# Patient Record
Sex: FEMALE | Race: WHITE | NOT HISPANIC OR LATINO | Employment: FULL TIME | URBAN - METROPOLITAN AREA
[De-identification: names, ages, dates, MRNs, and addresses within clinical notes are randomized per-mention and may not be internally consistent; named-entity substitution may affect disease eponyms.]

---

## 2017-02-02 ENCOUNTER — OFFICE VISIT (OUTPATIENT)
Dept: OPTOMETRY | Facility: CLINIC | Age: 62
End: 2017-02-02
Payer: COMMERCIAL

## 2017-02-02 DIAGNOSIS — E11.9 TYPE 2 DIABETES MELLITUS WITHOUT RETINOPATHY (H): Primary | ICD-10-CM

## 2017-02-02 DIAGNOSIS — H52.201 MYOPIA OF RIGHT EYE WITH ASTIGMATISM AND PRESBYOPIA: ICD-10-CM

## 2017-02-02 DIAGNOSIS — H52.4 HYPEROPIA WITH PRESBYOPIA OF LEFT EYE: ICD-10-CM

## 2017-02-02 DIAGNOSIS — H52.11 MYOPIA OF RIGHT EYE WITH ASTIGMATISM AND PRESBYOPIA: ICD-10-CM

## 2017-02-02 DIAGNOSIS — H26.9 CATARACT OF BOTH EYES: ICD-10-CM

## 2017-02-02 DIAGNOSIS — H02.839 DERMATOCHALASIS OF EYELID: ICD-10-CM

## 2017-02-02 DIAGNOSIS — H52.02 HYPEROPIA WITH PRESBYOPIA OF LEFT EYE: ICD-10-CM

## 2017-02-02 DIAGNOSIS — H52.4 MYOPIA OF RIGHT EYE WITH ASTIGMATISM AND PRESBYOPIA: ICD-10-CM

## 2017-02-02 PROCEDURE — 92015 DETERMINE REFRACTIVE STATE: CPT | Performed by: OPTOMETRIST

## 2017-02-02 PROCEDURE — 92014 COMPRE OPH EXAM EST PT 1/>: CPT | Performed by: OPTOMETRIST

## 2017-02-02 ASSESSMENT — EXTERNAL EXAM - RIGHT EYE: OD_EXAM: NORMAL

## 2017-02-02 ASSESSMENT — TONOMETRY
OD_IOP_MMHG: 19
OS_IOP_MMHG: 19
IOP_METHOD: APPLANATION

## 2017-02-02 ASSESSMENT — VISUAL ACUITY
METHOD: SNELLEN - LINEAR
OD_SC: 20/20
OS_CC: 20/40
OS_SC: 20/120
OD_CC: 20/30
OD_SC: 20/60
OD_CC+: -1
OS_SC: 20/40
OD_SC+: -1
OS_CC: 20/40
OD_CC: 20/20
CORRECTION_TYPE: GLASSES

## 2017-02-02 ASSESSMENT — REFRACTION_MANIFEST
OD_AXIS: 010
OS_ADD: +2.25
OD_SPHERE: -0.75
OD_ADD: +2.25
OS_SPHERE: +1.00
OD_CYLINDER: +0.50
OS_CYLINDER: SPHERE

## 2017-02-02 ASSESSMENT — REFRACTION_WEARINGRX
OD_CYLINDER: +0.50
SPECS_TYPE: BIFOCAL
OS_AXIS: 039
OS_SPHERE: +0.75
OS_CYLINDER: +0.25
OS_ADD: +2.50
OD_SPHERE: -1.25
OD_AXIS: 174
OD_ADD: +2.50

## 2017-02-02 ASSESSMENT — SLIT LAMP EXAM - LIDS
COMMENTS: 1+ DERMATOCHALASIS - UPPER LID
COMMENTS: 1+ DERMATOCHALASIS - UPPER LID

## 2017-02-02 ASSESSMENT — CUP TO DISC RATIO
OS_RATIO: 0.4
OD_RATIO: 0.3

## 2017-02-02 ASSESSMENT — EXTERNAL EXAM - LEFT EYE: OS_EXAM: NORMAL

## 2017-02-02 ASSESSMENT — CONF VISUAL FIELD
OS_NORMAL: 1
OD_NORMAL: 1

## 2017-02-02 NOTE — Clinical Note
[facility]  Optometry Department      2/2/2017    Re:  Kenna MANE Zownirowycz  1955   [id number]    Dear Dr. Layne,    Your patient was seen in our office on 2/2/2017 for a dilated diabetic eye exam.      Findings:    No Retinopathy  OU - Both  Mild Cataracts both eyes  Dermatochalasis of eyelid    Comments:  Kenna should return for a comprehensive eye exam in 1 year.    Sincerely,        Emi Savage O.D  [address]. [place]  [address]    [phone]

## 2017-02-02 NOTE — PATIENT INSTRUCTIONS
Monitor, Keep blood sugar under control  Sent letter to primary care provider regarding diabetes  Monitor cataracts and dermatochalasis by having yearly exams.  Wear sunglasses when outside.  A final glasses prescription was given.  Allow time for adaptation.  The glasses may cause dizziness and affect depth perception for awhile.  Return to clinic 1 year for Comprehensive Vision Exam      Emi Savage O.D  [address]. [facility] [place]  [address]    [phone]

## 2017-02-02 NOTE — PROGRESS NOTES
Chief Complaint   Patient presents with     Diabetic Eye Exam     Accompanied by self  A1C      7.0   8/4/2016  A1C      6.1   10/26/2015  A1C      5.9   6/25/2015  A1C      6.0   12/31/2014  A1C      6.2   9/4/2014    Last Eye Exam: 2 years ago  Dilated Previously: Yes    What are you currently using to see?  glasses    Distance Vision Acuity: Satisfied with vision    Near Vision Acuity: Satisfied with vision while reading  with glasses    Eye Comfort: good  Do you use eye drops? : No  Occupation or Hobbies:     Sarai Almazan, Optometric Tech     Medical, surgical and family histories reviewed and updated 2/2/2017.       OBJECTIVE: See Ophthalmology exam    ASSESSMENT:    ICD-10-CM    1. Type 2 diabetes mellitus without retinopathy (H) E11.9 EYE EXAM (SIMPLE-NONBILLABLE)   2. Cataract of both eyes H26.9 EYE EXAM (SIMPLE-NONBILLABLE)   3. Dermatochalasis of eyelid H02.839 EYE EXAM (SIMPLE-NONBILLABLE)   4. Myopia of right eye with astigmatism and presbyopia H52.11 EYE EXAM (SIMPLE-NONBILLABLE)    H52.201 REFRACTION    H52.4    5. Hyperopia with presbyopia of left eye H52.02 EYE EXAM (SIMPLE-NONBILLABLE)    H52.4 REFRACTION      PLAN:  Monitor, Keep blood sugar under control  Sent letter to primary care provider regarding diabetes.  Kenna Miranda aware  eye exam results will be sent to Carolyne Layne.  Monitor cataracts and dermatochalasis by having yearly exams.  Wear sunglasses when outside.  A final glasses prescription was given.  Allow time for adaptation.  The glasses may cause dizziness and affect depth perception for awhile.  Return to clinic 1 year for Comprehensive Vision Exam      Emi Savage O.D  [address]. [facility] [place]  [address]    [phone]

## 2017-02-02 NOTE — MR AVS SNAPSHOT
After Visit Summary   2/2/2017    Kenna Houghwnirowycz    MRN: [id number]           Patient Information     Date Of Birth          1955        Visit Information        Provider Department      2/2/2017 3:30 PM Emi Savage OD [facility]        Today's Diagnoses     Type 2 diabetes mellitus without retinopathy (H)    -  1     Cataract of both eyes         Dermatochalasis of eyelid         Myopia of right eye with astigmatism and presbyopia         Hyperopia with presbyopia of left eye           Care Instructions        Monitor, Keep blood sugar under control  Sent letter to primary care provider regarding diabetes  Monitor cataracts and dermatochalasis by having yearly exams.  Wear sunglasses when outside.  A final glasses prescription was given.  Allow time for adaptation.  The glasses may cause dizziness and affect depth perception for awhile.  Return to clinic 1 year for Comprehensive Vision Exam      Emi Savage O.D  [facility]  [address] [place] [address]. [place]  [address] [phone]              Follow-ups after your visit        Follow-up notes from your care team     Return in about 1 year (around 2/2/2018) for Eye Exam.      Your next 10 appointments already scheduled     Jun 16, 2017  3:00 PM   (Arrive by 2:45 PM)   Return Visit with Kaycee Meng MD   [facility] ([facility])    [address] [place] [id number] [phone]              Who to contact     If you have questions or need follow up information about today's clinic visit or your schedule please contact [facility] directly at [phone].  Normal or non-critical lab and imaging results will be communicated to you by MyChart, letter or phone within 4 business days after the clinic has received the results. If you do not hear from us within 7 days, please contact the clinic through MyChart or  phone. If you have a critical or abnormal lab result, we will notify you by phone as soon as possible.  Submit refill requests through [employer] or call your pharmacy and they will forward the refill request to us. Please allow 3 business days for your refill to be completed.          Additional Information About Your Visit        [employer] Information     [employer] gives you secure access to your electronic health record. If you see a primary care provider, you can also send messages to your care team and make appointments. If you have questions, please call your primary care clinic.  If you do not have a primary care provider, please call [phone] and they will assist you.        Care EveryWhere ID     This is your Care EveryWhere ID. This could be used by other organizations to access your [facility] medical records  [id number]         Blood Pressure from Last 3 Encounters:   12/16/16 147/81   09/01/16 137/84   06/13/16 156/85    Weight from Last 3 Encounters:   12/16/16 99.111 kg (218 lb 8 oz)   12/15/16 97.977 kg (216 lb)   09/01/16 99.338 kg (219 lb)              We Performed the Following     EYE EXAM (SIMPLE-NONBILLABLE)     REFRACTION        Primary Care Provider Office Phone # Fax #    Carolyne Layne [phone]       [address] [address]        Thank you!     Thank you for choosing [facility]  for your care. Our goal is always to provide you with excellent care. Hearing back from our patients is one way we can continue to improve our services. Please take a few minutes to complete the written survey that you may receive in the mail after your visit with us. Thank you!             Your Updated Medication List - Protect others around you: Learn how to safely use, store and throw away your medicines at www.disposemymeds.org.          This list is accurate as of: 2/2/17  4:41 PM.  Always use your most recent med list.                    Brand Name Dispense Instructions for use    anastrozole 1 MG tablet    ARIMIDEX    90 tablet    Take 1 tablet (1 mg) by mouth daily       aspirin 81 MG tablet      1 TABLET DAILY       atorvastatin 20 MG tablet    LIPITOR    26 tablet    Twice a week       levothyroxine 125 MCG tablet    LEVOXYL    90 tablet    Take 1 tablet (125 mcg) by mouth every morning       losartan-hydrochlorothiazide 50-12.5 MG per tablet    HYZAAR    90 tablet    Take 1 tablet by mouth daily       metoprolol 25 MG tablet    LOPRESSOR    180 tablet    Take 1 tablet (25 mg) by mouth 2 times daily       MULTIPLE VITAMINS/WOMENS PO      1 tablet daily       vitamin D 1000 UNITS capsule      Take 1 capsule by mouth At Bedtime.

## 2017-02-07 DIAGNOSIS — E03.8 OTHER SPECIFIED HYPOTHYROIDISM: Primary | ICD-10-CM

## 2017-02-07 NOTE — TELEPHONE ENCOUNTER
+++Med Sync patient requesting 90 day supply+++    This patient is enrolled in our Medication Synchronization Program.  Med Sync coordinates a patient's prescriptions to be filled on the same day.  Our goal is to help our patients better understand their medication therapy and achieve optimum health outcomes.    By authorizing, we will be able to synchronize these medications to be due for refill authorization from your office at the same time.    Thank you for your cooperation.  We are pleased to partner with you in the care of our patient.          Levothyroxine Sodium 125 mcg tablet  Last Written Prescription Date: 07/11/16  Last Quantity: 90, # refills: 1  Last Office Visit with [facility], [facility] or [facility] prescribing provider: 12/15/16       TSH   Date Value Ref Range Status   08/04/2016 4.68* 0.40 - 4.00 mU/L Final     Alena Mcduffie CPhT  On Behalf of [facility]

## 2017-02-09 RX ORDER — LEVOTHYROXINE SODIUM 125 UG/1
125 TABLET ORAL EVERY MORNING
Qty: 90 TABLET | Refills: 0 | Status: SHIPPED | OUTPATIENT
Start: 2017-02-09 | End: 2017-05-08

## 2017-02-09 NOTE — TELEPHONE ENCOUNTER
Routing refill request to provider for review/approval because:  Labs out of range:  Last TSH was abnormal.    Ruth Luz RN [facility] Triage.

## 2017-05-08 DIAGNOSIS — E03.8 OTHER SPECIFIED HYPOTHYROIDISM: ICD-10-CM

## 2017-05-08 DIAGNOSIS — I10 ESSENTIAL HYPERTENSION WITH GOAL BLOOD PRESSURE LESS THAN 140/90: ICD-10-CM

## 2017-05-08 NOTE — TELEPHONE ENCOUNTER
levothyroxine (LEVOXYL) 125 MCG tablet     Last Written Prescription Date: 2-9-17  Last Quantity: 90, # refills: 0  Last Office Visit with [facility], [facility] or [facility] prescribing provider: 12-15-16        TSH   Date Value Ref Range Status   08/04/2016 4.68 (H) 0.40 - 4.00 mU/L Final     metoprolol (LOPRESSOR) 25 MG tablet      Last Written Prescription Date: 7-11-16  Last Fill Quantity: 180, # refills: 2    Last Office Visit with [facility], [facility] or [facility] prescribing provider:  12-15-16   Future Office Visit:        BP Readings from Last 3 Encounters:   12/16/16 147/81   09/01/16 137/84   06/13/16 156/85

## 2017-05-10 RX ORDER — METOPROLOL TARTRATE 25 MG/1
TABLET, FILM COATED ORAL
Qty: 180 TABLET | Refills: 1 | Status: SHIPPED | OUTPATIENT
Start: 2017-05-10 | End: 2017-09-07

## 2017-05-10 RX ORDER — LEVOTHYROXINE SODIUM 125 UG/1
TABLET ORAL
Qty: 90 TABLET | Refills: 0 | Status: SHIPPED | OUTPATIENT
Start: 2017-05-10 | End: 2017-08-07

## 2017-05-10 NOTE — TELEPHONE ENCOUNTER
Routing refill request to provider for review/approval because:  Labs/ blood pressure out of range.     Peggy Marsh RN  [facility]

## 2017-06-01 ENCOUNTER — OFFICE VISIT (OUTPATIENT)
Dept: NUTRITION | Facility: CLINIC | Age: 62
End: 2017-06-01
Payer: COMMERCIAL

## 2017-06-01 VITALS — BODY MASS INDEX: 39.3 KG/M2 | WEIGHT: 208 LBS

## 2017-06-01 DIAGNOSIS — R80.9 TYPE 2 DIABETES MELLITUS WITH MICROALBUMINURIA, WITHOUT LONG-TERM CURRENT USE OF INSULIN (H): Primary | ICD-10-CM

## 2017-06-01 DIAGNOSIS — E11.29 TYPE 2 DIABETES MELLITUS WITH MICROALBUMINURIA, WITHOUT LONG-TERM CURRENT USE OF INSULIN (H): Primary | ICD-10-CM

## 2017-06-01 DIAGNOSIS — I10 ESSENTIAL HYPERTENSION WITH GOAL BLOOD PRESSURE LESS THAN 140/90: ICD-10-CM

## 2017-06-01 PROCEDURE — 97802 MEDICAL NUTRITION INDIV IN: CPT

## 2017-06-01 NOTE — PATIENT INSTRUCTIONS
Have balanced meals 3 times a day - include protein (1/4 plate), whole grain or starchy vegetable (1/4 plate), fruit (1/2 cup), non-starchy vegetable (1/2 plate).     Include dairy 1-2 times a day (milk with breakfast and yogurt with lunch)    Best snack options - fruit, yogurt, raw vegetables or small amount of nuts (handful)    Keep sweetened beverage servings to 4-6 oz once a day at most    Get back into drinking water more often    Keep a food journal    For physical activity  -Start slowly with walking the dogs or on the treadmill (10-15 minutes most days) and work up to 150 minutes a week (30 minutes, 5 days a week)  - Include 2-3 days of strength training (take the stairs a few extra times a day, use light weights, cans of food or water bottles for upper arm strength exercise)    Follow-up on Thursday, August 10 at 1 pm at the [facility] with Chantal

## 2017-06-01 NOTE — MR AVS SNAPSHOT
After Visit Summary   6/1/2017    Kenna Bravonimadhuriycz    MRN: [id number]           Patient Information     Date Of Birth          1955        Visit Information        Provider Department      6/1/2017 8:30 AM CP NUTRITION RESOURCE [facility]        Care Instructions    Have balanced meals 3 times a day - include protein (1/4 plate), whole grain or starchy vegetable (1/4 plate), fruit (1/2 cup), non-starchy vegetable (1/2 plate).     Include dairy 1-2 times a day (milk with breakfast and yogurt with lunch)    Best snack options - fruit, yogurt, raw vegetables or small amount of nuts (handful)    Keep sweetened beverage servings to 4-6 oz once a day at most    Get back into drinking water more often    Keep a food journal    For physical activity  -Start slowly with walking the dogs or on the treadmill (10-15 minutes most days) and work up to 150 minutes a week (30 minutes, 5 days a week)  - Include 2-3 days of strength training (take the stairs a few extra times a day, use light weights, cans of food or water bottles for upper arm strength exercise)    Follow-up on Thursday, August 10 at 1 pm at the [facility] with Chantal          Follow-ups after your visit        Your next 10 appointments already scheduled     Jun 19, 2017  3:30 PM CDT   (Arrive by 3:15 PM)   Return Visit with Kaycee Meng MD   [facility] ([facility])    [address] [place] [id number] [phone]            Aug 10, 2017  1:00 PM CDT   Office Visit with CP DIABETIC ED RESOURCE   [facility]    [address] [facility] [id number] [phone]           Bring a current list of meds and any records pertaining to this visit.  For Physicals, please bring immunization records and any forms needing to be filled out.  Please arrive 10  minutes early to complete paperwork.              Who to contact     If you have questions or need follow up information about today's clinic visit or your schedule please contact [facility] directly at [phone].  Normal or non-critical lab and imaging results will be communicated to you by MyChart, letter or phone within 4 business days after the clinic has received the results. If you do not hear from us within 7 days, please contact the clinic through MyChart or phone. If you have a critical or abnormal lab result, we will notify you by phone as soon as possible.  Submit refill requests through [employer] or call your pharmacy and they will forward the refill request to us. Please allow 3 business days for your refill to be completed.          Additional Information About Your Visit        [employer] Information     [employer] gives you secure access to your electronic health record. If you see a primary care provider, you can also send messages to your care team and make appointments. If you have questions, please call your primary care clinic.  If you do not have a primary care provider, please call [phone] and they will assist you.        Care EveryWhere ID     This is your Care EveryWhere ID. This could be used by other organizations to access your [facility] medical records  [id number]         Blood Pressure from Last 3 Encounters:   12/16/16 147/81   09/01/16 137/84   06/13/16 156/85    Weight from Last 3 Encounters:   12/16/16 99.1 kg (218 lb 8 oz)   12/15/16 98 kg (216 lb)   09/01/16 99.3 kg (219 lb)              Today, you had the following     No orders found for display       Primary Care Provider Office Phone # Fax #    Carolyne Layne [phone]       [facility] [address]  [facility] [place] [address]        Thank you!     Thank you for choosing [facility]  for your care. Our goal is always to provide you with  excellent care. Hearing back from our patients is one way we can continue to improve our services. Please take a few minutes to complete the written survey that you may receive in the mail after your visit with us. Thank you!             Your Updated Medication List - Protect others around you: Learn how to safely use, store and throw away your medicines at www.disposemymeds.org.          This list is accurate as of: 6/1/17  9:15 AM.  Always use your most recent med list.                   Brand Name Dispense Instructions for use    anastrozole 1 MG tablet    ARIMIDEX    90 tablet    Take 1 tablet (1 mg) by mouth daily       aspirin 81 MG tablet      1 TABLET DAILY       atorvastatin 20 MG tablet    LIPITOR    26 tablet    Twice a week       levothyroxine 125 MCG tablet    SYNTHROID/LEVOTHROID    90 tablet    TAKE ONE TABLET BY MOUTH EVERY MORNING       losartan-hydrochlorothiazide 50-12.5 MG per tablet    HYZAAR    90 tablet    Take 1 tablet by mouth daily       metoprolol 25 MG tablet    LOPRESSOR    180 tablet    TAKE ONE TABLET BY MOUTH TWICE A DAY       MULTIPLE VITAMINS/WOMENS PO      1 tablet daily       vitamin D 1000 UNITS capsule      Take 1 capsule by mouth At Bedtime.

## 2017-06-01 NOTE — PROGRESS NOTES
Medical Nutrition Therapy  Visit Type:Initial assessment and intervention    Kenna Miranda presents today for MNT and education related to type 2 diabetes and weight management.   She is accompanied by self.     ASSESSMENT:   Patient comments/concerns relating to nutrition: reports that she has tried multiple weight loss plans in the past - Radha Pravin, Weight Watchers and [employer]. Had the most success with [employer], however felt this was not the healthiest way to lose weight as the supplements they required made her ill. She has been working on weight loss independently and would like further guidance for continued weight loss. She is going to stop working in a couple of weeks, as the stress of her job is not allowing her to take very good care of herself. She feels that she will be better able to focus on her health once she is no longer working in her stressful position.     When she is no longer working, she plans to resume [employer] breakfast - scrambled eggs, 1/2 cup brown rice, fruit, milk. Thinking that she may do a salad with protein for lunch when at home. She is looking forward to being able to cook dinner meals at home more often, as well.    NUTRITION HISTORY:    Breakfast: [employer] granola bars, coffee with creamer  Lunch: fresh fruit - cantaloupe, watermelon or applesauce  Dinner: often out for dinner or take out - steak and potatoes OR pizza  Snacks: not often now, but anticipates she may snack when she is no longer working  Beverages: Juice 1/day, Pop occasionally/day, Tea (hot) 1 large/day, Coffee 1/day and Water     Misses meals? no  Eats out:  A few meals/per week     Previous diet education:  No     Food allergies/intolerances: none reported    Diet is high in: carbohydrates at some sittings  Diet is low in: vegetables    EXERCISE: no regular exercise program, plans to begin walking her dogs or on her treadmill    SOCIO/ECONOMIC:   Lives with: spouse    MEDICATIONS:  Current  Outpatient Prescriptions   Medication     levothyroxine (SYNTHROID/LEVOTHROID) 125 MCG tablet     metoprolol (LOPRESSOR) 25 MG tablet     anastrozole (ARIMIDEX) 1 MG tablet     atorvastatin (LIPITOR) 20 MG tablet     losartan-hydrochlorothiazide (HYZAAR) 50-12.5 MG per tablet     Cholecalciferol (VITAMIN D) 1000 UNITS capsule     MULTIPLE VITAMINS/WOMENS OR     ASPIRIN 81 MG OR TABS     No current facility-administered medications for this visit.        LABS:  Last Basic Metabolic Panel:  Lab Results   Component Value Date     12/15/2016      Lab Results   Component Value Date    POTASSIUM 3.8 12/15/2016     Lab Results   Component Value Date    CHLORIDE 103 12/15/2016     Lab Results   Component Value Date    CARLYN 9.3 12/15/2016     Lab Results   Component Value Date    CO2 27 12/15/2016     Lab Results   Component Value Date    BUN 17 12/15/2016     Lab Results   Component Value Date    CR 0.91 12/15/2016     Lab Results   Component Value Date     12/15/2016       ANTHROPOMETRICS:  Vitals: Wt 94.3 kg (208 lb)  BMI 39.3 kg/m2  Body mass index is 39.3 kg/(m^2).      Wt Readings from Last 5 Encounters:   06/01/17 94.3 kg (208 lb)   12/16/16 99.1 kg (218 lb 8 oz)   12/15/16 98 kg (216 lb)   09/01/16 99.3 kg (219 lb)   06/13/16 97.3 kg (214 lb 9.6 oz)       Weight Change: weight is down ~10 lbs in 6 months    ESTIMATED KCAL REQUIREMENTS:  1730 kcal per day    NUTRITION DIAGNOSIS: Overweight/Obesity related to historical eating pattern and stress as evidenced by Body mass index is 39.3 kg/(m^2).     NUTRITION INTERVENTION:  Nutrition Prescription: Energy Intake: 2736-9968 kcal/day  Education given to support: general nutrition guidelines, weight reduction, consistent meals, artificial sweeteners, exercise, fiber, behavior modification and portion control  Education Materials Provided: My Plate Planner/Choose My Plate and Carbohydrate Counting  Motivational Interviewing - patient is motivated and in action  stage of change - she reports high level of confidence that she will be able to make further changes to diet and exercise.     PATIENT'S BEHAVIOR CHANGE GOALS:   See Patient Instructions for patient stated behavior change goals. AVS was printed and given to patient at today's appointment.    MONITOR / EVALUATE:  RD will monitor/evaluate:  Blood Glucose / A1c  Food and nutrition knowledge / skills  Food / Beverage / Nutrient intake   Progress toward meeting stated nutrition-related goals  Readiness to change nutrition-related behaviors  Weight change    FOLLOW-UP:  Follow-up appointment scheduled on 8/10/17.     Chantal Hoover, MPH RD LD CDE   Time spent in minutes: 50  Encounter: Individual

## 2017-06-14 ASSESSMENT — ENCOUNTER SYMPTOMS
MUSCLE WEAKNESS: 0
JOINT SWELLING: 0
HYPOTENSION: 0
TACHYCARDIA: 0
BACK PAIN: 0
LIGHT-HEADEDNESS: 0
EXERCISE INTOLERANCE: 0
ORTHOPNEA: 0
LEG PAIN: 1
STIFFNESS: 0
MUSCLE CRAMPS: 0
MYALGIAS: 0
PALPITATIONS: 0
HYPERTENSION: 0
CLAUDICATION: 0
SLEEP DISTURBANCES DUE TO BREATHING: 0
ARTHRALGIAS: 0
NECK PAIN: 0
LEG SWELLING: 1

## 2017-06-19 ENCOUNTER — ONCOLOGY VISIT (OUTPATIENT)
Dept: ONCOLOGY | Facility: CLINIC | Age: 62
End: 2017-06-19
Attending: INTERNAL MEDICINE
Payer: COMMERCIAL

## 2017-06-19 VITALS
BODY MASS INDEX: 38.37 KG/M2 | HEART RATE: 82 BPM | SYSTOLIC BLOOD PRESSURE: 199 MMHG | TEMPERATURE: 98.5 F | HEIGHT: 62 IN | RESPIRATION RATE: 12 BRPM | DIASTOLIC BLOOD PRESSURE: 93 MMHG | OXYGEN SATURATION: 94 % | WEIGHT: 208.5 LBS

## 2017-06-19 DIAGNOSIS — C50.919 MALIGNANT NEOPLASM OF FEMALE BREAST, UNSPECIFIED LATERALITY, UNSPECIFIED SITE OF BREAST: Primary | ICD-10-CM

## 2017-06-19 PROCEDURE — 99212 OFFICE O/P EST SF 10 MIN: CPT

## 2017-06-19 PROCEDURE — 99214 OFFICE O/P EST MOD 30 MIN: CPT | Mod: GC | Performed by: INTERNAL MEDICINE

## 2017-06-19 ASSESSMENT — PAIN SCALES - GENERAL: PAINLEVEL: NO PAIN (0)

## 2017-06-19 NOTE — PROGRESS NOTES
[facility] CANCER CLINIC  ONCOLOGY FOLLOW-UP VISIT NOTE    PATIENT NAME: Kenna Miranda    YOB: 1955  MRN :[id number]  DATE OF VISIT: Jun 19, 2017    Diagnosis: Locally advanced breast cancer .     History of present Illness :  Ms. Miranda is a 61-year-old female with a history of hypertension, hyperlipidemia and diabetes and a stage I, ER positive , Her2 negative LEFT breast cancer. Briefly, Kenna has been getting regular screening mammographies. She reports she had a breast biopsy performed approximately 5 years ago for what turned out to be a cyst. In mammogram, which was performed in 10/2012, a small, spiculated asymmetry was appreciated in the posteromedial left breast. There was no evidence of malignancy in the right breast. She went on to have a diagnostic mammogram of the left breast on 10/18. This revealed an 8 x 11 x 7-mm mass in the left breast. This was approximately 9 cm from the nipple at 10 o'clock. She went on for an ultrasound that was performed that same day and subsequently ultrasound core biopsy performed. The actual pathology from the biopsy, which was performed on 10/24, revealed an invasive adenocarcinoma, solid and cribriform types. This was well-differentiated and Rajni grade 1. There were no calcifications present. Necrosis was absent. There was associated grade 1 intraductal carcinoma. This was 100% positive for estrogen and 80% positive for progesterone. HER2 testing was performed by FISH was negative with a ratio of HER2/CEP17 signals of 1.3. She went on to have a left lumpectomy performed by Jesus Vicente on 11/05/2012, as well as a left axillary sentinel lymph node biopsy. The left breast lumpectomy specimen revealed infiltrating ductal carcinoma, histologic [place] grade 1. The ultimate size was 1.1 x 0.7 cm. There was associated ductal carcinoma in situ with cribriform and micropapillary type, nuclear grade 2, representing  "approximately 13% of the tumor and extending beyond the main tumor. The margins were negative. The closest margin was 0.2 cm away, giving her a final pathologic staging of pT1c N0 MX. One sentinel lymph node was removed; this was negative. There was hemangioma present in the perinodal soft tissue. Oncotype DX returned at 12. This gives Kenna a chance of distant recurrence of 8% in 10 years, after 5 years of tamoxifen. With these results, she was recommended proceeding with radiation and adjuvant AI. No chemotherapy was indicated.  She completed radiation in January 2013.  She began arimidex shortly thereafter.    INTERVAL HISTORY : Kenna returns for a routine followup visit.  She has been doing relatively well.  She is tolerating Arimidex with minimal side effects.  She reports chronic cough which she has been experiencing for the past 6 months.  She describes the cough as dry.  It usually gets worse at night.  She denies having fever or chills.  She has had intermittent heartburn which usually improves with Tums.  Otherwise, she has no significant GERD symptoms.  She denies taking any new medications recently.  She denies having a runny nose or postnasal drip.  She denies a history of allergic rhinitis or bronchitis.  Her blood pressure also has been running high recently.  She thinks this is related to stress at her workplaces.  She is very happy that she retired just last week.      She gets intermittent hot flashes and mild arthralgias related to Arimidex.  Otherwise, she denies vaginal dryness.  Her mood has been good.  She reports stable energy.  However, she has not been exercising regularly.  She has no new lumps or bumps.       ROS: 10 point ROS neg other than the symptoms noted above in the HPI.    PHYSICAL EXAM :  BP (!) 199/93  Pulse 82  Temp 98.5  F (36.9  C) (Oral)  Resp 12  Ht 1.57 m (5' 1.81\")  Wt 94.6 kg (208 lb 8 oz)  SpO2 94%  BMI 38.37 kg/m2   GENERAL : Alert and oriented , not in distress " .   HEENT: Normocephalic head.  Anicteric sclerae. Moist buccal mucosa. No oral ulceration. Pupils are equal and reactive bilaterally. Normal extraocular eye movements. No conjunctival injection.    NECK: Supple, no thyromegaly.   LYMPH:  No cervical, supraclavicular, axillary, epitrochlear, or inguinal lymphadenopathy.  BREAST: Without palpable abnormalities, no overlying skin abnormalities.  No palpable lesions.   LUNGS: Clear breath sounds bilaterally, no wheezing, no crackles.    CARDIOVASCULAR: S1 and S2 well heard, regular rate and rhythm, no murmur or gallop. JVP absent.    ABDOMEN: Soft, nontender, positive bowel sounds. No organomegaly was appreciated.  EXTREMITIES: No cyanosis, no clubbing, no edema.    SKIN: No skin rash, no purpura or petechiae.    NEUROLOGIC: Normal mental status. Alert and oriented .Cranial nerves II through XII grossly intact.  No focal weakness. Sensory examination grossly intact.  Normal coordination. Romberg`s sign is negative.    ASSESSMENT AND PLAN:  61-year-old female with a T1c N0 M0, infiltrating ductal carcinoma of the left breast, ER/CT-positive, HER2/kenia-negative.     1.  Left breast cancer.  She is status post left lumpectomy and sentinel lymph node biopsy.  Her Oncotype DX score was 12 and she did not require adjuvant chemotherapy.  She had adjuvant radiation.  She has been on Arimidex since 01/2013.  Now she is approaching 4-1/2 years.  She is doing well without clinical evidence of disease recurrence on today's examination.  We plan to continue Arimidex for at least 5 years.  We discussed the pros and cons of continuing Arimidex for total of 10 years.  We discussed about the overall benefit based on MA-17R trial.  The patient expressed her interest to continue Arimidex for a total of 10 years.  She will return back to clinic with a mammogram in 6 months.      2.  Bone health.  She had a DEXA scan in 11/2015 which showed a T-score of -1.4 at the left femoral neck.  She  has been on calcium and vitamin D supplementation.  In the past we discussed about the possibility of Reclast.  She needs to see a dentist sometimes in the future to get clearance for it and see if she needs any dental work prior to starting Reclast.  This will be readdressed at the next visit.     3.  Hypertension.  Her blood pressure is significantly elevated today.  The patient also reports that her blood pressure has been running on the higher side in the past few weeks.  She thinks it is related with stress related to her job.  She has been retired for the past 1 week.  However, her blood pressure remains high.  She states that she has been compliant with her blood pressure medications.  We repeated her blood pressure and it remained high.  She has not been symptomatic.  We advised her to increase her dose of metoprolol tonight to 50 mg and then recheck her blood pressure in the morning and to contact her primary care physician for further guidance and adjustment of her blood pressure medications.  We also advised her to contact her primary care physician or to go to the emergency room if she gets any headache, neck stiffness, chest pain or any worrisome symptoms.      4.  Obesity.  We encouraged her to lose weight and exercise regularly.     5.  Health care maintenance.  The patient was encouraged to continue taking calcium and vitamin D.  We also discussed the importance of regular exercise with at least 150 minutes per week.         Patient seen and plan discussed with Dr Karyn Boss MD  Hematology and Oncology Fellow  [phone] (Pager)       Pt was seen and evaluated by me with Dr Boss.  Reviewed labs and mammogram.  Breast examination reveals no nodules or masses.  I did recheck BP manually and it was 182/92.  I asked pt to increase her metoprolol to 50 mg this evening, and to check back with her PCP tomorrow.    Mammogram in 6 months.    Kaycee Meng

## 2017-06-19 NOTE — LETTER
6/19/2017       RE: Kenna Miranda  [address] [facility] [address]     Dear Colleague,    Thank you for referring your patient, Kenna Miranda, to the [facility] CANCER CLINIC. Please see a copy of my visit note below.    [facility] CANCER CLINIC  ONCOLOGY FOLLOW-UP VISIT NOTE    PATIENT NAME: Kenna Miranda    YOB: 1955  MRN :[id number]  DATE OF VISIT: Jun 19, 2017    Diagnosis: Locally advanced breast cancer .     History of present Illness :  Ms. Miranda is a 61-year-old female with a history of hypertension, hyperlipidemia and diabetes and a stage I, ER positive , Her2 negative LEFT breast cancer. Briefly, Kenna has been getting regular screening mammographies. She reports she had a breast biopsy performed approximately 5 years ago for what turned out to be a cyst. In mammogram, which was performed in 10/2012, a small, spiculated asymmetry was appreciated in the posteromedial left breast. There was no evidence of malignancy in the right breast. She went on to have a diagnostic mammogram of the left breast on 10/18. This revealed an 8 x 11 x 7-mm mass in the left breast. This was approximately 9 cm from the nipple at 10 o'clock. She went on for an ultrasound that was performed that same day and subsequently ultrasound core biopsy performed. The actual pathology from the biopsy, which was performed on 10/24, revealed an invasive adenocarcinoma, solid and cribriform types. This was well-differentiated and [place] grade 1. There were no calcifications present. Necrosis was absent. There was associated grade 1 intraductal carcinoma. This was 100% positive for estrogen and 80% positive for progesterone. HER2 testing was performed by FISH was negative with a ratio of HER2/CEP17 signals of 1.3. She went on to have a left lumpectomy performed by Jesus Vicente on 11/05/2012, as well as a left axillary sentinel lymph node biopsy. The left breast  lumpectomy specimen revealed infiltrating ductal carcinoma, histologic [place] grade 1. The ultimate size was 1.1 x 0.7 cm. There was associated ductal carcinoma in situ with cribriform and micropapillary type, nuclear grade 2, representing approximately 13% of the tumor and extending beyond the main tumor. The margins were negative. The closest margin was 0.2 cm away, giving her a final pathologic staging of pT1c N0 MX. One sentinel lymph node was removed; this was negative. There was hemangioma present in the perinodal soft tissue. Oncotype DX returned at 12. This gives Kenna a chance of distant recurrence of 8% in 10 years, after 5 years of tamoxifen. With these results, she was recommended proceeding with radiation and adjuvant AI. No chemotherapy was indicated.  She completed radiation in January 2013.  She began arimidex shortly thereafter.    INTERVAL HISTORY : Kenna returns for a routine followup visit.  She has been doing relatively well.  She is tolerating Arimidex with minimal side effects.  She reports chronic cough which she has been experiencing for the past 6 months.  She describes the cough as dry.  It usually gets worse at night.  She denies having fever or chills.  She has had intermittent heartburn which usually improves with Tums.  Otherwise, she has no significant GERD symptoms.  She denies taking any new medications recently.  She denies having a runny nose or postnasal drip.  She denies a history of allergic rhinitis or bronchitis.  Her blood pressure also has been running high recently.  She thinks this is related to stress at her workplaces.  She is very happy that she retired just last week.      She gets intermittent hot flashes and mild arthralgias related to Arimidex.  Otherwise, she denies vaginal dryness.  Her mood has been good.  She reports stable energy.  However, she has not been exercising regularly.  She has no new lumps or bumps.       ROS: 10 point ROS neg other than the  "symptoms noted above in the HPI.    PHYSICAL EXAM :  BP (!) 199/93  Pulse 82  Temp 98.5  F (36.9  C) (Oral)  Resp 12  Ht 1.57 m (5' 1.81\")  Wt 94.6 kg (208 lb 8 oz)  SpO2 94%  BMI 38.37 kg/m2   GENERAL : Alert and oriented , not in distress .   HEENT: Normocephalic head.  Anicteric sclerae. Moist buccal mucosa. No oral ulceration. Pupils are equal and reactive bilaterally. Normal extraocular eye movements. No conjunctival injection.    NECK: Supple, no thyromegaly.   LYMPH:  No cervical, supraclavicular, axillary, epitrochlear, or inguinal lymphadenopathy.  BREAST: Without palpable abnormalities, no overlying skin abnormalities.  No palpable lesions.   LUNGS: Clear breath sounds bilaterally, no wheezing, no crackles.    CARDIOVASCULAR: S1 and S2 well heard, regular rate and rhythm, no murmur or gallop. JVP absent.    ABDOMEN: Soft, nontender, positive bowel sounds. No organomegaly was appreciated.  EXTREMITIES: No cyanosis, no clubbing, no edema.    SKIN: No skin rash, no purpura or petechiae.    NEUROLOGIC: Normal mental status. Alert and oriented .Cranial nerves II through XII grossly intact.  No focal weakness. Sensory examination grossly intact.  Normal coordination. Romberg`s sign is negative.    ASSESSMENT AND PLAN:  61-year-old female with a T1c N0 M0, infiltrating ductal carcinoma of the left breast, ER/SD-positive, HER2/kenia-negative.     1.  Left breast cancer.  She is status post left lumpectomy and sentinel lymph node biopsy.  Her Oncotype DX score was 12 and she did not require adjuvant chemotherapy.  She had adjuvant radiation.  She has been on Arimidex since 01/2013.  Now she is approaching 4-1/2 years.  She is doing well without clinical evidence of disease recurrence on today's examination.  We plan to continue Arimidex for at least 5 years.  We discussed the pros and cons of continuing Arimidex for total of 10 years.  We discussed about the overall benefit based on MA-17R trial.  The patient " expressed her interest to continue Arimidex for a total of 10 years.  She will return back to clinic with a mammogram in 6 months.      2.  Bone health.  She had a DEXA scan in 11/2015 which showed a T-score of -1.4 at the left femoral neck.  She has been on calcium and vitamin D supplementation.  In the past we discussed about the possibility of Reclast.  She needs to see a dentist sometimes in the future to get clearance for it and see if she needs any dental work prior to starting Reclast.  This will be readdressed at the next visit.     3.  Hypertension.  Her blood pressure is significantly elevated today.  The patient also reports that her blood pressure has been running on the higher side in the past few weeks.  She thinks it is related with stress related to her job.  She has been retired for the past 1 week.  However, her blood pressure remains high.  She states that she has been compliant with her blood pressure medications.  We repeated her blood pressure and it remained high.  She has not been symptomatic.  We advised her to increase her dose of metoprolol tonight to 50 mg and then recheck her blood pressure in the morning and to contact her primary care physician for further guidance and adjustment of her blood pressure medications.  We also advised her to contact her primary care physician or to go to the emergency room if she gets any headache, neck stiffness, chest pain or any worrisome symptoms.      4.  Obesity.  We encouraged her to lose weight and exercise regularly.     5.  Health care maintenance.  The patient was encouraged to continue taking calcium and vitamin D.  We also discussed the importance of regular exercise with at least 150 minutes per week.         Patient seen and plan discussed with Dr Karyn Boss MD  Hematology and Oncology Fellow  [phone] (Pager)       Pt was seen and evaluated by me with Dr Boss.  Reviewed labs and mammogram.  Breast examination  reveals no nodules or masses.  I did recheck BP manually and it was 182/92.  I asked pt to increase her metoprolol to 50 mg this evening, and to check back with her PCP tomorrow.    Mammogram in 6 months.    Kaycee Meng

## 2017-06-19 NOTE — NURSING NOTE
"Oncology Rooming Note    June 19, 2017 3:14 PM   Kenna Miranda is a 61 year old female who presents for:    Chief Complaint   Patient presents with     Oncology Clinic Visit     breast ca 6 mo f/u      Initial Vitals: Pulse 82  Temp 98.5  F (36.9  C) (Oral)  Resp 12  Ht 1.57 m (5' 1.81\")  Wt 94.6 kg (208 lb 8 oz)  SpO2 94%  BMI 38.37 kg/m2 Estimated body mass index is 38.37 kg/(m^2) as calculated from the following:    Height as of this encounter: 1.57 m (5' 1.81\").    Weight as of this encounter: 94.6 kg (208 lb 8 oz). Body surface area is 2.03 meters squared.  No Pain (0) Comment: Data Unavailable   No LMP recorded. Patient is postmenopausal.  Allergies reviewed: Yes  Medications reviewed: Yes    Medications: Medication refills not needed today.  Pharmacy name entered into EPIC:    [facility] - [place], [place] - [address]  [facility] [address] [facility] - [place], [place] - [address]  [facility] - [place], [place] - [address] [facility]    Clinical concerns: no doc was NOT notified.    5 minutes for nursing intake (face to face time)     Elba Luevano CMA              "

## 2017-06-19 NOTE — MR AVS SNAPSHOT
After Visit Summary   6/19/2017    Kenna Bravonirowycz    MRN: [id number]           Patient Information     Date Of Birth          1955        Visit Information        Provider Department      6/19/2017 3:30 PM Kaycee Meng MD [facility]        Today's Diagnoses     Malignant neoplasm of female breast, unspecified laterality, unspecified site of breast (H)    -  1       Follow-ups after your visit        Your next 10 appointments already scheduled     Aug 10, 2017  1:00 PM CDT   Office Visit with CP DIABETIC ED RESOURCE   [facility]    [address] [facility] [address] [phone]           Bring a current list of meds and any records pertaining to this visit.  For Physicals, please bring immunization records and any forms needing to be filled out.  Please arrive 10 minutes early to complete paperwork.              Who to contact     If you have questions or need follow up information about today's clinic visit or your schedule please contact [facility] directly at [phone].  Normal or non-critical lab and imaging results will be communicated to you by Baroc Pubhart, letter or phone within 4 business days after the clinic has received the results. If you do not hear from us within 7 days, please contact the clinic through [employer] or phone. If you have a critical or abnormal lab result, we will notify you by phone as soon as possible.  Submit refill requests through [employer] or call your pharmacy and they will forward the refill request to us. Please allow 3 business days for your refill to be completed.          Additional Information About Your Visit        MyChart Information     [employer] gives you secure access to your electronic health record. If you see a primary care provider, you can also send messages to your care team and make appointments. If you have  "questions, please call your primary care clinic.  If you do not have a primary care provider, please call [phone] and they will assist you.        Care EveryWhere ID     This is your Care EveryWhere ID. This could be used by other organizations to access your [place] medical records  [id number]        Your Vitals Were     Pulse Temperature Respirations Height Pulse Oximetry BMI (Body Mass Index)    82 98.5  F (36.9  C) (Oral) 12 1.57 m (5' 1.81\") 94% 38.37 kg/m2       Blood Pressure from Last 3 Encounters:   06/21/17 136/82   06/19/17 (!) 199/93   12/16/16 147/81    Weight from Last 3 Encounters:   06/21/17 93.4 kg (206 lb)   06/19/17 94.6 kg (208 lb 8 oz)   06/01/17 94.3 kg (208 lb)              Today, you had the following     No orders found for display       Primary Care Provider Office Phone # Fax #    Carolyne Layne [phone]       [facility] [address] [facility] [address]        Equal Access to Services     [facility]: Hadii aad ku hadasho Soomaali, waaxda luqadaha, qaybta kaalmada adeegyada, kesha vann . So [facility] [phone].    ATENCIÓN: Si habla español, tiene a tam disposición servicios gratuitos de asistencia lingüística. Llame al [phone].    We comply with applicable federal civil rights laws and [place] laws. We do not discriminate on the basis of race, color, national origin, age, disability sex, sexual orientation or gender identity.            Thank you!     Thank you for choosing [facility] CANCER [facility]  for your care. Our goal is always to provide you with excellent care. Hearing back from our patients is one way we can continue to improve our services. Please take a few minutes to complete the written survey that you may receive in the mail after your visit with us. Thank you!             Your Updated Medication List - Protect others around you: Learn how to safely use, store and throw " away your medicines at www.disposemymeds.org.          This list is accurate as of: 6/19/17 11:59 PM.  Always use your most recent med list.                   Brand Name Dispense Instructions for use Diagnosis    anastrozole 1 MG tablet    ARIMIDEX    90 tablet    Take 1 tablet (1 mg) by mouth daily    Malignant neoplasm of female breast, unspecified laterality, unspecified site of breast (H)       aspirin 81 MG tablet      1 TABLET DAILY        atorvastatin 20 MG tablet    LIPITOR    26 tablet    Twice a week    Hyperlipidemia LDL goal <100       levothyroxine 125 MCG tablet    SYNTHROID/LEVOTHROID    90 tablet    TAKE ONE TABLET BY MOUTH EVERY MORNING    Other specified hypothyroidism       losartan-hydrochlorothiazide 50-12.5 MG per tablet    HYZAAR    90 tablet    Take 1 tablet by mouth daily    Essential hypertension with goal blood pressure less than 140/90       metoprolol 25 MG tablet    LOPRESSOR    180 tablet    TAKE ONE TABLET BY MOUTH TWICE A DAY    Essential hypertension with goal blood pressure less than 140/90       MULTIPLE VITAMINS/WOMENS PO      1 tablet daily        vitamin D 1000 UNITS capsule      Take 1 capsule by mouth At Bedtime.

## 2017-06-21 ENCOUNTER — OFFICE VISIT (OUTPATIENT)
Dept: FAMILY MEDICINE | Facility: CLINIC | Age: 62
End: 2017-06-21
Payer: COMMERCIAL

## 2017-06-21 VITALS
DIASTOLIC BLOOD PRESSURE: 82 MMHG | TEMPERATURE: 98 F | OXYGEN SATURATION: 92 % | SYSTOLIC BLOOD PRESSURE: 136 MMHG | WEIGHT: 206 LBS | HEART RATE: 79 BPM | BODY MASS INDEX: 37.91 KG/M2

## 2017-06-21 DIAGNOSIS — I10 ESSENTIAL HYPERTENSION WITH GOAL BLOOD PRESSURE LESS THAN 140/90: Primary | ICD-10-CM

## 2017-06-21 PROCEDURE — 99213 OFFICE O/P EST LOW 20 MIN: CPT | Performed by: PHYSICIAN ASSISTANT

## 2017-06-21 ASSESSMENT — PAIN SCALES - GENERAL: PAINLEVEL: NO PAIN (0)

## 2017-06-21 NOTE — PROGRESS NOTES
SUBJECTIVE:                                                    Kenna Miranda is a 61 year old female who presents to clinic today for the following health issues:    Hypertension Follow-up      Outpatient blood pressures are being checked at home.  Results are 160/120 yesterday 180/120 the day before.    Low Salt Diet: not monitoring salt, but is now starting to.       Amount of exercise or physical activity: 7days/week for an average of less than 15 minutes    Problems taking medications regularly: No    Medication side effects: none    Diet: regular (no restrictions)- starting to monitor salt.      Florencia Troy MA    Takes her meds at about 7:30am and 7:30pm  Did not take the metoprolol this morning, because yesterday it did not seem to affect the BP.  Headaches with the high BP.   No dizziness. No chest pain or shortness of breath. No new swelling in the legs.   No palpitations.   Patient notes she has been using the Hyzaar just 2 times per week.       Problem list and histories reviewed & adjusted, as indicated.  Additional history: as documented    Patient Active Problem List   Diagnosis     Borderline osteopenia     HYPERLIPIDEMIA LDL GOAL <100     Family history of colon cancer     S/P colonoscopic polypectomy     Advanced directives, counseling/discussion     Breast cancer (H)     Cataract of both eyes     Elevated LFTs     Dermatochalasis of eyelid     No diabetic retinopathy in both eyes     overweight     Tibialis posterior tendinitis, left     Essential hypertension with goal blood pressure less than 140/90     Type 2 diabetes mellitus with microalbuminuria (H)     Hypothyroidism, unspecified type     Past Surgical History:   Procedure Laterality Date     BIOPSY  Nov 2015     BIOPSY BREAST NEEDLE LOCALIZATION, BIOPSY NODE SENTINEL, COMBINED  11/5/2012    Procedure: COMBINED BIOPSY BREAST NEEDLE LOCALIZATION, BIOPSY NODE SENTINEL;  Left Breast Wire Locilized Lumpectomy and Sentinal Lymph Node  Biopsy;  Surgeon: Jesus Vicente MD;  Location: [facility] OR     C  DELIVERY ONLY      x 2     COLONOSCOPY  10-26-11    Return in 1 yr for Polyp Surveillance     COLONOSCOPY  12    Return for Colonoscopy in 3 yrs.Polyps     LITHOTRIPSY       SURGICAL HISTORY OF -       exploratory laparotomy     SURGICAL HISTORY OF -       Right ovary removed     SURGICAL HISTORY OF -       breast biopsy       Social History   Substance Use Topics     Smoking status: Never Smoker     Smokeless tobacco: Never Used      Comment: no second hand exposure.  when young Dad smoked.     Alcohol use Yes      Comment: very, very little     Family History   Problem Relation Age of Onset     Cancer - colorectal Mother      Thyroid Disease Mother      Colon Cancer Mother      CANCER Father      Lung     Lipids Father      Glaucoma Father      Macular Degeneration Father      Depression Brother      Respiratory Brother      losing hearing in 50's           Reviewed and updated as needed this visit by clinical staff       Reviewed and updated as needed this visit by Provider         ROS:  Constitutional, HEENT, cardiovascular, pulmonary, gi and gu systems are negative, except as otherwise noted.    OBJECTIVE:                                                    /82 (BP Location: Left arm, Patient Position: Chair, Cuff Size: Adult Large)  Pulse 79  Temp 98  F (36.7  C) (Oral)  Wt 206 lb (93.4 kg)  SpO2 92%  BMI 37.91 kg/m2  Body mass index is 37.91 kg/(m^2).  GENERAL: healthy, alert and no distress  CV: regular rate and rhythm, normal S1 S2, no S3 or S4, no murmur, click or rub, no peripheral edema     Diagnostic Test Results:  none      ASSESSMENT/PLAN:                                                        ICD-10-CM    1. Essential hypertension with goal blood pressure less than 140/90 I10      Blood pressure at goal today. It was found that patient had confused the directions for the atorvastatin and her Hyzaar. We  discussed taking the Hyzaar and Metoprolol daily. Patient is normal in clinic today. She will continue to monitor it at home and follow up with her PCP if regularly higher than 140/90.    FUTURE APPOINTMENTS:       - Follow-up for annual visit or as needed    Danielle Lake PA-C  [facility]

## 2017-06-21 NOTE — NURSING NOTE
"Chief Complaint   Patient presents with     Hypertension     Health Maintenance     Foot Exam, Microalbumin, A1C, and ADP       Initial /82 (BP Location: Left arm, Patient Position: Chair, Cuff Size: Adult Large)  Pulse 79  Temp 98  F (36.7  C) (Oral)  Wt 206 lb (93.4 kg)  SpO2 92%  BMI 37.91 kg/m2 Estimated body mass index is 37.91 kg/(m^2) as calculated from the following:    Height as of 6/19/17: 5' 1.81\" (1.57 m).    Weight as of this encounter: 206 lb (93.4 kg).  Medication Reconciliation: complete   Florencia Troy MA      "

## 2017-06-21 NOTE — MR AVS SNAPSHOT
After Visit Summary   6/21/2017    Kenna Andersonyclobito    MRN: [id number]           Patient Information     Date Of Birth          1955        Visit Information        Provider Department      6/21/2017 8:20 AM Danielle Lake PA-C [facility]        Care Instructions    Goal for blood pressure less than 140/90              Follow-ups after your visit        Your next 10 appointments already scheduled     Aug 10, 2017  1:00 PM CDT   Office Visit with CP DIABETIC ED RESOURCE   [facility] ([facility])    [address] [id number] [phone]           Bring a current list of meds and any records pertaining to this visit.  For Physicals, please bring immunization records and any forms needing to be filled out.  Please arrive 10 minutes early to complete paperwork.              Who to contact     If you have questions or need follow up information about today's clinic visit or your schedule please contact [facility] directly at [phone].  Normal or non-critical lab and imaging results will be communicated to you by [email or web address], letter or phone within 4 business days after the clinic has received the results. If you do not hear from us within 7 days, please contact the clinic through BioBeats or phone. If you have a critical or abnormal lab result, we will notify you by phone as soon as possible.  Submit refill requests through [employer]Beats or call your pharmacy and they will forward the refill request to us. Please allow 3 business days for your refill to be completed.          Additional Information About Your Visit        [email or web address] Information     [employer] gives you secure access to your electronic health record. If you see a primary care provider, you can also send messages to your care team and make appointments. If you have questions, please call your primary care clinic.  If  you do not have a primary care provider, please call [phone] and they will assist you.        Care EveryWhere ID     This is your Care EveryWhere ID. This could be used by other organizations to access your [facility] medical records  [id number]        Your Vitals Were     Pulse Temperature Pulse Oximetry BMI (Body Mass Index)          79 98  F (36.7  C) (Oral) 92% 37.91 kg/m2         Blood Pressure from Last 3 Encounters:   06/21/17 136/82   06/19/17 (!) 199/93   12/16/16 147/81    Weight from Last 3 Encounters:   06/21/17 206 lb (93.4 kg)   06/19/17 208 lb 8 oz (94.6 kg)   06/01/17 208 lb (94.3 kg)              Today, you had the following     No orders found for display       Primary Care Provider Office Phone # Fax #    Carolyne Layne [phone]       [facility] [address]  [facility] [address]        Equal Access to Services     EBEN ROBINS : Hadii aad ku hadasho Soomaali, waaxda luqadaha, qaybta kaalmada adeegyada, waxay idiin hayroxyn china vann . So [facility] [phone].    ATENCIÓN: Si habla español, tiene a tam disposición servicios gratuitos de asistencia lingüística. Llame al [phone].    We comply with applicable federal civil rights laws and [place] laws. We do not discriminate on the basis of race, color, national origin, age, disability sex, sexual orientation or gender identity.            Thank you!     Thank you for choosing [facility]  for your care. Our goal is always to provide you with excellent care. Hearing back from our patients is one way we can continue to improve our services. Please take a few minutes to complete the written survey that you may receive in the mail after your visit with us. Thank you!             Your Updated Medication List - Protect others around you: Learn how to safely use, store and throw away your medicines at www.disposemymeds.org.          This list is accurate as of:  6/21/17  8:40 AM.  Always use your most recent med list.                   Brand Name Dispense Instructions for use Diagnosis    anastrozole 1 MG tablet    ARIMIDEX    90 tablet    Take 1 tablet (1 mg) by mouth daily    Malignant neoplasm of female breast, unspecified laterality, unspecified site of breast (H)       aspirin 81 MG tablet      1 TABLET DAILY        atorvastatin 20 MG tablet    LIPITOR    26 tablet    Twice a week    Hyperlipidemia LDL goal <100       levothyroxine 125 MCG tablet    SYNTHROID/LEVOTHROID    90 tablet    TAKE ONE TABLET BY MOUTH EVERY MORNING    Other specified hypothyroidism       losartan-hydrochlorothiazide 50-12.5 MG per tablet    HYZAAR    90 tablet    Take 1 tablet by mouth daily    Essential hypertension with goal blood pressure less than 140/90       metoprolol 25 MG tablet    LOPRESSOR    180 tablet    TAKE ONE TABLET BY MOUTH TWICE A DAY    Essential hypertension with goal blood pressure less than 140/90       MULTIPLE VITAMINS/WOMENS PO      1 tablet daily        vitamin D 1000 UNITS capsule      Take 1 capsule by mouth At Bedtime.

## 2017-06-23 ENCOUNTER — TELEPHONE (OUTPATIENT)
Dept: FAMILY MEDICINE | Facility: CLINIC | Age: 62
End: 2017-06-23

## 2017-06-23 DIAGNOSIS — E11.29 TYPE 2 DIABETES MELLITUS WITH MICROALBUMINURIA, WITHOUT LONG-TERM CURRENT USE OF INSULIN (H): Primary | ICD-10-CM

## 2017-06-23 DIAGNOSIS — I10 ESSENTIAL HYPERTENSION WITH GOAL BLOOD PRESSURE LESS THAN 140/90: ICD-10-CM

## 2017-06-23 DIAGNOSIS — R80.9 TYPE 2 DIABETES MELLITUS WITH MICROALBUMINURIA, WITHOUT LONG-TERM CURRENT USE OF INSULIN (H): Primary | ICD-10-CM

## 2017-06-23 RX ORDER — LOSARTAN POTASSIUM AND HYDROCHLOROTHIAZIDE 12.5; 5 MG/1; MG/1
2 TABLET ORAL DAILY
Qty: 180 TABLET | Refills: 3 | Status: ON HOLD
Start: 2017-06-23 | End: 2017-07-02

## 2017-06-23 NOTE — TELEPHONE ENCOUNTER
Increase losartan HCTZ to two daily    See HTN RN consult next week AND the week after.  Referral is in.   See me next available (  for DM follow up/ HTN follow up. )    She should have labs done at the RN visit, too.  I have ordered them

## 2017-06-23 NOTE — TELEPHONE ENCOUNTER
Called and spoke with patient, advised of provider message below.  Scheduled as requested.    Kathy Mak RN  [facility]

## 2017-06-23 NOTE — TELEPHONE ENCOUNTER
Called and spoke with patient.  BP was 171/84 with dizziness last night.  BP is 165/88 today just an hour ago, with a headache.  She reports her headache is just kind of there.  She will experience it in the morning for a while, then it will go away, then it come back in the evening.  Her BP was WNL with she saw Danielle, did make some medication adjustments.    Routed to PCP to advise.    Kathy Mak RN  [facility]

## 2017-06-23 NOTE — TELEPHONE ENCOUNTER
Reason for call:  Patient reporting a symptom    Symptom or request: B/P high, last night B/P 171/84 with dizziness, 165/88, patient has headache today but no dizziness    Duration (how long have symptoms been present): x 2 days    Have you been treated for this before? Yes    Additional comments: patient saw Danielle Lake on 6/21, patient states she was told to come in and have home B/P cuff checked    Phone Number patient can be reached at:  Cell number on file [phone]    Best Time:  any    Can we leave a detailed message on this number:  YES    Call taken on 6/23/2017 at 10:18 AM by Brie Eason

## 2017-06-29 ENCOUNTER — APPOINTMENT (OUTPATIENT)
Dept: GENERAL RADIOLOGY | Facility: CLINIC | Age: 62
DRG: 644 | End: 2017-06-29
Attending: EMERGENCY MEDICINE
Payer: COMMERCIAL

## 2017-06-29 ENCOUNTER — HOSPITAL ENCOUNTER (INPATIENT)
Facility: CLINIC | Age: 62
LOS: 2 days | Discharge: HOME | DRG: 644 | End: 2017-07-02
Attending: EMERGENCY MEDICINE | Admitting: INTERNAL MEDICINE
Payer: COMMERCIAL

## 2017-06-29 ENCOUNTER — OFFICE VISIT (OUTPATIENT)
Dept: FAMILY MEDICINE | Facility: CLINIC | Age: 62
End: 2017-06-29
Payer: COMMERCIAL

## 2017-06-29 ENCOUNTER — TELEPHONE (OUTPATIENT)
Dept: FAMILY MEDICINE | Facility: CLINIC | Age: 62
End: 2017-06-29

## 2017-06-29 ENCOUNTER — HOSPITAL ENCOUNTER (OUTPATIENT)
Dept: MRI IMAGING | Facility: CLINIC | Age: 62
Discharge: HOME | End: 2017-06-29
Attending: FAMILY MEDICINE | Admitting: FAMILY MEDICINE
Payer: COMMERCIAL

## 2017-06-29 ENCOUNTER — ALLIED HEALTH/NURSE VISIT (OUTPATIENT)
Dept: NURSING | Facility: CLINIC | Age: 62
End: 2017-06-29
Payer: COMMERCIAL

## 2017-06-29 VITALS
WEIGHT: 200.4 LBS | SYSTOLIC BLOOD PRESSURE: 152 MMHG | HEART RATE: 76 BPM | BODY MASS INDEX: 36.88 KG/M2 | DIASTOLIC BLOOD PRESSURE: 81 MMHG

## 2017-06-29 VITALS
BODY MASS INDEX: 36.88 KG/M2 | SYSTOLIC BLOOD PRESSURE: 152 MMHG | WEIGHT: 200.4 LBS | TEMPERATURE: 97.4 F | DIASTOLIC BLOOD PRESSURE: 81 MMHG | HEART RATE: 68 BPM | OXYGEN SATURATION: 98 %

## 2017-06-29 DIAGNOSIS — E83.39 HYPOPHOSPHATASIA: ICD-10-CM

## 2017-06-29 DIAGNOSIS — E03.9 HYPOTHYROIDISM, UNSPECIFIED TYPE: ICD-10-CM

## 2017-06-29 DIAGNOSIS — J96.01 ACUTE RESPIRATORY FAILURE WITH HYPOXIA (H): ICD-10-CM

## 2017-06-29 DIAGNOSIS — I10 BENIGN ESSENTIAL HYPERTENSION: Primary | ICD-10-CM

## 2017-06-29 DIAGNOSIS — N17.9 ACUTE KIDNEY INJURY (H): ICD-10-CM

## 2017-06-29 DIAGNOSIS — R80.9 TYPE 2 DIABETES MELLITUS WITH MICROALBUMINURIA, WITHOUT LONG-TERM CURRENT USE OF INSULIN (H): ICD-10-CM

## 2017-06-29 DIAGNOSIS — E83.52 HYPERCALCEMIA: ICD-10-CM

## 2017-06-29 DIAGNOSIS — R41.82 ALTERED MENTAL STATUS, UNSPECIFIED ALTERED MENTAL STATUS TYPE: ICD-10-CM

## 2017-06-29 DIAGNOSIS — I10 ESSENTIAL HYPERTENSION WITH GOAL BLOOD PRESSURE LESS THAN 140/90: ICD-10-CM

## 2017-06-29 DIAGNOSIS — R79.89 ELEVATED LFTS: ICD-10-CM

## 2017-06-29 DIAGNOSIS — R41.0 CONFUSION: ICD-10-CM

## 2017-06-29 DIAGNOSIS — E87.5 HYPERKALEMIA: ICD-10-CM

## 2017-06-29 DIAGNOSIS — R41.82 ALTERED MENTAL STATUS, UNSPECIFIED ALTERED MENTAL STATUS TYPE: Primary | ICD-10-CM

## 2017-06-29 DIAGNOSIS — E11.29 TYPE 2 DIABETES MELLITUS WITH MICROALBUMINURIA, WITHOUT LONG-TERM CURRENT USE OF INSULIN (H): ICD-10-CM

## 2017-06-29 DIAGNOSIS — C50.919 MALIGNANT NEOPLASM OF FEMALE BREAST, UNSPECIFIED ESTROGEN RECEPTOR STATUS, UNSPECIFIED LATERALITY, UNSPECIFIED SITE OF BREAST (H): ICD-10-CM

## 2017-06-29 LAB
ALBUMIN SERPL-MCNC: 3.9 G/DL (ref 3.4–5)
ALP SERPL-CCNC: 59 U/L (ref 40–150)
ALT SERPL W P-5'-P-CCNC: 93 U/L (ref 0–50)
ANION GAP SERPL CALCULATED.3IONS-SCNC: 13 MMOL/L (ref 3–14)
AST SERPL W P-5'-P-CCNC: 61 U/L (ref 0–45)
BILIRUB DIRECT SERPL-MCNC: 0.2 MG/DL (ref 0–0.2)
BILIRUB SERPL-MCNC: 1 MG/DL (ref 0.2–1.3)
BUN SERPL-MCNC: 37 MG/DL (ref 7–30)
CA-I BLD-MCNC: 8.3 MG/DL (ref 4.4–5.2)
CALCIUM SERPL-MCNC: 18.1 MG/DL (ref 8.5–10.1)
CHLORIDE SERPL-SCNC: 94 MMOL/L (ref 94–109)
CO2 SERPL-SCNC: 30 MMOL/L (ref 20–32)
CREAT SERPL-MCNC: 2.91 MG/DL (ref 0.52–1.04)
CREAT UR-MCNC: 154 MG/DL
D DIMER PPP FEU-MCNC: 0.9 UG/ML FEU (ref 0–0.5)
GFR SERPL CREATININE-BSD FRML MDRD: 16 ML/MIN/1.7M2
GLUCOSE SERPL-MCNC: 170 MG/DL (ref 70–99)
HBA1C MFR BLD: 7.1 % (ref 4.3–6)
MICROALBUMIN UR-MCNC: 259 MG/L
MICROALBUMIN/CREAT UR: 168.18 MG/G CR (ref 0–25)
POTASSIUM SERPL-SCNC: 3.4 MMOL/L (ref 3.4–5.3)
PROT SERPL-MCNC: 9 G/DL (ref 6.8–8.8)
SODIUM SERPL-SCNC: 137 MMOL/L (ref 133–144)
T4 FREE SERPL-MCNC: 1.23 NG/DL (ref 0.76–1.46)
TSH SERPL DL<=0.005 MIU/L-ACNC: 4.5 MU/L (ref 0.4–4)

## 2017-06-29 PROCEDURE — 99207 ZZC NO CHARGE NURSE ONLY: CPT

## 2017-06-29 PROCEDURE — 70551 MRI BRAIN STEM W/O DYE: CPT

## 2017-06-29 PROCEDURE — 83970 ASSAY OF PARATHORMONE: CPT | Performed by: EMERGENCY MEDICINE

## 2017-06-29 PROCEDURE — 96361 HYDRATE IV INFUSION ADD-ON: CPT | Performed by: EMERGENCY MEDICINE

## 2017-06-29 PROCEDURE — 83036 HEMOGLOBIN GLYCOSYLATED A1C: CPT | Performed by: INTERNAL MEDICINE

## 2017-06-29 PROCEDURE — 84100 ASSAY OF PHOSPHORUS: CPT | Performed by: EMERGENCY MEDICINE

## 2017-06-29 PROCEDURE — 25000128 H RX IP 250 OP 636: Performed by: EMERGENCY MEDICINE

## 2017-06-29 PROCEDURE — 82306 VITAMIN D 25 HYDROXY: CPT | Performed by: EMERGENCY MEDICINE

## 2017-06-29 PROCEDURE — 84443 ASSAY THYROID STIM HORMONE: CPT | Performed by: INTERNAL MEDICINE

## 2017-06-29 PROCEDURE — 84439 ASSAY OF FREE THYROXINE: CPT | Performed by: INTERNAL MEDICINE

## 2017-06-29 PROCEDURE — 85379 FIBRIN DEGRADATION QUANT: CPT | Performed by: EMERGENCY MEDICINE

## 2017-06-29 PROCEDURE — 80076 HEPATIC FUNCTION PANEL: CPT | Performed by: INTERNAL MEDICINE

## 2017-06-29 PROCEDURE — 83735 ASSAY OF MAGNESIUM: CPT | Performed by: EMERGENCY MEDICINE

## 2017-06-29 PROCEDURE — 82043 UR ALBUMIN QUANTITATIVE: CPT | Performed by: INTERNAL MEDICINE

## 2017-06-29 PROCEDURE — 96360 HYDRATION IV INFUSION INIT: CPT | Performed by: EMERGENCY MEDICINE

## 2017-06-29 PROCEDURE — 93010 ELECTROCARDIOGRAM REPORT: CPT | Mod: Z6 | Performed by: EMERGENCY MEDICINE

## 2017-06-29 PROCEDURE — 71020 XR CHEST 2 VW: CPT

## 2017-06-29 PROCEDURE — 99285 EMERGENCY DEPT VISIT HI MDM: CPT | Mod: 25 | Performed by: EMERGENCY MEDICINE

## 2017-06-29 PROCEDURE — 99214 OFFICE O/P EST MOD 30 MIN: CPT | Performed by: FAMILY MEDICINE

## 2017-06-29 PROCEDURE — 82652 VIT D 1 25-DIHYDROXY: CPT | Performed by: EMERGENCY MEDICINE

## 2017-06-29 PROCEDURE — 80048 BASIC METABOLIC PNL TOTAL CA: CPT | Performed by: EMERGENCY MEDICINE

## 2017-06-29 PROCEDURE — 84484 ASSAY OF TROPONIN QUANT: CPT | Performed by: EMERGENCY MEDICINE

## 2017-06-29 PROCEDURE — 36415 COLL VENOUS BLD VENIPUNCTURE: CPT | Performed by: INTERNAL MEDICINE

## 2017-06-29 PROCEDURE — 80048 BASIC METABOLIC PNL TOTAL CA: CPT | Performed by: INTERNAL MEDICINE

## 2017-06-29 PROCEDURE — 82330 ASSAY OF CALCIUM: CPT | Performed by: EMERGENCY MEDICINE

## 2017-06-29 PROCEDURE — 93005 ELECTROCARDIOGRAM TRACING: CPT | Performed by: EMERGENCY MEDICINE

## 2017-06-29 RX ORDER — SODIUM CHLORIDE 9 MG/ML
1000 INJECTION, SOLUTION INTRAVENOUS CONTINUOUS
Status: DISCONTINUED | OUTPATIENT
Start: 2017-06-29 | End: 2017-06-30

## 2017-06-29 RX ADMIN — SODIUM CHLORIDE 1000 ML: 9 INJECTION, SOLUTION INTRAVENOUS at 23:32

## 2017-06-29 ASSESSMENT — ENCOUNTER SYMPTOMS
ANAL BLEEDING: 0
NAUSEA: 1
NUMBNESS: 0
SHORTNESS OF BREATH: 0
ABDOMINAL PAIN: 0
ARTHRALGIAS: 0
CONFUSION: 1
APPETITE CHANGE: 1
WEAKNESS: 0
CONSTIPATION: 0
SPEECH DIFFICULTY: 1
SLEEP DISTURBANCE: 1
BLOOD IN STOOL: 0
BACK PAIN: 0
HEMATURIA: 0
HEADACHES: 1
VOMITING: 0
FATIGUE: 1
DIARRHEA: 0

## 2017-06-29 ASSESSMENT — PAIN SCALES - GENERAL
PAINLEVEL: NO PAIN (0)
PAINLEVEL: NO PAIN (0)

## 2017-06-29 NOTE — IP AVS SNAPSHOT
MRN:[id number]                      After Visit Summary   6/29/2017    Kenna Miranda    MRN: [id number]           Thank you!     Thank you for choosing [facility] for your care. Our goal is always to provide you with excellent care. Hearing back from our patients is one way we can continue to improve our services. Please take a few minutes to complete the written survey that you may receive in the mail after you visit with us. Thank you!        Patient Information     Date Of Birth          1955        About your hospital stay     You were admitted on:  June 30, 2017 You last received care in the:  [address] [facility]    You were discharged on:  July 2, 2017       Who to Call     For medical emergencies, please call 911.  For non-urgent questions about your medical care, please call your primary care provider or clinic, [phone]  For questions related to your surgery, please call your surgery clinic        Attending Provider     Provider Specialty    Danielle Meier MD Emergency Medicine    Flor Patton MD Hematology & Oncology    L[place]ely Park MD Hematology & Oncology       Primary Care Provider Office Phone # Fax #    Carolyne Layne [phone]      Your next 10 appointments already scheduled     Jul 11, 2017  9:00 AM CDT   Nurse Only with CP RN   [facility]    [address] [id number] [phone]           Honoring Choices need to be scheduled for 60 mins            Jul 13, 2017  9:00 AM CDT   SHORT with Carolyne Layne MD   [facility] ([facility])    [address] [place] [address]   [phone]            Aug 10, 2017  1:00 PM CDT   Office Visit with CP DIABETIC ED RESOURCE   [facility] ([facility])    [address] [address] [phone]           Bring a current list of meds and any records pertaining to this visit.  For Physicals, please bring immunization records and any forms needing to be filled out.  Please arrive 10 minutes early to complete paperwork.              Future tests that were ordered for you     Basic metabolic panel           Phosphorus                 Pending Results     Date and Time Order Name Status Description    7/1/2017 2330 Bone specific alk phosphatase In process     7/1/2017 1137 Urine Culture Aerobic Bacterial Preliminary     7/1/2017 0600 EKG 12-lead, complete Preliminary     6/30/2017 1314 EKG 12-lead, complete Preliminary     6/29/2017 2322 1,25 Dihydroxyvitamin D In process     6/29/2017 2247 PTH Related Peptide Test In process             Statement of Approval     Ordered          07/02/17 0830  I have reviewed and agree with all the recommendations and orders detailed in this document.  EFFECTIVE NOW     Approved and electronically signed by:  Flor Patton MD             Admission Information     Date & Time Department Dept. Phone    6/29/2017 [address] [facility] [place] [phone]      Your Vitals Were     Blood Pressure Pulse Temperature Respirations Height Weight    140/62 (BP Location: Left arm) 64 98  F (36.7  C) (Oral) 18 1.575 m (5' 2\") 95.5 kg (210 lb 8 oz)    Pulse Oximetry BMI (Body Mass Index)                96% 38.5 kg/m2          MyChart Information     Sincerely gives you secure access to your electronic health record. If you see a primary care provider, you can also send messages to your care team and make appointments. If you have questions, please call your primary care clinic.  If you do not have a primary care provider, please call [phone] and they will assist you.        Care EveryWhere ID     This is your Care EveryWhere ID. This could be used by other organizations to access your [facility] medical " records  [id number]        Equal Access to Services     CARMEN ROBINS : Hadii aad ku hadannemariejerry [place], waaxda luqadaha, qaybta andresmacandelario cabrales, kesha afir. So [facility] [phone].    ATENCIÓN: Si habla español, tiene a tam disposición servicios gratuitos de asistencia lingüística. Llame al [phone].    We comply with applicable federal civil rights laws and [place] laws. We do not discriminate on the basis of race, color, national origin, age, disability sex, sexual orientation or gender identity.               Review of your medicines      START taking        Dose / Directions    amLODIPine 10 MG tablet   Commonly known as:  NORVASC   Used for:  Benign essential hypertension        Dose:  10 mg   Take 1 tablet (10 mg) by mouth daily   Quantity:  30 tablet   Refills:  1       potassium & sodium phosphates 280-160-250 MG Packet   Commonly known as:  NEUTRA-PHOS   Used for:  Hypophosphatasia        Dose:  1 packet   Take 1 packet by mouth daily   Quantity:  3 each   Refills:  0       potassium chloride 20 MEQ Packet   Commonly known as:  KLOR-CON   Used for:  Hyperkalemia        Dose:  40 mEq   Take 40 mEq by mouth daily   Quantity:  3 tablet   Refills:  0         CONTINUE these medicines which have NOT CHANGED        Dose / Directions    anastrozole 1 MG tablet   Commonly known as:  ARIMIDEX   Used for:  Malignant neoplasm of female breast, unspecified laterality, unspecified site of breast        Dose:  1 mg   Take 1 tablet (1 mg) by mouth daily   Quantity:  90 tablet   Refills:  3       aspirin 81 MG tablet        1 TABLET DAILY   Refills:  0       levothyroxine 125 MCG tablet   Commonly known as:  SYNTHROID/LEVOTHROID   Used for:  Other specified hypothyroidism        TAKE ONE TABLET BY MOUTH EVERY MORNING   Quantity:  90 tablet   Refills:  0       metoprolol 25 MG tablet   Commonly known as:  LOPRESSOR   Used for:  Essential hypertension with goal blood pressure less than  140/90        TAKE ONE TABLET BY MOUTH TWICE A DAY   Quantity:  180 tablet   Refills:  1         STOP taking     losartan-hydrochlorothiazide 50-12.5 MG per tablet   Commonly known as:  HYZAAR           MULTIPLE VITAMINS/WOMENS PO           vitamin D 1000 UNITS capsule                Where to get your medicines      These medications were sent to [facility] - [place], [place] - [address]  [address] [address]     Phone:  [phone]     amLODIPine 10 MG tablet    potassium & sodium phosphates 280-160-250 MG Packet    potassium chloride 20 MEQ Packet                Protect others around you: Learn how to safely use, store and throw away your medicines at www.disposemymeds.org.             Medication List: This is a list of all your medications and when to take them. Check marks below indicate your daily home schedule. Keep this list as a reference.      Medications           Morning Afternoon Evening Bedtime As Needed    amLODIPine 10 MG tablet   Commonly known as:  NORVASC   Take 1 tablet (10 mg) by mouth daily   Last time this was given:  10 mg on 7/2/2017  9:25 AM                                anastrozole 1 MG tablet   Commonly known as:  ARIMIDEX   Take 1 tablet (1 mg) by mouth daily                                aspirin 81 MG tablet   1 TABLET DAILY                                levothyroxine 125 MCG tablet   Commonly known as:  SYNTHROID/LEVOTHROID   TAKE ONE TABLET BY MOUTH EVERY MORNING   Last time this was given:  125 mcg on 7/2/2017  9:25 AM                                metoprolol 25 MG tablet   Commonly known as:  LOPRESSOR   TAKE ONE TABLET BY MOUTH TWICE A DAY   Last time this was given:  25 mg on 7/2/2017  9:25 AM                                potassium & sodium phosphates 280-160-250 MG Packet   Commonly known as:  NEUTRA-PHOS   Take 1 packet by mouth daily                                potassium chloride 20 MEQ Packet   Commonly known as:   KLOR-CON   Take 40 mEq by mouth daily   Last time this was given:  40 mEq on 7/2/2017  9:29 AM

## 2017-06-29 NOTE — PROGRESS NOTES
I spoke with the radiologist and he reviewed these negative/unremarkable findings with me by phone. I informed the patient's  of this and he will discuss findings with the patient. She still has lab work pending and there may be findings there that could account for her symptoms (such as hyponatremia, etc). We will inform the patient and/or her  the lab results when available. Continue same meds for now.

## 2017-06-29 NOTE — MR AVS SNAPSHOT
After Visit Summary   6/29/2017    Kenna Andersonyclobito    MRN: [id number]           Patient Information     Date Of Birth          1955        Visit Information        Provider Department      6/29/2017 11:00 AM CP RN [facility]        Today's Diagnoses     Type 2 diabetes mellitus with microalbuminuria, without long-term current use of insulin (H)        Elevated LFTs        Hypothyroidism, unspecified type        Essential hypertension with goal blood pressure less than 140/90           Follow-ups after your visit        Your next 10 appointments already scheduled     Jun 29, 2017  1:00 PM CDT   SHORT with Curtis Lozoya MD   [facility]    [address] [place] [address]   [phone]            Jul 11, 2017  9:00 AM CDT   Nurse Only with CP RN   [facility]    [address] [place] [address]   [phone]           Honoring Choices need to be scheduled for 60 mins            Jul 13, 2017  9:00 AM CDT   SHORT with Carolyne Layne MD   [facility]    [address] [place] [address] [place] [address]   [phone]            Aug 10, 2017  1:00 PM CDT   Office Visit with CP DIABETIC ED RESOURCE   [facility]    [address] [place] [address]   [phone]           Bring a current list of meds and any records pertaining to this visit.  For Physicals, please bring immunization records and any forms needing to be filled out.  Please arrive 10 minutes early to complete paperwork.              Who to contact     If you have questions or need follow up information about today's clinic visit or your schedule please contact [facility]  [facility] directly at [phone].  Normal or non-critical lab and imaging results will be communicated to you by MyChart, letter or phone within 4 business days after the clinic has received the results. If you do not hear from us within 7 days, please contact the clinic through [facility] or phone. If you have a critical or abnormal lab result, we will notify you by phone as soon as possible.  Submit refill requests through Trunkbow or call your pharmacy and they will forward the refill request to us. Please allow 3 business days for your refill to be completed.          Additional Information About Your Visit        [facility] Information     Trunkbow gives you secure access to your electronic health record. If you see a primary care provider, you can also send messages to your care team and make appointments. If you have questions, please call your primary care clinic.  If you do not have a primary care provider, please call [phone] and they will assist you.        Care EveryWhere ID     This is your Care EveryWhere ID. This could be used by other organizations to access your [place] medical records  [id number]        Your Vitals Were     Pulse BMI (Body Mass Index)                76 36.88 kg/m2           Blood Pressure from Last 3 Encounters:   06/29/17 152/81   06/21/17 136/82   06/19/17 (!) 199/93    Weight from Last 3 Encounters:   06/29/17 200 lb 6.4 oz (90.9 kg)   06/21/17 206 lb (93.4 kg)   06/19/17 208 lb 8 oz (94.6 kg)              We Performed the Following     Albumin Random Urine Quantitative     Basic metabolic panel     Hemoglobin A1c     Hepatic panel (Albumin, ALT, AST, Bili, Alk Phos, TP)     TSH with free T4 reflex        Primary Care Provider Office Phone # Fax #    Carolyne Layne [phone]       [facility] [address]  [facility] [address]        Equal Access to Services     EBEN ROBINS : santy Vidal  ericka hernandezshruthi parsonsherry robekesha wadsworth. So [facility] [phone].    ATENCIÓN: Si willie smith, tiene a tam disposición servicios gratuitos de asistencia lingüística. Melo al [phone].    We comply with applicable federal civil rights laws and [place] laws. We do not discriminate on the basis of race, color, national origin, age, disability sex, sexual orientation or gender identity.            Thank you!     Thank you for choosing [facility]  for your care. Our goal is always to provide you with excellent care. Hearing back from our patients is one way we can continue to improve our services. Please take a few minutes to complete the written survey that you may receive in the mail after your visit with us. Thank you!             Your Updated Medication List - Protect others around you: Learn how to safely use, store and throw away your medicines at www.disposemymeds.org.          This list is accurate as of: 6/29/17 12:13 PM.  Always use your most recent med list.                   Brand Name Dispense Instructions for use Diagnosis    anastrozole 1 MG tablet    ARIMIDEX    90 tablet    Take 1 tablet (1 mg) by mouth daily    Malignant neoplasm of female breast, unspecified laterality, unspecified site of breast       aspirin 81 MG tablet      1 TABLET DAILY        levothyroxine 125 MCG tablet    SYNTHROID/LEVOTHROID    90 tablet    TAKE ONE TABLET BY MOUTH EVERY MORNING    Other specified hypothyroidism       losartan-hydrochlorothiazide 50-12.5 MG per tablet    HYZAAR    180 tablet    Take 2 tablets by mouth daily    Essential hypertension with goal blood pressure less than 140/90       metoprolol 25 MG tablet    LOPRESSOR    180 tablet    TAKE ONE TABLET BY MOUTH TWICE A DAY    Essential hypertension with goal blood pressure less than 140/90       MULTIPLE VITAMINS/WOMENS PO      1 tablet daily        vitamin D 1000 UNITS capsule      Take 1 capsule  by mouth At Bedtime.

## 2017-06-29 NOTE — IP AVS SNAPSHOT
[address] [address]    Phone:  [phone]                                       After Visit Summary   6/29/2017    Kenna Miranda    MRN: [id number]           After Visit Summary Signature Page     I have received my discharge instructions, and my questions have been answered. I have discussed any challenges I see with this plan with the nurse or doctor.    ..........................................................................................................................................  Patient/Patient Representative Signature      ..........................................................................................................................................  Patient Representative Print Name and Relationship to Patient    ..................................................               ................................................  Date                                            Time    ..........................................................................................................................................  Reviewed by Signature/Title    ...................................................              ..............................................  Date                                                            Time

## 2017-06-29 NOTE — PROGRESS NOTES
Indications for Blood Pressure monitoring: elevated, med on 6/23 (doubled the hyzaar)    Recent home BP checks (states she checks twice a day):    143/82, pulse 69  150/94, pulse 65  158/80, pulse 72  133/91, pulse 62  157/91, pulse 79  164/96, pulse 78  150/87, pulse 63  165/98, pulse 70    Questioned patient about current smoking habits.  Pt. has never smoked.     Signs and Symptoms:   Headaches: Yes , mild for 30 minutes every AM for past few days  Chest pain: No  Shortness of breath: No  Edema: No  Visual problems: No  Parathesia: No  Epitaxis: No  Dizziness: Yes, for the last 2 days, denies fainting or falling    Also reports trouble thinking/making words.   Son reports she is slurring her speech a bit for about a week.   No facial droop noted.  She also reports very poor sleep for about a week (2-3 hours per night, states having vivid dreams, denies any over the counter sleep meds taken).          BP:   BP Readings from Last 1 Encounters:   06/29/17 152/81     76     Diabetic: Yes   Heart Disease:  No    RN will huddle with team 1 provider to discuss, patient has future labs recommended by [facility].  Uncomfortable making med changes due to symptoms so only doing BP check today (has RN HTN orders).    Dr. Lozoya has a 1 pm opening, scheduled, huddled with Dr. Lozoya who agrees to see patient, could see her early as has a patient who has not come in yet (late).    Patient and son advised to wait in lab area for further instructions; did not intend to release labs until seen by Dr. Lozoya, but lab released orders and сергей labs while she was waiting.    Ani Garcia, OLGA  [facility]

## 2017-06-29 NOTE — PROGRESS NOTES
"  SUBJECTIVE:                                                    Kenna Miranda is a 61 year old female who presents to clinic today for the following health issues:      Hypertension Follow-up      Outpatient blood pressures are being checked at home.  Results are 150's/83.    Low Salt Diet: no added salt      Amount of exercise or physical activity: 2-3 days/week for an average of less than 15 minutes    Problems taking medications regularly: No    Medication side effects: none    Diet: regular (no restrictions) and no salt      Patient was seeing the RN for her HTN and explained that she was having other symptoms today of weakness, slurred speech and trouble cognitively-having trouble getting the right words out , balance is off and not sleeping at night for 2 weeks.    I was asked to see the patient on a work in basis for the above concerns. She is here with her son today. I also got some history from her , who is a physician in our clinic and a colleague of mine.  For the last couple of weeks she has seemed \"off\". She's had some confusion at times and some somewhat slurred speech. She's been tired and sleeping a lot. Some trouble with word finding and balance.  She was seen over a week ago and had not been taking her Hyzaar consistently. Her Hyzaar dose was doubled 6 days ago. She is on metoprolol as well. Blood pressures are starting to improve.  She has not had any obvious focal neurologic deficits such as numbness or taking a weakness of the extremities. She does have a history of breast cancer.    Problem list and histories reviewed & adjusted, as indicated.  Additional history: as documented    Patient Active Problem List   Diagnosis     Borderline osteopenia     HYPERLIPIDEMIA LDL GOAL <100     Family history of colon cancer     S/P colonoscopic polypectomy     Advanced directives, counseling/discussion     Breast cancer (H)     Cataract of both eyes     Elevated LFTs     Dermatochalasis of " eyelid     No diabetic retinopathy in both eyes     overweight     Tibialis posterior tendinitis, left     Essential hypertension with goal blood pressure less than 140/90     Type 2 diabetes mellitus with microalbuminuria (H)     Hypothyroidism, unspecified type     Past Surgical History:   Procedure Laterality Date     BIOPSY  2015     BIOPSY BREAST NEEDLE LOCALIZATION, BIOPSY NODE SENTINEL, COMBINED  2012    Procedure: COMBINED BIOPSY BREAST NEEDLE LOCALIZATION, BIOPSY NODE SENTINEL;  Left Breast Wire Locilized Lumpectomy and Sentinal Lymph Node Biopsy;  Surgeon: Jesus Vicente MD;  Location: [facility] OR     C  DELIVERY ONLY      x 2     COLONOSCOPY  10-26-11    Return in 1 yr for Polyp Surveillance     COLONOSCOPY  12    Return for Colonoscopy in 3 yrs.Polyps     LITHOTRIPSY       SURGICAL HISTORY OF -       exploratory laparotomy     SURGICAL HISTORY OF -       Right ovary removed     SURGICAL HISTORY OF -       breast biopsy       Social History   Substance Use Topics     Smoking status: Never Smoker     Smokeless tobacco: Never Used      Comment: no second hand exposure.  when young Dad smoked.     Alcohol use Yes      Comment: very, very little     Family History   Problem Relation Age of Onset     Cancer - colorectal Mother      Thyroid Disease Mother      Colon Cancer Mother      CANCER Father      Lung     Lipids Father      Glaucoma Father      Macular Degeneration Father      Depression Brother      Respiratory Brother      losing hearing in 50's         Current Outpatient Prescriptions   Medication Sig Dispense Refill     losartan-hydrochlorothiazide (HYZAAR) 50-12.5 MG per tablet Take 2 tablets by mouth daily 180 tablet 3     levothyroxine (SYNTHROID/LEVOTHROID) 125 MCG tablet TAKE ONE TABLET BY MOUTH EVERY MORNING 90 tablet 0     metoprolol (LOPRESSOR) 25 MG tablet TAKE ONE TABLET BY MOUTH TWICE A  tablet 1     anastrozole (ARIMIDEX) 1 MG tablet Take 1 tablet (1 mg)  by mouth daily 90 tablet 3     Cholecalciferol (VITAMIN D) 1000 UNITS capsule Take 1 capsule by mouth At Bedtime.       MULTIPLE VITAMINS/WOMENS OR 1 tablet daily       ASPIRIN 81 MG OR TABS 1 TABLET DAILY       Allergies   Allergen Reactions     Nkda [No Known Drug Allergies]        Reviewed and updated as needed this visit by clinical staff  Tobacco  Allergies  Meds       Reviewed and updated as needed this visit by Provider         ROS:  Mainly significant for the above    OBJECTIVE:     /81 (BP Location: Right arm, Patient Position: Chair, Cuff Size: Adult Large)  Pulse 68  Temp 97.4  F (36.3  C) (Oral)  Wt 200 lb 6.4 oz (90.9 kg)  SpO2 98%  Breastfeeding? No  BMI 36.88 kg/m2  Body mass index is 36.88 kg/(m^2).  GENERAL: healthy, alert and no distress  HENT: Grossly normal  NECK: Supple  MS: no gross musculoskeletal defects noted, no edema  NEURO: No obvious focal neurologic deficit. Her speech is somewhat slow to respond. She was able to tell me the current president and the last president, but she thought the date was July 26, when it is actually June 29. She knew the year. She struggled with serial sevens and was not able to do them accurately.  She works as an , and her son says this is unusual for her not to be able to do these types of calculations in her head.    Diagnostic Test Results:  She has had labs drawn today already which were ordered by her PCP    ASSESSMENT/PLAN:       ICD-10-CM    1. Altered mental status, unspecified altered mental status type R41.82 MR Brain w/o Contrast   2. Essential hypertension with goal blood pressure less than 140/90 I10    3. Type 2 diabetes mellitus with microalbuminuria, without long-term current use of insulin (H) E11.29     R80.9    4. Malignant neoplasm of female breast, unspecified estrogen receptor status, unspecified laterality, unspecified site of breast (H) C50.919      I am concerned about some altered mental status here and the  possibility of a recent stroke, bleed, or even metastatic disease from her previous breast cancer  I discussed these concerns with the patient and her son and her  and we will obtain an MRI of her brain today  We will check the lab results that will be coming back from the blood drawn just a short time ago  Continue with same meds for now  Further treatment then as indicated by the above results    Curtis Lozoya MD  [facility]

## 2017-06-29 NOTE — NURSING NOTE
"Chief Complaint   Patient presents with     Hypertension       Initial /81 (BP Location: Right arm, Patient Position: Chair, Cuff Size: Adult Large)  Pulse 68  Temp 97.4  F (36.3  C) (Oral)  Wt 200 lb 6.4 oz (90.9 kg)  SpO2 98%  Breastfeeding? No  BMI 36.88 kg/m2 Estimated body mass index is 36.88 kg/(m^2) as calculated from the following:    Height as of 6/19/17: 5' 1.81\" (1.57 m).    Weight as of this encounter: 200 lb 6.4 oz (90.9 kg).  Medication Reconciliation: complete.  JEANETH Perez MA      "

## 2017-06-29 NOTE — MR AVS SNAPSHOT
After Visit Summary   6/29/2017    Kenna Andersonyclobito    MRN: [id number]           Patient Information     Date Of Birth          1955        Visit Information        Provider Department      6/29/2017 1:00 PM Curtis Lozoya MD [facility]        Today's Diagnoses     Altered mental status, unspecified altered mental status type    -  1    Essential hypertension with goal blood pressure less than 140/90        Type 2 diabetes mellitus with microalbuminuria, without long-term current use of insulin (H)        Malignant neoplasm of female breast, unspecified estrogen receptor status, unspecified laterality, unspecified site of breast (H)           Follow-ups after your visit        Follow-up notes from your care team     Return if symptoms worsen or fail to improve.      Your next 10 appointments already scheduled     Jul 11, 2017  9:00 AM CDT   Nurse Only with CP RN   [facility]    [address] [place] [address]   [phone]           Honoring Choices need to be scheduled for 60 mins            Jul 13, 2017  9:00 AM CDT   SHORT with Carolyne Layne MD   [facility]    [address] [place] [address]   [phone]            Aug 10, 2017  1:00 PM CDT   Office Visit with CP DIABETIC ED RESOURCE   [facility]    [address] [place] [address]   [phone]           Bring a current list of meds and any records pertaining to this visit.  For Physicals, please bring immunization records and any forms needing to be filled out.  Please arrive 10 minutes early to complete paperwork.              Future tests that were ordered for you today     Open Future Orders        Priority Expected Expires Ordered    MR  Brain w/o Contrast Routine  6/29/2018 6/29/2017            Who to contact     If you have questions or need follow up information about today's clinic visit or your schedule please contact [facility] directly at [phone].  Normal or non-critical lab and imaging results will be communicated to you by MyChart, letter or phone within 4 business days after the clinic has received the results. If you do not hear from us within 7 days, please contact the clinic through [facility] or phone. If you have a critical or abnormal lab result, we will notify you by phone as soon as possible.  Submit refill requests through Sazze or call your pharmacy and they will forward the refill request to us. Please allow 3 business days for your refill to be completed.          Additional Information About Your Visit        [email or web address] Information     S[employer]ze gives you secure access to your electronic health record. If you see a primary care provider, you can also send messages to your care team and make appointments. If you have questions, please call your primary care clinic.  If you do not have a primary care provider, please call [phone] and they will assist you.        Care EveryWhere ID     This is your Care EveryWhere ID. This could be used by other organizations to access your [place] medical records  [id number]        Your Vitals Were     Pulse Temperature Pulse Oximetry Breastfeeding? BMI (Body Mass Index)       68 97.4  F (36.3  C) (Oral) 98% No 36.88 kg/m2        Blood Pressure from Last 3 Encounters:   06/29/17 152/81   06/29/17 152/81   06/21/17 136/82    Weight from Last 3 Encounters:   06/29/17 200 lb 6.4 oz (90.9 kg)   06/29/17 200 lb 6.4 oz (90.9 kg)   06/21/17 206 lb (93.4 kg)               Primary Care Provider Office Phone # Fax #    Carolyne Layne [phone]       [facility] [address]  [place]. \Hospital [facility] and Outlook.\"" [id number]        Equal Access to Services      EBEN ROBINS : Hadii aad ku ant Hernandez, waaxda luqadaha, qaybta kaalmada robemaycandelario, kesha sandhya haynohemy wooaltagraciajanel vann . So [facility] [phone].    ATENCIÓN: Si habla español, tiene a tam disposición servicios gratuitos de asistencia lingüística. Llame al [phone].    We comply with applicable federal civil rights laws and [place] laws. We do not discriminate on the basis of race, color, national origin, age, disability sex, sexual orientation or gender identity.            Thank you!     Thank you for choosing [facility]  for your care. Our goal is always to provide you with excellent care. Hearing back from our patients is one way we can continue to improve our services. Please take a few minutes to complete the written survey that you may receive in the mail after your visit with us. Thank you!             Your Updated Medication List - Protect others around you: Learn how to safely use, store and throw away your medicines at www.disposemymeds.org.          This list is accurate as of: 6/29/17  1:12 PM.  Always use your most recent med list.                   Brand Name Dispense Instructions for use Diagnosis    anastrozole 1 MG tablet    ARIMIDEX    90 tablet    Take 1 tablet (1 mg) by mouth daily    Malignant neoplasm of female breast, unspecified laterality, unspecified site of breast       aspirin 81 MG tablet      1 TABLET DAILY        levothyroxine 125 MCG tablet    SYNTHROID/LEVOTHROID    90 tablet    TAKE ONE TABLET BY MOUTH EVERY MORNING    Other specified hypothyroidism       losartan-hydrochlorothiazide 50-12.5 MG per tablet    HYZAAR    180 tablet    Take 2 tablets by mouth daily    Essential hypertension with goal blood pressure less than 140/90       metoprolol 25 MG tablet    LOPRESSOR    180 tablet    TAKE ONE TABLET BY MOUTH TWICE A DAY    Essential hypertension with goal blood pressure less than 140/90       MULTIPLE VITAMINS/WOMENS PO      1 tablet  daily        vitamin D 1000 UNITS capsule      Take 1 capsule by mouth At Bedtime.

## 2017-06-30 ENCOUNTER — APPOINTMENT (OUTPATIENT)
Dept: CT IMAGING | Facility: CLINIC | Age: 62
DRG: 644 | End: 2017-06-30
Attending: UROLOGY
Payer: COMMERCIAL

## 2017-06-30 ENCOUNTER — ANESTHESIA EVENT (OUTPATIENT)
Dept: SURGERY | Facility: CLINIC | Age: 62
DRG: 644 | End: 2017-06-30
Payer: COMMERCIAL

## 2017-06-30 ENCOUNTER — APPOINTMENT (OUTPATIENT)
Dept: ULTRASOUND IMAGING | Facility: CLINIC | Age: 62
DRG: 644 | End: 2017-06-30
Payer: COMMERCIAL

## 2017-06-30 PROBLEM — E83.52 HYPERCALCEMIA: Status: ACTIVE | Noted: 2017-06-30

## 2017-06-30 LAB
ALBUMIN SERPL-MCNC: 3.5 G/DL (ref 3.4–5)
ALBUMIN UR-MCNC: 10 MG/DL
ALP SERPL-CCNC: 51 U/L (ref 40–150)
ALT SERPL W P-5'-P-CCNC: 74 U/L (ref 0–50)
ANION GAP SERPL CALCULATED.3IONS-SCNC: 7 MMOL/L (ref 3–14)
ANION GAP SERPL CALCULATED.3IONS-SCNC: 9 MMOL/L (ref 3–14)
ANION GAP SERPL CALCULATED.3IONS-SCNC: 9 MMOL/L (ref 3–14)
APPEARANCE UR: ABNORMAL
AST SERPL W P-5'-P-CCNC: 54 U/L (ref 0–45)
BILIRUB SERPL-MCNC: 2.1 MG/DL (ref 0.2–1.3)
BILIRUB UR QL STRIP: NEGATIVE
BUN SERPL-MCNC: 34 MG/DL (ref 7–30)
BUN SERPL-MCNC: 37 MG/DL (ref 7–30)
BUN SERPL-MCNC: 43 MG/DL (ref 7–30)
CA-I BLD-MCNC: 7.9 MG/DL (ref 4.4–5.2)
CALCIUM SERPL-MCNC: 13.8 MG/DL (ref 8.5–10.1)
CALCIUM SERPL-MCNC: 15.4 MG/DL (ref 8.5–10.1)
CALCIUM SERPL-MCNC: 15.8 MG/DL (ref 8.5–10.1)
CHLORIDE SERPL-SCNC: 101 MMOL/L (ref 94–109)
CHLORIDE SERPL-SCNC: 94 MMOL/L (ref 94–109)
CHLORIDE SERPL-SCNC: 98 MMOL/L (ref 94–109)
CK SERPL-CCNC: 117 U/L (ref 30–225)
CO2 SERPL-SCNC: 25 MMOL/L (ref 20–32)
CO2 SERPL-SCNC: 31 MMOL/L (ref 20–32)
CO2 SERPL-SCNC: 32 MMOL/L (ref 20–32)
COLOR UR AUTO: ABNORMAL
CREAT SERPL-MCNC: 2.7 MG/DL (ref 0.52–1.04)
CREAT SERPL-MCNC: 2.86 MG/DL (ref 0.52–1.04)
CREAT SERPL-MCNC: 3.12 MG/DL (ref 0.52–1.04)
DEPRECATED CALCIDIOL+CALCIFEROL SERPL-MC: NORMAL UG/L (ref 20–75)
ERYTHROCYTE [DISTWIDTH] IN BLOOD BY AUTOMATED COUNT: 13.7 % (ref 10–15)
GFR SERPL CREATININE-BSD FRML MDRD: 15 ML/MIN/1.7M2
GFR SERPL CREATININE-BSD FRML MDRD: 17 ML/MIN/1.7M2
GFR SERPL CREATININE-BSD FRML MDRD: 18 ML/MIN/1.7M2
GLUCOSE SERPL-MCNC: 158 MG/DL (ref 70–99)
GLUCOSE SERPL-MCNC: 163 MG/DL (ref 70–99)
GLUCOSE SERPL-MCNC: 163 MG/DL (ref 70–99)
GLUCOSE UR STRIP-MCNC: NEGATIVE MG/DL
HCT VFR BLD AUTO: 36.5 % (ref 35–47)
HGB BLD-MCNC: 12 G/DL (ref 11.7–15.7)
HGB UR QL STRIP: ABNORMAL
HYALINE CASTS #/AREA URNS LPF: 1 /LPF (ref 0–2)
INTERPRETATION ECG - MUSE: NORMAL
KETONES UR STRIP-MCNC: NEGATIVE MG/DL
LEUKOCYTE ESTERASE UR QL STRIP: ABNORMAL
MAGNESIUM SERPL-MCNC: 1.3 MG/DL (ref 1.6–2.3)
MAGNESIUM SERPL-MCNC: 2.4 MG/DL (ref 1.6–2.3)
MCH RBC QN AUTO: 27.6 PG (ref 26.5–33)
MCHC RBC AUTO-ENTMCNC: 32.9 G/DL (ref 31.5–36.5)
MCV RBC AUTO: 84 FL (ref 78–100)
NITRATE UR QL: NEGATIVE
PH UR STRIP: 7 PH (ref 5–7)
PHOSPHATE SERPL-MCNC: 2.1 MG/DL (ref 2.5–4.5)
PHOSPHATE SERPL-MCNC: 2.1 MG/DL (ref 2.5–4.5)
PLATELET # BLD AUTO: 189 10E9/L (ref 150–450)
POTASSIUM SERPL-SCNC: 3 MMOL/L (ref 3.4–5.3)
POTASSIUM SERPL-SCNC: 3 MMOL/L (ref 3.4–5.3)
POTASSIUM SERPL-SCNC: 4 MMOL/L (ref 3.4–5.3)
PROT SERPL-MCNC: 7.8 G/DL (ref 6.8–8.8)
PTH-INTACT SERPL-MCNC: 25 PG/ML (ref 12–72)
RBC # BLD AUTO: 4.34 10E12/L (ref 3.8–5.2)
RBC #/AREA URNS AUTO: 6 /HPF (ref 0–2)
SODIUM SERPL-SCNC: 135 MMOL/L (ref 133–144)
SODIUM SERPL-SCNC: 135 MMOL/L (ref 133–144)
SODIUM SERPL-SCNC: 136 MMOL/L (ref 133–144)
SP GR UR STRIP: 1.01 (ref 1–1.03)
TROPONIN I SERPL-MCNC: 0.03 UG/L (ref 0–0.04)
TROPONIN I SERPL-MCNC: 0.04 UG/L (ref 0–0.04)
URATE SERPL-MCNC: 12 MG/DL (ref 2.6–6)
URN SPEC COLLECT METH UR: ABNORMAL
UROBILINOGEN UR STRIP-MCNC: NORMAL MG/DL (ref 0–2)
VITAMIN D2 SERPL-MCNC: <5 UG/L
VITAMIN D3 SERPL-MCNC: 57 UG/L
WBC # BLD AUTO: 8.3 10E9/L (ref 4–11)
WBC #/AREA URNS AUTO: 94 /HPF (ref 0–2)

## 2017-06-30 PROCEDURE — 99233 SBSQ HOSP IP/OBS HIGH 50: CPT | Mod: GC | Performed by: INTERNAL MEDICINE

## 2017-06-30 PROCEDURE — 81001 URINALYSIS AUTO W/SCOPE: CPT | Performed by: INTERNAL MEDICINE

## 2017-06-30 PROCEDURE — 74176 CT ABD & PELVIS W/O CONTRAST: CPT

## 2017-06-30 PROCEDURE — 93005 ELECTROCARDIOGRAM TRACING: CPT

## 2017-06-30 PROCEDURE — 12000008 ZZH R&B INTERMEDIATE UMMC

## 2017-06-30 PROCEDURE — 80053 COMPREHEN METABOLIC PANEL: CPT | Performed by: INTERNAL MEDICINE

## 2017-06-30 PROCEDURE — 80048 BASIC METABOLIC PNL TOTAL CA: CPT | Performed by: INTERNAL MEDICINE

## 2017-06-30 PROCEDURE — 36415 COLL VENOUS BLD VENIPUNCTURE: CPT | Performed by: INTERNAL MEDICINE

## 2017-06-30 PROCEDURE — 76770 US EXAM ABDO BACK WALL COMP: CPT

## 2017-06-30 PROCEDURE — 25000128 H RX IP 250 OP 636: Performed by: INTERNAL MEDICINE

## 2017-06-30 PROCEDURE — 82330 ASSAY OF CALCIUM: CPT | Performed by: INTERNAL MEDICINE

## 2017-06-30 PROCEDURE — 25000132 ZZH RX MED GY IP 250 OP 250 PS 637: Performed by: EMERGENCY MEDICINE

## 2017-06-30 PROCEDURE — 25000132 ZZH RX MED GY IP 250 OP 250 PS 637: Performed by: INTERNAL MEDICINE

## 2017-06-30 PROCEDURE — 25000125 ZZHC RX 250: Performed by: INTERNAL MEDICINE

## 2017-06-30 PROCEDURE — 82542 COL CHROMOTOGRAPHY QUAL/QUAN: CPT | Performed by: EMERGENCY MEDICINE

## 2017-06-30 PROCEDURE — 82550 ASSAY OF CK (CPK): CPT | Performed by: INTERNAL MEDICINE

## 2017-06-30 PROCEDURE — 40000141 ZZH STATISTIC PERIPHERAL IV START W/O US GUIDANCE

## 2017-06-30 PROCEDURE — 25000131 ZZH RX MED GY IP 250 OP 636 PS 637: Performed by: INTERNAL MEDICINE

## 2017-06-30 PROCEDURE — 83735 ASSAY OF MAGNESIUM: CPT | Performed by: INTERNAL MEDICINE

## 2017-06-30 PROCEDURE — 84550 ASSAY OF BLOOD/URIC ACID: CPT | Performed by: INTERNAL MEDICINE

## 2017-06-30 PROCEDURE — 25000128 H RX IP 250 OP 636: Performed by: EMERGENCY MEDICINE

## 2017-06-30 PROCEDURE — 85027 COMPLETE CBC AUTOMATED: CPT | Performed by: INTERNAL MEDICINE

## 2017-06-30 PROCEDURE — 84100 ASSAY OF PHOSPHORUS: CPT | Performed by: INTERNAL MEDICINE

## 2017-06-30 PROCEDURE — 84484 ASSAY OF TROPONIN QUANT: CPT | Performed by: INTERNAL MEDICINE

## 2017-06-30 RX ORDER — CALCITONIN SALMON 200 [USP'U]/ML
4 INJECTION, SOLUTION INTRAMUSCULAR; SUBCUTANEOUS ONCE
Status: COMPLETED | OUTPATIENT
Start: 2017-06-30 | End: 2017-06-30

## 2017-06-30 RX ORDER — HYDRALAZINE HYDROCHLORIDE 10 MG/1
10 TABLET, FILM COATED ORAL 4 TIMES DAILY PRN
Status: DISCONTINUED | OUTPATIENT
Start: 2017-06-30 | End: 2017-07-02 | Stop reason: HOSPADM

## 2017-06-30 RX ORDER — CEFAZOLIN SODIUM 2 G/100ML
2 INJECTION, SOLUTION INTRAVENOUS
Status: COMPLETED | OUTPATIENT
Start: 2017-07-01 | End: 2017-07-01

## 2017-06-30 RX ORDER — POTASSIUM CHLORIDE 20MEQ/15ML
20 LIQUID (ML) ORAL ONCE
Status: COMPLETED | OUTPATIENT
Start: 2017-06-30 | End: 2017-06-30

## 2017-06-30 RX ORDER — LIDOCAINE 40 MG/G
CREAM TOPICAL
Status: DISCONTINUED | OUTPATIENT
Start: 2017-06-30 | End: 2017-07-02 | Stop reason: HOSPADM

## 2017-06-30 RX ORDER — ONDANSETRON 4 MG/1
4 TABLET, ORALLY DISINTEGRATING ORAL EVERY 6 HOURS PRN
Status: DISCONTINUED | OUTPATIENT
Start: 2017-06-30 | End: 2017-07-02 | Stop reason: HOSPADM

## 2017-06-30 RX ORDER — HEPARIN SODIUM 5000 [USP'U]/.5ML
5000 INJECTION, SOLUTION INTRAVENOUS; SUBCUTANEOUS EVERY 8 HOURS
Status: DISCONTINUED | OUTPATIENT
Start: 2017-06-30 | End: 2017-07-02 | Stop reason: HOSPADM

## 2017-06-30 RX ORDER — POLYETHYLENE GLYCOL 3350 17 G/17G
17 POWDER, FOR SOLUTION ORAL DAILY
Status: DISCONTINUED | OUTPATIENT
Start: 2017-06-30 | End: 2017-07-02 | Stop reason: HOSPADM

## 2017-06-30 RX ORDER — METOPROLOL TARTRATE 25 MG/1
25 TABLET, FILM COATED ORAL 2 TIMES DAILY
Status: DISCONTINUED | OUTPATIENT
Start: 2017-06-30 | End: 2017-07-02 | Stop reason: HOSPADM

## 2017-06-30 RX ORDER — AMLODIPINE BESYLATE 10 MG/1
10 TABLET ORAL DAILY
Status: DISCONTINUED | OUTPATIENT
Start: 2017-07-01 | End: 2017-07-02 | Stop reason: HOSPADM

## 2017-06-30 RX ORDER — CEFAZOLIN SODIUM 1 G/3ML
1 INJECTION, POWDER, FOR SOLUTION INTRAMUSCULAR; INTRAVENOUS SEE ADMIN INSTRUCTIONS
Status: DISCONTINUED | OUTPATIENT
Start: 2017-06-30 | End: 2017-07-01 | Stop reason: HOSPADM

## 2017-06-30 RX ORDER — POTASSIUM CHLORIDE 750 MG/1
20 TABLET, EXTENDED RELEASE ORAL ONCE
Status: COMPLETED | OUTPATIENT
Start: 2017-06-30 | End: 2017-06-30

## 2017-06-30 RX ORDER — AMLODIPINE BESYLATE 5 MG/1
5 TABLET ORAL DAILY
Status: DISCONTINUED | OUTPATIENT
Start: 2017-06-30 | End: 2017-06-30

## 2017-06-30 RX ORDER — ONDANSETRON 2 MG/ML
4 INJECTION INTRAMUSCULAR; INTRAVENOUS EVERY 6 HOURS PRN
Status: DISCONTINUED | OUTPATIENT
Start: 2017-06-30 | End: 2017-07-02 | Stop reason: HOSPADM

## 2017-06-30 RX ORDER — HYDRALAZINE HYDROCHLORIDE 20 MG/ML
10 INJECTION INTRAMUSCULAR; INTRAVENOUS EVERY 6 HOURS PRN
Status: DISCONTINUED | OUTPATIENT
Start: 2017-06-30 | End: 2017-06-30

## 2017-06-30 RX ORDER — ACETAMINOPHEN 325 MG/1
650 TABLET ORAL EVERY 4 HOURS PRN
Status: DISCONTINUED | OUTPATIENT
Start: 2017-06-30 | End: 2017-07-02 | Stop reason: HOSPADM

## 2017-06-30 RX ORDER — SODIUM CHLORIDE 9 MG/ML
INJECTION, SOLUTION INTRAVENOUS CONTINUOUS
Status: DISCONTINUED | OUTPATIENT
Start: 2017-06-30 | End: 2017-07-02 | Stop reason: HOSPADM

## 2017-06-30 RX ORDER — POTASSIUM CHLORIDE 1.5 G/1.58G
60 POWDER, FOR SOLUTION ORAL ONCE
Status: COMPLETED | OUTPATIENT
Start: 2017-06-30 | End: 2017-06-30

## 2017-06-30 RX ORDER — MAGNESIUM SULFATE HEPTAHYDRATE 40 MG/ML
4 INJECTION, SOLUTION INTRAVENOUS EVERY 4 HOURS PRN
Status: DISCONTINUED | OUTPATIENT
Start: 2017-06-30 | End: 2017-07-02 | Stop reason: HOSPADM

## 2017-06-30 RX ORDER — POTASSIUM CHLORIDE 1.5 G/1.58G
20 POWDER, FOR SOLUTION ORAL 2 TIMES DAILY
Status: DISCONTINUED | OUTPATIENT
Start: 2017-06-30 | End: 2017-06-30

## 2017-06-30 RX ORDER — AMLODIPINE BESYLATE 5 MG/1
5 TABLET ORAL ONCE
Status: COMPLETED | OUTPATIENT
Start: 2017-06-30 | End: 2017-06-30

## 2017-06-30 RX ORDER — SODIUM CHLORIDE 9 MG/ML
1000 INJECTION, SOLUTION INTRAVENOUS CONTINUOUS
Status: ACTIVE | OUTPATIENT
Start: 2017-06-30 | End: 2017-06-30

## 2017-06-30 RX ORDER — DOCUSATE SODIUM 100 MG/1
100 CAPSULE, LIQUID FILLED ORAL 2 TIMES DAILY
Status: DISCONTINUED | OUTPATIENT
Start: 2017-06-30 | End: 2017-07-02 | Stop reason: HOSPADM

## 2017-06-30 RX ADMIN — AMLODIPINE BESYLATE 5 MG: 5 TABLET ORAL at 20:02

## 2017-06-30 RX ADMIN — AMLODIPINE BESYLATE 5 MG: 5 TABLET ORAL at 10:18

## 2017-06-30 RX ADMIN — SODIUM CHLORIDE 1000 ML: 9 INJECTION, SOLUTION INTRAVENOUS at 02:05

## 2017-06-30 RX ADMIN — METOPROLOL TARTRATE 25 MG: 25 TABLET ORAL at 20:05

## 2017-06-30 RX ADMIN — HYDRALAZINE HYDROCHLORIDE 10 MG: 10 TABLET, FILM COATED ORAL at 11:37

## 2017-06-30 RX ADMIN — Medication 2 G: at 02:06

## 2017-06-30 RX ADMIN — SODIUM CHLORIDE 1000 ML: 9 INJECTION, SOLUTION INTRAVENOUS at 09:38

## 2017-06-30 RX ADMIN — METOPROLOL TARTRATE 25 MG: 25 TABLET ORAL at 08:08

## 2017-06-30 RX ADMIN — HEPARIN SODIUM 5000 UNITS: 5000 INJECTION, SOLUTION INTRAVENOUS; SUBCUTANEOUS at 20:01

## 2017-06-30 RX ADMIN — SODIUM CHLORIDE 1000 ML: 9 INJECTION, SOLUTION INTRAVENOUS at 21:10

## 2017-06-30 RX ADMIN — SODIUM CHLORIDE: 9 INJECTION, SOLUTION INTRAVENOUS at 22:28

## 2017-06-30 RX ADMIN — HEPARIN SODIUM 5000 UNITS: 5000 INJECTION, SOLUTION INTRAVENOUS; SUBCUTANEOUS at 10:18

## 2017-06-30 RX ADMIN — LEVOTHYROXINE SODIUM 125 MCG: 25 TABLET ORAL at 08:08

## 2017-06-30 RX ADMIN — POTASSIUM CHLORIDE 20 MEQ: 20 SOLUTION ORAL at 00:54

## 2017-06-30 RX ADMIN — POTASSIUM CHLORIDE 60 MEQ: 1.5 POWDER, FOR SOLUTION ORAL at 10:18

## 2017-06-30 RX ADMIN — SODIUM CHLORIDE 1000 ML: 9 INJECTION, SOLUTION INTRAVENOUS at 15:49

## 2017-06-30 RX ADMIN — HEPARIN SODIUM 5000 UNITS: 5000 INJECTION, SOLUTION INTRAVENOUS; SUBCUTANEOUS at 02:05

## 2017-06-30 RX ADMIN — CALCITONIN SALMON 364 UNITS: 200 INJECTION, SOLUTION INTRAMUSCULAR; SUBCUTANEOUS at 02:36

## 2017-06-30 RX ADMIN — SODIUM CHLORIDE 1000 ML: 9 INJECTION, SOLUTION INTRAVENOUS at 05:30

## 2017-06-30 RX ADMIN — POLYETHYLENE GLYCOL 3350 17 G: 17 POWDER, FOR SOLUTION ORAL at 08:09

## 2017-06-30 RX ADMIN — HYDRALAZINE HYDROCHLORIDE 10 MG: 10 TABLET, FILM COATED ORAL at 22:28

## 2017-06-30 RX ADMIN — POTASSIUM CHLORIDE 20 MEQ: 750 TABLET, EXTENDED RELEASE ORAL at 08:14

## 2017-06-30 NOTE — PLAN OF CARE
Problem: Goal Outcome Summary  Goal: Goal Outcome Summary  BP elevated in the 160s. Norvasc started this am and pt continued to take home med lopressor. Hydralazine given PRN x1. NS @200cc/hr. Will continue to monitor for fluid overload. Pt sating 92% on 2LNC, fine crackles to lungs noted on assessment. MD aware. Will notify MD if pt is requiring more O2. Incentive Spirometry teaching done. Pt up to chair and encouraged to walk and stay out of bed. Pt reported stool was hard. Miralax given this am, Colace PRN if needed. K replaced this AM redraw labs at 1330 show Ca and Crt trending down and K 4.0. Pt placed on tele running NSR. Pt had one epidose of aflutter, EKG obtained and MD aware. No acute findings will continue to monitor. Pt had renal US today. Endocrine consulted. PT consulted. Pt up with assistance stand by assist.

## 2017-06-30 NOTE — ED PROVIDER NOTES
History     Chief Complaint   Patient presents with     Altered Mental Status     The history is provided by the patient and the spouse (). The history is limited by the condition of the patient (altered mental status).     Kenna Miranda is a 61 year old female with a history of diabetes, hypothyroid, hypertension, and breast cancer s/p radiation therapy (~2012) who presents for evaluation of altered mental status. Patient reports approximately two weeks ago she noticed she was having increased difficulty sleeping followed by increased fatigue resulting in decreased activity levels and decreased PO and fluid intake. These symptoms were subsequently followed by the onset of word finding difficulty which she reports other people pointed out to her about one week ago as well as confusion and disorientation. She also complains of increased difficulty ambulating which started 2-3 days ago due to bilateral lower extremity weakness. She denies bilateral lower extremity numbness or tingling. Additionally, she complains of intermittent headaches with associated nausea which she notes only occur in the morning. She denies chest pain, shortness of breath, or abdominal pain. She has had no urinary incontinence, dysuria, or hematuria, but does report decreased urine output and frequency which she attributes to her decreased fluid intake. No back pain, arthralgias, or saddle anesthesia. No vision changes.     Her medications dosages of lisinopril and hydrochlorothiazide were doubled after a recent episode of hypertension.  Patient is confused about whether or not she has been taking her metoprolol, and  reports he is unsure if the patient has been taking this medication.  Patient also takes 2 tablets of TUMS on average twice per week. No recent falls or trauma.    Patient was seen and evaluated in her primary clinic, [facility], earlier today at which time she had laboratory studies performed  which revealed an elevated calcium of 18.1, decreased GFR of 16. Patient also had a non-contrast MRI of the brain at [facility] which showed minimal atrophy and chronic white matter disease, though nothing clearly acute without the benefit of contrast material.     I have reviewed the Medications, Allergies, Past Medical and Surgical History, and Social History in the [facility] system.  Past Medical History:   Diagnosis Date     Benign breast lumps     biopsied.     Borderline osteopenia 2010     Cancer (H)      Cholesterol serum increased      DM (diabetes mellitus), type 2 (H)      Elevated liver function tests     h/o mild increase of LFT's     HTN (hypertension)      Hypothyroid     as teenager.     Menopause      Personal history of kidney stones     + stones on CT scan , treated with lithotripsy     Rectal fissure     resolved       Past Surgical History:   Procedure Laterality Date     BIOPSY  2015     BIOPSY BREAST NEEDLE LOCALIZATION, BIOPSY NODE SENTINEL, COMBINED  2012    Procedure: COMBINED BIOPSY BREAST NEEDLE LOCALIZATION, BIOPSY NODE SENTINEL;  Left Breast Wire Locilized Lumpectomy and Sentinal Lymph Node Biopsy;  Surgeon: Jesus Vicente MD;  Location: [facility] OR     C  DELIVERY ONLY      x 2     COLONOSCOPY  10-26-11    Return in 1 yr for Polyp Surveillance     COLONOSCOPY  12    Return for Colonoscopy in 3 yrs.Polyps     LITHOTRIPSY       SURGICAL HISTORY OF -       exploratory laparotomy     SURGICAL HISTORY OF -       Right ovary removed     SURGICAL HISTORY OF -   2008    breast biopsy       Family History   Problem Relation Age of Onset     Cancer - colorectal Mother      Thyroid Disease Mother      Colon Cancer Mother      CANCER Father      Lung     Lipids Father      Glaucoma Father      Macular Degeneration Father      Depression Brother      Respiratory Brother      losing hearing in 50's       Social History   Substance Use Topics     Smoking  "status: Never Smoker     Smokeless tobacco: Never Used      Comment: no second hand exposure.  when young Dad smoked.     Alcohol use Yes      Comment: very, very little     No current facility-administered medications for this encounter.      Current Outpatient Prescriptions   Medication     losartan-hydrochlorothiazide (HYZAAR) 50-12.5 MG per tablet     levothyroxine (SYNTHROID/LEVOTHROID) 125 MCG tablet     metoprolol (LOPRESSOR) 25 MG tablet     anastrozole (ARIMIDEX) 1 MG tablet     Cholecalciferol (VITAMIN D) 1000 UNITS capsule     MULTIPLE VITAMINS/WOMENS OR     ASPIRIN 81 MG OR TABS        Allergies   Allergen Reactions     Nkda [No Known Drug Allergies]        Review of Systems   Constitutional: Positive for appetite change (decreased PO/fluid intake) and fatigue.   Respiratory: Negative for shortness of breath.    Cardiovascular: Negative for chest pain.   Gastrointestinal: Positive for nausea (intermittent in mornings). Negative for abdominal pain, anal bleeding, blood in stool, constipation, diarrhea and vomiting.   Genitourinary: Positive for decreased urine volume. Negative for hematuria.   Musculoskeletal: Positive for gait problem (See HPI). Negative for arthralgias and back pain.   Neurological: Positive for speech difficulty and headaches (intermittent in mornings). Negative for weakness and numbness.   Psychiatric/Behavioral: Positive for confusion and sleep disturbance (decreased).   All other systems reviewed and are negative.      Physical Exam   BP: 188/88  Pulse: 71  Temp: 98  F (36.7  C)  Resp: 18  Height: 157.5 cm (5' 2\")  Weight: 91.2 kg (201 lb)  SpO2: 90 %  Physical Exam  General: patient is alert and can provide some history but does easily become confused and has difficulty recalling recent events  Head: atraumatic and normocephalic   EENT: dry mucus membranes without tonsillar erythema or exudates, pupils 2 mm equal round and reactive, EOMI, no nystagmus  Neck: supple with full " ROM  Cardiovascular: regular rate and rhythm, extremities warm and well perfused, 1+ bilateral lower extremity edema  Pulmonary: lungs clear to auscultation bilaterally   Abdomen: soft, non-tender   Musculoskeletal: normal range of motion   Neurological: alert and oriented x 4 but difficulty recalling recent details and events, unable to perform serial 7s, moving all extremities symmetrically, no facial droop, no pronator drift or drift in lower extremities, sensation to light touch in distal upper and lower extremities intact, normal finger to nose bilaterally  Skin: warm, dry     ED Course     ED Course     Procedures       10:11 PM  The patient was seen and examined by Dr. Meier in Room 10.          EKG Interpretation:      Interpreted by Danielle Meier  Time reviewed: 2205  Symptoms at time of EKG: altered mental status   Rhythm: normal sinus   Rate: Normal  Axis: Left Axis Deviation  Ectopy: none  Conduction: normal  ST Segments/ T Waves: No acute ischemic changes  Q Waves: nonspecific  Comparison to prior: No old EKG available    Clinical Impression: non-specific EKG                Critical Care time:  none               Labs Ordered and Resulted from Time of ED Arrival Up to the Time of Departure from the ED - No data to display         Assessments & Plan (with Medical Decision Making)   Mrs. Miranda is a 61 year old female with a history of diabetes, hypothyroid, hypertension, and breast cancer s/p radiation therapy (~2012) who presents for evaluation of altered mental status.  Differential diagnosis at this time is broad.  She is noted to be significantly hypercalcemic and given her history of breast cancer, concern primarily for metastatic disease.  I did review her previous MRI with radiology due to concerns for possible bony lesion noted on the skull which does look concerning for possible metastatic disease.  Due to her acute renal dysfunction this was done without contrast and does limit the  potential findings however no other obvious evidence of hemorrhage, ischemia or signs of mass effect or noted on MRI from earlier today.  Electrolytes were repeated in the emergency Department and notable for hypokalemia in addition to hypercalcemia and worsening renal dysfunction with a creatinine now up to 3.12.  She was given aggressive IV fluids as well as potassium supplementation.  Magnesium and phosphorus levels have been ordered and are pending.  She does note bilateral lower extremity weakness over the last few weeks that does not have other sensory deficits or objective weakness on physical exam to warrant emergent spine imaging at this time as my suspicion for acute cord compression is low.  In addition she was noted to be hypoxic with an SPO2 down to 84%.  She denies feeling short of breath or having any chest pain.  Chest x-ray shows trace left basilar opacity likely due to atelectasis.  She does have an elevated d-dimer and if she does have a recurrence of metastatic disease certainly PE is considered.  At this time she is hemodynamically stable and oxygen saturations have increased to 98% on 2 L nasal cannula.  She will be admitted to the oncology service for further workup and evaluation and may benefit from V/Q testing during her hospital stay.  At this time due to her acute renal dysfunction CTA was deferred.    I have reviewed the nursing notes.    I have reviewed the findings, diagnosis, plan and need for follow up with the patient.    Current Discharge Medication List          Final diagnoses:   Hypercalcemia   Acute kidney injury (H)   Confusion   Acute respiratory failure with hypoxia (H)   IOxana, am serving as a trained medical scribe to document services personally performed by Danielle Meier MD, based on the provider's statements to me.   IDanielle MD, was physically present and have reviewed and verified the accuracy of this note documented by Oxana HOWE  Brian.      6/29/2017   [facility], [place], EMERGENCY DEPARTMENT     Danielle Meier MD  06/30/17 0228

## 2017-06-30 NOTE — TELEPHONE ENCOUNTER
Contacted about elevated calcium and symptoms.  Report given to ER  Need iv fluids and evaluation  Concern over malignant hypercalcemia

## 2017-06-30 NOTE — PROGRESS NOTES
"HEMATOLOGY PROGRESS NOTE  6/30/2017    SUBJECTIVE:  Patient admitted with altered mental status, constipation, nausea, vomiting, pain in her LE. She received IVF and calcitonin. Reports feeling better this morning. Denies nausea, dyspnea, cough, chest pain, abdominal pain, denies focal neurological symptoms.    OBJECTIVE:  /66 (BP Location: Left arm)  Pulse 64  Temp 98  F (36.7  C) (Oral)  Resp 20  Ht 1.575 m (5' 2\")  Wt 93.8 kg (206 lb 12.8 oz)  SpO2 93%  BMI 37.82 kg/m2      Intake/Output Summary (Last 24 hours) at 06/30/17 1301  Last data filed at 06/30/17 1043   Gross per 24 hour   Intake              560 ml   Output             1000 ml   Net             -440 ml       General appearance: NAD  HEENT: NC/AT  Neck: supple.  Lungs: CTA, fine crackles in the left lower lung, non-labored breathing, on 2 L of O2 via NC  Heart: S1 S2 present, occasional PVCs, no edema of the LE  Abdomen: soft, non-tender, non-distended, no organomegaly  Extremities: No edema, no cyanosis.  Neuro: AAOx3, grossly non-focal.    Labs:  @LABRCNT(NA:3,Potassium:3,Chloride:3,CO2:3,Aniongap:3,BUN:3,Cr:3,Glc:3,Parmjit:3,mg:3,phos:3)  )  Recent Labs   Lab Test  06/30/17   0544  06/25/15   0700  12/31/14   1037   WBC  8.3  5.4  5.9   RBC  4.34  4.61  4.87   HGB  12.0  12.7  13.3   PLT  189  184  187   MCV  84  85  83     No results for input(s): INR, PTT in the last 37230 hours.        Imaging:  I personally reviewed the imaging.    MR BRAIN WITHOUT CONTRAST 6/29/2017 3:34 PM     HISTORY: Poor balance. Confusion.     COMPARISON: None.     TECHNIQUE: Routine noncontrast MR brain.     FINDINGS: No intracranial hemorrhage, mass, or recent infarct. Minimal  nonspecific subcortical white matter hyperintensities consistent with  mild small vessel ischemic change. Minimal atrophy.         IMPRESSION:  1. Minimal atrophy and chronic white matter disease.  2. Nothing clearly acute without the benefit of contrast material.     XR CHEST 2 VW, " 6/29/2017 11:02 PM     Comparison: 3/31/2011     History: confusion, hypoxia     Findings: The cardiomediastinal silhouette is within normal limits.  Trace left basilar opacities. Costophrenic angles are sharp. No  pneumothorax.         Impression: Trace left basilar opacities, favored to represent  atelectasis although infection or aspiration are possibilities.     ASSESSMENT & PLAN:  61 year old female w/ hx of diabetes, hypothyroidism, hypertension and breast cancer (s/p radiation completed in Jan 2013, followed by adjuvant Arimidex, no chemotherapy), who presented to the ED with symptomatic hypercalcemia and IRAM.    Symptomatic hypercalcemia.  Patient presented with 2 weeks of confusion, weakness, fatigue, headaches and decreased appetite. Hypercalcemia to 18.1 in clinic visit earlier in the day (recheck 15.8 here), associated with significant renal injury (creatinine ~3). Ionized calcium is also elevated at 8.3, and PTH is within normal range at 25. Compatible with primary hypeparathyroidism, possibly aggravated by vit D and calcium supplements as well as worsening renal function. Hypercalcemia of malignancy is on differential as well, although lower on the list, no clinical evidence of metastatic disease. She has a history of early breast cancer, ER positive, grade I, and is on adjuvant endocrine therapy. We will consider further investigation after other causes are ruled out.  - IV fluids in the ED (1 L NS followed by 125 ml/hr). .  -s/p Calcitonin IM dosed at 4 Units/kg x 1.   -symptomatically patient is better  -hold Zometa due to low EGFR.  -consult Endocrine to assist with evaluation.   Can re-dose in 6-12 hours if needed.  - Holding home thiazide (as can worsen hypercalcemia) and vitamin D supplement.  -  Vitamin D levels (1,25-dihydroxy vitamin D, and 25-hydroxy vitamin D) are WNL - PTHrP level is pending.  -Will consider SPEP and serum free light chain assay, however total protein is normal. (as serum  protein level is also elevated).     Acute kidney injury:  Previously normal renal function,  Hx of right-sided nephrolithiasis with clusters of non-obstructing stones on CT abd/pelvis in December 2016.  -multifactorial , du to hypercalcemia combined with pre-renal etiology related to poor PO intake and continued use of lisinopril/HCTZ at home.  - IV fluids   -renal US to rule out obstruction.  -- Strict I/Os with daily weights.  -avoid nephrotoxic meds  -avoid hypotension  - will consider nephro consult if no improvement.  - - Holding home HCTZ/lisinopril.     Hypokalemia.  Potassium low on labs this evening (3.0), after 3.4 earlier in the day. Given 20 mEq in the ED already.  - Caution with repletion given significant IRAM.  - Recheck with AM labs.     Hypomagnesemia.  Magnesium 1.3 on labs this evening.   - Repleated with 2 g IV, will recheck with AM labs.     Elevated transaminases.  Mildly elevated for last several months. Prior work-up included a CT abdomen/pelvis in December 2016, which revealed prominent diffuse fatty infiltration of the liver. No focal hepatic lesions were identified.  - Monitor for now.     Hx of stage 1, ER+, SC+, Her-2 neg left breast cancer (invasive adenocarcinoma).  Diagnosed in 2012, Stage I, grade I, she completed radiation in January 2013 after lumpectomy. Started on Arimidex in 2013. Follows with Dr. Meng. Last seen in clinic on 6/19/17, doing well without clinical evidence of disease recurrence. Normal mammogram on 12/16/16. Planned to complete a 10 year course of Arimidex, with mammogram in December 2017.      Diabetes.   Hgb A1c was 7.1 on labs today, managed with diet only.  - Monitor for now.     Hypothyroidism.  Labs today notable for TSH slightly elevated at 4.50 and free T4 in the normal range at 1.23.  - Continue home levothyroxine.     Hypertension.  BPs currently acceptable.  - Hold home lisinopril/HCTZ in setting of IRAM.  - Continue home metoprolol for  now.  -hydralazine prn     FEN: low calcium diet, NS at 200 ml/hr, replete lytes PRN (cautiously)  Prophylaxis: heparin SQ  Code: FULL  Disposition: Admitted to inpatient status for symptomatic hypercalcemia and acute kidney injury. Anticipated length of stay is >2 midnights.       Patient was discussed with Dr. Patton.    Talia Tatum MD  Hem-Onc Fellow

## 2017-06-30 NOTE — PROGRESS NOTES
Nursing Focus: Admission  D: Arrived around 0130 from ED via transport. Patient accompanied by spouse & son. Admitted for AMS. No complaints at this time.      I: Admission process began.  Patient oriented to room, enviroment, call light.  MD notified of patient's arrival on unit.     A: Vital signs stable, afebrile.  Patient stable at this time.    P: Implement plan of care when available. Continue to monitor patient. Nursing interventions as appropriate. Notify MD with changes in pt status.

## 2017-06-30 NOTE — ED NOTES
Bed: IN02  Expected date:   Expected time:   Means of arrival:   Comments:  Whitney Enciso  Increasing confusion, h/o breast cancer s/p chemo  Calcium of 18   Coming by private car

## 2017-06-30 NOTE — H&P
[facility], [place]    Hematology / Oncology  Admission History & Physical     Date of Admission:  6/29/2017  Date of Service: 06/30/17  Primary Oncologist: Dr. Kaycee Meng    Assessment & Plan   Kenna Miranda is a 61 year old female with history of diabetes, hypothyroidism, hypertension and breast cancer (s/p radiation completed in Jan 2013, followed by adjuvant Arimidex, no chemotherapy), who presented to the ED with symptomatic hypercalcemia and IRAM.    Symptomatic hypercalcemia.  Patient presented with 2 weeks of confusion, weakness, fatigue, headaches and decreased appetite. Labs notable for hypercalcemia to 18.1 in clinic visit earlier in the day (recheck 15.8 here), associated with significant renal injury (creatinine ~3). Ionized calcium is also elevated at 8.3, and PTH is actually in the normal range at 25. She was started on IV fluids in the ED (1 L NS followed by 125 ml/hr). Hypercalcemia of this severity likely represents hypercalcemia of malignancy, though of note, recently increased dose of lisinopril/HCTZ may also have contributed.  - Aggressive IV fluid hydration overnight.  - Calcitonin IM dosed at 4 Units/kg x 1 for now, then recheck calcium. Can re-dose in 6-12 hours if needed.  - Consider bisphosphonate therapy in the AM if renal function allows.  - Holding home thiazide (as can worsen hypercalcemia) and vitamin D supplement.  - Will add on Vitamin D levels (1,25-dihydroxy vitamin D, and 25-hydroxy vitamin D), though low suspicion for hypervitaminosis D as a cause in this setting.  - Follow-up PTHrP level which is pending.  - Pending these labs, could also consider SPEP and serum free light chain assay (as serum protein level is also elevated).    IRAM.  Previously normal renal function, though patient known to have right-sided nephrolithiasis with clusters of non-obstructing stones on CT abd/pelvis in December 2016. This IRAM is likely secondary to intrinsic renal  injury with hypercalcemia combined with pre-renal etiology related to poor PO intake and continued use of lisinopril/HCTZ at home. IV fluids were started in the ED.  - Will aggressively hydrate overnight, continuing NS at 200 ml/hr for goal -150 ml/hr.  - Strict I/Os with daily weights.  - UA with micro.  - Holding home HCTZ/lisinopril.  - Consider renal consult in the AM if not improving. May warrant HD with this degree of hypercalcemia.    Hypokalemia.  Potassium low on labs this evening (3.0), after 3.4 earlier in the day. Given 20 mEq in the ED already.  - Caution with repletion given significant IRAM.  - Recheck with AM labs.    Hypomagnesemia.  Magnesium 1.3 on labs this evening.   - Replete with 2 g IV now and recheck with AM labs.    Elevated transaminases.  Mildly elevated for last several months. Prior work-up included a CT abdomen/pelvis in December 2016, which revealed prominent diffuse fatty infiltration of the liver. No focal hepatic lesions were identified.  - Monitor for now.    Hx of stage 1, ER+, VT+, Her-2 neg left breast cancer (invasive adenocarcinoma).  Diagnosed in 2012, completed radiation in January 2013 after lumpectomy. Started on Arimidex in 2013. Follows with Dr. Meng. Last seen in clinic on 6/19/17, doing well without clinical evidence of disease recurrence. Normal mammogram on 12/16/16. Planned to complete a 10 year course of Arimidex, with mammogram in December 2017.    Diabetes.   Hgb A1c was 7.1 on labs today, patient not currently on medications.  - Monitor for now.    Hypothyroidism.  Labs today notable for TSH slightly elevated at 4.50 and free T4 in the normal range at 1.23.  - Continue home levothyroxine.    Hypertension.  BPs currently acceptable.  - Hold home lisinopril/HCTZ in setting of IRAM.  - Continue home metoprolol for now.    FEN: low calcium diet, NS at 200 ml/hr, replete lytes PRN (cautiously)  Prophylaxis: heparin SQ  Code: FULL  Disposition: Admitted to  inpatient status for symptomatic hypercalcemia and acute kidney injury. Anticipated length of stay is >2 midnights.    Patient will be formally staffed with the day team in the AM.    María Neff MD/PhD  Heme/Onc/Transplant [facility]    Chief Complaint   Altered mental status.   - History provided by the patient and her spouse.     History of Present Illness   Kenna Miranda is a 61 year old female with history of diabetes, hypothyroidism, hypertension, and breast cancer (s/p radiation completed in Jan 2013, currently on adjuvant AI, no chemotherapy). She presented to the ED for evaluation of altered mental status. This started about 2 weeks ago, when she noted increasing difficulty sleeping, followed by fatigue and poor appetite (food doesn't taste good). About a week later, others around her started to point out that she was slurring her speech more, and she was more confused and disoriented at times. A couple of days ago she noted bilateral lower extremity weakness and difficulty with ambulation, particularly going down stairs. There is no numbness or tingling. Some intermittent headaches lately, especially in the mornings, lasting about 30-45 minutes. They are diffuse in nature, rather than focal, and mild in intensity. No associated nausea, photophobia or vision changes. She denies abdominal pain and diarrhea, but has felt constipated for about a month. No dysuria or hematuria, but notes decreased urine output secondary to less intake lately. No joint pain, muscle aches, or swelling. No chest pain, cough or SOB. No vision changes or dizziness. No new lumps, bumps or rashes noted. Mood is stable.    She was seen earlier in the day for this by her PCP, at which time labs were drawn and an MRI of the brain was obtained. Labs were notable for IRAM (with creatinine 2.91) and hypercalcemia to 18.1. MRI w/out contrast revealed minimal atrophy and chronic white matter disease. Of note, patient takes Tums a  couple of times per week, and a vitamin D supplement. Other medication changes notable for a recently increased dose of lisinopril/HCTZ about one week ago for hypertension during a clinic visit.    Heme/Onc History (per most recent clinic visit note, 6/19/17):  Ms. Miranda is a 61-year-old female with a history of hypertension, hyperlipidemia and diabetes and a stage I, ER positive, Her2 negative LEFT breast cancer. Briefly, Kenna has been getting regular screening mammographies. She reports she had a breast biopsy performed approximately 5 years ago for what turned out to be a cyst. In mammogram, which was performed in 10/2012, a small, spiculated asymmetry was appreciated in the posteromedial left breast. There was no evidence of malignancy in the right breast. She went on to have a diagnostic mammogram of the left breast on 10/18. This revealed an 8 x 11 x 7-mm mass in the left breast. This was approximately 9 cm from the nipple at 10 o'clock. She went on for an ultrasound that was performed that same day and subsequently ultrasound core biopsy performed. The actual pathology from the biopsy, which was performed on 10/24, revealed an invasive adenocarcinoma, solid and cribriform types. This was well-differentiated and Rajni grade 1. There were no calcifications present. Necrosis was absent. There was associated grade 1 intraductal carcinoma. This was 100% positive for estrogen and 80% positive for progesterone. HER2 testing was performed by FISH was negative with a ratio of HER2/CEP17 signals of 1.3. She went on to have a left lumpectomy performed by Jesus Vicente on 11/05/2012, as well as a left axillary sentinel lymph node biopsy. The left breast lumpectomy specimen revealed infiltrating ductal carcinoma, histologic Larimore grade 1. The ultimate size was 1.1 x 0.7 cm. There was associated ductal carcinoma in situ with cribriform and micropapillary type, nuclear grade 2, representing approximately 13%  of the tumor and extending beyond the main tumor. The margins were negative. The closest margin was 0.2 cm away, giving her a final pathologic staging of pT1c N0 MX. One sentinel lymph node was removed; this was negative. There was hemangioma present in the perinodal soft tissue. Oncotype DX returned at 12. This gives Kenna a chance of distant recurrence of 8% in 10 years, after 5 years of tamoxifen. With these results, she was recommended proceeding with radiation and adjuvant AI. No chemotherapy was indicated.  She completed radiation in 2013.  She began arimidex shortly thereafter.    Past Medical History    I have reviewed this patient's medical history and updated it with pertinent information if needed.   Past Medical History:   Diagnosis Date     Benign breast lumps     biopsied.     Borderline osteopenia 2010     Cancer (H)      Cholesterol serum increased      DM (diabetes mellitus), type 2 (H)      Elevated liver function tests     h/o mild increase of LFT's     HTN (hypertension)      Hypothyroid     as teenager.     Menopause      Personal history of kidney stones     + stones on CT scan , treated with lithotripsy     Rectal fissure     resolved       Past Surgical History   I have reviewed this patient's surgical history and updated it with pertinent information if needed.  Past Surgical History:   Procedure Laterality Date     BIOPSY  2015     BIOPSY BREAST NEEDLE LOCALIZATION, BIOPSY NODE SENTINEL, COMBINED  2012    Procedure: COMBINED BIOPSY BREAST NEEDLE LOCALIZATION, BIOPSY NODE SENTINEL;  Left Breast Wire Locilized Lumpectomy and Sentinal Lymph Node Biopsy;  Surgeon: Jesus Vicente MD;  Location: [facility] OR     C  DELIVERY ONLY      x 2     COLONOSCOPY  10-26-11    Return in 1 yr for Polyp Surveillance     COLONOSCOPY  12    Return for Colonoscopy in 3 yrs.Polyps     LITHOTRIPSY       SURGICAL HISTORY OF -       exploratory laparotomy     SURGICAL  HISTORY OF -       Right ovary removed     SURGICAL HISTORY OF -       breast biopsy       Prior to Admission Medications   Prior to Admission Medications   Prescriptions Last Dose Informant Patient Reported? Taking?   ASPIRIN 81 MG OR TABS   Yes No   Si TABLET DAILY   Cholecalciferol (VITAMIN D) 1000 UNITS capsule   Yes No   Sig: Take 1 capsule by mouth At Bedtime.   MULTIPLE VITAMINS/WOMENS OR   Yes No   Si tablet daily   anastrozole (ARIMIDEX) 1 MG tablet   No No   Sig: Take 1 tablet (1 mg) by mouth daily   levothyroxine (SYNTHROID/LEVOTHROID) 125 MCG tablet   No No   Sig: TAKE ONE TABLET BY MOUTH EVERY MORNING   losartan-hydrochlorothiazide (HYZAAR) 50-12.5 MG per tablet   No No   Sig: Take 2 tablets by mouth daily   metoprolol (LOPRESSOR) 25 MG tablet   No No   Sig: TAKE ONE TABLET BY MOUTH TWICE A DAY      Facility-Administered Medications: None     Allergies   Allergies   Allergen Reactions     Nkda [No Known Drug Allergies]        Social History   Social History     Social History     Marital status:      Spouse name: dale     Number of children: 2     Years of education: N/A     Occupational History      [employer]     Social History Main Topics     Smoking status: Never Smoker     Smokeless tobacco: Never Used      Comment: no second hand exposure.  when young Dad smoked.     Alcohol use Yes      Comment: very, very little     Drug use: No     Sexual activity: Yes     Partners: Male      Comment: post menopausal.     Other Topics Concern     Parent/Sibling W/ Cabg, Mi Or Angioplasty Before 65f 55m? No      Service No     Blood Transfusions No     Caffeine Concern No     1 cups coffee daily and then herbal teas.     Occupational Exposure No     Hobby Hazards No     Sleep Concern No     Stress Concern No     Weight Concern No     Special Diet No     Back Care No     Exercise No     Seat Belt Yes     Social History Narrative    Lives with  and son (b ,  going going to [employer] [place]).  Daughter lives at home (b 1990).  Works as an .   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.       Family History   I have reviewed this patient's family history and updated it with pertinent information if needed.   Family History   Problem Relation Age of Onset     Cancer - colorectal Mother      Thyroid Disease Mother      Colon Cancer Mother      CANCER Father      Lung     Lipids Father      Glaucoma Father      Macular Degeneration Father      Depression Brother      Respiratory Brother      losing hearing in 50's       Review of Systems   CONSTITUTIONAL: + for fatigue and decreased appetite; negative for fevers, chills, night sweats, and unintentional weight loss   EYES: negative for vision changes, diplopia, pain, and drainage  HEENT: negative for hearing loss, otalgia, tinnitus, nasal congestion, rhinorrhea, epistaxis, mucositis, and sore throat   CARDIOVASCULAR: negative for chest pain, dyspnea, palpitations, orthopnea, PND, exertional chest pressure/discomfort, and edema   RESPIRATORY: negative for cough, wheezing, SOB, and hemoptysis   GASTROINTESTINAL: + for nausea and constipation; negative for vomiting, diarrhea, abdominal pain, heartburn, hematemesis, hemtochezia and melena   GENITOURINARY: + for decreased frequency; negative for dysuria, urgency, and hematuria  MUSCULOSKELETAL: negative for myalgias, arthralgias, and joint swelling   HEMATOLOGIC/LYMPHATIC: negative for easy bruising or bleeding   SKIN: negative for rash, no new or concerning skin lesions   NEUROLOGIC: + for headaches, confusion, lower extremity weakness and slurred speech; negative for dizziness, coordination problems, LOC, and near-syncope    Physical Exam   Temp:  [97.4  F (36.3  C)-98  F (36.7  C)] 98  F (36.7  C)  Pulse:  [68-76] 71  Heart Rate:  [57-78] 57  Resp:  [11-27] 27  BP: (148-188)/(74-92) 156/87  SpO2:  [90 %-100 %] 95  %  CONSTITUTIONAL: Alert and interactive. Lying in bed in no acute distress.  HEENT: NC/AT, PERRLA, EOMI, anicteric sclera, oropharynx is pink and moist without erythema/exudates/lesions/thrush.  NECK: Supple, full ROM, no palpable mandibular/cervical/supra/infraclavicular lymph nodes.  CARDIOVASCULAR: RRR. Normal S1/S2. No murmurs, click, or rub.   RESPIRATORY: CTAB. No wheezes appreciated. Normal respiratory effort on ambient air.  GASTROINTESTINAL: Soft, non-tender, non-distended, normoactive bowel sounds, no masses or organomegaly appreciated.  MUSCULOSKELETAL: 2+ equal and bilateral radial and pedal pulses. Equal strength in all major muscle groups. No extremity edema.   SKIN: Warm and intact. No concerning lesions or rashes on exposed skin surfaces. No jaundice.  NEUROLOGIC: Alert and fully oriented, CN II-XII grossly intact, appropriately responsive during interview. Strength 5/5 in bilateral upper and lower extremities.    Data   Results for orders placed or performed during the hospital encounter of 06/29/17 (from the past 24 hour(s))   EKG 12 lead   Result Value Ref Range    Interpretation ECG Click View Image link to view waveform and result    Basic metabolic panel   Result Value Ref Range    Sodium 135 133 - 144 mmol/L    Potassium 3.0 (L) 3.4 - 5.3 mmol/L    Chloride 94 94 - 109 mmol/L    Carbon Dioxide 32 20 - 32 mmol/L    Anion Gap 9 3 - 14 mmol/L    Glucose 163 (H) 70 - 99 mg/dL    Urea Nitrogen 43 (H) 7 - 30 mg/dL    Creatinine 3.12 (H) 0.52 - 1.04 mg/dL    GFR Estimate 15 (L) >60 mL/min/1.7m2    GFR Estimate If Black 18 (L) >60 mL/min/1.7m2    Calcium 15.8 (HH) 8.5 - 10.1 mg/dL   Calcium ionized   Result Value Ref Range    Calcium Ionized Whole Blood 8.3 (HH) 4.4 - 5.2 mg/dL   Parathyroid Hormone Intact   Result Value Ref Range    Parathyroid Hormone Intact 25 12 - 72 pg/mL   Troponin I   Result Value Ref Range    Troponin I ES 0.040 0.000 - 0.045 ug/L   D dimer quantitative   Result Value Ref  Range    D Dimer 0.9 (H) 0.0 - 0.50 ug/ml FEU   Magnesium   Result Value Ref Range    Magnesium 1.3 (L) 1.6 - 2.3 mg/dL   Phosphorus   Result Value Ref Range    Phosphorus 2.1 (L) 2.5 - 4.5 mg/dL       MR BRAIN WITHOUT CONTRAST 6/29/2017 3:34 PM  HISTORY: Poor balance. Confusion.  COMPARISON: None.  TECHNIQUE: Routine noncontrast MR brain.  FINDINGS: No intracranial hemorrhage, mass, or recent infarct. Minimal  nonspecific subcortical white matter hyperintensities consistent with  mild small vessel ischemic change. Minimal atrophy.  IMPRESSION:  1. Minimal atrophy and chronic white matter disease.  2. Nothing clearly acute without the benefit of contrast material.

## 2017-06-30 NOTE — PLAN OF CARE
Problem: Goal Outcome Summary  Goal: Goal Outcome Summary     BPmax 166/70, have hydralazine orders w/ parameters PRN. O2 sats 92/93% w/ 2LPM via NC overnight. OVSS. A&O, calling appropriately. Emesis x1 when up to the bathroom overnight; emesis was red in color r/t food/drink earlier, no nausea prior to; pt declined any interventions. SBA w/ ambulation, can be wobbly/unsteady at times. Need medications reviewed & admission questions done. Had trouble w/ the placement of pt's right upper PIV overnight, new one placed to continue NS bolus. UA sent down. Continue to monitor & w/ POC.    Addendum: Calcium remains high this morning @ 15.4, care team notified.

## 2017-07-01 ENCOUNTER — APPOINTMENT (OUTPATIENT)
Dept: GENERAL RADIOLOGY | Facility: CLINIC | Age: 62
DRG: 644 | End: 2017-07-01
Attending: UROLOGY
Payer: COMMERCIAL

## 2017-07-01 ENCOUNTER — APPOINTMENT (OUTPATIENT)
Dept: OCCUPATIONAL THERAPY | Facility: CLINIC | Age: 62
DRG: 644 | End: 2017-07-01
Attending: INTERNAL MEDICINE
Payer: COMMERCIAL

## 2017-07-01 ENCOUNTER — ANESTHESIA (OUTPATIENT)
Dept: SURGERY | Facility: CLINIC | Age: 62
DRG: 644 | End: 2017-07-01
Payer: COMMERCIAL

## 2017-07-01 LAB
ALBUMIN SERPL-MCNC: 3 G/DL (ref 3.4–5)
ALP SERPL-CCNC: 52 U/L (ref 40–150)
ALT SERPL W P-5'-P-CCNC: 54 U/L (ref 0–50)
ANION GAP SERPL CALCULATED.3IONS-SCNC: 6 MMOL/L (ref 3–14)
AST SERPL W P-5'-P-CCNC: 32 U/L (ref 0–45)
BACTERIA SPEC CULT: NORMAL
BASOPHILS # BLD AUTO: 0 10E9/L (ref 0–0.2)
BASOPHILS NFR BLD AUTO: 0.3 %
BILIRUB DIRECT SERPL-MCNC: 0.2 MG/DL (ref 0–0.2)
BILIRUB SERPL-MCNC: 1.2 MG/DL (ref 0.2–1.3)
BUN SERPL-MCNC: 29 MG/DL (ref 7–30)
CA-I SERPL ISE-MCNC: 6 MG/DL (ref 4.4–5.2)
CALCIUM SERPL-MCNC: 11.4 MG/DL (ref 8.5–10.1)
CHLORIDE SERPL-SCNC: 107 MMOL/L (ref 94–109)
CO2 SERPL-SCNC: 26 MMOL/L (ref 20–32)
CREAT SERPL-MCNC: 2.53 MG/DL (ref 0.52–1.04)
CREAT UR-MCNC: 28 MG/DL
DIFFERENTIAL METHOD BLD: ABNORMAL
EOSINOPHIL # BLD AUTO: 0.2 10E9/L (ref 0–0.7)
EOSINOPHIL NFR BLD AUTO: 3.5 %
ERYTHROCYTE [DISTWIDTH] IN BLOOD BY AUTOMATED COUNT: 13.8 % (ref 10–15)
GFR SERPL CREATININE-BSD FRML MDRD: 19 ML/MIN/1.7M2
GGT SERPL-CCNC: 43 U/L (ref 0–40)
GLUCOSE BLDC GLUCOMTR-MCNC: 150 MG/DL (ref 70–99)
GLUCOSE BLDC GLUCOMTR-MCNC: 241 MG/DL (ref 70–99)
GLUCOSE SERPL-MCNC: 142 MG/DL (ref 70–99)
HCT VFR BLD AUTO: 35.4 % (ref 35–47)
HGB BLD-MCNC: 11.5 G/DL (ref 11.7–15.7)
IMM GRANULOCYTES # BLD: 0 10E9/L (ref 0–0.4)
IMM GRANULOCYTES NFR BLD: 0.2 %
LYMPHOCYTES # BLD AUTO: 1.6 10E9/L (ref 0.8–5.3)
LYMPHOCYTES NFR BLD AUTO: 23.7 %
Lab: NORMAL
MCH RBC QN AUTO: 27.4 PG (ref 26.5–33)
MCHC RBC AUTO-ENTMCNC: 32.5 G/DL (ref 31.5–36.5)
MCV RBC AUTO: 85 FL (ref 78–100)
MICRO REPORT STATUS: NORMAL
MONOCYTES # BLD AUTO: 0.6 10E9/L (ref 0–1.3)
MONOCYTES NFR BLD AUTO: 9.7 %
NEUTROPHILS # BLD AUTO: 4.1 10E9/L (ref 1.6–8.3)
NEUTROPHILS NFR BLD AUTO: 62.6 %
NRBC # BLD AUTO: 0 10*3/UL
NRBC BLD AUTO-RTO: 0 /100
PHOSPHATE SERPL-MCNC: 0.6 MG/DL (ref 2.5–4.5)
PHOSPHATE SERPL-MCNC: 1.9 MG/DL (ref 2.5–4.5)
PLATELET # BLD AUTO: 157 10E9/L (ref 150–450)
POTASSIUM SERPL-SCNC: 3.1 MMOL/L (ref 3.4–5.3)
POTASSIUM SERPL-SCNC: 3.3 MMOL/L (ref 3.4–5.3)
PROT SERPL-MCNC: 6.8 G/DL (ref 6.8–8.8)
PROT UR-MCNC: 0.14 G/L
PROT/CREAT 24H UR: 0.52 G/G CR (ref 0–0.2)
RBC # BLD AUTO: 4.19 10E12/L (ref 3.8–5.2)
SODIUM SERPL-SCNC: 138 MMOL/L (ref 133–144)
SPECIMEN SOURCE: NORMAL
WBC # BLD AUTO: 6.6 10E9/L (ref 4–11)

## 2017-07-01 PROCEDURE — 40000277 XR SURGERY CARM FLUORO LESS THAN 5 MIN W STILLS: Mod: TC

## 2017-07-01 PROCEDURE — 25000132 ZZH RX MED GY IP 250 OP 250 PS 637: Performed by: INTERNAL MEDICINE

## 2017-07-01 PROCEDURE — 25000128 H RX IP 250 OP 636: Performed by: NURSE ANESTHETIST, CERTIFIED REGISTERED

## 2017-07-01 PROCEDURE — 82977 ASSAY OF GGT: CPT | Performed by: INTERNAL MEDICINE

## 2017-07-01 PROCEDURE — C2617 STENT, NON-COR, TEM W/O DEL: HCPCS | Performed by: UROLOGY

## 2017-07-01 PROCEDURE — 97166 OT EVAL MOD COMPLEX 45 MIN: CPT | Mod: GO | Performed by: OCCUPATIONAL THERAPIST

## 2017-07-01 PROCEDURE — 37000009 ZZH ANESTHESIA TECHNICAL FEE, EACH ADDTL 15 MIN: Performed by: UROLOGY

## 2017-07-01 PROCEDURE — BT1D1ZZ FLUOROSCOPY OF RIGHT KIDNEY, URETER AND BLADDER USING LOW OSMOLAR CONTRAST: ICD-10-PCS | Performed by: UROLOGY

## 2017-07-01 PROCEDURE — 99232 SBSQ HOSP IP/OBS MODERATE 35: CPT | Mod: GC | Performed by: INTERNAL MEDICINE

## 2017-07-01 PROCEDURE — 25000128 H RX IP 250 OP 636: Performed by: INTERNAL MEDICINE

## 2017-07-01 PROCEDURE — 82248 BILIRUBIN DIRECT: CPT | Performed by: INTERNAL MEDICINE

## 2017-07-01 PROCEDURE — 27210794 ZZH OR GENERAL SUPPLY STERILE: Performed by: UROLOGY

## 2017-07-01 PROCEDURE — 71000014 ZZH RECOVERY PHASE 1 LEVEL 2 FIRST HR: Performed by: UROLOGY

## 2017-07-01 PROCEDURE — C1769 GUIDE WIRE: HCPCS | Performed by: UROLOGY

## 2017-07-01 PROCEDURE — 36000055 ZZH SURGERY LEVEL 2 W FLUORO 1ST 30 MIN - UMMC: Performed by: UROLOGY

## 2017-07-01 PROCEDURE — 25000128 H RX IP 250 OP 636: Performed by: UROLOGY

## 2017-07-01 PROCEDURE — 36415 COLL VENOUS BLD VENIPUNCTURE: CPT | Performed by: INTERNAL MEDICINE

## 2017-07-01 PROCEDURE — 84156 ASSAY OF PROTEIN URINE: CPT | Performed by: INTERNAL MEDICINE

## 2017-07-01 PROCEDURE — 97535 SELF CARE MNGMENT TRAINING: CPT | Mod: GO | Performed by: OCCUPATIONAL THERAPIST

## 2017-07-01 PROCEDURE — 40000133 ZZH STATISTIC OT WARD VISIT: Performed by: OCCUPATIONAL THERAPIST

## 2017-07-01 PROCEDURE — 25000125 ZZHC RX 250: Performed by: INTERNAL MEDICINE

## 2017-07-01 PROCEDURE — 25000125 ZZHC RX 250: Performed by: NURSE ANESTHETIST, CERTIFIED REGISTERED

## 2017-07-01 PROCEDURE — 80053 COMPREHEN METABOLIC PANEL: CPT | Performed by: INTERNAL MEDICINE

## 2017-07-01 PROCEDURE — 82330 ASSAY OF CALCIUM: CPT | Performed by: INTERNAL MEDICINE

## 2017-07-01 PROCEDURE — 84100 ASSAY OF PHOSPHORUS: CPT | Performed by: INTERNAL MEDICINE

## 2017-07-01 PROCEDURE — 37000008 ZZH ANESTHESIA TECHNICAL FEE, 1ST 30 MIN: Performed by: UROLOGY

## 2017-07-01 PROCEDURE — 12000008 ZZH R&B INTERMEDIATE UMMC

## 2017-07-01 PROCEDURE — 97110 THERAPEUTIC EXERCISES: CPT | Mod: GO | Performed by: OCCUPATIONAL THERAPIST

## 2017-07-01 PROCEDURE — 40000170 ZZH STATISTIC PRE-PROCEDURE ASSESSMENT II: Performed by: UROLOGY

## 2017-07-01 PROCEDURE — 36000053 ZZH SURGERY LEVEL 2 EA 15 ADDTL MIN - UMMC: Performed by: UROLOGY

## 2017-07-01 PROCEDURE — 0T768DZ DILATION OF RIGHT URETER WITH INTRALUMINAL DEVICE, VIA NATURAL OR ARTIFICIAL OPENING ENDOSCOPIC: ICD-10-PCS | Performed by: UROLOGY

## 2017-07-01 PROCEDURE — 85025 COMPLETE CBC W/AUTO DIFF WBC: CPT | Performed by: INTERNAL MEDICINE

## 2017-07-01 PROCEDURE — 87086 URINE CULTURE/COLONY COUNT: CPT | Performed by: PHYSICIAN ASSISTANT

## 2017-07-01 PROCEDURE — 00000146 ZZHCL STATISTIC GLUCOSE BY METER IP

## 2017-07-01 PROCEDURE — 93005 ELECTROCARDIOGRAM TRACING: CPT

## 2017-07-01 PROCEDURE — 84132 ASSAY OF SERUM POTASSIUM: CPT | Performed by: INTERNAL MEDICINE

## 2017-07-01 PROCEDURE — 25500064 ZZH RX 255 OP 636: Performed by: UROLOGY

## 2017-07-01 DEVICE — STENT URETERAL FIRM 6FRX24CM W/O GW G23405
Type: IMPLANTABLE DEVICE | Site: URETER | Status: NON-FUNCTIONAL
Removed: 2017-07-31

## 2017-07-01 RX ORDER — ONDANSETRON 2 MG/ML
4 INJECTION INTRAMUSCULAR; INTRAVENOUS EVERY 30 MIN PRN
Status: CANCELLED | OUTPATIENT
Start: 2017-07-01

## 2017-07-01 RX ORDER — PROPOFOL 10 MG/ML
INJECTION, EMULSION INTRAVENOUS PRN
Status: DISCONTINUED | OUTPATIENT
Start: 2017-07-01 | End: 2017-07-01

## 2017-07-01 RX ORDER — LIDOCAINE HYDROCHLORIDE 20 MG/ML
INJECTION, SOLUTION INFILTRATION; PERINEURAL PRN
Status: DISCONTINUED | OUTPATIENT
Start: 2017-07-01 | End: 2017-07-01

## 2017-07-01 RX ORDER — ONDANSETRON 4 MG/1
4 TABLET, ORALLY DISINTEGRATING ORAL EVERY 30 MIN PRN
Status: CANCELLED | OUTPATIENT
Start: 2017-07-01

## 2017-07-01 RX ORDER — SODIUM CHLORIDE, SODIUM LACTATE, POTASSIUM CHLORIDE, CALCIUM CHLORIDE 600; 310; 30; 20 MG/100ML; MG/100ML; MG/100ML; MG/100ML
INJECTION, SOLUTION INTRAVENOUS CONTINUOUS
Status: CANCELLED | OUTPATIENT
Start: 2017-07-01

## 2017-07-01 RX ORDER — SODIUM CHLORIDE, SODIUM LACTATE, POTASSIUM CHLORIDE, CALCIUM CHLORIDE 600; 310; 30; 20 MG/100ML; MG/100ML; MG/100ML; MG/100ML
INJECTION, SOLUTION INTRAVENOUS CONTINUOUS PRN
Status: DISCONTINUED | OUTPATIENT
Start: 2017-07-01 | End: 2017-07-01

## 2017-07-01 RX ORDER — POTASSIUM CHLORIDE 1.5 G/1.58G
40 POWDER, FOR SOLUTION ORAL ONCE
Status: COMPLETED | OUTPATIENT
Start: 2017-07-01 | End: 2017-07-01

## 2017-07-01 RX ORDER — EPHEDRINE SULFATE 50 MG/ML
INJECTION, SOLUTION INTRAMUSCULAR; INTRAVENOUS; SUBCUTANEOUS PRN
Status: DISCONTINUED | OUTPATIENT
Start: 2017-07-01 | End: 2017-07-01

## 2017-07-01 RX ORDER — PROPOFOL 10 MG/ML
INJECTION, EMULSION INTRAVENOUS CONTINUOUS PRN
Status: DISCONTINUED | OUTPATIENT
Start: 2017-07-01 | End: 2017-07-01

## 2017-07-01 RX ORDER — FENTANYL CITRATE 50 UG/ML
INJECTION, SOLUTION INTRAMUSCULAR; INTRAVENOUS PRN
Status: DISCONTINUED | OUTPATIENT
Start: 2017-07-01 | End: 2017-07-01

## 2017-07-01 RX ORDER — SCOLOPAMINE TRANSDERMAL SYSTEM 1 MG/1
PATCH, EXTENDED RELEASE TRANSDERMAL PRN
Status: DISCONTINUED | OUTPATIENT
Start: 2017-07-01 | End: 2017-07-01

## 2017-07-01 RX ORDER — FENTANYL CITRATE 50 UG/ML
25-50 INJECTION, SOLUTION INTRAMUSCULAR; INTRAVENOUS
Status: CANCELLED | OUTPATIENT
Start: 2017-07-01

## 2017-07-01 RX ORDER — SCOLOPAMINE TRANSDERMAL SYSTEM 1 MG/1
1 PATCH, EXTENDED RELEASE TRANSDERMAL ONCE
Status: DISCONTINUED | OUTPATIENT
Start: 2017-07-01 | End: 2017-07-01 | Stop reason: HOSPADM

## 2017-07-01 RX ORDER — CEFAZOLIN SODIUM 1 G/3ML
INJECTION, POWDER, FOR SOLUTION INTRAMUSCULAR; INTRAVENOUS PRN
Status: DISCONTINUED | OUTPATIENT
Start: 2017-07-01 | End: 2017-07-01

## 2017-07-01 RX ORDER — LABETALOL HYDROCHLORIDE 5 MG/ML
10 INJECTION, SOLUTION INTRAVENOUS
Status: CANCELLED | OUTPATIENT
Start: 2017-07-01

## 2017-07-01 RX ORDER — ONDANSETRON 2 MG/ML
INJECTION INTRAMUSCULAR; INTRAVENOUS PRN
Status: DISCONTINUED | OUTPATIENT
Start: 2017-07-01 | End: 2017-07-01

## 2017-07-01 RX ADMIN — METOPROLOL TARTRATE 25 MG: 25 TABLET ORAL at 07:52

## 2017-07-01 RX ADMIN — SODIUM PHOSPHATE, MONOBASIC, MONOHYDRATE AND SODIUM PHOSPHATE, DIBASIC, ANHYDROUS 25 MMOL: 276; 142 INJECTION, SOLUTION INTRAVENOUS at 09:02

## 2017-07-01 RX ADMIN — DOCUSATE SODIUM 100 MG: 100 CAPSULE, LIQUID FILLED ORAL at 19:33

## 2017-07-01 RX ADMIN — SODIUM PHOSPHATE, MONOBASIC, MONOHYDRATE AND SODIUM PHOSPHATE, DIBASIC, ANHYDROUS 20 MMOL: 276; 142 INJECTION, SOLUTION INTRAVENOUS at 20:46

## 2017-07-01 RX ADMIN — LEVOTHYROXINE SODIUM 125 MCG: 25 TABLET ORAL at 07:51

## 2017-07-01 RX ADMIN — CEFAZOLIN 2 G: 1 INJECTION, POWDER, FOR SOLUTION INTRAMUSCULAR; INTRAVENOUS at 08:55

## 2017-07-01 RX ADMIN — SODIUM CHLORIDE, POTASSIUM CHLORIDE, SODIUM LACTATE AND CALCIUM CHLORIDE: 600; 310; 30; 20 INJECTION, SOLUTION INTRAVENOUS at 08:28

## 2017-07-01 RX ADMIN — MIDAZOLAM HYDROCHLORIDE 1 MG: 1 INJECTION, SOLUTION INTRAMUSCULAR; INTRAVENOUS at 08:28

## 2017-07-01 RX ADMIN — METOPROLOL TARTRATE 25 MG: 25 TABLET ORAL at 19:33

## 2017-07-01 RX ADMIN — SCOPOLAMINE 1 PATCH: 1 PATCH, EXTENDED RELEASE TRANSDERMAL at 08:09

## 2017-07-01 RX ADMIN — HEPARIN SODIUM 5000 UNITS: 5000 INJECTION, SOLUTION INTRAVENOUS; SUBCUTANEOUS at 18:36

## 2017-07-01 RX ADMIN — POTASSIUM CHLORIDE 40 MEQ: 1.5 POWDER, FOR SOLUTION ORAL at 13:32

## 2017-07-01 RX ADMIN — Medication 10 MG: at 09:10

## 2017-07-01 RX ADMIN — CEFAZOLIN SODIUM 2 G: 2 INJECTION, SOLUTION INTRAVENOUS at 05:47

## 2017-07-01 RX ADMIN — FENTANYL CITRATE 50 MCG: 50 INJECTION, SOLUTION INTRAMUSCULAR; INTRAVENOUS at 09:04

## 2017-07-01 RX ADMIN — Medication 10 MG: at 09:02

## 2017-07-01 RX ADMIN — SODIUM CHLORIDE: 9 INJECTION, SOLUTION INTRAVENOUS at 10:47

## 2017-07-01 RX ADMIN — PROPOFOL 150 MCG/KG/MIN: 10 INJECTION, EMULSION INTRAVENOUS at 08:50

## 2017-07-01 RX ADMIN — SODIUM CHLORIDE: 9 INJECTION, SOLUTION INTRAVENOUS at 05:46

## 2017-07-01 RX ADMIN — AMLODIPINE BESYLATE 10 MG: 10 TABLET ORAL at 07:52

## 2017-07-01 RX ADMIN — LIDOCAINE HYDROCHLORIDE 80 MG: 20 INJECTION, SOLUTION INFILTRATION; PERINEURAL at 08:45

## 2017-07-01 RX ADMIN — MIDAZOLAM HYDROCHLORIDE 1 MG: 1 INJECTION, SOLUTION INTRAMUSCULAR; INTRAVENOUS at 08:45

## 2017-07-01 RX ADMIN — SODIUM CHLORIDE: 9 INJECTION, SOLUTION INTRAVENOUS at 19:42

## 2017-07-01 RX ADMIN — FENTANYL CITRATE 25 MCG: 50 INJECTION, SOLUTION INTRAMUSCULAR; INTRAVENOUS at 08:52

## 2017-07-01 RX ADMIN — ONDANSETRON 4 MG: 2 INJECTION INTRAMUSCULAR; INTRAVENOUS at 09:10

## 2017-07-01 RX ADMIN — PROPOFOL 170 MG: 10 INJECTION, EMULSION INTRAVENOUS at 08:45

## 2017-07-01 RX ADMIN — FENTANYL CITRATE 25 MCG: 50 INJECTION, SOLUTION INTRAMUSCULAR; INTRAVENOUS at 08:58

## 2017-07-01 NOTE — PLAN OF CARE
Problem: Goal Outcome Summary  Goal: Goal Outcome Summary  Outcome: No Change  VSS, afebrile, no c/o pain or nausea. Pt slept comfortably over evening. Telemetry monitoring WDL. Scheduled for 0745 surgery to remove kidney stones and place stents, 2g Ancef and skin scrub completed. Heparin injection withheld overnight. Continue with frequent monitoring upon return to unit.

## 2017-07-01 NOTE — ANESTHESIA CARE TRANSFER NOTE
Patient: Kenna Miranda    Procedure(s):  Cystoscopy Right retrograde pyelogram, Right ureteral Stent Placement - Wound Class: II-Clean Contaminated    Diagnosis: Ureteral Stone  Diagnosis Additional Information: No value filed.    Anesthesia Type:   LMA, General     Note:  Airway :Face Mask  Patient transferred to:PACU        Vitals: (Last set prior to Anesthesia Care Transfer)    CRNA VITALS  7/1/2017 0926 - 7/1/2017 0956      7/1/2017             Resp Rate (set): 10                Electronically Signed By: Christine Salvador CRNA, APRN CRNA  July 1, 2017  9:56 AM

## 2017-07-01 NOTE — OP NOTE
OPERATIVE REPORT    PREOPERATIVE DIAGNOSIS: Right ureteral stone(s)    POSTOPERATIVE DIAGNOSIS:  Same    PROCEDURES PERFORMED:   1. Cystourethroscopy with right retrograde pyelography  2. Placement of right ureteral stent.  3. Intraoperative interpretation of fluoroscopic imaging.     STAFF SURGEON: Dr. Sánchez    RESIDENT: Ruth Martin MD, Lucila Martni   ANESTHESIA: General    ESTIMATED BLOOD LOSS: 0 mL.     DRAINS: 6 Ugandan x 24 cm double JJ ureteral stent (R)       OPERATIVE INDICATIONS:   Kenna Miranda is a 61 year old female with history of DM2, breast cancer s/p radiation and recurrent urolithiasis who presented with a right obstructing ureteral stone.  The patient was counseled on the alternatives, risks, and benefits and elected to proceed with the above stated procedure.    DESCRIPTION OF PROCEDURE:    After informed consent was obtained, the patient was taken to the operating room, and moved to the operating table.  After adequate anesthesia was induced, the patient was repositioned in dorsal lithotomy position and prepped and draped in the usual sterile fashion. A timeout was taken to confirm correct patient, procedure and laterality.     A 22-Ugandan cystoscope was inserted into a well lubricated urethra. The urethra was unremarkable.  The bladder was free of tumors, stones or diverticuli.  The media was clear.  Bilateral ureteral orifices were orthotopic.  A Sensor guidewire was advanced into the right renal pelvis with the aid of a 5-Ugandan open ended ureteral catheter.  A retrograde pyelogram demonstrated mild to moderate  hydronephrosis or filling defects.  The wire was replaced and a 6 Ugandan x 24 cm double JJ ureteral stent was advanced over the guidewire under fluoroscopic guidance with a good curl in the renal pelvis and bladder.  The stent passed up easily with no appreciable resistance.  The bladder was then drained.    The patient tolerated the procedure well.  There were no  complications.         PLAN:   - Transfer back to Heme/onc   - Follow-up with Dr. Gross for definitive stone management in next 2-3 weeks    I was present and involved in the entire procedure

## 2017-07-01 NOTE — CONSULTS
Urology Consult    Name: Kenna Miranda    MRN: [id number]   YOB: 1955               Chief Complaint:   Right ureteral stone    History is obtained from the patient and chart review          History of Present Illness:   Kenna Miranda is a 61 year old female with PMH of DM2, breast cancer s/p radiation and urologic history of recurrent urolithiasis who is currently admitted to heme/onc service with IRAM and symptomatic hypercalcemia. Has reported had 2 weeks of confusion, weakness and fatigue and noted to have Ca of 18.1 - suspected to be due to malignancy.  Also noted to have elevated Cr to 3.1 (previously 1.0) that was thought to be due to prerenal + hypercalcemic renal injury.  An u/s demonstrated right sided hydronephrosis and an obstructing stone.  CT demonstrates right hydronephrosis with about 2 cm worth of stone in her right ureter.    On review it appears she had a CT 6 months ago that showed a 5 mm UPJ stone as well as a large volume of non-obstructing renal stones.  She denies any flank pain. She fevers/chills, no dysuria or hematuria. Her WBC is 8.3 and her UA shows large LE with negative nitrite.           Past Medical History:     Past Medical History:   Diagnosis Date     Benign breast lumps 2008    biopsied.     Borderline osteopenia 2/17/2010     Cancer (H)      Cholesterol serum increased      DM (diabetes mellitus), type 2 (H)      Elevated liver function tests     h/o mild increase of LFT's     HTN (hypertension)      Hypothyroid     as teenager.     Menopause      Personal history of kidney stones     + stones on CT scan 2007, treated with lithotripsy     Rectal fissure 2002    resolved            Past Surgical History:     Past Surgical History:   Procedure Laterality Date     BIOPSY  Nov 2015     BIOPSY BREAST NEEDLE LOCALIZATION, BIOPSY NODE SENTINEL, COMBINED  11/5/2012    Procedure: COMBINED BIOPSY BREAST NEEDLE LOCALIZATION, BIOPSY NODE SENTINEL;  Left Breast Wire  Locilized Lumpectomy and Sentinal Lymph Node Biopsy;  Surgeon: Jesus Vicente MD;  Location: [facility] OR     C  DELIVERY ONLY      x 2     COLONOSCOPY  10-26-11    Return in 1 yr for Polyp Surveillance     COLONOSCOPY  12    Return for Colonoscopy in 3 yrs.Polyps     LITHOTRIPSY       SURGICAL HISTORY OF -       exploratory laparotomy     SURGICAL HISTORY OF -       Right ovary removed     SURGICAL HISTORY OF -       breast biopsy            Social History:     Social History   Substance Use Topics     Smoking status: Never Smoker     Smokeless tobacco: Never Used      Comment: no second hand exposure.  when young Dad smoked.     Alcohol use Yes      Comment: very, very little            Family History:     Family History   Problem Relation Age of Onset     Cancer - colorectal Mother      Thyroid Disease Mother      Colon Cancer Mother      CANCER Father      Lung     Lipids Father      Glaucoma Father      Macular Degeneration Father      Depression Brother      Respiratory Brother      losing hearing in 50's            Allergies:     Allergies   Allergen Reactions     Nkda [No Known Drug Allergies]             Medications:     Current Facility-Administered Medications   Medication     levothyroxine (SYNTHROID/LEVOTHROID) tablet 125 mcg     metoprolol (LOPRESSOR) tablet 25 mg     Medication Instruction     heparin sodium PF injection 5,000 Units     acetaminophen (TYLENOL) tablet 650 mg     0.9% sodium chloride infusion     polyethylene glycol (MIRALAX/GLYCOLAX) Packet 17 g     lidocaine 1 % 1 mL     lidocaine (LMX4) kit     sodium chloride (PF) 0.9% PF flush 3 mL     sodium chloride (PF) 0.9% PF flush 3 mL     ondansetron (ZOFRAN-ODT) ODT tab 4 mg    Or     ondansetron (ZOFRAN) injection 4 mg     magnesium sulfate 2 g in NS intermittent infusion ([facility] or [facility] C[facility]d)     magnesium sulfate 4 g in 100 mL sterile water (premade)     hydrALAZINE (APRESOLINE) tablet 10 mg     docusate sodium (COLACE)  capsule 100 mg     0.9% sodium chloride infusion     [START ON 7/1/2017] amLODIPine (NORVASC) tablet 10 mg     amLODIPine (NORVASC) tablet 5 mg             Review of Systems:    ROS: 10 point ROS neg other than the symptoms noted above in the HPI           Physical Exam:   VS:  T: 98.3    HR: 64    BP: 143/61    RR: 20   GEN:  AOx3.  NAD.    CV:  RRR  LUNGS: Non-labored breathing.   BACK:  No midline or CVA tenderness.  ABD:  Soft.  NT.  ND.  No rebound or guarding.  No masses.  EXT:  Warm, well perfused.  No edema.  SKIN:  Warm.  Dry.  No rashes.  NEURO:  CN grossly intact.            Data:   All laboratory data reviewed:      Recent Labs  Lab 06/30/17  0544   WBC 8.3   HGB 12.0          Recent Labs  Lab 06/30/17  1410 06/30/17  0544 06/29/17  2322 06/29/17  1148    136 135 137   POTASSIUM 4.0 3.0* 3.0* 3.4   CHLORIDE 101 98 94 94   CO2 25 31 32 30   BUN 34* 37* 43* 37*   CR 2.70* 2.86* 3.12* 2.91*   * 158* 163* 170*   CARLYN 13.8* 15.4* 15.8* 18.1*   MAG  --  2.4* 1.3*  --    PHOS  --  2.1* 2.1*  --        Recent Labs  Lab 06/30/17  0537   COLOR Light Yellow   APPEARANCE Slightly Cloudy   URINEGLC Negative   URINEBILI Negative   URINEKETONE Negative   SG 1.007   URINEPH 7.0   PROTEIN 10*   NITRITE Negative   LEUKEST Large*   RBCU 6*   WBCU 94*       All pertinent imaging reviewed:    6/30/17 CT scan of the abdomen:     Right hydronephrosis. 1.5 cm of stone at UPJ, also about 1 cm of stone (when viewed in coronal plane) in the mid ureter (a couple of stones back to back).     6/30/17 Renal ultrasound:     IMPRESSION:  1.  Obstructing calculus in the proximal right ureter measuring  approximately 15 mm with associated mild hydronephrosis and proximal  hydroureter.  2.  Multiple simple cysts in the left kidney.            Impression and Plan:   Impression:   Kenna Miranda is a 61 year old female with  PMH of DM2, breast cancer s/p radiation and urologic history of recurrent urolithiasis who  is currently admitted to heme/onc service with IRAM and symptomatic hypercalcemia. Found to have large obstructing right proximal ureteral stone - kidney has likely been obstructed to some degree for months.      No emergent indication for stent placement as patient is totally asymptomatic in regards to the obstruction and no evidence of infection. However her IRAM is concerning that is at least partially related to her postrenal obstruction. Also possible length of time obstructed is concerning for renal damage.        Plan:     - Plan for right ureteral stent placement tomorrow AM with definitive stone management in the future.  Briefly discussed with patient options for treatment including ureteroscopy and PCNL.  Also discussed that with volume of stone burden and possible length of time impacted it may be difficult or impossible to place a stent, in which case she would require PNT placement.      - Patient consented, pre-op orders placed, NPO at midnight    - If patient becomes febrile or hemodynamically unstable please page urology stat for discussion of possible emergent stent placement    - Urology will continue to follow. Please contact resident/PA on call with any questions or concerns.         This patient's exam findings, labs, and imaging discussed with urology staff surgeon Dr. Sánchez, who developed the treatment plan.    Mati Romero MD  Urology Resident           Patient seen and examined with the resident.    I agree with the resident's note and plan of care.       Loida Sánchez MD  Urology Staff

## 2017-07-01 NOTE — PLAN OF CARE
Problem: Goal Outcome Summary  Goal: Goal Outcome Summary  Outcome: Declining  Additional dose of amlodipine given per orders for hypertension along with scheduled metoprolol. Hydralazine also required x1. CT scan done to further evaluate blockage of one ureter and kidney. Urology consult done and procedure planned for a.m NPO after midnight. Pre-op orders released and appropriate interventions reviewed with night RN.

## 2017-07-01 NOTE — PROGRESS NOTES
"HEMATOLOGY PROGRESS NOTE  7/1/2017    SUBJECTIVE:  Kenna is doing well today. She discovered to have right hydronephrosis secondary to obstructing stone. She underwent stent placement this morning. No acute events overnight. She is urinating okay, denies SOB, pain, fever, chills, N/V.    OBJECTIVE:  /56  Pulse 64  Temp 95.8  F (35.4  C) (Oral)  Resp 16  Ht 1.575 m (5' 2\")  Wt 92.9 kg (204 lb 12.8 oz)  SpO2 95%  BMI 37.46 kg/m2      Intake/Output Summary (Last 24 hours) at 06/30/17 1301  Last data filed at 06/30/17 1043   Gross per 24 hour   Intake              560 ml   Output             1000 ml   Net             -440 ml       General appearance: NAD  HEENT: NC/AT  Neck: supple.  Lungs: CTA, non-labored breathing, on 2 L of O2 via NC  Heart: S1 S2 present,RRR, no edema of the LE  Abdomen: soft, non-tender, non-distended, no organomegaly  Extremities: No edema, no cyanosis.  Neuro: AAOx3, grossly non-focal.    Labs:  @LABRCNT(NA:3,Potassium:3,Chloride:3,CO2:3,Aniongap:3,BUN:3,Cr:3,Glc:3,Parmjit:3,mg:3,phos:3)  )  Recent Labs   Lab Test  06/30/17   0544  06/25/15   0700  12/31/14   1037   WBC  8.3  5.4  5.9   RBC  4.34  4.61  4.87   HGB  12.0  12.7  13.3   PLT  189  184  187   MCV  84  85  83     No results for input(s): INR, PTT in the last 21495 hours.        Imaging:  I personally reviewed the imaging.    MR BRAIN WITHOUT CONTRAST 6/29/2017 3:34 PM     HISTORY: Poor balance. Confusion.     COMPARISON: None.     TECHNIQUE: Routine noncontrast MR brain.     FINDINGS: No intracranial hemorrhage, mass, or recent infarct. Minimal  nonspecific subcortical white matter hyperintensities consistent with  mild small vessel ischemic change. Minimal atrophy.         IMPRESSION:  1. Minimal atrophy and chronic white matter disease.  2. Nothing clearly acute without the benefit of contrast material.     XR CHEST 2 VW, 6/29/2017 11:02 PM     Comparison: 3/31/2011     History: confusion, hypoxia     Findings: The " cardiomediastinal silhouette is within normal limits.  Trace left basilar opacities. Costophrenic angles are sharp. No  pneumothorax.         Impression: Trace left basilar opacities, favored to represent  atelectasis although infection or aspiration are possibilities.    CT:  EXAMINATION: CT ABDOMEN PELVIS W/O CONTRAST, 6/30/2017 8:54 PM     TECHNIQUE:  Helical CT images from the lung bases through the  symphysis pubis were obtained without contrast.  Coronal and sagittal  reformatted images were generated at a workstation for further  assessment.     COMPARISON: Ultrasound 6/30/2017     HISTORY: R nephrolithiasis on US, evaluate stone size and location.  Stone protocol.     FINDINGS:     Lines and tubes: None.     Lung bases: 3 mm subpleural nodule in the right middle lobe (series 5,  image 1). Trace pericardial effusion. No consolidation or pleural  effusion. Mild subsegmental bibasilar atelectasis. Mild interstitial  thickening.     Liver: No suspicious liver lesions. Portal veins appear patent.  Gallbladder: Layering hyperdense material present, likely sludge or  layering stones. No evidence of acute cholecystitis.  Spleen: Normal size.  Pancreas: No suspicious pancreatic lesions. The pancreatic duct is not  dilated.  Adrenal glands: No adrenal nodules.  Kidneys: The right kidney is edematous, with perinephric fat stranding  and moderate hydronephrosis. There is an obstructing, jagged stone  measuring up to 1.6 cm at the right UVJ, and 3 smaller stones in the  mid right ureter. There are a few punctate stones in the left kidney  calyces, largest of which measures 3 mm. No obstructing stones on  left. No left-sided hydronephrosis.  Bladder / Pelvic organs: No radiopaque stones are within the bladder.  Uterus is unremarkable..  Bowel: No bowel wall thickening. There is appendicolith at the base of  the appendix, but no evidence of acute appendicitis.  Lymph nodes: No retroperitoneal, mesenteric, or  pelvic  lymphadenopathy.  Peritoneum / Retroperitoneum: No free fluid or air within the abdomen.  Vessels: No infrarenal aortic aneurysm.      Bones and soft tissues: Degenerative changes of the spine.         IMPRESSION:   1. Evidence of acute kidney obstruction given the edematous appearance  and moderate hydronephrosis of the right kidney. There is a 1.6 cm  obstructing stone at the right UPJ, and a line of smaller stones (2 to  3 mm) in the mid right ureter.  2. Mild shifting renal stones on the left, largest of which measures 3  mm.  3. Appendicolith, with no evidence of acute appendicitis. The presence  of an appendicolith can place this patient at risk for future  appendicitis.  4. Pulmonary nodule measuring 3 mm in the right middle lobe, stable  since 12/23/2016. Per the Fleischner guidelines, no follow-up is  necessary in patients at low risk for lung cancer and 12 month  follow-up is at the clinician's discretion for patients at high risk.    ASSESSMENT & PLAN:  61 year old female w/ hx of diabetes, hypothyroidism, hypertension and breast cancer (s/p radiation completed in Jan 2013, followed by adjuvant Arimidex, no chemotherapy), who presented to the ED with symptomatic hypercalcemia and IRAM.    Symptomatic hypercalcemia.  Patient presented with 2 weeks of confusion, weakness, fatigue, headaches and decreased appetite. Hypercalcemia to 18.1 in clinic visit earlier in the day (recheck 15.8 here), associated with significant renal injury (creatinine ~3). Ionized calcium is also elevated at 8.3, and PTH is within normal range at 25. Compatible with primary hypeparathyroidism, possibly aggravated by vit D and calcium supplements as well as worsening renal function. Hypercalcemia of malignancy is on differential as well, although lower on the list, no clinical evidence of metastatic disease. She has a history of early breast cancer, ER positive, grade I, and is on adjuvant endocrine therapy. We will consider  further investigation after other causes are ruled out.  - IV fluids in the ED (1 L NS followed by 125 ml/hr). .  -s/p Calcitonin IM dosed at 4 Units/kg x 1.   -symptomatically patient continues to improve  -hold Zometa due to low EGFR.  -calcium is trending down  - Endocrine consulted, appreciate recs.  - Holding home thiazide (as can worsen hypercalcemia) and vitamin D supplement.  -  Vitamin D levels (1,25-dihydroxy vitamin D, and 25-hydroxy vitamin D) are WNL - PTHrP level is pending.     Acute kidney injury:  Previously normal renal function,  Hx of right-sided nephrolithiasis with clusters of non-obstructing stones on CT abd/pelvis in December 2016.  --multifactorial , due to hydronephrosis, hypercalcemia combined with pre-renal etiology related to poor PO intake and continued use of lisinopril/HCTZ at home.  - continue IV fluids, rate decreased at 100 ml/h  -CT and US revealed right hydronephrosis with obstructing ureteral stone  -urology consulted, appreciate their assistance  -- Strict I/Os with daily weights.  -avoid nephrotoxic meds  -avoid hypotension  - - Holding home HCTZ/lisinopril.     Hypokalemia, hypophosphatemia  - Caution with repletion given significant IRAM.  -will continue replacement prn  - Recheck with AM labs.     Hypomagnesemia.  Magnesium 1.3 on labs this evening.   - Repleated with 2 g IV, will recheck with AM labs.     Elevated transaminases.  Mildly elevated for last several months. Prior work-up included a CT abdomen/pelvis in December 2016, which revealed prominent diffuse fatty infiltration of the liver. No focal hepatic lesions were identified.Possibly GARCIA.  - Monitor for now, numbers are better today.     Hx of stage 1, ER+, CT+, Her-2 neg left breast cancer (invasive adenocarcinoma).  Diagnosed in 2012, Stage I, grade I, she completed radiation in January 2013 after lumpectomy. Started on Arimidex in 2013. Follows with Dr. Meng. Last seen in clinic on 6/19/17, doing well without  clinical evidence of disease recurrence. Normal mammogram on 12/16/16. Planned to complete a 10 year course of Arimidex, with mammogram in December 2017.      Diabetes.   Hgb A1c was 7.1 on labs today, managed with diet only.  - Monitor for now.     Hypothyroidism.  Labs today notable for TSH slightly elevated at 4.50 and free T4 in the normal range at 1.23.  - Continue home levothyroxine.     Hypertension.  BPs currently acceptable with some hights  - Hold home lisinopril/HCTZ in setting of IRAM.  - Continue home metoprolol for now  -hydralazine prn     FEN: low calcium diet, NS at 200 ml/hr, replete lytes PRN (cautiously)  Prophylaxis: heparin SQ  Code: FULL  Disposition: Admitted to inpatient status for symptomatic hypercalcemia and acute kidney injury. Possible discharge tomorrow.    Patient was discussed with Dr. Patton.    Talia Tatum MD  Hem-Onc Fellow

## 2017-07-01 NOTE — BRIEF OP NOTE
[facility], [place]    Brief Operative Note    Pre-operative diagnosis: Ureteral Stone  Post-operative diagnosis Same as above   Procedure: Procedure(s):  Cystoscopy Right retrograde pyelogram, Right ureteral Stent Placement - Wound Class: II-Clean Contaminated  Surgeon: Surgeon(s) and Role:     * Loida Sánchez MD - Primary     * Jose Angel Martin MD - Resident - Assisting     * Ruth Martin MD - Resident - Assisting  Anesthesia: General   Estimated blood loss: Minimal  Drains:  6 [place] x 24 cm R JJ  Ureteral stent   Specimens: * No specimens in log *  Findings:   Right hydronephrotic drip.  Complications: None.  Implants: None.    Plan: Transfer to floor     Ruth Martin MD   Urology Resident PGY-3

## 2017-07-01 NOTE — PLAN OF CARE
Problem: Goal Outcome Summary  Goal: Goal Outcome Summary     7D: OT eval completed and tx initiated. Pt is able to follow basic commands and A&O x 3, however she remains confused with significant memory deficits noted by writer. Pt able to complete functional transfers, toileting, and mobility ~500ft with SBA.      Recommend discharge home with OP OT, 24/7 supervision, and assist with IADLs (oven/stove use, meds, finances, driving) due to cognitive impairment.

## 2017-07-01 NOTE — PROGRESS NOTES
07/01/17 1447   Quick Adds   Type of Visit Initial Occupational Therapy Evaluation   Living Environment   Lives With spouse;child(kyrie), adult  (son)   Living Arrangements house  (multi-level home)   Home Accessibility stairs to enter home;stairs within home;bed not on first floor   Number of Stairs to Enter Home 2   Number of Stairs Within Home 20  (20 stairs up to bedroom; does not need to access lower level)   Transportation Available car;family or friend will provide   Living Environment Comment Pt's spouse works outside the home; pt's son is around during the daytime and can provide some assist   Self-Care   Dominant Hand right   Usual Activity Tolerance good   Current Activity Tolerance moderate   Regular Exercise no   Equipment Currently Used at Home none   Functional Level Prior   Ambulation 0-->independent   Transferring 0-->independent   Toileting 0-->independent   Bathing 0-->independent   Dressing 0-->independent   Eating 0-->independent   Communication 0-->understands/communicates without difficulty   Swallowing 0-->swallows foods/liquids without difficulty   Cognition 0 - no cognition issues reported   Fall history within last six months no   Which of the above functional risks had a recent onset or change? ambulation;transferring;toileting;bathing;dressing;cognition   Prior Functional Level Comment Pt was (I) with all ADL/IADLs including driving; pt is not working outside the home   General Information   Onset of Illness/Injury or Date of Surgery - Date 06/29/17   Referring Physician Flor Patton MD   Additional Occupational Profile Info/Pertinent History of Current Problem Pt is a 61 year old female w/ hx of diabetes, hypothyroidism, hypertension and breast cancer (s/p radiation completed in Jan 2013, followed by adjuvant Arimidex, no chemotherapy), who presented to the ED with symptomatic hypercalcemia and IRAM; presented with AMS, N/V, and pain in LEs   Precautions/Limitations fall precautions    Weight-Bearing Status - LUE full weight-bearing   Weight-Bearing Status - RUE full weight-bearing   Weight-Bearing Status - LLE full weight-bearing   Weight-Bearing Status - RLE full weight-bearing   Cognitive Status Examination   Orientation orientation to person, place and time   Level of Consciousness alert;confused   Able to Follow Commands success, 1-step commands   Personal Safety (Cognitive) moderate impairment   Memory impaired   Attention Sustained attention impaired   Executive Function Self awareness/monitoring impaired   Visual Perception   Visual Perception Wears glasses  (all the time; no new concerns)   Sensory Examination   Sensory Quick Adds No deficits were identified   Pain Assessment   Patient Currently in Pain No   Range of Motion (ROM)   ROM Comment BUE/LEs WFL   Strength   Strength Comments BUE/LEs WFL   Mobility   Bed Mobility Bed mobility skill: Sit to supine;Bed mobility skill: Scooting/Bridging   Bed Mobility Skill: Scooting/Bridging   Level of [place]: Scooting/Bridging stand-by assist   Physical Assist/Nonphysical Assist: Scooting/Bridging verbal cues;1 person assist   Bed Mobility Skill: Sit to Supine   Level of [place]: Sit/Supine contact guard   Physical Assist/Nonphysical Assist: Sit/Supine verbal cues;1 person assist   Transfer Skill: Sit to Stand   Level of [place]: Sit/Stand stand-by assist   Physical Assist/Nonphysical Assist: Sit/Stand verbal cues;1 person assist   Transfer Skill: Toilet Transfer   Level of [place]: Toilet stand-by assist   Physical Assist/Nonphysical Assist: Toilet verbal cues;1 person assist   Activities of Daily Living Analysis   Impairments Contributing to Impaired Activities of Daily Living cognition impaired;strength decreased   General Therapy Interventions   Planned Therapy Interventions ADL retraining;IADL retraining;bed mobility training;cognition;ROM;strengthening;stretching;transfer training;home program guidelines;progressive  "activity/exercise   Clinical Impression   Criteria for Skilled Therapeutic Interventions Met yes, treatment indicated   OT Diagnosis decreased ADL/IADL (I)   Influenced by the following impairments cognition, weakness   Assessment of Occupational Performance 5 or more Performance Deficits   Identified Performance Deficits bathing, functional transfers, cooking, cleaning, medication and financial management, driving   Clinical Decision Making (Complexity) Moderate complexity   Therapy Frequency daily   Predicted Duration of Therapy Intervention (days/wks) 7 days   Anticipated Equipment Needs at Discharge (TBD pending progress)   Anticipated Discharge Disposition Home with Assist;Home with Home Therapy;Home with Outpatient Therapy;Transitional Care Facility   Risks and Benefits of Treatment have been explained. Yes   Patient, Family & other staff in agreement with plan of care Yes   [facility] TM \"6 Clicks\"   2016, Trustees of [facility], under license to [employer].  All rights reserved.   6 Clicks Short Forms Daily Activity Inpatient Short Form   [facility] Walk-in-PAC  \"6 Clicks\" Daily Activity Inpatient Short Form   1. Putting on and taking off regular lower body clothing? 3 - A Little   2. Bathing (including washing, rinsing, drying)? 3 - A Little   3. Toileting, which includes using toilet, bedpan or urinal? 3 - A Little   4. Putting on and taking off regular upper body clothing? 3 - A Little   5. Taking care of personal grooming such as brushing teeth? 3 - A Little   6. Eating meals? 3 - A Little   Daily Activity Raw Score (Score out of 24.Lower scores equate to lower levels of function) 18   Total Evaluation Time   Total Evaluation Time (Minutes) 7     "

## 2017-07-01 NOTE — PLAN OF CARE
Problem: Confusion, Acute (Adult)  Goal: Identify Related Risk Factors and Signs and Symptoms  Related risk factors and signs and symptoms are identified upon initiation of Human Response Clinical Practice Guideline (CPG)   Outcome: Improving  Alert and oriented times three. No longer confused. Denies pain or nausea. Went to O.R. For stint placement. Vitals stable post procedure. On Capnography until 1200 tomorrow. Phosphorus and potassium replaced and rechecks ordered. Up with standby assistance.

## 2017-07-01 NOTE — ANESTHESIA POSTPROCEDURE EVALUATION
DERMATOLOGY HISTORY  []  YES    [x]  NO Pacemaker, defibrillator  []  YES    [x]  NO Artificial heart valves  []  YES    [x]  NO Artificial joints in last 6 months  []  YES    [x]  NO Allergy to numbing medicine  []  YES    [x]  NO Tanning bed use.    PERSONAL HISTORY:  []  YES    [x]  NO  Melanoma located at   []  YES    [x]  NO  Basal Cell Carcinoma  []  YES    [x]  NO  Squamous Cell Carcinoma  []  YES    [x]  NO  Unknown type in    FAMILY HISTORY:  []  YES    [x]  NO  Melanoma in   []  YES    [x]  NO  Basal Cell Carcinoma  []  YES    [x]  NO  Squamous Cell Carcinoma  []  YES    [x]  NO  Unknown type in    CHIEF COMPLAINT:  Chief Complaint   Patient presents with   • Derm Problem     spot on right cheek which has been there since about aug she believes. It started to grow in the summer and it has stayed that size for the last few month. No family or personal hx of skin cancer.    • Md Bello     NO PREOP/NO PACEMAKER         HISTORY OF PRESENT ILLNESS:  Patient is a pleasant 13 year old female new patient of Dr. Nieves Here today for evaluation of a lesion along the right cheek that is been present for about 9 months. It was smaller first and then eventually increased in size and has stopped growing since fall. She denies any pain or tenderness to the area. Denies picking or playing with it.        ALLERGIES:  No Known Allergies    Current Outpatient Prescriptions   Medication Sig   • polyethylene glycol (GLYCOLAX, MIRALAX) packet Take 17 g by mouth every other day.     No current facility-administered medications for this visit.        No past medical history on file.    No past surgical history on file.    PHYSICAL EXAMINATION:  Vitals:    06/01/17 1539   BP: 116/70   Weight: 59.9 kg   Height: 5' 2\" (1.575 m)     General:  No acute distress, pleasant, alert and oriented x3, well nourished fair complexioned  Right lateral cheek with a 5x5mm flesh colored, cyst, no inflammation evidence of central punctum.  Just  Patient: Kenna Miranda    Procedure(s):  Cystoscopy Right retrograde pyelogram, Right ureteral Stent Placement - Wound Class: II-Clean Contaminated    Diagnosis:Ureteral Stone  Diagnosis Additional Information: No value filed.    Anesthesia Type:  LMA, General    Note:  Anesthesia Post Evaluation    Patient location during evaluation: PACU  Patient participation: Able to fully participate in evaluation  Level of consciousness: awake  Pain management: satisfactory to patient  Airway patency: patent  Cardiovascular status: acceptable  Respiratory status: acceptable  Hydration status: acceptable  PONV: none     Anesthetic complications: None          Last vitals:  Vitals:    06/30/17 2356 07/01/17 0417 07/01/17 1000   BP: 147/63 155/65    Pulse:      Resp:  20    Temp:  37.1  C (98.7  F) 36.9  C (98.4  F)   SpO2:  92%          Electronically Signed By: Parth Aranda MD  July 1, 2017  10:08 AM   superior of this is evidence of open comedone.  The face is otherwise very clear of acne lesions.       ASSESSMENT/PLAN:  Infundibular cyst.  No inflammation.  We discussed the nature of the lesion. She is not interested in removal.  We discussed manually expressing contents which she was comfortable with.  With gentle pressure, white, thick, cheesy substance expressed. Wound care discussed.  If she is interested in excision, recommend plastics given the location on her face and her age.       Dr. Nieves thank you for allowing me to participate in the care of this patient. Please feel free to contact me with any questions.

## 2017-07-01 NOTE — PLAN OF CARE
Problem: Goal Outcome Summary  Goal: Goal Outcome Summary  7D PT: holding PT eval, OT evaluated today and will inform PT of needs

## 2017-07-01 NOTE — ANESTHESIA PREPROCEDURE EVALUATION
Anesthesia Evaluation     . Pt has had prior anesthetic.     No history of anesthetic complications     ROS/MED HX    Pulmonary:  - neg pulmonary ROS     Neurologic:  - neg neurologic ROS     Cardiovascular:     (+) hypertension----. : . . . :. . Previous cardiac testing date:results:date: results:ECG reviewed date: results: date: results:        METS/Exercise Tolerance:     Hematologic:     (+) Anemia, -    Musculoskeletal:  - neg musculoskeletal ROS     GI/Hepatic:  - neg GI/hepatic ROS     Renal:  - ROS Renal section negative   (+) chronic renal disease,     Endo:     (+) type II DM (Diet controlled) .      Psychiatric:  - neg psychiatric ROS     Infectious Disease:  - neg infectious disease ROS     Other: Comment: Breast mass.    (+) No chance of pregnancy C-spine cleared: N/A, no H/O Chronic Pain,no other significant disability        Procedure: Procedure(s):  Cystoscopy, Right ureteral Stent Placement - Wound Class:     HPI: Kenna Miranda is a 61 year old female with PMH of DM2, breast cancer s/p radiation and urologic history of recurrent urolithiasis who is currently admitted to heme/onc service with IRAM and symptomatic hypercalcemia.  Per u/s right sided hydronephrosis and an obstructing stone, further confirmed by CT.  Scheduled for urgent cystoscopy with ureteral stent placement under general anesthesia.     PMHx/PSHx:  Past Medical History:   Diagnosis Date     Benign breast lumps 2008    biopsied.     Borderline osteopenia 2/17/2010     Cancer (H)      Cholesterol serum increased      DM (diabetes mellitus), type 2 (H)      Elevated liver function tests     h/o mild increase of LFT's     HTN (hypertension)      Hypothyroid     as teenager.     Menopause      Personal history of kidney stones     + stones on CT scan 2007, treated with lithotripsy     Rectal fissure 2002    resolved       Past Surgical History:   Procedure Laterality Date     BIOPSY  Nov 2015     BIOPSY BREAST NEEDLE  LOCALIZATION, BIOPSY NODE SENTINEL, COMBINED  2012    Procedure: COMBINED BIOPSY BREAST NEEDLE LOCALIZATION, BIOPSY NODE SENTINEL;  Left Breast Wire Locilized Lumpectomy and Sentinal Lymph Node Biopsy;  Surgeon: Jesus Vicente MD;  Location: [facility] OR     C  DELIVERY ONLY      x 2     COLONOSCOPY  10-26-11    Return in 1 yr for Polyp Surveillance     COLONOSCOPY  12    Return for Colonoscopy in 3 yrs.Polyps     LITHOTRIPSY       SURGICAL HISTORY OF -       exploratory laparotomy     SURGICAL HISTORY OF -       Right ovary removed     SURGICAL HISTORY OF -       breast biopsy         No current facility-administered medications on file prior to encounter.   Current Outpatient Prescriptions on File Prior to Encounter:  losartan-hydrochlorothiazide (HYZAAR) 50-12.5 MG per tablet Take 2 tablets by mouth daily   levothyroxine (SYNTHROID/LEVOTHROID) 125 MCG tablet TAKE ONE TABLET BY MOUTH EVERY MORNING   metoprolol (LOPRESSOR) 25 MG tablet TAKE ONE TABLET BY MOUTH TWICE A DAY   anastrozole (ARIMIDEX) 1 MG tablet Take 1 tablet (1 mg) by mouth daily   Cholecalciferol (VITAMIN D) 1000 UNITS capsule Take 1 capsule by mouth At Bedtime.   MULTIPLE VITAMINS/WOMENS OR 1 tablet daily   ASPIRIN 81 MG OR TABS 1 TABLET DAILY       Social Hx:   Social History   Substance Use Topics     Smoking status: Never Smoker     Smokeless tobacco: Never Used      Comment: no second hand exposure.  when young Dad smoked.     Alcohol use Yes      Comment: very, very little       Allergies:   Allergies   Allergen Reactions     Nkda [No Known Drug Allergies]          NPO Status: > 8 hours     Labs:    Blood Bank:  No results found for: ABO, RH, AS  BMP:  Recent Labs   Lab Test  17   1410   NA  135   POTASSIUM  4.0   CHLORIDE  101   CO2  25   BUN  34*   CR  2.70*   GLC  163*   CARLYN  13.8*     CBC:   Recent Labs   Lab Test  17   0544   WBC  8.3   RBC  4.34   HGB  12.0   HCT  36.5   MCV  84   MCH  27.6   MCHC  32.9    RDW  13.7   PLT  189     Physical Exam  Normal systems: cardiovascular and pulmonary    Airway   Mallampati: III  TM distance: <3 FB  Neck ROM: limited    Dental   (+) missing    Cardiovascular   Rhythm and rate: regular and normal      Pulmonary    breath sounds clear to auscultation                Anesthesia Plan      History & Physical Review  History and physical reviewed and following examination; no interval change.    ASA Status:  3 .    NPO Status:  > 8 hours    Plan for LMA and General with Propofol induction. Maintenance will be Balanced.    PONV prophylaxis:  Ondansetron (or other 5HT-3) and Scopolamine patch  Possibility of MAC.  Will d/w surgeon.      Postoperative Care  Postoperative pain management:  IV analgesics.      Consents  Anesthetic plan, risks, benefits and alternatives discussed with:  Patient or representative and Patient..        Anesthesia Evaluation     . Pt has had prior anesthetic.     No history of anesthetic complications          ROS/MED HX    ENT/Pulmonary:  - neg pulmonary ROS     Neurologic:  - neg neurologic ROS     Cardiovascular:     (+) hypertension----. : . . . :. . Previous cardiac testing date:results:date: results:ECG reviewed date: results: date: results:          METS/Exercise Tolerance:     Hematologic:     (+) Anemia, -      Musculoskeletal:  - neg musculoskeletal ROS       GI/Hepatic:  - neg GI/hepatic ROS       Renal/Genitourinary:  - ROS Renal section negative   (+) chronic renal disease,       Endo:     (+) type II DM (Diet controlled) .      Psychiatric:  - neg psychiatric ROS       Infectious Disease:  - neg infectious disease ROS       Malignancy:         Other: Comment: Breast mass.   (+) No chance of pregnancy C-spine cleared: N/A, no H/O Chronic Pain,no other significant disability                  Anesthesia Plan      History & Physical Review  History and physical reviewed and following examination; no interval change.    ASA Status:  3 .    NPO Status:   > 8 hours    Plan for LMA and General with Propofol induction. Maintenance will be Balanced.    PONV prophylaxis:  Ondansetron (or other 5HT-3) and Scopolamine patch  Possibility of MAC.  Will d/w surgeon.      Postoperative Care  Postoperative pain management:  IV analgesics.      Consents  Anesthetic plan, risks, benefits and alternatives discussed with:  Patient or representative and Patient..          Airway management in prior anesthetic: 3; laryngeal mask airway; Equal, clear and bilateral; Morales CRNA     Plan:   - ASA 2  - GETA with standard ASA monitors, IV induction, balanced anesthetic  - PIV x 1   - Antibiotics per Urology   - PONV prophylaxis  - Pain management with Fentanyl/dilaudid boluses     Tomas Pena MD  Anesthesiology Resident  [phone]

## 2017-07-01 NOTE — PROGRESS NOTES
SPIRITUAL HEALTH SERVICES  SPIRITUAL ASSESSMENT Progress Note  [facility] 7D    REFERRAL SOURCE: Unit Consult    Pt, Kenna requested prayer prior to surgery. Pt stated that she is worried about tomorrow's scheduled surgery. Kenna relayed that her  and son will be here to support her. I talked with Kenna for a few minutes then shared prayer with her. She appeared relaxed and drowsy.    PLAN: SHS not required unless patient requests a .    Maryanne Salvador   Intern  Pager [phone]

## 2017-07-02 VITALS
SYSTOLIC BLOOD PRESSURE: 140 MMHG | HEART RATE: 64 BPM | BODY MASS INDEX: 38.74 KG/M2 | HEIGHT: 62 IN | DIASTOLIC BLOOD PRESSURE: 62 MMHG | OXYGEN SATURATION: 96 % | RESPIRATION RATE: 18 BRPM | WEIGHT: 210.5 LBS | TEMPERATURE: 98 F

## 2017-07-02 LAB
ALBUMIN SERPL-MCNC: 2.8 G/DL (ref 3.4–5)
ALP SERPL-CCNC: 48 U/L (ref 40–150)
ALT SERPL W P-5'-P-CCNC: 27 U/L (ref 0–50)
ANION GAP SERPL CALCULATED.3IONS-SCNC: 9 MMOL/L (ref 3–14)
AST SERPL W P-5'-P-CCNC: 23 U/L (ref 0–45)
BACTERIA SPEC CULT: NO GROWTH
BASOPHILS # BLD AUTO: 0 10E9/L (ref 0–0.2)
BASOPHILS NFR BLD AUTO: 0.5 %
BILIRUB SERPL-MCNC: 0.8 MG/DL (ref 0.2–1.3)
BUN SERPL-MCNC: 23 MG/DL (ref 7–30)
CALCIUM SERPL-MCNC: 9.1 MG/DL (ref 8.5–10.1)
CHLORIDE SERPL-SCNC: 108 MMOL/L (ref 94–109)
CO2 SERPL-SCNC: 25 MMOL/L (ref 20–32)
CREAT SERPL-MCNC: 2.28 MG/DL (ref 0.52–1.04)
DIFFERENTIAL METHOD BLD: ABNORMAL
EOSINOPHIL # BLD AUTO: 0.4 10E9/L (ref 0–0.7)
EOSINOPHIL NFR BLD AUTO: 6.4 %
ERYTHROCYTE [DISTWIDTH] IN BLOOD BY AUTOMATED COUNT: 14 % (ref 10–15)
GFR SERPL CREATININE-BSD FRML MDRD: 22 ML/MIN/1.7M2
GLUCOSE SERPL-MCNC: 132 MG/DL (ref 70–99)
HCT VFR BLD AUTO: 32.2 % (ref 35–47)
HGB BLD-MCNC: 10.5 G/DL (ref 11.7–15.7)
IMM GRANULOCYTES # BLD: 0 10E9/L (ref 0–0.4)
IMM GRANULOCYTES NFR BLD: 0.2 %
LYMPHOCYTES # BLD AUTO: 1.7 10E9/L (ref 0.8–5.3)
LYMPHOCYTES NFR BLD AUTO: 28.2 %
Lab: NORMAL
MCH RBC QN AUTO: 27.8 PG (ref 26.5–33)
MCHC RBC AUTO-ENTMCNC: 32.6 G/DL (ref 31.5–36.5)
MCV RBC AUTO: 85 FL (ref 78–100)
MICRO REPORT STATUS: NORMAL
MONOCYTES # BLD AUTO: 0.6 10E9/L (ref 0–1.3)
MONOCYTES NFR BLD AUTO: 9.8 %
NEUTROPHILS # BLD AUTO: 3.4 10E9/L (ref 1.6–8.3)
NEUTROPHILS NFR BLD AUTO: 54.9 %
NRBC # BLD AUTO: 0 10*3/UL
NRBC BLD AUTO-RTO: 0 /100
PHOSPHATE SERPL-MCNC: 3.2 MG/DL (ref 2.5–4.5)
PLATELET # BLD AUTO: 151 10E9/L (ref 150–450)
POTASSIUM SERPL-SCNC: 3 MMOL/L (ref 3.4–5.3)
PROT SERPL-MCNC: 6.3 G/DL (ref 6.8–8.8)
PTH-INTACT SERPL-MCNC: 70 PG/ML (ref 12–72)
RBC # BLD AUTO: 3.78 10E12/L (ref 3.8–5.2)
SODIUM SERPL-SCNC: 142 MMOL/L (ref 133–144)
SPECIMEN SOURCE: NORMAL
WBC # BLD AUTO: 6.1 10E9/L (ref 4–11)

## 2017-07-02 PROCEDURE — 36415 COLL VENOUS BLD VENIPUNCTURE: CPT | Performed by: INTERNAL MEDICINE

## 2017-07-02 PROCEDURE — 25000132 ZZH RX MED GY IP 250 OP 250 PS 637: Performed by: INTERNAL MEDICINE

## 2017-07-02 PROCEDURE — 25000128 H RX IP 250 OP 636: Performed by: INTERNAL MEDICINE

## 2017-07-02 PROCEDURE — 84100 ASSAY OF PHOSPHORUS: CPT | Performed by: INTERNAL MEDICINE

## 2017-07-02 PROCEDURE — 80053 COMPREHEN METABOLIC PANEL: CPT | Performed by: INTERNAL MEDICINE

## 2017-07-02 PROCEDURE — 83970 ASSAY OF PARATHORMONE: CPT | Performed by: INTERNAL MEDICINE

## 2017-07-02 PROCEDURE — 85025 COMPLETE CBC W/AUTO DIFF WBC: CPT | Performed by: INTERNAL MEDICINE

## 2017-07-02 PROCEDURE — 84080 ASSAY ALKALINE PHOSPHATASES: CPT | Performed by: INTERNAL MEDICINE

## 2017-07-02 RX ORDER — AMLODIPINE BESYLATE 10 MG/1
10 TABLET ORAL DAILY
Qty: 30 TABLET | Refills: 1 | Status: SHIPPED | OUTPATIENT
Start: 2017-07-02 | End: 2017-08-07

## 2017-07-02 RX ORDER — POTASSIUM CHLORIDE 1.5 G/1.58G
40 POWDER, FOR SOLUTION ORAL ONCE
Status: COMPLETED | OUTPATIENT
Start: 2017-07-02 | End: 2017-07-02

## 2017-07-02 RX ORDER — POTASSIUM CHLORIDE 1.5 G/1.58G
40 POWDER, FOR SOLUTION ORAL DAILY
Qty: 3 TABLET | Refills: 0 | Status: SHIPPED | OUTPATIENT
Start: 2017-07-02 | End: 2017-07-13

## 2017-07-02 RX ADMIN — HEPARIN SODIUM 5000 UNITS: 5000 INJECTION, SOLUTION INTRAVENOUS; SUBCUTANEOUS at 03:07

## 2017-07-02 RX ADMIN — LEVOTHYROXINE SODIUM 125 MCG: 25 TABLET ORAL at 09:25

## 2017-07-02 RX ADMIN — DOCUSATE SODIUM 100 MG: 100 CAPSULE, LIQUID FILLED ORAL at 09:25

## 2017-07-02 RX ADMIN — POTASSIUM CHLORIDE 40 MEQ: 1.5 POWDER, FOR SOLUTION ORAL at 09:29

## 2017-07-02 RX ADMIN — SODIUM CHLORIDE: 9 INJECTION, SOLUTION INTRAVENOUS at 05:50

## 2017-07-02 RX ADMIN — AMLODIPINE BESYLATE 10 MG: 10 TABLET ORAL at 09:25

## 2017-07-02 RX ADMIN — METOPROLOL TARTRATE 25 MG: 25 TABLET ORAL at 09:25

## 2017-07-02 NOTE — PROGRESS NOTES
Endocrine Note    Ca is back to normal. PTH is 70. Hypercalcemia was likely multifactorial due to renal failure, dehydration and use of calcium supplements.   -We recommend repeating Ca, Alb, Pth, Phos after discharge.  -F/u with endocrine clinic in 4-6 weeks. We will arrange for the appointment.     Chang Betancourt MD  Endocrine Fellow   [phone]

## 2017-07-02 NOTE — DISCHARGE SUMMARY
[facility], [place]    Discharge Summary  Hematology / Oncology    Date of Admission:  6/30/2017  Date of Discharge:  07/02/2017  Discharging Physician: Dr. Patton  Primary Heme/Oncologist: dr. Meng  Date of Service (when I saw the patient): 07/02/2017    Discharge Diagnoses  Symptomatic hypercalcemia  Acute kidney injury  Right sided hydronephrosis  Urolithiasis  Hypocalcemia  Hypophosphatemia  Hypomagnesemia  Elevated transaminases  History of left breast invasive adenocarcinoma  Diabetes mellitus type 2, controlled with diet.  Hypertension  Hyperthyroidism  Primary hyperparathyroidism    History of Present Illness  Adopted from H&P  Kenna Miranda is a 61 year old female with history of diabetes, hypothyroidism, hypertension, and breast cancer (s/p radiation completed in Jan 2013, currently on adjuvant AI, no chemotherapy). She presented to the ED for evaluation of altered mental status. This started about 2 weeks ago, when she noted increasing difficulty sleeping, followed by fatigue and poor appetite (food doesn't taste good). About a week later, others around her started to point out that she was slurring her speech more, and she was more confused and disoriented at times. A couple of days ago she noted bilateral lower extremity weakness and difficulty with ambulation, particularly going down stairs. There is no numbness or tingling. Some intermittent headaches lately, especially in the mornings, lasting about 30-45 minutes. They are diffuse in nature, rather than focal, and mild in intensity. No associated nausea, photophobia or vision changes. She denies abdominal pain and diarrhea, but has felt constipated for about a month. No dysuria or hematuria, but notes decreased urine output secondary to less intake lately. No joint pain, muscle aches, or swelling. No chest pain, cough or SOB. No vision changes or dizziness. No new lumps, bumps or rashes noted. Mood is  stable.     She was seen earlier in the day for this by her PCP, at which time labs were drawn and an MRI of the brain was obtained. Labs were notable for IRAM (with creatinine 2.91) and hypercalcemia to 18.1. MRI w/out contrast revealed minimal atrophy and chronic white matter disease. Of note, patient takes Tums a couple of times per week, and a calcium/vitamin D supplement. Other medication changes notable for a recently increased dose of lisinopril/HCTZ about one week ago for hypertension during a clinic visit.    Hospital Course  Summarized by problem below    Symptomatic hypercalcemia.  Patient presented with 2 weeks of confusion, weakness, fatigue, headaches and decreased appetite. Hypercalcemia to 18.1 in clinic visit earlier in the day (recheck 15.8 here), associated with significant renal injury (creatinine ~3). Ionized calcium is also elevated at 8.3, and PTH is within normal range at 25. Compatible with primary hypeparathyroidism, possibly aggravated by vit D and calcium supplements as well as worsening renal function. Hypercalcemia of malignancy is on differential as well, although lower on the list, no clinical evidence of metastatic disease and normal Alk ph. She has a history of early breast cancer, ER positive, grade I, and is on adjuvant endocrine therapy.   - IVF .  -s/p Calcitonin IM dosed at 4 Units/kg x 1.   -symptomatically patient continues to improve, calcium level has normalized  -no biphosphonates were given due to low EGFR.  - Endocrine consulted, appreciate recs.She will need to follow up as outpatient.  - Holding home thiazide (as can worsen hypercalcemia) and vitamin D and calcium supplement.  -  Vitamin D levels (1,25-dihydroxy vitamin D, and 25-hydroxy vitamin D) are WNL - PTHrP level is pending.      Acute kidney injury:  Previously normal renal function,  Hx of right-sided nephrolithiasis with clusters of non-obstructing stones on CT abd/pelvis in December 2016.  --multifactorial ,  due to hydronephrosis, hypercalcemia combined with pre-renal etiology related to poor PO intake and continued use of lisinopril/HCTZ at home.  - was on IV fluids  -CT and US revealed right hydronephrosis with obstructing ureteral stone  -urology consulted, s/p ureteral stent 7/1/17. She will follow up with Dr. Gross in 2-3 weeks for definitive stone management.  --avoid nephrotoxic meds  -avoid hypotension  - - Holding home HCTZ/lisinopril.      Hypokalemia, hypophosphatemia  - Caution with repletion given significant IRAM.  -had replacement prn, will be dc with several day supply of phosph and potassium supplements  - Recheck phos, K and CMP tomorrow, results to be send to PCP.      Hypomagnesemia.  - Repleated       Elevated transaminases.  Mildly elevated for last several months. Prior work-up included a CT abdomen/pelvis in December 2016, which revealed prominent diffuse fatty infiltration of the liver. No focal hepatic lesions were identified.Possibly GARCIA.  - Monitor for now, numbers are better today.      Hx of stage 1, ER+, KS+, Her-2 neg left breast cancer (invasive adenocarcinoma).  Diagnosed in 2012, Stage I, grade I, she completed radiation in January 2013 after lumpectomy. Started on Arimidex in 2013. Follows with Dr. Meng. Last seen in clinic on 6/19/17, doing well without clinical evidence of disease recurrence. Normal mammogram on 12/16/16. Planned to complete a 10 year course of Arimidex, with mammogram in December 2017.       Diabetes.   Hgb A1c was 7.1 on labs today, managed with diet only.  - Monitor for now.      Hypothyroidism.  Labs today notable for TSH slightly elevated at 4.50 and free T4 in the normal range at 1.23.  - Continue home levothyroxine.      Hypertension.  BPs currently acceptable with some hights  - Hold home lisinopril/HCTZ in setting of IRAM.  - Continue home metoprolol for now  -hydralazine prn            Code Status  FULL    Physical Exam  /74 mmHg  Pulse 84   "Temp(Src) 97.6  F (36.4  C) (Axillary)  Resp 16  Ht 1.676 m (5' 6\")  Wt 86.002 kg (189 lb 9.6 oz)  BMI 30.62 kg/m2  SpO2 100%      Constitutional:  NAD. Alert and interactive.   HEENT: NCAT. PERRL, EOMI, anicteric sclera. Oral mucosa pink and moist with no lesions or thrush.  Respiratory: Non-labored breathing, good air exchange, lungs clear to auscultation bilaterally. No cough or wheeze noted.   Cardiovascular: Regular rate and rhythm. No murmur or rub.   GI: Normoactive bowel sounds. Abdomen soft, non-distended, and non-tender. No palpable masses or organomegaly.  Genitourinary: Deferred.   Skin: Warm and dry. No concerning lesions or rash on exposed surfaces.  Neurologic: A&O x 3, CNs 2-12 grossly intact, speech normal. Grossly non-focal.  Neuropsychiatric: Mentation and affect appear normal/appropriate.      Follow-up Labs/Appointments:  CMP on 7/3/17, f/u with PCP next week  Urology appt with Dr. Gross in2-3 weeks  Endocrinology appt within a month    Consultations This Hospital Stay  Endocrine  Urology    Discharge Medications   Kenna Miranda   Home Medication Instructions CARMEN:[id number]    Printed on:07/02/17 1023   Medication Information                      amLODIPine (NORVASC) 10 MG tablet  Take 1 tablet (10 mg) by mouth daily             anastrozole (ARIMIDEX) 1 MG tablet  Take 1 tablet (1 mg) by mouth daily             ASPIRIN 81 MG OR TABS  1 TABLET DAILY             levothyroxine (SYNTHROID/LEVOTHROID) 125 MCG tablet  TAKE ONE TABLET BY MOUTH EVERY MORNING             metoprolol (LOPRESSOR) 25 MG tablet  TAKE ONE TABLET BY MOUTH TWICE A DAY             potassium & sodium phosphates (NEUTRA-PHOS) 280-160-250 MG Packet  Take 1 packet by mouth daily             potassium chloride (KLOR-CON) 20 MEQ Packet  Take 40 mEq by mouth daily                       Data  Results for orders placed or performed during the hospital encounter of 06/29/17 (from the past 24 hour(s))   Urine Culture Aerobic " Bacterial   Result Value Ref Range    Specimen Description Midstream Urine     Special Requests Specimen received in preservative     Culture Micro Culture negative < 24 hours, reincubate     Micro Report Status Pending    Glucose by meter   Result Value Ref Range    Glucose 241 (H) 70 - 99 mg/dL   Potassium   Result Value Ref Range    Potassium 3.3 (L) 3.4 - 5.3 mmol/L   Phosphorus   Result Value Ref Range    Phosphorus 1.9 (L) 2.5 - 4.5 mg/dL   CBC with platelets differential   Result Value Ref Range    WBC 6.1 4.0 - 11.0 10e9/L    RBC Count 3.78 (L) 3.8 - 5.2 10e12/L    Hemoglobin 10.5 (L) 11.7 - 15.7 g/dL    Hematocrit 32.2 (L) 35.0 - 47.0 %    MCV 85 78 - 100 fl    MCH 27.8 26.5 - 33.0 pg    MCHC 32.6 31.5 - 36.5 g/dL    RDW 14.0 10.0 - 15.0 %    Platelet Count 151 150 - 450 10e9/L    Diff Method Automated Method     % Neutrophils 54.9 %    % Lymphocytes 28.2 %    % Monocytes 9.8 %    % Eosinophils 6.4 %    % Basophils 0.5 %    % Immature Granulocytes 0.2 %    Nucleated RBCs 0 0 /100    Absolute Neutrophil 3.4 1.6 - 8.3 10e9/L    Absolute Lymphocytes 1.7 0.8 - 5.3 10e9/L    Absolute Monocytes 0.6 0.0 - 1.3 10e9/L    Absolute Eosinophils 0.4 0.0 - 0.7 10e9/L    Absolute Basophils 0.0 0.0 - 0.2 10e9/L    Abs Immature Granulocytes 0.0 0 - 0.4 10e9/L    Absolute Nucleated RBC 0.0    Comprehensive metabolic panel   Result Value Ref Range    Sodium 142 133 - 144 mmol/L    Potassium 3.0 (L) 3.4 - 5.3 mmol/L    Chloride 108 94 - 109 mmol/L    Carbon Dioxide 25 20 - 32 mmol/L    Anion Gap 9 3 - 14 mmol/L    Glucose 132 (H) 70 - 99 mg/dL    Urea Nitrogen 23 7 - 30 mg/dL    Creatinine 2.28 (H) 0.52 - 1.04 mg/dL    GFR Estimate 22 (L) >60 mL/min/1.7m2    GFR Estimate If Black 26 (L) >60 mL/min/1.7m2    Calcium 9.1 8.5 - 10.1 mg/dL    Bilirubin Total 0.8 0.2 - 1.3 mg/dL    Albumin 2.8 (L) 3.4 - 5.0 g/dL    Protein Total 6.3 (L) 6.8 - 8.8 g/dL    Alkaline Phosphatase 48 40 - 150 U/L    ALT 27 0 - 50 U/L    AST 23 0 - 45 U/L    Parathyroid Hormone Intact   Result Value Ref Range    Parathyroid Hormone Intact 70 12 - 72 pg/mL   Phosphorus   Result Value Ref Range    Phosphorus 3.2 2.5 - 4.5 mg/dL           Discussed Assessment and Plan with .    Talia Tatum MD  Hem-Onc Fellow

## 2017-07-02 NOTE — PLAN OF CARE
Problem: Goal Outcome Summary  Goal: Goal Outcome Summary  OT:  Patient discharged home prior to OT session.       Occupational Therapy Discharge Summary     Reason for therapy discharge:    Discharged to home.     Progress towards therapy goal(s). See goals on Care Plan in [facility] electronic health record for goal details.  Goals partially met.  Barriers to achieving goals:   discharge from facility.     Therapy recommendation(s):    No further therapy is recommended.

## 2017-07-02 NOTE — PLAN OF CARE
Problem: Goal Outcome Summary  Goal: Goal Outcome Summary  Outcome: No Change  VSS, afebrile, no c/o pain or nausea, pt ambulating with assist of 1 and urinating appropriately though not saving urine for measurement. No capnography orders in place at this time, was not being used at shift change. Phos replaced on evening shift, recheck time close to morning labs so will get recheck value with those, bag overfilled.  NSR for shift with no isolated incidents, if monitoring to be continued, electrode pads should be changed as they are becoming looser. Pt has tremor in both hands, pt states that this is normal and is her baseline.

## 2017-07-02 NOTE — PROGRESS NOTES
Urology Progress Note:     No acute events overnight. Pain controlled. Vitals wnl. Cr trending downward. Will plan for follow up in 2 weeks with Dr. Boston for definitive treatment of stones. Urology clinic will coordinate.     Patient examined with staff, Dr. Gonzalo Martin MD   Urology PGY-3   Patient seen and examined with the resident.  Visit time 15 minutes and >50% spent in counseling.  I agree with the resident's note and plan of care.       Loida Sánchez MD  Urology Staff

## 2017-07-02 NOTE — PLAN OF CARE
Problem: Goal Outcome Summary  Goal: Goal Outcome Summary  Outcome: Improving  Kenna is stable following stenting procedure. Hypercalcemia continues to moderate; however, phosphorus continues to be low and second bolus of sodium phosphate started late on evening shift for phosphorus of 1.9. She denies pain. Blood pressure better controlled this evening.

## 2017-07-02 NOTE — PROGRESS NOTES
DI: Reviewed discharge medications and orders with Kenna and her  and they verbalized understanding.  AP: Kenna  Has a follow up clinic appointment and phone numbers to call with questions.

## 2017-07-03 ENCOUNTER — TELEPHONE (OUTPATIENT)
Dept: EDUCATION SERVICES | Facility: CLINIC | Age: 62
End: 2017-07-03

## 2017-07-03 LAB
ALP BONE SERPL-MCNC: 5.5 UG/L
PTH RELATED PROT SERPL-SCNC: 3.2 PMOL/L

## 2017-07-03 NOTE — TELEPHONE ENCOUNTER
This patient was discharged from [facility] on 7-1-17.    Discharge Diagnosis: Symptomatic hypercalcemia  Acute kidney injury  Right sided hydronephrosis  Urolithiasis  Hypocalcemia  Hypophosphatemia  Hypomagnesemia  Elevated transaminases  History of left breast invasive adenocarcinoma  Diabetes mellitus type 2, controlled with diet.  Hypertension  Hyperthyroidism  Primary hyperparathyroidism    Follow-up instructions: See notes    A follow-up visit has not been scheduled.      Please follow-up with patient.

## 2017-07-03 NOTE — TELEPHONE ENCOUNTER
Called and spoke with patient, they have already talked to [facility].  Scheduled labs for Wednesday.      Kathy Mak RN  [facility]

## 2017-07-05 DIAGNOSIS — N17.9 ACUTE KIDNEY INJURY (H): ICD-10-CM

## 2017-07-05 DIAGNOSIS — E83.39 HYPOPHOSPHATASIA: ICD-10-CM

## 2017-07-05 LAB
1,25(OH)2D SERPL-MCNC: 8.8 PG/ML (ref 19.9–79.3)
ANION GAP SERPL CALCULATED.3IONS-SCNC: 8 MMOL/L (ref 3–14)
BUN SERPL-MCNC: 20 MG/DL (ref 7–30)
CALCIUM SERPL-MCNC: 8.2 MG/DL (ref 8.5–10.1)
CHLORIDE SERPL-SCNC: 109 MMOL/L (ref 94–109)
CO2 SERPL-SCNC: 25 MMOL/L (ref 20–32)
CREAT SERPL-MCNC: 1.67 MG/DL (ref 0.52–1.04)
GFR SERPL CREATININE-BSD FRML MDRD: 31 ML/MIN/1.7M2
GLUCOSE SERPL-MCNC: 119 MG/DL (ref 70–99)
INTERPRETATION ECG - MUSE: NORMAL
INTERPRETATION ECG - MUSE: NORMAL
PHOSPHATE SERPL-MCNC: 2.4 MG/DL (ref 2.5–4.5)
POTASSIUM SERPL-SCNC: 4.1 MMOL/L (ref 3.4–5.3)
SODIUM SERPL-SCNC: 142 MMOL/L (ref 133–144)

## 2017-07-05 PROCEDURE — 80048 BASIC METABOLIC PNL TOTAL CA: CPT | Performed by: INTERNAL MEDICINE

## 2017-07-05 PROCEDURE — 84100 ASSAY OF PHOSPHORUS: CPT | Performed by: INTERNAL MEDICINE

## 2017-07-05 PROCEDURE — 36415 COLL VENOUS BLD VENIPUNCTURE: CPT | Performed by: INTERNAL MEDICINE

## 2017-07-06 ENCOUNTER — MYC MEDICAL ADVICE (OUTPATIENT)
Dept: FAMILY MEDICINE | Facility: CLINIC | Age: 62
End: 2017-07-06

## 2017-07-06 ENCOUNTER — TELEPHONE (OUTPATIENT)
Dept: FAMILY MEDICINE | Facility: CLINIC | Age: 62
End: 2017-07-06

## 2017-07-06 DIAGNOSIS — E87.6 HYPOKALEMIA: ICD-10-CM

## 2017-07-06 DIAGNOSIS — R79.89 ELEVATED SERUM CREATININE: Primary | ICD-10-CM

## 2017-07-07 ENCOUNTER — MYC MEDICAL ADVICE (OUTPATIENT)
Dept: FAMILY MEDICINE | Facility: CLINIC | Age: 62
End: 2017-07-07

## 2017-07-07 ENCOUNTER — TELEPHONE (OUTPATIENT)
Dept: UROLOGY | Facility: CLINIC | Age: 62
End: 2017-07-07

## 2017-07-07 DIAGNOSIS — N20.2 CALCULUS OF KIDNEY AND URETER: Primary | ICD-10-CM

## 2017-07-07 RX ORDER — CEFAZOLIN SODIUM 1 G/3ML
1 INJECTION, POWDER, FOR SOLUTION INTRAMUSCULAR; INTRAVENOUS SEE ADMIN INSTRUCTIONS
Status: CANCELLED | OUTPATIENT
Start: 2017-07-07

## 2017-07-07 NOTE — TELEPHONE ENCOUNTER
Team 1:  Last night I forwarded message re: Dr Gross appt.   Can disregard, looks like his office has contacted her.  Thank you!

## 2017-07-07 NOTE — TELEPHONE ENCOUNTER
Called patient with improved BMP    Will repeat in a week.  Patient will make lab appt.  Patient is infomed.     Patient needs to have ureteral stent removed by Dr Gross.  We don't know who this is. Will ask for TC help on this.  Tried to figure out.

## 2017-07-07 NOTE — TELEPHONE ENCOUNTER
Spoke to the patient surgery scheduled for 07/31/17 at 2:20pm with a 12:20pm check in. Patient is asking that if anyone CX that she be called for a sooner date. I let her know that I would put her the list. Per  no HP is needed. Sending out a surgery packet today.

## 2017-07-11 DIAGNOSIS — E87.6 HYPOKALEMIA: ICD-10-CM

## 2017-07-11 DIAGNOSIS — R79.89 ELEVATED SERUM CREATININE: ICD-10-CM

## 2017-07-11 LAB
ANION GAP SERPL CALCULATED.3IONS-SCNC: 9 MMOL/L (ref 3–14)
BUN SERPL-MCNC: 15 MG/DL (ref 7–30)
CALCIUM SERPL-MCNC: 8.7 MG/DL (ref 8.5–10.1)
CHLORIDE SERPL-SCNC: 109 MMOL/L (ref 94–109)
CO2 SERPL-SCNC: 21 MMOL/L (ref 20–32)
CREAT SERPL-MCNC: 1.27 MG/DL (ref 0.52–1.04)
GFR SERPL CREATININE-BSD FRML MDRD: 42 ML/MIN/1.7M2
GLUCOSE SERPL-MCNC: 206 MG/DL (ref 70–99)
POTASSIUM SERPL-SCNC: 4.4 MMOL/L (ref 3.4–5.3)
SODIUM SERPL-SCNC: 139 MMOL/L (ref 133–144)

## 2017-07-11 PROCEDURE — 80048 BASIC METABOLIC PNL TOTAL CA: CPT | Performed by: INTERNAL MEDICINE

## 2017-07-11 PROCEDURE — 36415 COLL VENOUS BLD VENIPUNCTURE: CPT | Performed by: INTERNAL MEDICINE

## 2017-07-12 PROBLEM — E21.0 PRIMARY HYPERPARATHYROIDISM (H): Status: ACTIVE | Noted: 2017-07-12

## 2017-07-12 PROBLEM — E83.52 HYPERCALCEMIA: Status: RESOLVED | Noted: 2017-06-30 | Resolved: 2017-07-12

## 2017-07-13 ENCOUNTER — OFFICE VISIT (OUTPATIENT)
Dept: FAMILY MEDICINE | Facility: CLINIC | Age: 62
End: 2017-07-13
Payer: COMMERCIAL

## 2017-07-13 VITALS
SYSTOLIC BLOOD PRESSURE: 132 MMHG | WEIGHT: 204 LBS | HEART RATE: 68 BPM | BODY MASS INDEX: 37.31 KG/M2 | DIASTOLIC BLOOD PRESSURE: 80 MMHG | OXYGEN SATURATION: 98 % | TEMPERATURE: 97.2 F

## 2017-07-13 DIAGNOSIS — N17.9 ACUTE KIDNEY INJURY (H): ICD-10-CM

## 2017-07-13 DIAGNOSIS — N20.0 NEPHROLITHIASIS: ICD-10-CM

## 2017-07-13 DIAGNOSIS — Z01.818 PREOP GENERAL PHYSICAL EXAM: Primary | ICD-10-CM

## 2017-07-13 LAB
ALBUMIN UR-MCNC: 100 MG/DL
APPEARANCE UR: CLEAR
BACTERIA #/AREA URNS HPF: ABNORMAL /HPF
BILIRUB UR QL STRIP: NEGATIVE
COLOR UR AUTO: YELLOW
GLUCOSE UR STRIP-MCNC: NEGATIVE MG/DL
HGB UR QL STRIP: ABNORMAL
HYALINE CASTS #/AREA URNS LPF: ABNORMAL /LPF (ref 0–2)
KETONES UR STRIP-MCNC: NEGATIVE MG/DL
LEUKOCYTE ESTERASE UR QL STRIP: ABNORMAL
MUCOUS THREADS #/AREA URNS LPF: PRESENT /LPF
NITRATE UR QL: NEGATIVE
NON-SQ EPI CELLS #/AREA URNS LPF: ABNORMAL /LPF
PH UR STRIP: 6 PH (ref 5–7)
RBC #/AREA URNS AUTO: ABNORMAL /HPF (ref 0–2)
SP GR UR STRIP: 1.02 (ref 1–1.03)
URN SPEC COLLECT METH UR: ABNORMAL
UROBILINOGEN UR STRIP-ACNC: 0.2 EU/DL (ref 0.2–1)
WBC #/AREA URNS AUTO: ABNORMAL /HPF (ref 0–2)

## 2017-07-13 PROCEDURE — 99213 OFFICE O/P EST LOW 20 MIN: CPT | Performed by: NURSE PRACTITIONER

## 2017-07-13 PROCEDURE — 87086 URINE CULTURE/COLONY COUNT: CPT | Performed by: NURSE PRACTITIONER

## 2017-07-13 PROCEDURE — 81001 URINALYSIS AUTO W/SCOPE: CPT | Performed by: NURSE PRACTITIONER

## 2017-07-13 RX ORDER — ATORVASTATIN CALCIUM 20 MG/1
20 TABLET, FILM COATED ORAL EVERY MORNING
COMMUNITY
End: 2017-09-07

## 2017-07-13 ASSESSMENT — PAIN SCALES - GENERAL: PAINLEVEL: NO PAIN (0)

## 2017-07-13 NOTE — NURSING NOTE
"Chief Complaint   Patient presents with     Pre-Op Exam       Initial BP (!) 154/98 (BP Location: Right arm, Patient Position: Chair, Cuff Size: Adult Large)  Pulse 68  Temp 97.2  F (36.2  C) (Oral)  Wt 204 lb (92.5 kg)  SpO2 98%  Breastfeeding? No  BMI 37.31 kg/m2 Estimated body mass index is 37.31 kg/(m^2) as calculated from the following:    Height as of 6/29/17: 5' 2\" (1.575 m).    Weight as of this encounter: 204 lb (92.5 kg).  Medication Reconciliation: complete.  JEANETH Perez MA      "

## 2017-07-13 NOTE — MR AVS SNAPSHOT
After Visit Summary   7/13/2017    Kenna Miranda    MRN: [id number]           Patient Information     Date Of Birth          1955        Visit Information        Provider Department      7/13/2017 11:20 AM Shilpi Hinkle APRN CNP [facility]        Today's Diagnoses     Preop general physical exam    -  1    Nephrolithiasis          Care Instructions    Hold aspirin for the week prior to your surgery  Continue with your medications, including day of surgery. Take with small sips of water  I will contact you with the results of your urine culture. If you develop any symptoms (painful urination, urgency, frequency) please call clinic and I will treat you    Before Your Surgery      Call your surgeon if there is any change in your health. This includes signs of a cold or flu (such as a sore throat, runny nose, cough, rash or fever).    Do not smoke, drink alcohol or take over the counter medicine (unless your surgeon or primary care doctor tells you to) for the 24 hours before and after surgery.    If you take prescribed drugs: Follow your doctor s orders about which medicines to take and which to stop until after surgery.    Eating and drinking prior to surgery: follow the instructions from your surgeon    Take a shower or bath the night before surgery. Use the soap your surgeon gave you to gently clean your skin. If you do not have soap from your surgeon, use your regular soap. Do not shave or scrub the surgery site.  Wear clean pajamas and have clean sheets on your bed.           Follow-ups after your visit        Your next 10 appointments already scheduled     Jul 31, 2017   Procedure with Ruth Boston MD   [facility], [place], Same Day Surgery (--)    [address] [address]   [phone]            Aug 10, 2017  1:00 PM CDT   Office Visit with CP DIABETIC ED RESOURCE   [facility] ([facility])     [address] [id number] [phone]           Bring a current list of meds and any records pertaining to this visit.  For Physicals, please bring immunization records and any forms needing to be filled out.  Please arrive 10 minutes early to complete paperwork.            Aug 11, 2017 10:45 AM CDT   (Arrive by 10:30 AM)   Post-Op with Ruth Boston MD   [facility] ([facility])    [address]  4th Floor  [facility] [address] [phone]              Who to contact     If you have questions or need follow up information about today's clinic visit or your schedule please contact [facility] directly at [phone].  Normal or non-critical lab and imaging results will be communicated to you by MyChart, letter or phone within 4 business days after the clinic has received the results. If you do not hear from us within 7 days, please contact the clinic through MyChart or phone. If you have a critical or abnormal lab result, we will notify you by phone as soon as possible.  Submit refill requests through [employer] or call your pharmacy and they will forward the refill request to us. Please allow 3 business days for your refill to be completed.          Additional Information About Your Visit        Jixee Information     [employer] gives you secure access to your electronic health record. If you see a primary care provider, you can also send messages to your care team and make appointments. If you have questions, please call your primary care clinic.  If you do not have a primary care provider, please call [phone] and they will assist you.        Care EveryWhere ID     This is your Care EveryWhere ID. This could be used by other organizations to access your [facility] medical records  [id number]        Your Vitals Were     Pulse Temperature Pulse Oximetry  Breastfeeding? BMI (Body Mass Index)       68 97.2  F (36.2  C) (Oral) 98% No 37.31 kg/m2        Blood Pressure from Last 3 Encounters:   07/13/17 132/80   07/02/17 140/62   06/29/17 152/81    Weight from Last 3 Encounters:   07/13/17 204 lb (92.5 kg)   07/02/17 210 lb 8 oz (95.5 kg)   06/29/17 200 lb 6.4 oz (90.9 kg)              We Performed the Following     UA reflex to Microscopic and Culture     Urine Microscopic        Primary Care Provider Office Phone # Fax #    Carolyne Layne [phone]       [facility] [address]  [facility] [address]        Equal Access to Services     EBEN ROBINS : Manisha marieo [place], waaxda luqadaha, qaybta kaalmada adeegyada, kesha fair. So [facility] [phone].    ATENCIÓN: Si habla español, tiene a tam disposición servicios gratuitos de asistencia lingüística. Llame al [phone].    We comply with applicable federal civil rights laws and [place] laws. We do not discriminate on the basis of race, color, national origin, age, disability sex, sexual orientation or gender identity.            Thank you!     Thank you for choosing [facility]  for your care. Our goal is always to provide you with excellent care. Hearing back from our patients is one way we can continue to improve our services. Please take a few minutes to complete the written survey that you may receive in the mail after your visit with us. Thank you!             Your Updated Medication List - Protect others around you: Learn how to safely use, store and throw away your medicines at www.disposemymeds.org.          This list is accurate as of: 7/13/17 12:17 PM.  Always use your most recent med list.                   Brand Name Dispense Instructions for use Diagnosis    amLODIPine 10 MG tablet    NORVASC    30 tablet    Take 1 tablet (10 mg) by mouth daily    Benign essential hypertension       anastrozole  1 MG tablet    ARIMIDEX    90 tablet    Take 1 tablet (1 mg) by mouth daily    Malignant neoplasm of female breast, unspecified laterality, unspecified site of breast       aspirin 81 MG tablet      1 TABLET DAILY        atorvastatin 20 MG tablet    LIPITOR     Take 20 mg by mouth Patient is taking 2 times a week.        levothyroxine 125 MCG tablet    SYNTHROID/LEVOTHROID    90 tablet    TAKE ONE TABLET BY MOUTH EVERY MORNING    Other specified hypothyroidism       metoprolol 25 MG tablet    LOPRESSOR    180 tablet    TAKE ONE TABLET BY MOUTH TWICE A DAY    Essential hypertension with goal blood pressure less than 140/90       potassium & sodium phosphates 280-160-250 MG Packet    NEUTRA-PHOS    3 each    Take 1 packet by mouth daily    Hypophosphatasia       potassium chloride 20 MEQ Packet    KLOR-CON    3 tablet    Take 40 mEq by mouth daily    Hyperkalemia

## 2017-07-13 NOTE — PROGRESS NOTES
[address] [address]  [phone]  Dept: [phone]    PRE-OP EVALUATION:  Today's date: 2017    Kenna Miranda (: 1955) presents for pre-operative evaluation assessment as requested by Dr. Boston.  She requires evaluation and anesthesia risk assessment prior to undergoing surgery/procedure for treatment of Kidney stones .  Proposed procedure: Right Cystoscopy  Ureteroscopy and Laser Lithotripsy    Date of Surgery/ Procedure: 17  Time of Surgery/ Procedure: 2:20 PM  Hospital/Surgical Facility[facility]  Fax number for surgical facility: [phone]  Primary Physician: Carolyne Layne  Type of Anesthesia Anticipated: General    Patient has a Health Care Directive or Living Will:  NO    1. NO - Do you have a history of heart attack, stroke, stent, bypass or surgery on an artery in the head, neck, heart or legs?  2. NO - Do you ever have any pain or discomfort in your chest?  3. NO - Do you have a history of  Heart Failure?  4. NO - Are you troubled by shortness of breath when: walking on the level, up a slight hill or at night?  5. NO - Do you currently have a cold, bronchitis or other respiratory infection?  6. NO - Do you have a cough, shortness of breath or wheezing?  7. NO - Do you sometimes get pains in the calves of your legs when you walk?  8. NO - Do you or anyone in your family have previous history of blood clots?  9. NO - Do you or does anyone in your family have a serious bleeding problem such as prolonged bleeding following surgeries or cuts?  10. YES - HAVE YOU EVER HAD PROBLEMS WITH ANEMIA OR BEEN TOLD TO TAKE IRON PILLS? On iron in the past  11. NO - Have you had any abnormal blood loss such as black, tarry or bloody stools, or abnormal vaginal bleeding?  12. NO - Have you ever had a blood transfusion?  13. NO - Have you or any of your relatives ever had problems with anesthesia?  14. NO - Do  you have sleep apnea, excessive snoring or daytime drowsiness?  15. NO - Do you have any prosthetic heart valves?  16. NO - Do you have prosthetic joints?  17. NO - Is there any chance that you may be pregnant?      HPI:                                                      Brief HPI related to upcoming procedure: Patient was recently hospitalized for altered mental status and weakness, hypercalcemia 18.1 secondary due IRAM. Known nephrolithiasis as seen on CT 12/2016. Uretal stent placed 7/1/17 by Dr. Tiffanie Boston. Now due for above procedure. Symptoms have resolved. Last Cr checked 7/11/17 1.27      She has a longstanding history of hypothyroidism, hypertension, DM2 (controlled through diet), breast cancer (s/p radiation 2013) which are controlled through medications as listed below.     MEDICAL HISTORY:                                                      Patient Active Problem List    Diagnosis Date Noted     Primary hyperparathyroidism (H) 07/12/2017     Priority: Medium     Hypothyroidism, unspecified type 12/15/2016     Priority: Medium     Essential hypertension with goal blood pressure less than 140/90 07/11/2016     Priority: Medium     Type 2 diabetes mellitus with microalbuminuria (H) 07/11/2016     Priority: Medium     Tibialis posterior tendinitis, left 12/21/2015     Priority: Medium     overweight 10/26/2015     Priority: Medium     Dermatochalasis of eyelid 04/02/2014     Priority: Medium     No diabetic retinopathy in both eyes 04/02/2014     Priority: Medium     Elevated LFTs 04/08/2013     Priority: Medium     Resolved with weight loss 2013/2015.... Then upon regaining weight, LFTs increase again (at weight above 205 or so) - 9/2/16[facility]       Cataract of both eyes 03/19/2013     Priority: Medium     Breast cancer (H) 11/26/2012     Priority: Medium     ER/IA postive, HER-2 kenia negative.  Rx includes lumpectomy (Fall 2012), XRT and aromatase inhibitor (11/29/12, [facility])       Family history of  colon cancer 2012     Priority: Medium     S/P colonoscopic polypectomy 2012     Priority: Medium     Needs Colonoscopy q1yr       Advanced directives, counseling/discussion 2012     Priority: Medium     Discussed advance care planning with patient; information given to patient to review. 2012          HYPERLIPIDEMIA LDL GOAL <100 10/31/2010     Priority: Medium     Borderline osteopenia 2010     Priority: Medium     DEXA 2/10 with T scores -1.1 and -1.0.  Slight progression from .        Past Medical History:   Diagnosis Date     Benign breast lumps     biopsied.     Borderline osteopenia 2010     Cancer (H)      Cholesterol serum increased      DM (diabetes mellitus), type 2 (H)      Elevated liver function tests     h/o mild increase of LFT's     HTN (hypertension)      Hypothyroid     as teenager.     Menopause      Personal history of kidney stones     + stones on CT scan , treated with lithotripsy     Rectal fissure     resolved     Past Surgical History:   Procedure Laterality Date     BIOPSY  2015     BIOPSY BREAST NEEDLE LOCALIZATION, BIOPSY NODE SENTINEL, COMBINED  2012    Procedure: COMBINED BIOPSY BREAST NEEDLE LOCALIZATION, BIOPSY NODE SENTINEL;  Left Breast Wire Locilized Lumpectomy and Sentinal Lymph Node Biopsy;  Surgeon: Jesus Vicente MD;  Location: [facility] OR     C  DELIVERY ONLY      x 2     COLONOSCOPY  10-26-    Return in 1 yr for Polyp Surveillance     COLONOSCOPY  -12    Return for Colonoscopy in 3 yrs.Polyps     COMBINED CYSTOSCOPY, INSERT STENT URETER(S) Right 2017    Procedure: COMBINED CYSTOSCOPY, INSERT STENT URETER(S);  Cystoscopy Right retrograde pyelogram, Right ureteral Stent Placement;  Surgeon: Loida Sánchez MD;  Location: [facility] OR     LITHOTRIPSY       SURGICAL HISTORY OF -       exploratory laparotomy     SURGICAL HISTORY OF -       Right ovary removed     SURGICAL HISTORY OF -       breast  biopsy     Current Outpatient Prescriptions   Medication Sig Dispense Refill     atorvastatin (LIPITOR) 20 MG tablet Take 20 mg by mouth Patient is taking 2 times a week.       amLODIPine (NORVASC) 10 MG tablet Take 1 tablet (10 mg) by mouth daily 30 tablet 1     levothyroxine (SYNTHROID/LEVOTHROID) 125 MCG tablet TAKE ONE TABLET BY MOUTH EVERY MORNING 90 tablet 0     metoprolol (LOPRESSOR) 25 MG tablet TAKE ONE TABLET BY MOUTH TWICE A  tablet 1     anastrozole (ARIMIDEX) 1 MG tablet Take 1 tablet (1 mg) by mouth daily 90 tablet 3     ASPIRIN 81 MG OR TABS 1 TABLET DAILY       OTC products: None, except as noted above    Allergies   Allergen Reactions     Nkda [No Known Drug Allergies]       Latex Allergy: NO    Social History   Substance Use Topics     Smoking status: Never Smoker     Smokeless tobacco: Never Used      Comment: no second hand exposure.  when young Dad smoked.     Alcohol use Yes      Comment: very, very little     History   Drug Use No       REVIEW OF SYSTEMS:                                                    C: NEGATIVE for fever, chills, change in weight  I: NEGATIVE for worrisome rashes, moles or lesions  E: NEGATIVE for vision changes or irritation  E/M: NEGATIVE for ear, mouth and throat problems  R: NEGATIVE for significant cough or SOB  B: NEGATIVE for masses, tenderness or discharge  CV: NEGATIVE for chest pain, palpitations or peripheral edema  GI: NEGATIVE for nausea, abdominal pain, heartburn, or change in bowel habits  : NEGATIVE for frequency, dysuria, or hematuria  M: NEGATIVE for significant arthralgias or myalgia  N: NEGATIVE for weakness, dizziness or paresthesias  E: NEGATIVE for temperature intolerance, skin/hair changes  H: NEGATIVE for bleeding problems  P: NEGATIVE for changes in mood or affect    EXAM:                                                    /80  Pulse 68  Temp 97.2  F (36.2  C) (Oral)  Wt 204 lb (92.5 kg)  SpO2 98%  Breastfeeding? No  BMI  37.31 kg/m2    GENERAL APPEARANCE: healthy, alert and no distress     EYES: EOMI, PERRL     HENT: ear canals and TM's normal and nose and mouth without ulcers or lesions     NECK: no adenopathy, no asymmetry, masses, or scars and thyroid normal to palpation     RESP: lungs clear to auscultation - no rales, rhonchi or wheezes     CV: regular rates and rhythm, no murmur, positive for moderate non pitting ankle edema     ABDOMEN:  soft, nontender, no HSM or masses and bowel sounds normal     MS: extremities normal- no gross deformities noted, no evidence of inflammation in joints, FROM in all extremities.     SKIN: no suspicious lesions or rashes     NEURO: Normal strength and tone, sensory exam grossly normal, mentation intact and speech normal     PSYCH: mentation appears normal. and affect normal/bright     LYMPHATICS: No axillary, cervical, or supraclavicular nodes    DIAGNOSTICS:                                                    EKG: Not indicated due to non-vascular surgery and last ekg on 6/29/17 (within 30 days for CAD history or last year for cardiac risk factors)  Labs done for follow up 2 days ago, show continued improvement in kidney function.   Recent Labs   Lab Test  07/11/17   1009  07/05/17   0929  07/02/17   0513   07/01/17   0658   06/29/17   1148   08/04/16   0657   HGB   --    --   10.5*   --   11.5*   < >   --    --    --    PLT   --    --   151   --   157   < >   --    --    --    NA  139  142  142   --   138   < >  137   < >  140   POTASSIUM  4.4  4.1  3.0*   < >  3.1*   < >  3.4   < >  4.3   CR  1.27*  1.67*  2.28*   --   2.53*   < >  2.91*   < >  1.11*   A1C   --    --    --    --    --    --   7.1*   --   7.0*    < > = values in this interval not displayed.    UA RESULTS:  Recent Labs   Lab Test  07/13/17   1155   COLOR  Yellow   APPEARANCE  Clear   URINEGLC  Negative   URINEBILI  Negative   URINEKETONE  Negative   SG  1.020   UBLD  Large*   URINEPH  6.0   PROTEIN  100*   UROBILINOGEN  0.2    NITRITE  Negative   LEUKEST  Small*   RBCU  25-50*   WBCU  5-10*     IMPRESSION:                                                    Reason for surgery/procedure: nephrolithiasis  Diagnosis/reason for consult: pre-op evaluation    The proposed surgical procedure is considered INTERMEDIATE risk.    REVISED CARDIAC RISK INDEX  The patient has the following serious cardiovascular risks for perioperative complications such as (MI, PE, VFib and 3  AV Block):  No serious cardiac risks  INTERPRETATION: 0 risks: Class I (very low risk - 0.4% complication rate)    The patient has the following additional risks for perioperative complications:  No identified additional risks      ICD-10-CM    1. Preop general physical exam Z01.818 UA reflex to Microscopic and Culture     Urine Microscopic   2. Nephrolithiasis N20.0    3. Acute kidney injury (H) N17.9        RECOMMENDATIONS:                                                      --Patient is to take all scheduled medications on the day of surgery EXCEPT for modifications listed below.    APPROVAL GIVEN to proceed with proposed procedure, without further diagnostic evaluation     UA done per surgery request. Patient is asymptomatic. Abnormal results thought likely d/t contamination. Will hold off on treating, and will culture urine, to avoid unnecessary treatment, side effects, and potential worsening kidney function.   Patient had labs done 2 days ago, so not necessary to repeat.   BP was improved upon recheck. Was previously on Hyzaar which was stopped d/t IRAM. As recheck was within goal, will hold off on making medication adjustment. Recommend patient follow up with primary care provider after procedure for continued BP monitoring.     Signed Electronically by: PHUONG Adan CNP    Copy of this evaluation report is provided to requesting physician.      This encounter has been reviewed.  The patient was not examined by me.  GEORGE DEL ANGEL M.D.   Supervising  Physician in Internal Medicine, [place].

## 2017-07-13 NOTE — PATIENT INSTRUCTIONS
Hold aspirin for the week prior to your surgery  Continue with your medications, including day of surgery. Take with small sips of water  I will contact you with the results of your urine culture. If you develop any symptoms (painful urination, urgency, frequency) please call clinic and I will treat you    Before Your Surgery      Call your surgeon if there is any change in your health. This includes signs of a cold or flu (such as a sore throat, runny nose, cough, rash or fever).    Do not smoke, drink alcohol or take over the counter medicine (unless your surgeon or primary care doctor tells you to) for the 24 hours before and after surgery.    If you take prescribed drugs: Follow your doctor s orders about which medicines to take and which to stop until after surgery.    Eating and drinking prior to surgery: follow the instructions from your surgeon    Take a shower or bath the night before surgery. Use the soap your surgeon gave you to gently clean your skin. If you do not have soap from your surgeon, use your regular soap. Do not shave or scrub the surgery site.  Wear clean pajamas and have clean sheets on your bed.

## 2017-07-13 NOTE — Clinical Note
Do you agree with my decision to not treat abnormal UA and await for culture results? I also considered having her repeat UA, but it can be hard to give a sterile sample, so I don't know if that would change anything?

## 2017-07-14 LAB
BACTERIA SPEC CULT: NORMAL
MICRO REPORT STATUS: NORMAL
SPECIMEN SOURCE: NORMAL

## 2017-07-14 NOTE — PROGRESS NOTES
Kenna,  Your urine culture came back negative so we don't need to treat you with antibiotics.   Please let me know if you develop any symptoms. In that case, I would recommend repeating the UA.  I hope you are continuing to do well.  Shilpi Hinkle, CNP

## 2017-07-20 ENCOUNTER — RELEASE OF INFORMATION (OUTPATIENT)
Dept: FAMILY MEDICINE | Facility: CLINIC | Age: 62
End: 2017-07-20

## 2017-07-28 ENCOUNTER — CARE COORDINATION (OUTPATIENT)
Dept: UROLOGY | Facility: CLINIC | Age: 62
End: 2017-07-28

## 2017-07-28 NOTE — PROGRESS NOTES
Attempted to call patient regarding surgery on Monday. Unable to leave message. Karen Salvador RN

## 2017-07-30 ENCOUNTER — ANESTHESIA EVENT (OUTPATIENT)
Dept: SURGERY | Facility: CLINIC | Age: 62
End: 2017-07-30
Payer: COMMERCIAL

## 2017-07-30 NOTE — ANESTHESIA PREPROCEDURE EVALUATION
Anesthesia Evaluation     . Pt has had prior anesthetic. Type: General    History of anesthetic complications   - PONV        ROS/MED HX    ENT/Pulmonary:     (+)RAYMUNDO risk factors hypertension, obese, , . .    Neurologic:  - neg neurologic ROS     Cardiovascular:     (+) hypertension-range: 130/80 baseline on amlodipine and metoprolol, ---. : . . . :. . Previous cardiac testing date:results:date: results:ECG reviewed date: results: date: results:          METS/Exercise Tolerance:  >4 METS   Hematologic:     (+) Anemia, -      Musculoskeletal:  - neg musculoskeletal ROS       GI/Hepatic:  - neg GI/hepatic ROS       Renal/Genitourinary: Comment: IRAM noted on admission to hospital 7/1/17, resolving        Endo:     (+) type II DM (Diet controlled) Not using insulin thyroid problem hypothyroidism, Obesity, .      Psychiatric:  - neg psychiatric ROS       Infectious Disease:  - neg infectious disease ROS       Malignancy:   (+) Malignancy History of Breast  Breast CA Remission status post Surgery and Chemo. History of breast cancer        Other: Comment: Breast mass.   (+) No chance of pregnancy no H/O Chronic Pain,no other significant disability                  Procedure: Procedure(s):  Cystoscopy, Right Ureteroscopy, Laser Lithotripsy, Right Stent Exchange  - Wound Class:     HPI: Kenna Miranda is a 61 year old female with PMHx of HTN and DMII undergoing cystoscopy, ureteroscopy, lithotripsy and stent placement for R ureteral and renal calculi.     PMHx/PSHx:  Past Medical History:   Diagnosis Date     Benign breast lumps 2008    biopsied.     Borderline osteopenia 2/17/2010     Cancer (H)      Cholesterol serum increased      DM (diabetes mellitus), type 2 (H)      Elevated liver function tests     h/o mild increase of LFT's     HTN (hypertension)      Hypothyroid     as teenager.     Menopause      Personal history of kidney stones     + stones on CT scan 2007, treated with lithotripsy     Rectal fissure 2002     resolved       Past Surgical History:   Procedure Laterality Date     BIOPSY  2015     BIOPSY BREAST NEEDLE LOCALIZATION, BIOPSY NODE SENTINEL, COMBINED  2012    Procedure: COMBINED BIOPSY BREAST NEEDLE LOCALIZATION, BIOPSY NODE SENTINEL;  Left Breast Wire Locilized Lumpectomy and Sentinal Lymph Node Biopsy;  Surgeon: Jesus Vicente MD;  Location: [facility] OR     C  DELIVERY ONLY      x 2     COLONOSCOPY  10-26-11    Return in 1 yr for Polyp Surveillance     COLONOSCOPY  12    Return for Colonoscopy in 3 yrs.Polyps     COMBINED CYSTOSCOPY, INSERT STENT URETER(S) Right 2017    Procedure: COMBINED CYSTOSCOPY, INSERT STENT URETER(S);  Cystoscopy Right retrograde pyelogram, Right ureteral Stent Placement;  Surgeon: Loida Sánchez MD;  Location: [facility] OR     LITHOTRIPSY       SURGICAL HISTORY OF -       exploratory laparotomy     SURGICAL HISTORY OF -       Right ovary removed     SURGICAL HISTORY OF -       breast biopsy         No current facility-administered medications on file prior to encounter.   Current Outpatient Prescriptions on File Prior to Encounter:  levothyroxine (SYNTHROID/LEVOTHROID) 125 MCG tablet TAKE ONE TABLET BY MOUTH EVERY MORNING   metoprolol (LOPRESSOR) 25 MG tablet TAKE ONE TABLET BY MOUTH TWICE A DAY   ASPIRIN 81 MG OR TABS 1 TABLET DAILY   amLODIPine (NORVASC) 10 MG tablet Take 1 tablet (10 mg) by mouth daily (Patient taking differently: Take 10 mg by mouth every evening )   anastrozole (ARIMIDEX) 1 MG tablet Take 1 tablet (1 mg) by mouth daily (Patient taking differently: Take 1 mg by mouth every evening )       Social Hx:   Social History   Substance Use Topics     Smoking status: Never Smoker     Smokeless tobacco: Never Used      Comment: no second hand exposure.  when young Dad smoked.     Alcohol use Yes      Comment: very, very little       Allergies:   Allergies   Allergen Reactions     Nkda [No Known Drug Allergies]          NPO Status: Per ASA  Guidelines    Labs:    Blood Bank:  No results found for: ABO, RH, AS  BMP:  Recent Labs   Lab Test  07/11/17   1009   NA  139   POTASSIUM  4.4   CHLORIDE  109   CO2  21   BUN  15   CR  1.27*   GLC  206*   CARLYN  8.7     CBC:   Recent Labs   Lab Test  07/02/17   0513   WBC  6.1   RBC  3.78*   HGB  10.5*   HCT  32.2*   MCV  85   MCH  27.8   MCHC  32.6   RDW  14.0   PLT  151     Coags:  No results for input(s): INR, PTT, FIBR in the last 82267 hours.            Physical Exam  Normal systems: cardiovascular, pulmonary and dental    Airway   Mallampati: III  TM distance: >3 FB  Neck ROM: full    Dental   Comment: Poor dentition    Cardiovascular   Rhythm and rate: regular and normal      Pulmonary    breath sounds clear to auscultation                    Anesthesia Plan      History & Physical Review  History and physical reviewed and following examination; no interval change.    ASA Status:  3 .    NPO Status:  > 8 hours    Plan for General, ETT and RSI with Intravenous induction. Maintenance will be Balanced.    PONV prophylaxis:  Ondansetron (or other 5HT-3), Dexamethasone or Solumedrol and Scopolamine patch  Additional equipment: Videolaryngoscope      Postoperative Care  Postoperative pain management:  IV analgesics and Oral pain medications.      Consents  Anesthetic plan, risks, benefits and alternatives discussed with:  Patient..        To be discussed with staff.   - ASA 3  - GETA  with standard ASA monitors, IV induction, balanced anesthetic  - PIV x1  - Antibiotics per surgery  - PONV prophylaxis    Mitul Lobo                      .

## 2017-07-31 ENCOUNTER — HOSPITAL ENCOUNTER (OUTPATIENT)
Facility: CLINIC | Age: 62
Discharge: HOME | End: 2017-07-31
Attending: UROLOGY | Admitting: UROLOGY
Payer: COMMERCIAL

## 2017-07-31 ENCOUNTER — APPOINTMENT (OUTPATIENT)
Dept: GENERAL RADIOLOGY | Facility: CLINIC | Age: 62
End: 2017-07-31
Attending: UROLOGY
Payer: COMMERCIAL

## 2017-07-31 ENCOUNTER — SURGERY (OUTPATIENT)
Age: 62
End: 2017-07-31

## 2017-07-31 ENCOUNTER — ANESTHESIA (OUTPATIENT)
Dept: SURGERY | Facility: CLINIC | Age: 62
End: 2017-07-31
Payer: COMMERCIAL

## 2017-07-31 VITALS
HEIGHT: 62 IN | WEIGHT: 205.69 LBS | RESPIRATION RATE: 14 BRPM | DIASTOLIC BLOOD PRESSURE: 77 MMHG | TEMPERATURE: 98.2 F | SYSTOLIC BLOOD PRESSURE: 138 MMHG | OXYGEN SATURATION: 96 % | BODY MASS INDEX: 37.85 KG/M2

## 2017-07-31 DIAGNOSIS — N20.0 NEPHROLITHIASIS: Primary | ICD-10-CM

## 2017-07-31 PROBLEM — Z98.890 PONV (POSTOPERATIVE NAUSEA AND VOMITING): Chronic | Status: ACTIVE | Noted: 2017-07-31

## 2017-07-31 PROBLEM — R11.2 PONV (POSTOPERATIVE NAUSEA AND VOMITING): Chronic | Status: ACTIVE | Noted: 2017-07-31

## 2017-07-31 LAB
GLUCOSE BLDC GLUCOMTR-MCNC: 144 MG/DL (ref 70–99)
GLUCOSE BLDC GLUCOMTR-MCNC: 147 MG/DL (ref 70–99)

## 2017-07-31 PROCEDURE — 37000008 ZZH ANESTHESIA TECHNICAL FEE, 1ST 30 MIN: Performed by: UROLOGY

## 2017-07-31 PROCEDURE — C1894 INTRO/SHEATH, NON-LASER: HCPCS | Performed by: UROLOGY

## 2017-07-31 PROCEDURE — 25500064 ZZH RX 255 OP 636: Performed by: UROLOGY

## 2017-07-31 PROCEDURE — C2617 STENT, NON-COR, TEM W/O DEL: HCPCS | Performed by: UROLOGY

## 2017-07-31 PROCEDURE — 25000128 H RX IP 250 OP 636: Performed by: NURSE ANESTHETIST, CERTIFIED REGISTERED

## 2017-07-31 PROCEDURE — 25000125 ZZHC RX 250: Performed by: ANESTHESIOLOGY

## 2017-07-31 PROCEDURE — 25000132 ZZH RX MED GY IP 250 OP 250 PS 637: Performed by: ANESTHESIOLOGY

## 2017-07-31 PROCEDURE — C1769 GUIDE WIRE: HCPCS | Performed by: UROLOGY

## 2017-07-31 PROCEDURE — 71000012 ZZH RECOVERY PHASE 1 LEVEL 1 FIRST HR: Performed by: UROLOGY

## 2017-07-31 PROCEDURE — 36000059 ZZH SURGERY LEVEL 3 EA 15 ADDTL MIN UMMC: Performed by: UROLOGY

## 2017-07-31 PROCEDURE — 25000566 ZZH SEVOFLURANE, EA 15 MIN: Performed by: UROLOGY

## 2017-07-31 PROCEDURE — 82962 GLUCOSE BLOOD TEST: CPT | Mod: 91

## 2017-07-31 PROCEDURE — 40000141 ZZH STATISTIC PERIPHERAL IV START W/O US GUIDANCE

## 2017-07-31 PROCEDURE — 36000061 ZZH SURGERY LEVEL 3 W FLUORO 1ST 30 MIN - UMMC: Performed by: UROLOGY

## 2017-07-31 PROCEDURE — 25000128 H RX IP 250 OP 636: Performed by: STUDENT IN AN ORGANIZED HEALTH CARE EDUCATION/TRAINING PROGRAM

## 2017-07-31 PROCEDURE — 27210794 ZZH OR GENERAL SUPPLY STERILE: Performed by: UROLOGY

## 2017-07-31 PROCEDURE — 25000125 ZZHC RX 250: Performed by: STUDENT IN AN ORGANIZED HEALTH CARE EDUCATION/TRAINING PROGRAM

## 2017-07-31 PROCEDURE — C1758 CATHETER, URETERAL: HCPCS | Performed by: UROLOGY

## 2017-07-31 PROCEDURE — 37000009 ZZH ANESTHESIA TECHNICAL FEE, EACH ADDTL 15 MIN: Performed by: UROLOGY

## 2017-07-31 PROCEDURE — 40000170 ZZH STATISTIC PRE-PROCEDURE ASSESSMENT II: Performed by: UROLOGY

## 2017-07-31 PROCEDURE — 40000277 XR SURGERY CARM FLUORO LESS THAN 5 MIN W STILLS: Mod: TC

## 2017-07-31 PROCEDURE — 71000027 ZZH RECOVERY PHASE 2 EACH 15 MINS: Performed by: UROLOGY

## 2017-07-31 PROCEDURE — C9399 UNCLASSIFIED DRUGS OR BIOLOG: HCPCS | Performed by: NURSE ANESTHETIST, CERTIFIED REGISTERED

## 2017-07-31 PROCEDURE — 71000013 ZZH RECOVERY PHASE 1 LEVEL 1 EA ADDTL HR: Performed by: UROLOGY

## 2017-07-31 PROCEDURE — 25800025 ZZH RX 258: Performed by: UROLOGY

## 2017-07-31 PROCEDURE — 25000128 H RX IP 250 OP 636: Performed by: UROLOGY

## 2017-07-31 PROCEDURE — 82365 CALCULUS SPECTROSCOPY: CPT | Performed by: UROLOGY

## 2017-07-31 DEVICE — STENT URETERAL FIRM 6FRX26CM W/O GW G23406: Type: IMPLANTABLE DEVICE | Site: URETER | Status: FUNCTIONAL

## 2017-07-31 RX ORDER — ONDANSETRON 2 MG/ML
INJECTION INTRAMUSCULAR; INTRAVENOUS PRN
Status: DISCONTINUED | OUTPATIENT
Start: 2017-07-31 | End: 2017-07-31

## 2017-07-31 RX ORDER — CEFAZOLIN SODIUM 2 G/100ML
2 INJECTION, SOLUTION INTRAVENOUS
Status: COMPLETED | OUTPATIENT
Start: 2017-07-31 | End: 2017-07-31

## 2017-07-31 RX ORDER — LIDOCAINE HYDROCHLORIDE 20 MG/ML
INJECTION, SOLUTION INFILTRATION; PERINEURAL PRN
Status: DISCONTINUED | OUTPATIENT
Start: 2017-07-31 | End: 2017-07-31

## 2017-07-31 RX ORDER — FENTANYL CITRATE 50 UG/ML
INJECTION, SOLUTION INTRAMUSCULAR; INTRAVENOUS PRN
Status: DISCONTINUED | OUTPATIENT
Start: 2017-07-31 | End: 2017-07-31

## 2017-07-31 RX ORDER — PROPOFOL 10 MG/ML
INJECTION, EMULSION INTRAVENOUS PRN
Status: DISCONTINUED | OUTPATIENT
Start: 2017-07-31 | End: 2017-07-31

## 2017-07-31 RX ORDER — SODIUM CHLORIDE, SODIUM LACTATE, POTASSIUM CHLORIDE, CALCIUM CHLORIDE 600; 310; 30; 20 MG/100ML; MG/100ML; MG/100ML; MG/100ML
INJECTION, SOLUTION INTRAVENOUS CONTINUOUS PRN
Status: DISCONTINUED | OUTPATIENT
Start: 2017-07-31 | End: 2017-07-31

## 2017-07-31 RX ORDER — ONDANSETRON 2 MG/ML
4 INJECTION INTRAMUSCULAR; INTRAVENOUS EVERY 30 MIN PRN
Status: DISCONTINUED | OUTPATIENT
Start: 2017-07-31 | End: 2017-07-31 | Stop reason: HOSPADM

## 2017-07-31 RX ORDER — TAMSULOSIN HYDROCHLORIDE 0.4 MG/1
0.4 CAPSULE ORAL DAILY
Qty: 20 CAPSULE | Refills: 0 | Status: SHIPPED | OUTPATIENT
Start: 2017-07-31 | End: 2017-08-20

## 2017-07-31 RX ORDER — DEXAMETHASONE SODIUM PHOSPHATE 4 MG/ML
INJECTION, SOLUTION INTRA-ARTICULAR; INTRALESIONAL; INTRAMUSCULAR; INTRAVENOUS; SOFT TISSUE PRN
Status: DISCONTINUED | OUTPATIENT
Start: 2017-07-31 | End: 2017-07-31

## 2017-07-31 RX ORDER — OXYCODONE HYDROCHLORIDE 5 MG/1
5-10 TABLET ORAL EVERY 4 HOURS PRN
Qty: 15 TABLET | Refills: 0 | Status: SHIPPED | OUTPATIENT
Start: 2017-07-31 | End: 2017-09-07

## 2017-07-31 RX ORDER — HYDROMORPHONE HYDROCHLORIDE 1 MG/ML
.3-.5 INJECTION, SOLUTION INTRAMUSCULAR; INTRAVENOUS; SUBCUTANEOUS EVERY 5 MIN PRN
Status: DISCONTINUED | OUTPATIENT
Start: 2017-07-31 | End: 2017-07-31 | Stop reason: HOSPADM

## 2017-07-31 RX ORDER — FENTANYL CITRATE 50 UG/ML
25-50 INJECTION, SOLUTION INTRAMUSCULAR; INTRAVENOUS
Status: DISCONTINUED | OUTPATIENT
Start: 2017-07-31 | End: 2017-07-31 | Stop reason: HOSPADM

## 2017-07-31 RX ORDER — SCOLOPAMINE TRANSDERMAL SYSTEM 1 MG/1
1 PATCH, EXTENDED RELEASE TRANSDERMAL ONCE
Status: COMPLETED | OUTPATIENT
Start: 2017-07-31 | End: 2017-07-31

## 2017-07-31 RX ORDER — CEFAZOLIN SODIUM 1 G/3ML
1 INJECTION, POWDER, FOR SOLUTION INTRAMUSCULAR; INTRAVENOUS SEE ADMIN INSTRUCTIONS
Status: DISCONTINUED | OUTPATIENT
Start: 2017-07-31 | End: 2017-07-31 | Stop reason: HOSPADM

## 2017-07-31 RX ORDER — ONDANSETRON 4 MG/1
4 TABLET, ORALLY DISINTEGRATING ORAL EVERY 30 MIN PRN
Status: DISCONTINUED | OUTPATIENT
Start: 2017-07-31 | End: 2017-07-31 | Stop reason: HOSPADM

## 2017-07-31 RX ORDER — SODIUM CHLORIDE, SODIUM LACTATE, POTASSIUM CHLORIDE, CALCIUM CHLORIDE 600; 310; 30; 20 MG/100ML; MG/100ML; MG/100ML; MG/100ML
INJECTION, SOLUTION INTRAVENOUS CONTINUOUS
Status: DISCONTINUED | OUTPATIENT
Start: 2017-07-31 | End: 2017-07-31 | Stop reason: HOSPADM

## 2017-07-31 RX ADMIN — PROPOFOL 40 MG: 10 INJECTION, EMULSION INTRAVENOUS at 16:10

## 2017-07-31 RX ADMIN — FENTANYL CITRATE 100 MCG: 50 INJECTION, SOLUTION INTRAMUSCULAR; INTRAVENOUS at 14:12

## 2017-07-31 RX ADMIN — PROPOFOL 200 MG: 10 INJECTION, EMULSION INTRAVENOUS at 14:12

## 2017-07-31 RX ADMIN — SUGAMMADEX 200 MG: 100 INJECTION, SOLUTION INTRAVENOUS at 16:20

## 2017-07-31 RX ADMIN — PROPOFOL 100 MG: 10 INJECTION, EMULSION INTRAVENOUS at 14:25

## 2017-07-31 RX ADMIN — FENTANYL CITRATE 50 MCG: 50 INJECTION, SOLUTION INTRAMUSCULAR; INTRAVENOUS at 15:43

## 2017-07-31 RX ADMIN — ROCURONIUM BROMIDE 50 MG: 10 INJECTION INTRAVENOUS at 14:25

## 2017-07-31 RX ADMIN — LIDOCAINE HYDROCHLORIDE 60 MG: 20 INJECTION, SOLUTION INFILTRATION; PERINEURAL at 14:12

## 2017-07-31 RX ADMIN — SODIUM CHLORIDE, POTASSIUM CHLORIDE, SODIUM LACTATE AND CALCIUM CHLORIDE: 600; 310; 30; 20 INJECTION, SOLUTION INTRAVENOUS at 14:01

## 2017-07-31 RX ADMIN — Medication 1 LOZENGE: at 17:04

## 2017-07-31 RX ADMIN — DEXAMETHASONE SODIUM PHOSPHATE 6 MG: 4 INJECTION, SOLUTION INTRA-ARTICULAR; INTRALESIONAL; INTRAMUSCULAR; INTRAVENOUS; SOFT TISSUE at 15:09

## 2017-07-31 RX ADMIN — SODIUM CHLORIDE 3300 ML: 900 IRRIGANT IRRIGATION at 15:59

## 2017-07-31 RX ADMIN — CEFAZOLIN SODIUM 2 G: 2 INJECTION, SOLUTION INTRAVENOUS at 14:33

## 2017-07-31 RX ADMIN — IOTHALAMATE MEGLUMINE 2 ML: 600 INJECTION INTRAVASCULAR at 16:29

## 2017-07-31 RX ADMIN — FENTANYL CITRATE 50 MCG: 50 INJECTION, SOLUTION INTRAMUSCULAR; INTRAVENOUS at 14:57

## 2017-07-31 RX ADMIN — SCOPALAMINE 1 PATCH: 1 PATCH, EXTENDED RELEASE TRANSDERMAL at 10:08

## 2017-07-31 RX ADMIN — Medication 100 MG: at 14:12

## 2017-07-31 RX ADMIN — ONDANSETRON 4 MG: 2 INJECTION INTRAMUSCULAR; INTRAVENOUS at 16:09

## 2017-07-31 NOTE — IP AVS SNAPSHOT
MRN:[id number]                      After Visit Summary   7/31/2017    Kenna Miranda    MRN: [id number]           Thank you!     Thank you for choosing [facility] for your care. Our goal is always to provide you with excellent care. Hearing back from our patients is one way we can continue to improve our services. Please take a few minutes to complete the written survey that you may receive in the mail after you visit with us. Thank you!        Patient Information     Date Of Birth          1955        About your hospital stay     You were admitted on:  July 31, 2017 You last received care in the:  Post Anesthesia Care Unit [facility]    You were discharged on:  July 31, 2017       Who to Call     For medical emergencies, please call 911.  For non-urgent questions about your medical care, please call your primary care provider or clinic, [phone]  For questions related to your surgery, please call your surgery clinic        Attending Provider     Provider Ruth Pearl MD Urology       Primary Care Provider Office Phone # Fax #    Carolyne Layne [phone]      After Care Instructions     Discharge Instructions       Activity  - Refrain from strenuous activity in the immediate post-operative period   - Do not strain with bowel movements.  - Do not drive until you can press the brake pedal quickly and fully without pain.   - Do not operate a motor vehicle while taking narcotic pain medications.     Urination  - Some light redness (up to a watermelon-like-pink in color) is expected in the upcoming 7-10days.   - If having hematuria (blood in the urine), make sure to increase your water intake and monitor for improvement  - If you are unable to urinate you should return urgently to the ED or call clinic to try to arrange for an urgent visit same day.     Medications  - Transition from narcotic pain medications to tylenol (acetaminophen)  "as you are able.  Wean yourself off all pain medications as you are able.  - Some pain medications contain both tylenol (acetaminophen) and a narcotic (Norco, vicodin, percocet), do not take more than 4,000mg of Tylenol (acetaminophen) from all sources in any 24 hour period.  - Narcotics can make you constipated.  Take over the counter fiber (metamucil or benefiber) and stool softeners (miralax, docusate or senna) while taking narcotic pain medications, but stop if you develop diarrhea.  - No driving or operating machinery while taking narcotic pain medications     Follow-Up:  - Call your primary care provider to touch base regarding your recent admission.    - Call or return sooner than your regularly scheduled visit if you develop any of the following: fever (greater than 101.5), uncontrolled pain, uncontrolled nausea or vomiting, as well as increased redness, swelling, or drainage from your wound.     Phone numbers:   - Monday through Friday 8am to 4:30pm: Call [phone] with questions or to schedule or confirm appointment.    - Nights or weekends: call the after hours emergency pager - [phone] and tell the  \"I would like to page the Urology Resident on call.\"  - For emergencies, call [phone]                  Your next 10 appointments already scheduled     Aug 10, 2017  1:00 PM CDT   Office Visit with [facility] DIABETIC ED RESOURCE   [facility] ([facility])    [address] [address] [phone]           Bring a current list of meds and any records pertaining to this visit. For Physicals, please bring immunization records and any forms needing to be filled out. Please arrive 10 minutes early to complete paperwork.            Aug 11, 2017 10:45 AM CDT   (Arrive by 10:30 AM)   Post-Op with Ruth Boston MD   [facility] ([facility])    [address]  [address] [facility] [id number] [phone]              Further instructions from your care team       REMEMBER: SAME DAY SURGERY IS NOT SAME DAY RECOVERY. TAKE IT EASY, GET PLENTY OF REST, AND HAVE SOMEONE WITH YOU FOR 24 HOURS. FOLLOW THESE INSTRUCTIONS AND PLEASE DO NOT HESITATE TO CALL WITH ANY QUESTIONS.   [facility] [place]  Same-Day Surgery   Adult Discharge Orders & Instructions     For 24 hours after surgery    1. Get plenty of rest.  A responsible adult must stay with you for at least 24 hours after you leave the hospital.   2. Do not drive or use heavy equipment.  If you have weakness or tingling, don't drive or use heavy equipment until this feeling goes away.  3. Do not drink alcohol.  4. Avoid strenuous or risky activities.  Ask for help when climbing stairs.   5. You may feel lightheaded.  IF so, sit for a few minutes before standing.  Have someone help you get up.   6. If you have nausea (feel sick to your stomach): Drink only clear liquids such as apple juice, ginger ale, broth or 7-Up.  Rest may also help.  Be sure to drink enough fluids.  Move to a regular diet as you feel able.  7. You may have a slight fever. Call the doctor if your fever is over 100 F (37.7 C) (taken under the tongue) or lasts longer than 24 hours.  8. You may have a dry mouth, a sore throat, muscle aches or trouble sleeping.  These should go away after 24 hours.  9. Do not make important or legal decisions.   Call your doctor for any of the followin.  Signs of infection (fever, growing tenderness at the surgery site, a large amount of drainage or bleeding, severe pain, foul-smelling drainage, redness, swelling).    2. It has been over 8 to 10 hours since surgery and you are still not able to urinate (pass water).    3.  Headache for over 24 hours.    To contact a doctor, call Dr. Boston's  office at [phone] or:        [phone] and ask for the resident on  "call for Urology (answered 24 hours a day)      Emergency Department:    [facility]: [phone]       (TTY for hearing impaired: [phone])          Additional Information     If you use hormonal birth control (such as the pill, patch, ring or implants): You'll need a second form of birth control for 7 days (condoms, a diaphragm or contraceptive foam). While in the hospital, you received a medicine called Bridion. Your normal birth control will not work as well for a week after taking this medicine.          Pending Results     Date and Time Order Name Status Description    7/31/2017 1611 Stone analysis In process     7/31/2017 1437 XR Surgery ZULEIMA Fluoro L/T 5 Min w Stills In process             Admission Information     Date & Time Provider Department Dept. Phone    7/31/2017 Ruth Boston MD Post Anesthesia Care Unit [facility] [phone]      Your Vitals Were     Blood Pressure Temperature Respirations Height Weight       175/90 98  F (36.7  C) (Oral) 18 1.575 m (5' 2\") 93.3 kg (205 lb 11 oz)     Pulse Oximetry BMI (Body Mass Index)                92% 37.62 kg/m2          MyChart Information     [facility] gives you secure access to your electronic health record. If you see a primary care provider, you can also send messages to your care team and make appointments. If you have questions, please call your primary care clinic.  If you do not have a primary care provider, please call [phone] and they will assist you.        Care EveryWhere ID     This is your Care EveryWhere ID. This could be used by other organizations to access your [facility] medical records  [id number]        Equal Access to Services     [facility] Hospital[facility]IZABETH : Haddevonte Hernandez, wahaydenda luqadaha, qaybta kaalkesha bermudez. So [facility] [phone].    ATENCIÓN: Si habla español, tiene a tam disposición servicios gratuitos de asistencia lingüística. Llame al " [phone].    We comply with applicable federal civil rights laws and [place] laws. We do not discriminate on the basis of race, color, national origin, age, disability sex, sexual orientation or gender identity.               Review of your medicines      START taking        Dose / Directions    oxyCODONE 5 MG IR tablet   Commonly known as:  ROXICODONE   Used for:  Nephrolithiasis        Dose:  5-10 mg   Take 1-2 tablets (5-10 mg) by mouth every 4 hours as needed for pain maximum 6 tablet(s) per day   Quantity:  15 tablet   Refills:  0       phenazopyridine 97.5 MG tablet   Commonly known as:  AZO   Used for:  Nephrolithiasis        Dose:  200 mg   Take 2 tablets (195 mg) by mouth 3 times daily as needed for urinary tract discomfort   Quantity:  6 tablet   Refills:  3       tamsulosin 0.4 MG capsule   Commonly known as:  FLOMAX   Used for:  Nephrolithiasis        Dose:  0.4 mg   Take 1 capsule (0.4 mg) by mouth daily for 20 days   Quantity:  20 capsule   Refills:  0         CONTINUE these medicines which may have CHANGED, or have new prescriptions. If we are uncertain of the size of tablets/capsules you have at home, strength may be listed as something that might have changed.        Dose / Directions    amLODIPine 10 MG tablet   Commonly known as:  NORVASC   This may have changed:  when to take this   Used for:  Benign essential hypertension        Dose:  10 mg   Take 1 tablet (10 mg) by mouth daily   Quantity:  30 tablet   Refills:  1       anastrozole 1 MG tablet   Commonly known as:  ARIMIDEX   This may have changed:  when to take this   Used for:  Malignant neoplasm of female breast, unspecified laterality, unspecified site of breast        Dose:  1 mg   Take 1 tablet (1 mg) by mouth daily   Quantity:  90 tablet   Refills:  3         CONTINUE these medicines which have NOT CHANGED        Dose / Directions    aspirin 81 MG tablet        1 TABLET DAILY   Refills:  0       atorvastatin 20 MG tablet    Commonly known as:  LIPITOR        Dose:  20 mg   Take 20 mg by mouth every morning Patient is taking 2 times a week.   Refills:  0       levothyroxine 125 MCG tablet   Commonly known as:  SYNTHROID/LEVOTHROID   Used for:  Other specified hypothyroidism        TAKE ONE TABLET BY MOUTH EVERY MORNING   Quantity:  90 tablet   Refills:  0       metoprolol 25 MG tablet   Commonly known as:  LOPRESSOR   Used for:  Essential hypertension with goal blood pressure less than 140/90        TAKE ONE TABLET BY MOUTH TWICE A DAY   Quantity:  180 tablet   Refills:  1            Where to get your medicines      These medications were sent to [facility] [place], [place] - [address]  [address] [address]     Phone:  [phone]     phenazopyridine 97.5 MG tablet    tamsulosin 0.4 MG capsule         Some of these will need a paper prescription and others can be bought over the counter. Ask your nurse if you have questions.     Bring a paper prescription for each of these medications     oxyCODONE 5 MG IR tablet                Protect others around you: Learn how to safely use, store and throw away your medicines at www.disposemymeds.org.             Medication List: This is a list of all your medications and when to take them. Check marks below indicate your daily home schedule. Keep this list as a reference.      Medications           Morning Afternoon Evening Bedtime As Needed    amLODIPine 10 MG tablet   Commonly known as:  NORVASC   Take 1 tablet (10 mg) by mouth daily                                anastrozole 1 MG tablet   Commonly known as:  ARIMIDEX   Take 1 tablet (1 mg) by mouth daily                                aspirin 81 MG tablet   1 TABLET DAILY                                atorvastatin 20 MG tablet   Commonly known as:  LIPITOR   Take 20 mg by mouth every morning Patient is taking 2 times a week.                                levothyroxine 125 MCG tablet    Commonly known as:  SYNTHROID/LEVOTHROID   TAKE ONE TABLET BY MOUTH EVERY MORNING                                metoprolol 25 MG tablet   Commonly known as:  LOPRESSOR   TAKE ONE TABLET BY MOUTH TWICE A DAY                                oxyCODONE 5 MG IR tablet   Commonly known as:  ROXICODONE   Take 1-2 tablets (5-10 mg) by mouth every 4 hours as needed for pain maximum 6 tablet(s) per day                                phenazopyridine 97.5 MG tablet   Commonly known as:  AZO   Take 2 tablets (195 mg) by mouth 3 times daily as needed for urinary tract discomfort                                tamsulosin 0.4 MG capsule   Commonly known as:  FLOMAX   Take 1 capsule (0.4 mg) by mouth daily for 20 days

## 2017-07-31 NOTE — ANESTHESIA CARE TRANSFER NOTE
Patient: Kenna Miranda    Procedure(s):  Cystoscopy, Right Ureteroscopy, Laser Lithotripsy, Right Stent Exchange  - Wound Class: II-Clean Contaminated    Diagnosis: Right Ureteral and Renal Calculi, Calculus Of Right Kidney and Ureter.   Diagnosis Additional Information: No value filed.    Anesthesia Type:   General, ETT     Note:  Airway :Face Mask  Patient transferred to:PACU        Vitals: (Last set prior to Anesthesia Care Transfer)    CRNA VITALS  7/31/2017 1600 - 7/31/2017 1631      7/31/2017             Resp Rate (set): 10                Electronically Signed By: PHUONG Garcia CRNA  July 31, 2017  4:31 PM

## 2017-07-31 NOTE — OP NOTE
OPERATIVE REPORT    PREOPERATIVE DIAGNOSIS:  Right-sided 1.6cm obstructing UPJ stone    POSTOPERATIVE DIAGNOSIS:  Same    PROCEDURES PERFORMED:   1. Cystourethroscopy  2. ureteroscopy  3. Retrograde pyelogram with interpretation of intraoperative fluoroscopic imaging  4. Holmium laser lithotripsy with basket stone extraction  5. Right ureteral stent placement    STAFF SURGEON: Dr. Ruth Lopez MD, present for the entire case.   RESIDENT(S): Paxton Prakash MD  ANESTHESIA: General    ESTIMATED BLOOD LOSS: 1 cc  DRAINS/TUBES: 6 Martiniquais x 24cm double-J ureteral stent    IV FLUIDS: Please see dictated anesthesia record  COMPLICATIONS: None.   SPECIMEN: Stones for analysis  SIGNIFICANT FINDINGS:   1. Patient is mostly stone free in right upper tract  2. Proximal curl of the ureteral stent seen in the renal pelvis under fluoroscopy and distal curl seen in the bladder under direct vision.     BRIEF OPERATIVE INDICATIONS:  Kenna Miranda is a(n) 61 year old female who presented with a right sided 1.6cm obstructing UPJ stone.    After a discussion of all risks, benefits, and alternatives, the patient elected to proceed with definitive stone management. The patient understands the potential need for more than one procedure to eliminate all stone burden.     DESCRIPTION OF PROCEDURE:  After informed consent was obtained, the patient was transported to the operating room & placed supine on the table. After adequate anesthesia was induced, the patient was placed in lithotomy and prepped and draped in the usual sterile fashion. A timeout was taken to confirm correct patient, procedure and laterality. Pre-operative IV antibiotics were administered.     A 22-Martiniquais rigid cystoscope was inserted into a well-lubricated urethra. The urethra was unremarkable without stricture.  The previous indwelling ureteral stent was identified and removed with the flexible stent grasper to the level of the urethral meatus.  A sensor wire was  advanced up to the renal pelvis under fluoroscopic guidance and previous stent discarded.  The wire met resistance near the ureteropelvic junction. At this point contrast was instilled into the right upper tract and retrograde pyelogram was performed revealing no significant filling defect.     A semi-rigid ureteroscope was then introduced and advanced up the ureter until we encountered the stone(s).  A 200 micron laser fiber was used at a setting of 0.8 J and 8 Hz and lithotripsy was performed. A Halo basket was used to remove all fragments greater than 2 mm.     Subsequently, we performed flexible ureteroscopy. A dual lumen catheter was used to establish access with a second sensor wire  to act as our safety wire. A 36 cm 11/13 ureteral access sheath was advanced up to the distal ureter. The flexible ureteroscope was used to identify additional stones. Laser lithotripsy was performed in a similar manner and a Halo basket was used to remove all fragments greater than 2mm. A Pullback ureteroscopy was performed and showed no retained stone fragments or significant ureteral injury.    A 6Fr x 24-cm double-J stent was advanced over the Sensor wire, and a good proximal curl was seen in the renal pelvis on fluoroscopy and the distal curl was seen in the bladder. The bladder was drained.    The patient tolerated the procedure well and there were no apparent complications. The patient  was transported to the postanesthesia care unit in stable condition.     POSTOP PLAN:  - Discharge home with adequate follow up  - Tamsulosin, Pyridium, Oxycodone and Oxybutynin for pain management

## 2017-07-31 NOTE — DISCHARGE INSTRUCTIONS
REMEMBER: SAME DAY SURGERY IS NOT SAME DAY RECOVERY. TAKE IT EASY, GET PLENTY OF REST, AND HAVE SOMEONE WITH YOU FOR 24 HOURS. FOLLOW THESE INSTRUCTIONS AND PLEASE DO NOT HESITATE TO CALL WITH ANY QUESTIONS.   [facility], [place]  Same-Day Surgery   Adult Discharge Orders & Instructions     For 24 hours after surgery    1. Get plenty of rest.  A responsible adult must stay with you for at least 24 hours after you leave the hospital.   2. Do not drive or use heavy equipment.  If you have weakness or tingling, don't drive or use heavy equipment until this feeling goes away.  3. Do not drink alcohol.  4. Avoid strenuous or risky activities.  Ask for help when climbing stairs.   5. You may feel lightheaded.  IF so, sit for a few minutes before standing.  Have someone help you get up.   6. If you have nausea (feel sick to your stomach): Drink only clear liquids such as apple juice, ginger ale, broth or 7-Up.  Rest may also help.  Be sure to drink enough fluids.  Move to a regular diet as you feel able.  7. You may have a slight fever. Call the doctor if your fever is over 100 F (37.7 C) (taken under the tongue) or lasts longer than 24 hours.  8. You may have a dry mouth, a sore throat, muscle aches or trouble sleeping.  These should go away after 24 hours.  9. Do not make important or legal decisions.   Call your doctor for any of the followin.  Signs of infection (fever, growing tenderness at the surgery site, a large amount of drainage or bleeding, severe pain, foul-smelling drainage, redness, swelling).    2. It has been over 8 to 10 hours since surgery and you are still not able to urinate (pass water).    3.  Headache for over 24 hours.    To contact a doctor, call Dr. Boston's  office at [phone] or:        [phone] and ask for the resident on call for Urology (answered 24 hours a day)      Emergency Department:    [facility]: [phone]       (TTY for hearing  impaired: [phone])

## 2017-07-31 NOTE — IP AVS SNAPSHOT
Post Anesthesia Care Unit [address] [address]    Phone:  [phone]                                       After Visit Summary   7/31/2017    Kenna Miranda    MRN: [id number]           After Visit Summary Signature Page     I have received my discharge instructions, and my questions have been answered. I have discussed any challenges I see with this plan with the nurse or doctor.    ..........................................................................................................................................  Patient/Patient Representative Signature      ..........................................................................................................................................  Patient Representative Print Name and Relationship to Patient    ..................................................               ................................................  Date                                            Time    ..........................................................................................................................................  Reviewed by Signature/Title    ...................................................              ..............................................  Date                                                            Time

## 2017-07-31 NOTE — BRIEF OP NOTE
[facility], [place]    Brief Operative Note    Pre-operative diagnosis: Right Ureteral and Renal Calculi, Calculus Of Right Kidney and Ureter.   Post-operative diagnosis * No post-op diagnosis entered *  Procedure: Procedure(s):  Cystoscopy, Right Ureteroscopy, Laser Lithotripsy, Right Stent Exchange  - Wound Class: II-Clean Contaminated  Surgeon: Surgeon(s) and Role:     * Ruth Boston MD - Primary     * Paxton Prakash MD - Resident - Assisting  Anesthesia: General   Estimated blood loss: Minimal  Drains:  26x4F Right Ureteral Stent  Specimens:   ID Type Source Tests Collected by Time Destination   1 : Right Ureteral Stones Calculus/Stone Ureter, Right STONE ANALYSIS Ruth Boston MD 7/31/2017  4:10 PM      Findings:   Some edematous regions noted in ureter .     Patient is mostly stone free in right upper tract     Proximal curl of the ureteral stent seen in the renal pelvis under    fluoroscopy and distal curl seen in the bladder under direct vision.   Complications: None.  Implants: None.

## 2017-08-01 ENCOUNTER — TELEPHONE (OUTPATIENT)
Dept: FAMILY MEDICINE | Facility: CLINIC | Age: 62
End: 2017-08-01

## 2017-08-01 DIAGNOSIS — E21.0 PRIMARY HYPERPARATHYROIDISM (H): Primary | ICD-10-CM

## 2017-08-01 DIAGNOSIS — R80.9 TYPE 2 DIABETES MELLITUS WITH MICROALBUMINURIA, WITHOUT LONG-TERM CURRENT USE OF INSULIN (H): ICD-10-CM

## 2017-08-01 DIAGNOSIS — E03.9 HYPOTHYROIDISM, UNSPECIFIED TYPE: ICD-10-CM

## 2017-08-01 DIAGNOSIS — E11.29 TYPE 2 DIABETES MELLITUS WITH MICROALBUMINURIA, WITHOUT LONG-TERM CURRENT USE OF INSULIN (H): ICD-10-CM

## 2017-08-01 NOTE — TELEPHONE ENCOUNTER
MD called patient.  Discussed recent events.     Patient due for labs and AFE. She will schedule.  Labs entered.

## 2017-08-03 LAB
APPEARANCE STONE: NORMAL
COMPN STONE: NORMAL
NUMBER STONE: NORMAL
SIZE STONE: NORMAL MM3
WT STONE: 341 MG

## 2017-08-07 DIAGNOSIS — E03.8 OTHER SPECIFIED HYPOTHYROIDISM: ICD-10-CM

## 2017-08-07 DIAGNOSIS — E78.5 HYPERLIPIDEMIA LDL GOAL <100: ICD-10-CM

## 2017-08-07 DIAGNOSIS — I10 BENIGN ESSENTIAL HYPERTENSION: ICD-10-CM

## 2017-08-07 RX ORDER — LEVOTHYROXINE SODIUM 125 UG/1
TABLET ORAL
Qty: 90 TABLET | Refills: 0 | Status: SHIPPED | OUTPATIENT
Start: 2017-08-07 | End: 2017-09-07

## 2017-08-07 RX ORDER — AMLODIPINE BESYLATE 10 MG/1
TABLET ORAL
Qty: 30 TABLET | Refills: 0 | OUTPATIENT
Start: 2017-08-07

## 2017-08-07 RX ORDER — ATORVASTATIN CALCIUM 20 MG/1
TABLET, FILM COATED ORAL
Qty: 26 TABLET | Refills: 2 | Status: SHIPPED | OUTPATIENT
Start: 2017-08-07 | End: 2017-09-07

## 2017-08-07 NOTE — TELEPHONE ENCOUNTER
atorvastatin (LIPITOR) 20 MG tablet      Last Written Prescription Date:  na  Last Fill Quantity: na,   # refills: na  Last Office Visit with [facility] or  Health prescribing provider: 7/13/17  Future Office visit:    Next 5 appointments (look out 90 days)     Sep 07, 2017  8:20 AM CDT   PHYSICAL with Carolyne Layne MD   [facility] ([facility]    [address] [place] [address]   [phone]                   Routing refill request to provider for review/approval because:  Medication is reported/historical      levothyroxine (SYNTHROID/LEVOTHROID) 125 MCG tablet     Last Written Prescription Date: 5/10/17  Last Quantity: 90, # refills: 0  Last Office Visit with [facility], [facility] or  Health prescribing provider: 7/13/17   Next 5 appointments (look out 90 days)     Sep 07, 2017  8:20 AM CDT   PHYSICAL with Carolyne Layne MD   [facility] ([facility])    [address] [place] [address]   [phone]                   TSH   Date Value Ref Range Status   06/29/2017 4.50 (H) 0.40 - 4.00 mU/L Final

## 2017-08-07 NOTE — TELEPHONE ENCOUNTER
Amlodipine 10mg tabs      Last Written Prescription Date: 07-02-17  Last Fill Quantity: 30, # refills: 0    Last Office Visit with [facility], [facility] or [facility] prescribing provider:  07-13-17   Future Office Visit:    Next 5 appointments (look out 90 days)     Sep 07, 2017  8:20 AM CDT   PHYSICAL with Carolyne Layne MD   [facility] ([facility])    [address] [place] [address] [phone]                    BP Readings from Last 3 Encounters:   07/31/17 138/77   07/13/17 132/80   07/02/17 140/62     Thanks!    Mati Pelletier CPhT  [facility]- Float Technician  For [facility]

## 2017-08-08 RX ORDER — AMLODIPINE BESYLATE 10 MG/1
10 TABLET ORAL DAILY
Qty: 30 TABLET | Refills: 1 | Status: SHIPPED | OUTPATIENT
Start: 2017-08-08 | End: 2017-08-18

## 2017-08-08 NOTE — TELEPHONE ENCOUNTER
Prescription approved per [facility] Refill Protocol.    Kathy Mak RN  [facility]

## 2017-08-11 ENCOUNTER — PRE VISIT (OUTPATIENT)
Dept: UROLOGY | Facility: CLINIC | Age: 62
End: 2017-08-11

## 2017-08-11 ENCOUNTER — OFFICE VISIT (OUTPATIENT)
Dept: UROLOGY | Facility: CLINIC | Age: 62
End: 2017-08-11

## 2017-08-11 VITALS
WEIGHT: 200 LBS | HEART RATE: 74 BPM | HEIGHT: 62 IN | BODY MASS INDEX: 36.8 KG/M2 | SYSTOLIC BLOOD PRESSURE: 127 MMHG | DIASTOLIC BLOOD PRESSURE: 71 MMHG

## 2017-08-11 DIAGNOSIS — N20.0 KIDNEY STONE: Primary | ICD-10-CM

## 2017-08-11 ASSESSMENT — PAIN SCALES - GENERAL
PAINLEVEL: NO PAIN (0)
PAINLEVEL: NO PAIN (0)

## 2017-08-11 NOTE — NURSING NOTE
Invasive Procedure Safety Checklist:    Procedure: cysto     Action: Complete sections and checkboxes as appropriate.    Pre-procedure:  1. Patient ID Verified with 2 identifiers (Saranya and  or MRN) : YES    2. Procedure and site verified with patient/designee (when able) : YES    3. Accurate consent documentation in medical record : YES    4. H&P (or appropriate assessment) documented in medical record : NO  H&P must be up to 30 days prior to procedure an updated within 24 hours of                 Procedure as applicable.     5. Relevant diagnostic and radiology test results appropriately labeled and displayed as applicable : YES    6. Blood products, implants, devices, and/or special equipment available for the procedure as applicable : YES    7. Procedure site(s) marked with provider initials [Exclusions: none] : NO    8. Marking not required. Reason : Yes  Procedure does not require site marking    Time Out:     Time-Out performed immediately prior to starting procedure, including verbal and active participation of all team members addressing: YES    1. Correct patient identity.  2. Confirmed that the correct side and site are marked.  3. An accurate procedure to be done.  4. Agreement on the procedure to be done.  5. Correct patient position.  6. Relevant images and results are properly labeled and appropriately displayed.  7. The need to administer antibiotics or fluids for irrigation purposes during the procedure as applicable.  8. Safety precautions based on patient history or medication use.    During Procedure: Verification of correct person, site, and procedure occurs any time the responsibility for care of the patient is transferred to another member of the care team.

## 2017-08-11 NOTE — MR AVS SNAPSHOT
After Visit Summary   8/11/2017    Kenna Miranda    MRN: [id number]           Patient Information     Date Of Birth          1955        Visit Information        Provider Department      8/11/2017 10:45 AM Ruth Boston MD [facility]rology and [facility]        Today's Diagnoses     Kidney stone    -  1      Care Instructions    Please follow up in 3 months with a CT before  Please do litholink and make a appointment with  and Lo  You have been referred to the Kidney Stone Clinic. This means that you will set up an appointment at the Urology Clinic to see a Kidney doctor (Nephrologist) and possibly a dietitian. The Nephrologist will work with you to understand what is causing kidney stones and learn ways to prevent future recurrences. The dietitian can also help you understand how your diet affects this condition. This type of visit is important because there are lots of ways to prevent kidney stones just by changing your medication regimen or tweaking your diet. We may ask you to complete something called a Botanical Tans 24 hour urine collection. A box will be sent to your home from the [employer] company. This box will contain detailed instructions on how to collect your urine for 24 hours and send a sample back to the company. The sample should be sent back at least two weeks before your appointment with the Stone Clinic to ensure we get results before the visit. Please be sure to write down everything you eat and drink for 48 hours, both the day before the urine collection as well as the day of the urine collection. Bring this information with you to the appointment. We may also request blood labs or scans. Please be sure to complete these before your visit as well. All of these tests will help tailor the visit to suit your needs, and make the appointment much more beneficial to you. If you have any questions, or you don't receive the  urine collection kit with enough time to complete it before your visit, please call our clinic and we will be happy to help you.       It was a pleasure meeting with you today.  Thank you for allowing me and my team the privilege of caring for you today.  YOU are the reason we are here, and I truly hope we provided you with the excellent service you deserve.  Please let us know if there is anything else we can do for you so that we can be sure you are leaving completely satisfied with your care experience.                  Follow-ups after your visit        Your next 10 appointments already scheduled     Sep 07, 2017  8:20 AM CDT   PHYSICAL with Carolyne Layne MD   [facility]    [address] [address] [phone]            Nov 08, 2017  8:20 AM CST   CT ABDOMEN PELVIS W/O CONTRAST with UCCT1   [facility] ([facility])    [address] [place] [address] [phone]           Please bring any scans or X-rays taken at other hospitals, if similar tests were done. Also bring a list of your medicines, including vitamins, minerals and over-the-counter drugs. It is safest to leave personal items at home.  Be sure to tell your doctor:   If you have any allergies.   If there s any chance you are pregnant.   If you are breastfeeding.   If you have any special needs.  You do not need to do anything special to prepare.  Please wear loose clothing, such as a sweat suit or jogging clothes. Avoid snaps, zippers and other metal. We may ask you to undress and put on a hospital gown.            Nov 08, 2017  9:00 AM CST   (Arrive by 8:45 AM)   Return Renal Calculi with Ruth Boston MD   [facility] Urology and [facility] Urologic C[facility] ([facility])    [address] [place] [address]  "  [phone]              Future tests that were ordered for you today     Open Future Orders        Priority Expected Expires Ordered    CT Abdomen Pelvis w/o Contrast Routine 8/11/2017 8/11/2018 8/11/2017    CBC with platelets Routine 8/24/2017 8/10/2018 8/10/2017            Who to contact     Please call your clinic at [phone] to:    Ask questions about your health    Make or cancel appointments    Discuss your medicines    Learn about your test results    Speak to your doctor   If you have compliments or concerns about an experience at your clinic, or if you wish to file a complaint, please contact [facility] Patient Relations at [phone] or email us at [email or web address]         Additional Information About Your Visit        [employer] Information     [employer] gives you secure access to your electronic health record. If you see a primary care provider, you can also send messages to your care team and make appointments. If you have questions, please call your primary care clinic.  If you do not have a primary care provider, please call [phone] and they will assist you.      [employer] is an electronic gateway that provides easy, online access to your medical records. With [employer], you can request a clinic appointment, read your test results, renew a prescription or communicate with your care team.     To access your existing account, please contact your [facility] or call [phone] for assistance.        Care EveryWhere ID     This is your Care EveryWhere ID. This could be used by other organizations to access your [facility] medical records  [id number]        Your Vitals Were     Pulse Height BMI (Body Mass Index)             74 1.575 m (5' 2\") 36.58 kg/m2          Blood Pressure from Last 3 Encounters:   08/11/17 127/71   07/31/17 138/77   07/13/17 132/80    Weight from Last 3 Encounters:   08/11/17 90.7 kg (200 lb)   07/31/17 " 93.3 kg (205 lb 11 oz)   07/13/17 92.5 kg (204 lb)                 Today's Medication Changes          These changes are accurate as of: 8/11/17 12:24 PM.  If you have any questions, ask your nurse or doctor.               These medicines have changed or have updated prescriptions.        Dose/Directions    anastrozole 1 MG tablet   Commonly known as:  ARIMIDEX   This may have changed:  when to take this   Used for:  Malignant neoplasm of female breast, unspecified laterality, unspecified site of breast        Dose:  1 mg   Take 1 tablet (1 mg) by mouth daily   Quantity:  90 tablet   Refills:  3                Primary Care Provider Office Phone # Fax #    Carolyne Layne [phone]       [address] [facility] [address]        Equal Access to Services     EBEN ROBINS : Manisha cain [place], waaxda luqadaha, qaybta kaalmada adelaurelyacandelario, kesha vann . So [place] -[phone].    ATENCIÓN: Si habla español, tiene a tma disposición servicios gratuitos de asistencia lingüística. Llame al [phone].    We comply with applicable federal civil rights laws and [place] laws. We do not discriminate on the basis of race, color, national origin, age, disability sex, sexual orientation or gender identity.            Thank you!     Thank you for choosing [facility] UROLOGY AND [facility] FOR PROSTATE AND UROLOGIC CANCERS  for your care. Our goal is always to provide you with excellent care. Hearing back from our patients is one way we can continue to improve our services. Please take a few minutes to complete the written survey that you may receive in the mail after your visit with us. Thank you!             Your Updated Medication List - Protect others around you: Learn how to safely use, store and throw away your medicines at www.disposemymeds.org.          This list is accurate as of: 8/11/17 12:25 PM.  Always use your most recent med list.                    Brand Name Dispense Instructions for use Diagnosis    ACE/ARB NOT PRESCRIBED (INTENTIONAL)      Please choose reason not prescribed, below    Anemia, unspecified       amLODIPine 10 MG tablet    NORVASC    30 tablet    Take 1 tablet (10 mg) by mouth daily    Benign essential hypertension       anastrozole 1 MG tablet    ARIMIDEX    90 tablet    Take 1 tablet (1 mg) by mouth daily    Malignant neoplasm of female breast, unspecified laterality, unspecified site of breast       aspirin 81 MG tablet      1 TABLET DAILY        * atorvastatin 20 MG tablet    LIPITOR     Take 20 mg by mouth every morning Patient is taking 2 times a week.        * atorvastatin 20 MG tablet    LIPITOR    26 tablet    TAKE ONE TABLET BY MOUTH TWICE A WEEK.    Hyperlipidemia LDL goal <100       levothyroxine 125 MCG tablet    SYNTHROID/LEVOTHROID    90 tablet    TAKE ONE TABLET BY MOUTH EVERY MORNING    Other specified hypothyroidism       metoprolol 25 MG tablet    LOPRESSOR    180 tablet    TAKE ONE TABLET BY MOUTH TWICE A DAY    Essential hypertension with goal blood pressure less than 140/90       oxyCODONE 5 MG IR tablet    ROXICODONE    15 tablet    Take 1-2 tablets (5-10 mg) by mouth every 4 hours as needed for pain maximum 6 tablet(s) per day    Nephrolithiasis       phenazopyridine 97.5 MG tablet    AZO    6 tablet    Take 2 tablets (195 mg) by mouth 3 times daily as needed for urinary tract discomfort    Nephrolithiasis       tamsulosin 0.4 MG capsule    FLOMAX    20 capsule    Take 1 capsule (0.4 mg) by mouth daily for 20 days    Nephrolithiasis       * Notice:  This list has 2 medication(s) that are the same as other medications prescribed for you. Read the directions carefully, and ask your doctor or other care provider to review them with you.

## 2017-08-11 NOTE — PATIENT INSTRUCTIONS
Please follow up in 3 months with a CT before  Please do litholink and make a appointment with  and Lo  You have been referred to the Kidney Stone Clinic. This means that you will set up an appointment at the Urology Clinic to see a Kidney doctor (Nephrologist) and possibly a dietitian. The Nephrologist will work with you to understand what is causing kidney stones and learn ways to prevent future recurrences. The dietitian can also help you understand how your diet affects this condition. This type of visit is important because there are lots of ways to prevent kidney stones just by changing your medication regimen or tweaking your diet. We may ask you to complete something called a Twyxt 24 hour urine collection. A box will be sent to your home from the [employer] company. This box will contain detailed instructions on how to collect your urine for 24 hours and send a sample back to the company. The sample should be sent back at least two weeks before your appointment with the Stone Clinic to ensure we get results before the visit. Please be sure to write down everything you eat and drink for 48 hours, both the day before the urine collection as well as the day of the urine collection. Bring this information with you to the appointment. We may also request blood labs or scans. Please be sure to complete these before your visit as well. All of these tests will help tailor the visit to suit your needs, and make the appointment much more beneficial to you. If you have any questions, or you don't receive the urine collection kit with enough time to complete it before your visit, please call our clinic and we will be happy to help you.       It was a pleasure meeting with you today.  Thank you for allowing me and my team the privilege of caring for you today.  YOU are the reason we are here, and I truly hope we provided you with the excellent service you deserve.  Please let us know if there is anything  "else we can do for you so that we can be sure you are leaving completely satisfied with your care experience.          AFTER YOUR CYSTOSCOPY        You have just completed a cystoscopy, or \"cysto\", which allowed your physician to learn more about your bladder (or to remove a stent placed after surgery). We suggest that you continue to avoid caffeine, fruit juice, and alcohol for the next 24 hours, however, you are encouraged to return to your normal activities.         A few things that are considered normal after your cystoscopy:     * Small amount of bleeding (or spotting) that clears within the next 24 hours     * Slight burning sensation with urination     * Sensation to of needing to avoid more frequently     * The feeling of \"air\" in your urine     * Mild discomfort that is relieved with Tylenol        Please contact our office promptly if you:     * Develop a fever above 101 degrees     * Are unable to urinate     * Develop bright red blood that does not stop     * Severe pain or swelling         Please contact our office with any concerns or questions @DEPHN.  "

## 2017-08-11 NOTE — LETTER
8/11/2017       RE: Kenna Miranda  [address] [place] [address]     Dear Colleague,    Thank you for referring your patient, Kenna Miranda, to the [facility] UROLOGY AND INST FOR PROSTATE AND UROLOGIC CANCERS at [facility]. Please see a copy of my visit note below.    ASSESSMENT AND PLAN  Kidney Stone    CT scan in three months   [facility] x2   Refer to nephrology stone clinic   Follow up in three months   _________________________________________________________________    CHIEF COMPLAINT  It was my pleasure to see Kenna Miranda who is a 61 year old female who is here for follow-up for kidney stones and removal of her ureteral stent.    HPI   Patient with large ureteral calculus s/p urgent stent placement. Now s/p URS with stone clearance.       Patient Active Problem List    Diagnosis Date Noted     PONV (postoperative nausea and vomiting) 07/31/2017     Priority: Medium     Relieved with scopolamine patch       Primary hyperparathyroidism (H) 07/12/2017     Priority: Medium     Hypothyroidism, unspecified type 12/15/2016     Priority: Medium     Essential hypertension with goal blood pressure less than 140/90 07/11/2016     Priority: Medium     Type 2 diabetes mellitus with microalbuminuria (H) 07/11/2016     Priority: Medium     Tibialis posterior tendinitis, left 12/21/2015     Priority: Medium     overweight 10/26/2015     Priority: Medium     Dermatochalasis of eyelid 04/02/2014     Priority: Medium     No diabetic retinopathy in both eyes 04/02/2014     Priority: Medium     Elevated LFTs 04/08/2013     Priority: Medium     Resolved with weight loss 2013/2015.... Then upon regaining weight, LFTs increase again (at weight above 205 or so) - 9/2/16, [facility]       Cataract of both eyes 03/19/2013     Priority: Medium     Breast cancer (H) 11/26/2012     Priority: Medium     ER/HI postive, HER-2 kenia negative.  Rx includes lumpectomy (Fall 2012), XRT and  aromatase inhibitor (12, [facility])       Family history of colon cancer 2012     Priority: Medium     S/P colonoscopic polypectomy 2012     Priority: Medium     Needs Colonoscopy q1yr       HYPERLIPIDEMIA LDL GOAL <100 10/31/2010     Priority: Medium     Borderline osteopenia 2010     Priority: Medium     DEXA 2/10 with T scores -1.1 and -1.0.  Slight progression from .       Past Medical History:   Diagnosis Date     Benign breast lumps     biopsied.     Borderline osteopenia 2010     Cancer (H)      Cholesterol serum increased      DM (diabetes mellitus), type 2 (H)      Elevated liver function tests     h/o mild increase of LFT's     HTN (hypertension)      Hypothyroid     as teenager.     Menopause      Personal history of kidney stones     + stones on CT scan , treated with lithotripsy     Rectal fissure     resolved     Past Surgical History:   Procedure Laterality Date     BIOPSY  2015     BIOPSY BREAST NEEDLE LOCALIZATION, BIOPSY NODE SENTINEL, COMBINED  2012    Procedure: COMBINED BIOPSY BREAST NEEDLE LOCALIZATION, BIOPSY NODE SENTINEL;  Left Breast Wire Locilized Lumpectomy and Sentinal Lymph Node Biopsy;  Surgeon: Jesus Vicente MD;  Location: [facility] OR     C  DELIVERY ONLY      x 2     COLONOSCOPY  10-26-11    Return in 1 yr for Polyp Surveillance     COLONOSCOPY  12    Return for Colonoscopy in 3 yrs.Polyps     COMBINED CYSTOSCOPY, INSERT STENT URETER(S) Right 2017    Procedure: COMBINED CYSTOSCOPY, INSERT STENT URETER(S);  Cystoscopy Right retrograde pyelogram, Right ureteral Stent Placement;  Surgeon: Loida Sánchez MD;  Location: [facility] OR     COMBINED CYSTOSCOPY, RETROGRADES, URETEROSCOPY, LASER HOLMIUM LITHOTRIPSY URETER(S), INSERT STENT Right 2017    Procedure: COMBINED CYSTOSCOPY, RETROGRADES, URETEROSCOPY, LASER HOLMIUM LITHOTRIPSY URETER(S), INSERT STENT;  Cystoscopy, Right Ureteroscopy, Laser Lithotripsy, Right Stent  Exchange ;  Surgeon: Ruth Boston MD;  Location: [facility] OR     LITHOTRIPSY  2007     SURGICAL HISTORY OF -       exploratory laparotomy     SURGICAL HISTORY OF -       Right ovary removed     SURGICAL HISTORY OF -   2008    breast biopsy     Current Outpatient Prescriptions   Medication Sig Dispense Refill     ACE/ARB NOT PRESCRIBED, INTENTIONAL, Please choose reason not prescribed, below       amLODIPine (NORVASC) 10 MG tablet Take 1 tablet (10 mg) by mouth daily 30 tablet 1     atorvastatin (LIPITOR) 20 MG tablet TAKE ONE TABLET BY MOUTH TWICE A WEEK. 26 tablet 2     levothyroxine (SYNTHROID/LEVOTHROID) 125 MCG tablet TAKE ONE TABLET BY MOUTH EVERY MORNING 90 tablet 0     oxyCODONE (ROXICODONE) 5 MG IR tablet Take 1-2 tablets (5-10 mg) by mouth every 4 hours as needed for pain maximum 6 tablet(s) per day 15 tablet 0     phenazopyridine (AZO) 97.5 MG tablet Take 2 tablets (195 mg) by mouth 3 times daily as needed for urinary tract discomfort 6 tablet 3     tamsulosin (FLOMAX) 0.4 MG capsule Take 1 capsule (0.4 mg) by mouth daily for 20 days 20 capsule 0     atorvastatin (LIPITOR) 20 MG tablet Take 20 mg by mouth every morning Patient is taking 2 times a week.        metoprolol (LOPRESSOR) 25 MG tablet TAKE ONE TABLET BY MOUTH TWICE A  tablet 1     anastrozole (ARIMIDEX) 1 MG tablet Take 1 tablet (1 mg) by mouth daily (Patient taking differently: Take 1 mg by mouth every evening ) 90 tablet 3     ASPIRIN 81 MG OR TABS 1 TABLET DAILY        SOCIAL HISTORY: She  reports that she has never smoked. She has never used smokeless tobacco. She reports that she drinks alcohol. She reports that she does not use illicit drugs.    CYSTOSCOPY PROCEDURE  After a sterile prep and drape and an informed consent a 16-French flexible cystoscope was introduced via the urethra.  The urethra was open.  The stent was visualized, grasped and removed in its entirety.    CC  Patient Care Team:  Karen Salvador RN as  Registered Nurse  GEORGE DEL ANGEL    Again, thank you for allowing me to participate in the care of your patient.      Sincerely,    Ruth Boston MD

## 2017-08-15 NOTE — PROGRESS NOTES
ASSESSMENT AND PLAN  Kidney Stone    CT scan in three months   [facility] x2   Refer to nephrology stone clinic   Follow up in three months   _________________________________________________________________    CHIEF COMPLAINT  It was my pleasure to see Kenna Andersonmichaellobito who is a 61 year old female who is here for follow-up for kidney stones and removal of her ureteral stent.    HPI   Patient with large ureteral calculus s/p urgent stent placement. Now s/p URS with stone clearance.       Patient Active Problem List    Diagnosis Date Noted     PONV (postoperative nausea and vomiting) 07/31/2017     Priority: Medium     Relieved with scopolamine patch       Primary hyperparathyroidism (H) 07/12/2017     Priority: Medium     Hypothyroidism, unspecified type 12/15/2016     Priority: Medium     Essential hypertension with goal blood pressure less than 140/90 07/11/2016     Priority: Medium     Type 2 diabetes mellitus with microalbuminuria (H) 07/11/2016     Priority: Medium     Tibialis posterior tendinitis, left 12/21/2015     Priority: Medium     overweight 10/26/2015     Priority: Medium     Dermatochalasis of eyelid 04/02/2014     Priority: Medium     No diabetic retinopathy in both eyes 04/02/2014     Priority: Medium     Elevated LFTs 04/08/2013     Priority: Medium     Resolved with weight loss 2013/2015.... Then upon regaining weight, LFTs increase again (at weight above 205 or so) - 9/2/16, [facility]       Cataract of both eyes 03/19/2013     Priority: Medium     Breast cancer (H) 11/26/2012     Priority: Medium     ER/RI postive, HER-2 kenia negative.  Rx includes lumpectomy (Fall 2012), XRT and aromatase inhibitor (11/29/12, [facility])       Family history of colon cancer 04/27/2012     Priority: Medium     S/P colonoscopic polypectomy 04/27/2012     Priority: Medium     Needs Colonoscopy q1yr       HYPERLIPIDEMIA LDL GOAL <100 10/31/2010     Priority: Medium     Borderline osteopenia 02/17/2010     Priority: Medium      DEXA 2/10 with T scores -1.1 and -1.0.  Slight progression from .       Past Medical History:   Diagnosis Date     Benign breast lumps     biopsied.     Borderline osteopenia 2010     Cancer (H)      Cholesterol serum increased      DM (diabetes mellitus), type 2 (H)      Elevated liver function tests     h/o mild increase of LFT's     HTN (hypertension)      Hypothyroid     as teenager.     Menopause      Personal history of kidney stones     + stones on CT scan , treated with lithotripsy     Rectal fissure     resolved     Past Surgical History:   Procedure Laterality Date     BIOPSY  2015     BIOPSY BREAST NEEDLE LOCALIZATION, BIOPSY NODE SENTINEL, COMBINED  2012    Procedure: COMBINED BIOPSY BREAST NEEDLE LOCALIZATION, BIOPSY NODE SENTINEL;  Left Breast Wire Locilized Lumpectomy and Sentinal Lymph Node Biopsy;  Surgeon: Jesus Vicente MD;  Location: [facility] OR     C  DELIVERY ONLY      x 2     COLONOSCOPY  10-26-11    Return in 1 yr for Polyp Surveillance     COLONOSCOPY  12    Return for Colonoscopy in 3 yrs.Polyps     COMBINED CYSTOSCOPY, INSERT STENT URETER(S) Right 2017    Procedure: COMBINED CYSTOSCOPY, INSERT STENT URETER(S);  Cystoscopy Right retrograde pyelogram, Right ureteral Stent Placement;  Surgeon: Loida Sánchez MD;  Location: [facility] OR     COMBINED CYSTOSCOPY, RETROGRADES, URETEROSCOPY, LASER HOLMIUM LITHOTRIPSY URETER(S), INSERT STENT Right 2017    Procedure: COMBINED CYSTOSCOPY, RETROGRADES, URETEROSCOPY, LASER HOLMIUM LITHOTRIPSY URETER(S), INSERT STENT;  Cystoscopy, Right Ureteroscopy, Laser Lithotripsy, Right Stent Exchange ;  Surgeon: Ruth Boston MD;  Location: [facility] OR     LITHOTRIPSY       SURGICAL HISTORY OF -       exploratory laparotomy     SURGICAL HISTORY OF -       Right ovary removed     SURGICAL HISTORY OF -       breast biopsy     Current Outpatient Prescriptions   Medication Sig Dispense Refill     ACE/ARB NOT  PRESCRIBED, INTENTIONAL, Please choose reason not prescribed, below       amLODIPine (NORVASC) 10 MG tablet Take 1 tablet (10 mg) by mouth daily 30 tablet 1     atorvastatin (LIPITOR) 20 MG tablet TAKE ONE TABLET BY MOUTH TWICE A WEEK. 26 tablet 2     levothyroxine (SYNTHROID/LEVOTHROID) 125 MCG tablet TAKE ONE TABLET BY MOUTH EVERY MORNING 90 tablet 0     oxyCODONE (ROXICODONE) 5 MG IR tablet Take 1-2 tablets (5-10 mg) by mouth every 4 hours as needed for pain maximum 6 tablet(s) per day 15 tablet 0     phenazopyridine (AZO) 97.5 MG tablet Take 2 tablets (195 mg) by mouth 3 times daily as needed for urinary tract discomfort 6 tablet 3     tamsulosin (FLOMAX) 0.4 MG capsule Take 1 capsule (0.4 mg) by mouth daily for 20 days 20 capsule 0     atorvastatin (LIPITOR) 20 MG tablet Take 20 mg by mouth every morning Patient is taking 2 times a week.        metoprolol (LOPRESSOR) 25 MG tablet TAKE ONE TABLET BY MOUTH TWICE A  tablet 1     anastrozole (ARIMIDEX) 1 MG tablet Take 1 tablet (1 mg) by mouth daily (Patient taking differently: Take 1 mg by mouth every evening ) 90 tablet 3     ASPIRIN 81 MG OR TABS 1 TABLET DAILY        SOCIAL HISTORY: She  reports that she has never smoked. She has never used smokeless tobacco. She reports that she drinks alcohol. She reports that she does not use illicit drugs.    CYSTOSCOPY PROCEDURE  After a sterile prep and drape and an informed consent a 16-French flexible cystoscope was introduced via the urethra.  The urethra was open.  The stent was visualized, grasped and removed in its entirety.    CC  Patient Care Team:  Carolyne Layne MD as PCP - General (Internal Medicine)  Ruth Boston MD as MD (Urology)  Karen Salvador, RN as Registered Nurse  CAROLYNE LAYNE

## 2017-09-05 DIAGNOSIS — E11.29 TYPE 2 DIABETES MELLITUS WITH MICROALBUMINURIA, WITHOUT LONG-TERM CURRENT USE OF INSULIN (H): ICD-10-CM

## 2017-09-05 DIAGNOSIS — R80.9 TYPE 2 DIABETES MELLITUS WITH MICROALBUMINURIA, WITHOUT LONG-TERM CURRENT USE OF INSULIN (H): ICD-10-CM

## 2017-09-05 DIAGNOSIS — D64.9 ANEMIA, UNSPECIFIED: ICD-10-CM

## 2017-09-05 DIAGNOSIS — E21.0 PRIMARY HYPERPARATHYROIDISM (H): ICD-10-CM

## 2017-09-05 LAB
ANION GAP SERPL CALCULATED.3IONS-SCNC: 10 MMOL/L (ref 3–14)
BUN SERPL-MCNC: 19 MG/DL (ref 7–30)
CALCIUM SERPL-MCNC: 9.9 MG/DL (ref 8.5–10.1)
CHLORIDE SERPL-SCNC: 105 MMOL/L (ref 94–109)
CO2 SERPL-SCNC: 26 MMOL/L (ref 20–32)
CREAT SERPL-MCNC: 1.2 MG/DL (ref 0.52–1.04)
ERYTHROCYTE [DISTWIDTH] IN BLOOD BY AUTOMATED COUNT: 13 % (ref 10–15)
GFR SERPL CREATININE-BSD FRML MDRD: 45 ML/MIN/1.7M2
GLUCOSE SERPL-MCNC: 149 MG/DL (ref 70–99)
HBA1C MFR BLD: 6.9 % (ref 4.3–6)
HCT VFR BLD AUTO: 38.5 % (ref 35–47)
HGB BLD-MCNC: 12.7 G/DL (ref 11.7–15.7)
MCH RBC QN AUTO: 27.8 PG (ref 26.5–33)
MCHC RBC AUTO-ENTMCNC: 33 G/DL (ref 31.5–36.5)
MCV RBC AUTO: 84 FL (ref 78–100)
PLATELET # BLD AUTO: 202 10E9/L (ref 150–450)
POTASSIUM SERPL-SCNC: 4 MMOL/L (ref 3.4–5.3)
PTH-INTACT SERPL-MCNC: 52 PG/ML (ref 12–72)
RBC # BLD AUTO: 4.57 10E12/L (ref 3.8–5.2)
SODIUM SERPL-SCNC: 141 MMOL/L (ref 133–144)
WBC # BLD AUTO: 6 10E9/L (ref 4–11)

## 2017-09-05 PROCEDURE — 83036 HEMOGLOBIN GLYCOSYLATED A1C: CPT | Performed by: INTERNAL MEDICINE

## 2017-09-05 PROCEDURE — 80048 BASIC METABOLIC PNL TOTAL CA: CPT | Performed by: INTERNAL MEDICINE

## 2017-09-05 PROCEDURE — 83970 ASSAY OF PARATHORMONE: CPT | Performed by: INTERNAL MEDICINE

## 2017-09-05 PROCEDURE — 85027 COMPLETE CBC AUTOMATED: CPT | Performed by: INTERNAL MEDICINE

## 2017-09-05 PROCEDURE — 36415 COLL VENOUS BLD VENIPUNCTURE: CPT | Performed by: INTERNAL MEDICINE

## 2017-09-06 PROBLEM — E11.29 TYPE 2 DIABETES MELLITUS WITH MICROALBUMINURIA, WITHOUT LONG-TERM CURRENT USE OF INSULIN (H): Status: ACTIVE | Noted: 2017-09-06

## 2017-09-06 PROBLEM — R80.9 TYPE 2 DIABETES MELLITUS WITH MICROALBUMINURIA, WITHOUT LONG-TERM CURRENT USE OF INSULIN (H): Status: ACTIVE | Noted: 2017-09-06

## 2017-09-07 ENCOUNTER — OFFICE VISIT (OUTPATIENT)
Dept: FAMILY MEDICINE | Facility: CLINIC | Age: 62
End: 2017-09-07
Payer: COMMERCIAL

## 2017-09-07 VITALS
SYSTOLIC BLOOD PRESSURE: 136 MMHG | DIASTOLIC BLOOD PRESSURE: 75 MMHG | HEART RATE: 70 BPM | BODY MASS INDEX: 37.73 KG/M2 | WEIGHT: 205 LBS | TEMPERATURE: 97.8 F | HEIGHT: 62 IN | OXYGEN SATURATION: 96 %

## 2017-09-07 DIAGNOSIS — E78.5 HYPERLIPIDEMIA LDL GOAL <100: ICD-10-CM

## 2017-09-07 DIAGNOSIS — R53.83 OTHER FATIGUE: ICD-10-CM

## 2017-09-07 DIAGNOSIS — E03.8 OTHER SPECIFIED HYPOTHYROIDISM: ICD-10-CM

## 2017-09-07 DIAGNOSIS — E03.9 HYPOTHYROIDISM, UNSPECIFIED TYPE: ICD-10-CM

## 2017-09-07 DIAGNOSIS — Z79.811 USE OF ANASTROZOLE (ARIMIDEX): ICD-10-CM

## 2017-09-07 DIAGNOSIS — E11.29 TYPE 2 DIABETES MELLITUS WITH MICROALBUMINURIA, WITHOUT LONG-TERM CURRENT USE OF INSULIN (H): ICD-10-CM

## 2017-09-07 DIAGNOSIS — R06.83 SNORING: ICD-10-CM

## 2017-09-07 DIAGNOSIS — Z13.89 SCREENING FOR DIABETIC PERIPHERAL NEUROPATHY: ICD-10-CM

## 2017-09-07 DIAGNOSIS — I10 ESSENTIAL HYPERTENSION WITH GOAL BLOOD PRESSURE LESS THAN 140/90: Primary | ICD-10-CM

## 2017-09-07 DIAGNOSIS — Z78.0 ASYMPTOMATIC POSTMENOPAUSAL STATUS: ICD-10-CM

## 2017-09-07 DIAGNOSIS — R80.9 TYPE 2 DIABETES MELLITUS WITH MICROALBUMINURIA, WITHOUT LONG-TERM CURRENT USE OF INSULIN (H): ICD-10-CM

## 2017-09-07 PROBLEM — E66.01 OBESITY, CLASS III, BMI 40-49.9 (MORBID OBESITY) (H): Status: RESOLVED | Noted: 2017-09-07 | Resolved: 2017-09-07

## 2017-09-07 PROBLEM — E66.813 OBESITY, CLASS III, BMI 40-49.9 (MORBID OBESITY) (H): Status: RESOLVED | Noted: 2017-09-07 | Resolved: 2017-09-07

## 2017-09-07 PROBLEM — E66.01 OBESITY, CLASS III, BMI 40-49.9 (MORBID OBESITY) (H): Status: ACTIVE | Noted: 2017-09-07

## 2017-09-07 PROBLEM — E66.813 OBESITY, CLASS III, BMI 40-49.9 (MORBID OBESITY) (H): Status: ACTIVE | Noted: 2017-09-07

## 2017-09-07 PROCEDURE — 99213 OFFICE O/P EST LOW 20 MIN: CPT | Mod: 25 | Performed by: INTERNAL MEDICINE

## 2017-09-07 PROCEDURE — 99396 PREV VISIT EST AGE 40-64: CPT | Performed by: INTERNAL MEDICINE

## 2017-09-07 PROCEDURE — 99207 C FOOT EXAM  NO CHARGE: CPT | Mod: 25 | Performed by: INTERNAL MEDICINE

## 2017-09-07 RX ORDER — LOSARTAN POTASSIUM 50 MG/1
50 TABLET ORAL DAILY
Qty: 90 TABLET | Refills: 3 | Status: SHIPPED | OUTPATIENT
Start: 2017-09-07 | End: 2018-08-01

## 2017-09-07 RX ORDER — LEVOTHYROXINE SODIUM 125 UG/1
125 TABLET ORAL EVERY MORNING
Qty: 90 TABLET | Refills: 3 | Status: SHIPPED | OUTPATIENT
Start: 2017-09-07 | End: 2018-10-30

## 2017-09-07 RX ORDER — METOPROLOL TARTRATE 25 MG/1
25 TABLET, FILM COATED ORAL 2 TIMES DAILY
Qty: 180 TABLET | Refills: 3 | Status: SHIPPED | OUTPATIENT
Start: 2017-09-07 | End: 2018-09-18

## 2017-09-07 RX ORDER — ATORVASTATIN CALCIUM 20 MG/1
20 TABLET, FILM COATED ORAL EVERY MORNING
Qty: 24 TABLET | Refills: 3 | Status: SHIPPED | OUTPATIENT
Start: 2017-09-07 | End: 2018-09-18

## 2017-09-07 NOTE — NURSING NOTE
"Chief Complaint   Patient presents with     Physical     Health Maintenance     foot,lipid       Initial /75 (BP Location: Right arm, Patient Position: Chair, Cuff Size: Adult Large)  Pulse 70  Temp 97.8  F (36.6  C) (Oral)  Ht 5' 1.75\" (1.568 m)  Wt 205 lb (93 kg)  SpO2 96%  BMI 37.8 kg/m2 Estimated body mass index is 37.8 kg/(m^2) as calculated from the following:    Height as of this encounter: 5' 1.75\" (1.568 m).    Weight as of this encounter: 205 lb (93 kg).  Medication Reconciliation: complete   Maryann Palencia ma      "

## 2017-09-07 NOTE — PROGRESS NOTES
SUBJECTIVE:   CC: Kenna Miranda is an 61 year old woman who presents for preventive health visit.     62 y/o WF with h/o Breast Cancer  s/p  a ureteral stent  for Kidney stone during acute hydronephrosis.... her losartan/HCTZ had been held (reconsider at next appointment).  Ureteral stent removed 8/11/17  Creatinine has come close to normal, 1.2...   No metastatic disease findings on imaging during that stone work up.  She also had what appeared to be primary hyperparathyroidism, during the acute presentation, but a second look on recent labs shows normal parathyroid function. .  .  DM controlled.     Patient stopped working, retired officially.  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     Physical   Annual:     Getting at least 3 servings of Calcium per day::  Yes    Bi-annual eye exam::  Yes    Dental care twice a year::  Yes    Sleep apnea or symptoms of sleep apnea::  Sleep apnea    Diet::  Low salt    Frequency of exercise::  1 day/week    Duration of exercise::  15-30 minutes    Taking medications regularly::  Yes    Medication side effects::  Other    Additional concerns today::  No          Discuss taking calcium     Today's PHQ-2 Score: PHQ-2 ( 1999 [employer]) 9/7/2017   Q1: Little interest or pleasure in doing things 0   Q2: Feeling down, depressed or hopeless 0   PHQ-2 Score 0   Q1: Little interest or pleasure in doing things Not at all   Q2: Feeling down, depressed or hopeless Not at all   PHQ-2 Score 0       Abuse: Current or Past(Physical, Sexual or Emotional)- No  Do you feel safe in your environment - Yes    Social History   Substance Use Topics     Smoking status: Never Smoker     Smokeless tobacco: Never Used      Comment: no second hand exposure.  when young Dad smoked.     Alcohol use Yes      Comment: very, very little     The patient does not drink >3 drinks per day nor >7 drinks per week.    Reviewed orders with patient.   Reviewed health maintenance and updated orders accordingly - Yes  Labs reviewed in EPIC  BP Readings from Last 3 Encounters:   17 136/75   17 127/71   17 138/77    Wt Readings from Last 3 Encounters:   17 205 lb (93 kg)   17 200 lb (90.7 kg)   17 205 lb 11 oz (93.3 kg)                  Patient Active Problem List   Diagnosis     Borderline osteopenia     HYPERLIPIDEMIA LDL GOAL <100     Family history of colon cancer     S/P colonoscopic polypectomy     Breast cancer (H)     Cataract of both eyes     Elevated LFTs     Dermatochalasis of eyelid     No diabetic retinopathy in both eyes     overweight     Tibialis posterior tendinitis, left     Essential hypertension with goal blood pressure less than 140/90     Hypothyroidism, unspecified type     Primary hyperparathyroidism (H)     PONV (postoperative nausea and vomiting)     Type 2 diabetes mellitus with microalbuminuria, without long-term current use of insulin (H)     Past Surgical History:   Procedure Laterality Date     BIOPSY  2015     BIOPSY BREAST NEEDLE LOCALIZATION, BIOPSY NODE SENTINEL, COMBINED  2012    Procedure: COMBINED BIOPSY BREAST NEEDLE LOCALIZATION, BIOPSY NODE SENTINEL;  Left Breast Wire Locilized Lumpectomy and Sentinal Lymph Node Biopsy;  Surgeon: Jesus Vicente MD;  Location: [facility]     C  DELIVERY ONLY      x 2     COLONOSCOPY  10-26-11    Return in 1 yr for Polyp Surveillance     COLONOSCOPY  12    Return for Colonoscopy in 3 yrs.Polyps     COMBINED CYSTOSCOPY, INSERT STENT URETER(S) Right 2017    Procedure: COMBINED CYSTOSCOPY, INSERT STENT URETER(S);  Cystoscopy Right retrograde pyelogram, Right ureteral Stent Placement;  Surgeon: Loida Sánchez MD;  Location: [facility] OR     COMBINED CYSTOSCOPY, RETROGRADES, URETEROSCOPY, LASER HOLMIUM LITHOTRIPSY URETER(S), INSERT STENT Right 2017    Procedure: COMBINED CYSTOSCOPY, RETROGRADES, URETEROSCOPY, LASER HOLMIUM LITHOTRIPSY  URETER(S), INSERT STENT;  Cystoscopy, Right Ureteroscopy, Laser Lithotripsy, Right Stent Exchange ;  Surgeon: Ruth Boston MD;  Location: [facility]     LITHOTRIPSY  2007     SURGICAL HISTORY OF -       exploratory laparotomy     SURGICAL HISTORY OF -       Right ovary removed     SURGICAL HISTORY OF -   2008    breast biopsy       Social History   Substance Use Topics     Smoking status: Never Smoker     Smokeless tobacco: Never Used      Comment: no second hand exposure.  when young Dad smoked.     Alcohol use Yes      Comment: very, very little     Family History   Problem Relation Age of Onset     Cancer - colorectal Mother      Thyroid Disease Mother      Colon Cancer Mother      CANCER Father      Lung     Lipids Father      Glaucoma Father      Macular Degeneration Father      Depression Brother      Respiratory Brother      losing hearing in 50's         Current Outpatient Prescriptions   Medication Sig Dispense Refill     amLODIPine (NORVASC) 10 MG tablet Take 1 tablet (10 mg) by mouth daily 90 tablet 1     levothyroxine (SYNTHROID/LEVOTHROID) 125 MCG tablet TAKE ONE TABLET BY MOUTH EVERY MORNING 90 tablet 0     atorvastatin (LIPITOR) 20 MG tablet Take 20 mg by mouth every morning Patient is taking 2 times a week.        metoprolol (LOPRESSOR) 25 MG tablet TAKE ONE TABLET BY MOUTH TWICE A  tablet 1     anastrozole (ARIMIDEX) 1 MG tablet Take 1 tablet (1 mg) by mouth daily (Patient taking differently: Take 1 mg by mouth every evening ) 90 tablet 3     ASPIRIN 81 MG OR TABS 1 TABLET DAILY       [DISCONTINUED] atorvastatin (LIPITOR) 20 MG tablet TAKE ONE TABLET BY MOUTH TWICE A WEEK. 26 tablet 2     Allergies   Allergen Reactions     Nkda [No Known Drug Allergies]      Recent Labs   Lab Test  09/05/17   1442  07/11/17   1009   07/02/17   0513   07/01/17   0658   06/30/17   0544   06/29/17   1148   08/04/16   0657   06/25/15   0700  12/31/14   1037   A1C  6.9*   --    --    --    --    --    --     --    --   7.1*   --   7.0*   < >  5.9  6.0   LDL   --    --    --    --    --    --    --    --    --    --    --   65   --   64  115   HDL   --    --    --    --    --    --    --    --    --    --    --   44*   --   56  53   TRIG   --    --    --    --    --    --    --    --    --    --    --   210*   --   114  149   ALT   --    --    --   27   --   54*   --   74*   --   93*   < >  143*   < >   --   38   CR  1.20*  1.27*   < >  2.28*   --   2.53*   < >  2.86*   < >  2.91*   < >  1.11*   < >  0.97  0.93   GFRESTIMATED  45*  42*   < >  22*   --   19*   < >  17*   < >  16*   < >  50*   < >  58*  61   GFRESTBLACK  55*  51*   < >  26*   --   23*   < >  20*   < >  20*   < >  60*   < >  71  74   POTASSIUM  4.0  4.4   < >  3.0*   < >  3.1*   < >  3.0*   < >  3.4   < >  4.3   < >  4.2  3.9   TSH   --    --    --    --    --    --    --    --    --   4.50*   --   4.68*   --   4.09*  4.55*    < > = values in this interval not displayed.              Patient over age 50, mutual decision to screen reflected in health maintenance.      Pertinent mammograms are reviewed under the imaging tab.  History of abnormal Pap smear: NO - age 30-65 PAP every 5 years with negative HPV co-testing recommended    Reviewed and updated as needed this visit by clinical staffTobacco  Allergies  Med Hx  Surg Hx  Fam Hx  Soc Hx        Reviewed and updated as needed this visit by Provider        Past Medical History:   Diagnosis Date     Benign breast lumps 2008    biopsied.     Borderline osteopenia 2/17/2010     Cancer (H)      Cholesterol serum increased      DM (diabetes mellitus), type 2 (H)      Elevated liver function tests     h/o mild increase of LFT's     HTN (hypertension)      Hypothyroid     as teenager.     Menopause      Personal history of kidney stones     + stones on CT scan 2007, treated with lithotripsy     Rectal fissure 2002    resolved      Past Surgical History:   Procedure Laterality Date     BIOPSY  Nov 2015      BIOPSY BREAST NEEDLE LOCALIZATION, BIOPSY NODE SENTINEL, COMBINED  2012    Procedure: COMBINED BIOPSY BREAST NEEDLE LOCALIZATION, BIOPSY NODE SENTINEL;  Left Breast Wire Locilized Lumpectomy and Sentinal Lymph Node Biopsy;  Surgeon: Jesus Vicente MD;  Location: [facility]     C  DELIVERY ONLY      x 2     COLONOSCOPY  10-26-11    Return in 1 yr for Polyp Surveillance     COLONOSCOPY  12    Return for Colonoscopy in 3 yrs.Polyps     COMBINED CYSTOSCOPY, INSERT STENT URETER(S) Right 2017    Procedure: COMBINED CYSTOSCOPY, INSERT STENT URETER(S);  Cystoscopy Right retrograde pyelogram, Right ureteral Stent Placement;  Surgeon: Loida Sánchez MD;  Location: [facility]     COMBINED CYSTOSCOPY, RETROGRADES, URETEROSCOPY, LASER HOLMIUM LITHOTRIPSY URETER(S), INSERT STENT Right 2017    Procedure: COMBINED CYSTOSCOPY, RETROGRADES, URETEROSCOPY, LASER HOLMIUM LITHOTRIPSY URETER(S), INSERT STENT;  Cystoscopy, Right Ureteroscopy, Laser Lithotripsy, Right Stent Exchange ;  Surgeon: Ruth Boston MD;  Location: [facility]     LITHOTRIPSY       SURGICAL HISTORY OF -       exploratory laparotomy     SURGICAL HISTORY OF -       Right ovary removed     SURGICAL HISTORY OF -       breast biopsy       ROS:  C: NEGATIVE for fever, chills, change in weight  I: NEGATIVE for worrisome rashes, moles or lesions  E: NEGATIVE for vision changes or irritation  ENT: NEGATIVE for ear, mouth and throat problems  R: NEGATIVE for significant cough or SOB  B: NEGATIVE for masses, tenderness or discharge  CV: NEGATIVE for chest pain, palpitations or peripheral edema  GI: NEGATIVE for nausea, abdominal pain, heartburn, or change in bowel habits  : NEGATIVE for unusual urinary or vaginal symptoms. No vaginal bleeding.    no hematuria or dysuria  M: NEGATIVE for significant arthralgias or myalgia. Mild edema at end of day  N: NEGATIVE for weakness, dizziness or paresthesias  E: NEGATIVE for temperature  "intolerance (despite arimadex), skin/hair changes  P: NEGATIVE for changes in mood or affect      OBJECTIVE:   /75 (BP Location: Right arm, Patient Position: Chair, Cuff Size: Adult Large)  Pulse 70  Temp 97.8  F (36.6  C) (Oral)  Ht 5' 1.75\" (1.568 m)  Wt 205 lb (93 kg)  SpO2 96%  BMI 37.8 kg/m2  Weight is down 20# since she was ill over summer with acute hydronephrosis due to kidney stone.    EXAM:  GENERAL APPEARANCE: healthy, alert, morbid obesity and no distress  EYES: Eyes grossly normal to inspection, PERRL and conjunctivae and sclerae normal  HENT: ear canals and TM's normal, nose and mouth without ulcers or lesions, oropharynx clear and oral mucous membranes moist. Small orifice.   NECK: no adenopathy, no asymmetry, masses, or scars and thyroid normal to palpation  RESP: lungs clear to auscultation - no rales, rhonchi or wheezes  BREAST: normal without masses, tenderness or nipple discharge and no palpable axillary masses or adenopathy  xrt tatoos, left lumpectomy status.   CV: regular rate and rhythm, normal S1 S2, no S3 or S4, no murmur, click or rub, no peripheral edema and peripheral pulses strong  ABDOMEN: soft, nontender, no hepatosplenomegaly, no masses and bowel sounds normal   (female): normal female external genitalia, normal urethral meatus, vaginal mucosal atrophy noted, normal cervix, adnexae, and uterus without masses or abnormal discharge   (female):bimanual only  MS: no musculoskeletal defects are noted and gait is age appropriate without ataxia  SKIN: no suspicious lesions or rashes. Scattered SKs  NEURO: Normal strength and tone, sensory exam grossly normal, mentation intact and speech normal  PSYCH: mentation appears normal and affect normal/bright    ASSESSMENT/PLAN:       ICD-10-CM    1. Essential hypertension with goal blood pressure less than 140/90 I10 Basic metabolic panel     metoprolol (LOPRESSOR) 25 MG tablet     losartan (COZAAR) 50 MG tablet   2. Type 2 diabetes " mellitus with microalbuminuria, without long-term current use of insulin (H) E11.29 Basic metabolic panel    R80.9 OFFICE/OUTPT VISIT,EST,LEVL IV     losartan (COZAAR) 50 MG tablet   3. overweight HCC E66.9    4. Hypothyroidism, unspecified type E03.9 TSH with free T4 reflex     OFFICE/OUTPT VISIT,EST,LEVL IV     CANCELED: TSH with free T4 reflex   5. Screening for diabetic peripheral neuropathy Z13.89 FOOT EXAM  NO CHARGE [29294.114]   6. Hyperlipidemia LDL goal <100 E78.5 Lipid panel reflex to direct LDL     atorvastatin (LIPITOR) 20 MG tablet   7. Other specified hypothyroidism E03.8 levothyroxine (SYNTHROID/LEVOTHROID) 125 MCG tablet   8. Snoring R06.83 SLEEP EVALUATION & MANAGEMENT REFERRAL - ADULT     OFFICE/OUTPT VISIT,EST,LEVL IV   9. Other fatigue R53.83 SLEEP EVALUATION & MANAGEMENT REFERRAL - ADULT     OFFICE/OUTPT VISIT,EST,LEVL IV   10. Use of anastrozole (Arimidex) Z79.811 DX Hip/Pelvis/Spine   11. Asymptomatic postmenopausal status Z78.0 DX Hip/Pelvis/Spine     Restarting ACE, recheck labs  Restart calcium (arimidex therapy), recheck labs.   She does not have primary hyperparathyroidism  Sleep study recommended based on findings and subjective symptoms.  Patient declined for now.  Referral entered in case she changes her mind.  Patient educated on long term effects of untreated RAYMUNDO.     Patient Instructions     Start losartan 50 mg daily (blood pressure and kidney protection)    Low sodium diet: continue.     Start daily exercise    You can resume the calcium (one a day) -------------------------------------------------------  Recheck labs in 3 weeks - late September (fasting)    Return to clinic 6 months (March '18) for diabetes follow up (or sooner if needed).     Get bone density (Call to schedule imaging  ) and labs prior to the appointment    Consider sleep study     Kalpana Sheikh Ph [phone] (Age 15 and up)              COUNSELING:  Reviewed preventive health counseling, as  "reflected in patient instructions       Healthy diet/nutrition         reports that she has never smoked. She has never used smokeless tobacco.    Estimated body mass index is 37.8 kg/(m^2) as calculated from the following:    Height as of this encounter: 5' 1.75\" (1.568 m).    Weight as of this encounter: 205 lb (93 kg).   Weight management plan: Discussed healthy diet and exercise guidelines and patient will follow up in 6 months in clinic to re-evaluate.    Counseling Resources:  ATP IV Guidelines  Pooled Cohorts Equation Calculator  Breast Cancer Risk Calculator  FRAX Risk Assessment  ICSI Preventive Guidelines  Dietary Guidelines for Americans, 2010  USDA's MyPlate  ASA Prophylaxis  Lung CA Screening    Carolyne Layne MD  [facility] [place]  Answers for HPI/ROS submitted by the patient on 9/7/2017   PHQ-2 Score: 0  Sleep r  "

## 2017-09-07 NOTE — PATIENT INSTRUCTIONS
Start losartan 50 mg daily (blood pressure and kidney protection)    Low sodium diet: continue.     Start daily exercise    You can resume the calcium (one a day) -------------------------------------------------------  Recheck labs in 3 weeks - late September (fasting)      Return to clinic 6 months (March '18) for diabetes follow up (or sooner if needed).     Get bone density (Call to schedule imaging  ) and labs prior to the appointment    Consider sleep study    Kalpana Sheikh Ph [phone] (Age 15 and up)

## 2017-09-07 NOTE — MR AVS SNAPSHOT
After Visit Summary   9/7/2017    Kenna Miranda    MRN: [id number]           Patient Information     Date Of Birth          1955        Visit Information        Provider Department      9/7/2017 8:20 AM Carolyne Layne MD [facility]        Today's Diagnoses     Essential hypertension with goal blood pressure less than 140/90    -  1    Type 2 diabetes mellitus with microalbuminuria, without long-term current use of insulin (H)        overweight HCC        Hypothyroidism, unspecified type        Screening for diabetic peripheral neuropathy        Hyperlipidemia LDL goal <100        Other specified hypothyroidism        Snoring        Other fatigue          Care Instructions    Start losartan 50 mg daily (blood pressure and kidney protection)    Low sodium diet: continue.     Start daily exercise    You can resume the calcium (one a day) -------------------------------------------------------  Recheck labs in 3 weeks - late September (fasting)      Return to clinic 6 months (March '18) for diabetes follow up (or sooner if needed).     Get bone density (Call to schedule imaging  ) and labs prior to the appointment    Consider sleep study          Follow-ups after your visit        Additional Services     SLEEP EVALUATION & MANAGEMENT REFERRAL - ADULT       Please be aware that coverage of these services is subject to the terms and limitations of your health insurance plan.  Call member services at your health plan with any benefit or coverage questions.      Please bring the following to your appointment:    >>   List of current medications   >>   This referral request   >>   Any documents/labs given to you for this referral    [facility] Ph [phone] (Age 15 and up)                  Your next 10 appointments already scheduled     Nov 08, 2017  8:20 AM CST   CT ABDOMEN PELVIS W/O CONTRAST with UCCT1   [facility]  CT ([facility])    [address]  [address] [facility] [address] [phone]           Please bring any scans or X-rays taken at other hospitals, if similar tests were done. Also bring a list of your medicines, including vitamins, minerals and over-the-counter drugs. It is safest to leave personal items at home.  Be sure to tell your doctor:   If you have any allergies.   If there s any chance you are pregnant.   If you are breastfeeding.   If you have any special needs.  You may have contrast for this exam. To prepare:   Do not eat or drink for 2 hours before your exam. If you need to take medicine, you may take it with small sips of water. (We may ask you to take liquid medicine as well.)   The day before your exam, drink extra fluids at least six 8-ounce glasses (unless your doctor tells you to restrict your fluids).  Patients over 70 or patients with diabetes or kidney problems:   If you haven t had a blood test (creatinine test) within the last 30 days, go to your clinic or Diagnostic Imaging Department for this test.  If you have diabetes:   If your kidney function is normal, continue taking your metformin (Avandamet, Glucophage, Glucovance, Metaglip) on the day of your exam.   If your kidney function is abnormal, wait 48 hours before restarting this medicine.  You will have oral contrast for this exam:   You will drink the contrast at home. Get this from your clinic or Diagnostic Imaging Department. Please follow the directions given.  Please wear loose clothing, such as a sweat suit or jogging clothes. Avoid snaps, zippers and other metal. We may ask you to undress and put on a hospital gown.  If you have any questions, please call the Imaging Department where you will have your exam.            Nov 08, 2017  9:00 AM CST   (Arrive by 8:45 AM)   Return Renal Calculi with Ruth Boston MD   [facility] ([facility])    [address]  [address] [facility] [address] [phone]              Future tests that were ordered for you today     Open Future Orders        Priority Expected Expires Ordered    TSH with free T4 reflex Routine  9/7/2018 9/7/2017    Lipid panel reflex to direct LDL Routine  9/5/2018 9/7/2017    Basic metabolic panel Routine  9/5/2018 9/7/2017    SLEEP EVALUATION & MANAGEMENT REFERRAL - ADULT Routine  9/7/2018 9/7/2017            Who to contact     If you have questions or need follow up information about today's clinic visit or your schedule please contact [facility] directly at [phone].  Normal or non-critical lab and imaging results will be communicated to you by [employer], letter or phone within 4 business days after the clinic has received the results. If you do not hear from us within 7 days, please contact the clinic through Sw[employer] or phone. If you have a critical or abnormal lab result, we will notify you by phone as soon as possible.  Submit refill requests through [employer] or call your pharmacy and they will forward the refill request to us. Please allow 3 business days for your refill to be completed.          Additional Information About Your Visit        [employer] Information     [employer] gives you secure access to your electronic health record. If you see a primary care provider, you can also send messages to your care team and make appointments. If you have questions, please call your primary care clinic.  If you do not have a primary care provider, please call [phone] and they will assist you.        Care EveryWhere ID     This is your Care EveryWhere ID. This could be used by other organizations to access your [facility] medical records  [id number]        Your Vitals Were     Pulse Temperature Height Pulse Oximetry BMI (Body Mass Index)       70 97.8  F (36.6  C) (Oral) 5' 1.75\" (1.568 m) 96% 37.8 kg/m2        Blood " Pressure from Last 3 Encounters:   09/07/17 136/75   08/11/17 127/71   07/31/17 138/77    Weight from Last 3 Encounters:   09/07/17 205 lb (93 kg)   08/11/17 200 lb (90.7 kg)   07/31/17 205 lb 11 oz (93.3 kg)              We Performed the Following     FOOT EXAM  NO CHARGE [96767.114]          Today's Medication Changes          These changes are accurate as of: 9/7/17  9:27 AM.  If you have any questions, ask your nurse or doctor.               These medicines have changed or have updated prescriptions.        Dose/Directions    anastrozole 1 MG tablet   Commonly known as:  ARIMIDEX   This may have changed:  when to take this   Used for:  Malignant neoplasm of female breast, unspecified laterality, unspecified site of breast        Dose:  1 mg   Take 1 tablet (1 mg) by mouth daily   Quantity:  90 tablet   Refills:  3       levothyroxine 125 MCG tablet   Commonly known as:  SYNTHROID/LEVOTHROID   This may have changed:  See the new instructions.   Used for:  Other specified hypothyroidism   Changed by:  Carolyne Layne MD        Dose:  125 mcg   Take 1 tablet (125 mcg) by mouth every morning   Quantity:  90 tablet   Refills:  3       metoprolol 25 MG tablet   Commonly known as:  LOPRESSOR   This may have changed:  See the new instructions.   Used for:  Essential hypertension with goal blood pressure less than 140/90   Changed by:  Carolyne Layne MD        Dose:  25 mg   Take 1 tablet (25 mg) by mouth 2 times daily   Quantity:  180 tablet   Refills:  3            Where to get your medicines      These medications were sent to [facility] - [place], [place] - [address]  [address], [facility] [address]     Phone:  [phone]     atorvastatin 20 MG tablet    levothyroxine 125 MCG tablet    metoprolol 25 MG tablet                Primary Care Provider Office Phone # Fax #    Carolyne Layne [phone]       [address]  [facility] [address]        Equal Access to Services     EBEN ROBINS : Hadii aad ku hadannemariejerry [place], waaxda luqadaha, qaybta kaalmakesha mitchell. So [facility] [phone].    ATENCIÓN: Si habla español, tiene a tam disposición servicios gratuitos de asistencia lingüística. Melo al [phone].    We comply with applicable federal civil rights laws and [place] laws. We do not discriminate on the basis of race, color, national origin, age, disability sex, sexual orientation or gender identity.            Thank you!     Thank you for choosing [facility]  for your care. Our goal is always to provide you with excellent care. Hearing back from our patients is one way we can continue to improve our services. Please take a few minutes to complete the written survey that you may receive in the mail after your visit with us. Thank you!             Your Updated Medication List - Protect others around you: Learn how to safely use, store and throw away your medicines at www.disposemymeds.org.          This list is accurate as of: 9/7/17  9:27 AM.  Always use your most recent med list.                   Brand Name Dispense Instructions for use Diagnosis    amLODIPine 10 MG tablet    NORVASC    90 tablet    Take 1 tablet (10 mg) by mouth daily    Benign essential hypertension       anastrozole 1 MG tablet    ARIMIDEX    90 tablet    Take 1 tablet (1 mg) by mouth daily    Malignant neoplasm of female breast, unspecified laterality, unspecified site of breast       aspirin 81 MG tablet      1 TABLET DAILY        atorvastatin 20 MG tablet    LIPITOR    24 tablet    Take 1 tablet (20 mg) by mouth every morning Patient is taking 2 times a week.    Hyperlipidemia LDL goal <100       levothyroxine 125 MCG tablet    SYNTHROID/LEVOTHROID    90 tablet    Take 1 tablet (125 mcg) by mouth every morning    Other specified hypothyroidism       metoprolol 25 MG tablet     LOPRESSOR    180 tablet    Take 1 tablet (25 mg) by mouth 2 times daily    Essential hypertension with goal blood pressure less than 140/90

## 2017-10-02 ENCOUNTER — RADIANT APPOINTMENT (OUTPATIENT)
Dept: BONE DENSITY | Facility: CLINIC | Age: 62
End: 2017-10-02
Attending: INTERNAL MEDICINE
Payer: COMMERCIAL

## 2017-10-02 ENCOUNTER — ALLIED HEALTH/NURSE VISIT (OUTPATIENT)
Dept: NURSING | Facility: CLINIC | Age: 62
End: 2017-10-02
Payer: COMMERCIAL

## 2017-10-02 DIAGNOSIS — R80.9 TYPE 2 DIABETES MELLITUS WITH MICROALBUMINURIA, WITHOUT LONG-TERM CURRENT USE OF INSULIN (H): ICD-10-CM

## 2017-10-02 DIAGNOSIS — E78.5 HYPERLIPIDEMIA LDL GOAL <100: ICD-10-CM

## 2017-10-02 DIAGNOSIS — I10 ESSENTIAL HYPERTENSION WITH GOAL BLOOD PRESSURE LESS THAN 140/90: ICD-10-CM

## 2017-10-02 DIAGNOSIS — E03.9 HYPOTHYROIDISM, UNSPECIFIED TYPE: ICD-10-CM

## 2017-10-02 DIAGNOSIS — Z23 NEED FOR PROPHYLACTIC VACCINATION AND INOCULATION AGAINST INFLUENZA: Primary | ICD-10-CM

## 2017-10-02 DIAGNOSIS — Z79.811 USE OF ANASTROZOLE (ARIMIDEX): ICD-10-CM

## 2017-10-02 DIAGNOSIS — Z78.0 ASYMPTOMATIC POSTMENOPAUSAL STATUS: ICD-10-CM

## 2017-10-02 DIAGNOSIS — E11.29 TYPE 2 DIABETES MELLITUS WITH MICROALBUMINURIA, WITHOUT LONG-TERM CURRENT USE OF INSULIN (H): ICD-10-CM

## 2017-10-02 LAB
ANION GAP SERPL CALCULATED.3IONS-SCNC: 7 MMOL/L (ref 3–14)
BUN SERPL-MCNC: 17 MG/DL (ref 7–30)
CALCIUM SERPL-MCNC: 9.7 MG/DL (ref 8.5–10.1)
CHLORIDE SERPL-SCNC: 103 MMOL/L (ref 94–109)
CHOLEST SERPL-MCNC: 175 MG/DL
CO2 SERPL-SCNC: 27 MMOL/L (ref 20–32)
CREAT SERPL-MCNC: 1.3 MG/DL (ref 0.52–1.04)
GFR SERPL CREATININE-BSD FRML MDRD: 41 ML/MIN/1.7M2
GLUCOSE SERPL-MCNC: 156 MG/DL (ref 70–99)
HDLC SERPL-MCNC: 44 MG/DL
LDLC SERPL CALC-MCNC: 84 MG/DL
NONHDLC SERPL-MCNC: 131 MG/DL
POTASSIUM SERPL-SCNC: 4.1 MMOL/L (ref 3.4–5.3)
SODIUM SERPL-SCNC: 137 MMOL/L (ref 133–144)
TRIGL SERPL-MCNC: 234 MG/DL
TSH SERPL DL<=0.005 MIU/L-ACNC: 1.54 MU/L (ref 0.4–4)

## 2017-10-02 PROCEDURE — 99207 ZZC NO CHARGE NURSE ONLY: CPT

## 2017-10-02 PROCEDURE — 84443 ASSAY THYROID STIM HORMONE: CPT | Performed by: INTERNAL MEDICINE

## 2017-10-02 PROCEDURE — 90471 IMMUNIZATION ADMIN: CPT

## 2017-10-02 PROCEDURE — 80061 LIPID PANEL: CPT | Performed by: INTERNAL MEDICINE

## 2017-10-02 PROCEDURE — 90686 IIV4 VACC NO PRSV 0.5 ML IM: CPT

## 2017-10-02 PROCEDURE — 80048 BASIC METABOLIC PNL TOTAL CA: CPT | Performed by: INTERNAL MEDICINE

## 2017-10-02 PROCEDURE — 36415 COLL VENOUS BLD VENIPUNCTURE: CPT | Performed by: INTERNAL MEDICINE

## 2017-10-02 PROCEDURE — 77080 DXA BONE DENSITY AXIAL: CPT | Performed by: INTERNAL MEDICINE

## 2017-10-02 NOTE — LETTER
[facility]  [address] [place]  [address]  [phone]        October 3, 2017    Kenna MANE Ruben  [address] [place] [address]        Dear Kenna,    Your bone density is diminished, but you do not have osteoporosis.  We call this osteopenia.  This does not necessarily meant that treatment with a medication for osteoporosis is advised.     However, you are taking the Arimidex and that can accelerate bone loss. Due to this fact and your osteopenia, we could consider a bone medication to prevent or lessen further bone loss.     Please let me know if you would be agreeable to treatment.  It would be with Fosamax 35 mg weekly    Results for orders placed or performed in visit on 10/02/17   DX Hip/Pelvis/Spine    Narrative    BONE DENSITOMETRY  [facility]  [address]  [place] [address]  10/2/2017      PATIENT: Kenna MANE Ruben  CHART: [id number]   :  1955  AGE:  61 year old  SEX:  female   REFERRING PROVIDER:  Carolyne Layne MD     PROCEDURE:  Bone density scanning was performed using DXA technology of   the lumbar spine and hip.  Scanning was performed on a Lunar Prodigy   scanner.  Reporting is completed in the form of a T-score.  The T-score   represents the standard deviation from peak bone mass based on a young   healthy adult.     REFERENCE T-SCORES:       Normal                -1.0 and greater                                 Osteopenia         Between -1.0 and -2.5                                             Osteoporosis     -2.5 and less                                       RISK FACTORS:  Post-menopausal, Condition related to bone loss: breast   cancer therapy: arimidex, follow up osteopenia    CURRENT TREATMENT:  Calcium     FINDINGS:               Lumbar Spine (L1-L4) T-score:  -0.5, degenerative changes   present               Left Femoral Neck T-score:  -1.3               Right Femoral Neck T-score:   "-1.3                              Lumbar (L1-L4) BMD: 1.125            Previous:   1.089                                              Total Hip Mean BMD: 1.000            Previous:   1.053     Comparison is made to another DXA performed on a different Lunar Prodigy    machine on 3/3/2014.      IMPRESSION  Osteopenia., Degenerative changes of the lumbar spine which may falsely   elevate results.    Comparisons from different scanners that have not been cross calibrated,   are not necessarily valid. Such a comparison has been performed here; one   should interpret with caution.   There has been probably no significant change in bone density of the   lumbar spine. There has been possible significant decrease in bone density   of the hip(s).    Recommendations include ensuring adequate Calcium and Vitamin D.    The current NOF Guidelines recommend treatment for patients with prior hip   or vertebral fracture, T-score -2.5 or below, or 10 year risk of any major   osteoporotic fracture >20% or 10 year risk of hip fracture >3%, as   calculated using the FRAX calculator (www.shef.ac.uk/FRAX or you can   google \"FRAX\").      This patient's risks based on available information, with the use of FRAX,   are 7.4 % for major osteoporotic fracture and 0.6 % for hip fracture.   Based on these guidelines, treatment (in addition to calcium and vitamin   D) is not recommended for this patient, after ruling out other causes of   osteoporosis.  This is meant as an aid to clinical decision making; one must still use   clinical judgement.      Follow up can be considered in 5 years.   ___________________  Trudi Tom M.D.  Electronically signed      If you have any questions please call the clinic at [phone].  Sincerely,  Carolyne EM    "

## 2017-10-02 NOTE — MR AVS SNAPSHOT
After Visit Summary   10/2/2017    Kenna Bravonirowycz    MRN: [id number]           Patient Information     Date Of Birth          1955        Visit Information        Provider Department      10/2/2017 9:30 AM FZ ANCILLARY [facility]        Today's Diagnoses     Need for prophylactic vaccination and inoculation against influenza    -  1       Follow-ups after your visit        Your next 10 appointments already scheduled     Oct 02, 2017  9:30 AM CDT   Nurse Only with FZ ANCILLARY   [facility]    [address] [address]   [phone]            Oct 02, 2017 10:15 AM CDT   LAB with FZ LAB   [facility] ([facility])    [address] [address]   [phone]           Patient must bring picture ID. Patient should be prepared to give a urine specimen  Please do not eat 10-12 hours before your appointment if you are coming in fasting for labs on lipids, cholesterol, or glucose (sugar). Pregnant women should follow their Care Team instructions. Water with medications is okay. Do not drink coffee or other fluids. If you have concerns about taking  your medications, please ask at office or if scheduling via [employer], send a message by clicking on Secure Messaging, Message Your Care Team.            Nov 08, 2017  8:20 AM CST   CT ABDOMEN PELVIS W/O CONTRAST with UCCT1   [facility] CT ([facility])    [address]  1st Floor  [facility] [address] [phone]           Please bring any scans or X-rays taken at other hospitals, if similar tests were done. Also bring a list of your medicines, including vitamins, minerals and over-the-counter drugs. It is safest to leave personal items at home.  Be sure to tell your doctor:   If you have any allergies.   If there s any chance you are pregnant.   If you are breastfeeding.   If you have  any special needs.  You may have contrast for this exam. To prepare:   Do not eat or drink for 2 hours before your exam. If you need to take medicine, you may take it with small sips of water. (We may ask you to take liquid medicine as well.)   The day before your exam, drink extra fluids at least six 8-ounce glasses (unless your doctor tells you to restrict your fluids).  Patients over 70 or patients with diabetes or kidney problems:   If you haven t had a blood test (creatinine test) within the last 30 days, go to your clinic or Diagnostic Imaging Department for this test.  If you have diabetes:   If your kidney function is normal, continue taking your metformin (Avandamet, Glucophage, Glucovance, Metaglip) on the day of your exam.   If your kidney function is abnormal, wait 48 hours before restarting this medicine.  You will have oral contrast for this exam:   You will drink the contrast at home. Get this from your clinic or Diagnostic Imaging Department. Please follow the directions given.  Please wear loose clothing, such as a sweat suit or jogging clothes. Avoid snaps, zippers and other metal. We may ask you to undress and put on a hospital gown.  If you have any questions, please call the Imaging Department where you will have your exam.            Nov 08, 2017  9:00 AM CST   (Arrive by 8:45 AM)   Return Renal Calculi with Ruth Boston MD   [facility]rolog[facility] ([facility])    [address] [place] [address]  4th Floor  [facility] [id number] [phone]            Dec 11, 2017  2:30 PM CST   (Arrive by 2:15 PM)   Return Visit with Kaycee Meng MD   [facility] ([facility])    [address]  2nd Floor  [facility] [address] [phone]              Who to contact     If you have questions or need follow up information about today's clinic visit or your schedule please contact  [facility] directly at [phone].  Normal or non-critical lab and imaging results will be communicated to you by MyChart, letter or phone within 4 business days after the clinic has received the results. If you do not hear from us within 7 days, please contact the clinic through [employer] or phone. If you have a critical or abnormal lab result, we will notify you by phone as soon as possible.  Submit refill requests through [employer] or call your pharmacy and they will forward the refill request to us. Please allow 3 business days for your refill to be completed.          Additional Information About Your Visit        [employer] Information     [employer] gives you secure access to your electronic health record. If you see a primary care provider, you can also send messages to your care team and make appointments. If you have questions, please call your primary care clinic.  If you do not have a primary care provider, please call [phone] and they will assist you.        Care EveryWhere ID     This is your Care EveryWhere ID. This could be used by other organizations to access your [facility] medical records  [id number]         Blood Pressure from Last 3 Encounters:   09/07/17 136/75   08/11/17 127/71   07/31/17 138/77    Weight from Last 3 Encounters:   09/07/17 205 lb (93 kg)   08/11/17 200 lb (90.7 kg)   07/31/17 205 lb 11 oz (93.3 kg)              We Performed the Following     FLU VAC, SPLIT VIRUS IM > 3 YO (QUADRIVALENT) [47350]     Vaccine Administration, Initial [73710]          Today's Medication Changes          These changes are accurate as of: 10/2/17  9:05 AM.  If you have any questions, ask your nurse or doctor.               These medicines have changed or have updated prescriptions.        Dose/Directions    anastrozole 1 MG tablet   Commonly known as:  ARIMIDEX   This may have changed:  when to take this   Used for:  Malignant neoplasm of female breast, unspecified laterality, unspecified  site of breast        Dose:  1 mg   Take 1 tablet (1 mg) by mouth daily   Quantity:  90 tablet   Refills:  3                Primary Care Provider Office Phone # Fax #    Carolyne Layne [phone] [address] [address]        Equal Access to Services     EBEN ROBINS : Hadii aad ku hadasho Soomaali, waaxda luqadaha, qaybta kaalmada adeegyada, waxflores arthur bartn china wooaltagraciajanel fair. So [facility] [phone].    ATENCIÓN: Si habla español, tiene a tam disposición servicios gratuitos de asistencia lingüística. Llame al [phone].    We comply with applicable federal civil rights laws and [place] laws. We do not discriminate on the basis of race, color, national origin, age, disability, sex, sexual orientation, or gender identity.            Thank you!     Thank you for choosing [facility]  for your care. Our goal is always to provide you with excellent care. Hearing back from our patients is one way we can continue to improve our services. Please take a few minutes to complete the written survey that you may receive in the mail after your visit with us. Thank you!             Your Updated Medication List - Protect others around you: Learn how to safely use, store and throw away your medicines at www.disposemymeds.org.          This list is accurate as of: 10/2/17  9:05 AM.  Always use your most recent med list.                   Brand Name Dispense Instructions for use Diagnosis    amLODIPine 10 MG tablet    NORVASC    90 tablet    Take 1 tablet (10 mg) by mouth daily    Benign essential hypertension       anastrozole 1 MG tablet    ARIMIDEX    90 tablet    Take 1 tablet (1 mg) by mouth daily    Malignant neoplasm of female breast, unspecified laterality, unspecified site of breast       aspirin 81 MG tablet      1 TABLET DAILY        atorvastatin 20 MG tablet    LIPITOR    24 tablet    Take 1 tablet (20 mg) by mouth every morning Patient is taking 2  times a week.    Hyperlipidemia LDL goal <100       levothyroxine 125 MCG tablet    SYNTHROID/LEVOTHROID    90 tablet    Take 1 tablet (125 mcg) by mouth every morning    Other specified hypothyroidism       losartan 50 MG tablet    COZAAR    90 tablet    Take 1 tablet (50 mg) by mouth daily    Type 2 diabetes mellitus with microalbuminuria, without long-term current use of insulin (H), Essential hypertension with goal blood pressure less than 140/90       metoprolol 25 MG tablet    LOPRESSOR    180 tablet    Take 1 tablet (25 mg) by mouth 2 times daily    Essential hypertension with goal blood pressure less than 140/90

## 2017-10-02 NOTE — NURSING NOTE
Prior to injection verified patient identity using patient's name and date of birth.  Marlen MAURICE CMA ([facility])

## 2017-10-03 NOTE — PROGRESS NOTES
Kenna Miranda    Enclosed are your results.  Your labs are normal/stable at this time.     Please call  with any questions.  We can also discuss any questions regarding these labs at your next scheduled visit.    For your kidneys:  Stay well hydrated and avoid over the counter anti inflammatories.   Sincerely,    Carolyne Layne M.D.

## 2017-10-03 NOTE — PROGRESS NOTES
Kenna Miranda    Your bone density is diminished, but you do not have osteoporosis.  We call this osteopenia.  This does not necessarily meant that treatment with a medication for osteoporosis is advised.     However, you are taking the Arimidex and that can accelerate bone loss. Due to this fact and your osteopenia, we could consider a bone medication to prevent or lessen further bone loss.     Please let me know if you would be agreeable to treatment.  It would be with Fosamax 35 mg weekly    Sincerely,     GEORGE DEL ANGEL M.D.   Send note

## 2017-10-31 ENCOUNTER — PRE VISIT (OUTPATIENT)
Dept: UROLOGY | Facility: CLINIC | Age: 62
End: 2017-10-31

## 2017-11-03 DIAGNOSIS — C50.919 MALIGNANT NEOPLASM OF FEMALE BREAST (H): ICD-10-CM

## 2017-11-07 RX ORDER — ANASTROZOLE 1 MG/1
TABLET ORAL
Qty: 90 TABLET | Refills: 3 | Status: SHIPPED | OUTPATIENT
Start: 2017-11-07 | End: 2018-06-11

## 2017-11-07 NOTE — TELEPHONE ENCOUNTER
anastrozole      Last Written Prescription Date: 11/16/16  Last Fill Quantity: 90,  # refills: 3   Last Office Visit with [facility], [facility] or [facility] prescribing provider: 6/19/17 with Dr. Meng  Next office visit: 1/22/18 with Dr. Meng

## 2017-11-08 ENCOUNTER — OFFICE VISIT (OUTPATIENT)
Dept: UROLOGY | Facility: CLINIC | Age: 62
End: 2017-11-08

## 2017-11-08 VITALS
HEIGHT: 62 IN | DIASTOLIC BLOOD PRESSURE: 77 MMHG | SYSTOLIC BLOOD PRESSURE: 130 MMHG | BODY MASS INDEX: 38.81 KG/M2 | HEART RATE: 72 BPM | WEIGHT: 210.9 LBS

## 2017-11-08 DIAGNOSIS — N20.0 CALCULUS OF KIDNEY: Primary | ICD-10-CM

## 2017-11-08 ASSESSMENT — PAIN SCALES - GENERAL: PAINLEVEL: NO PAIN (0)

## 2017-11-08 NOTE — PROGRESS NOTES
UROLOGIC DIAGNOSES:        CURRENT INTERVENTIONS:        History:    Patient presents for follow up for ureteral and renal calculi.   She presented urgently with large stone burden in the right UPJ and ureter.   She is s/p stone clearance and presents for follow up.     She states she has not completed stone risk assessment (24 hr urine). She states that she knows the stones are coming from her calcium supplements and therefore doesn't need further evaluation.     CT scan today did not reveal any hydronephrosis, stone fragments, or new stones on the right and stable small stone on the left.         Imaging:      Labs:      MEDICATIONS:    Current Outpatient Prescriptions:      anastrozole (ARIMIDEX) 1 MG tablet, TAKE ONE TABLET BY MOUTH EVERY DAY, Disp: 90 tablet, Rfl: 3     levothyroxine (SYNTHROID/LEVOTHROID) 125 MCG tablet, Take 1 tablet (125 mcg) by mouth every morning, Disp: 90 tablet, Rfl: 3     metoprolol (LOPRESSOR) 25 MG tablet, Take 1 tablet (25 mg) by mouth 2 times daily, Disp: 180 tablet, Rfl: 3     atorvastatin (LIPITOR) 20 MG tablet, Take 1 tablet (20 mg) by mouth every morning Patient is taking 2 times a week., Disp: 24 tablet, Rfl: 3     losartan (COZAAR) 50 MG tablet, Take 1 tablet (50 mg) by mouth daily, Disp: 90 tablet, Rfl: 3     amLODIPine (NORVASC) 10 MG tablet, Take 1 tablet (10 mg) by mouth daily, Disp: 90 tablet, Rfl: 1     ASPIRIN 81 MG OR TABS, 1 TABLET DAILY, Disp: , Rfl:     ALLERGIES:     Allergies   Allergen Reactions     Nkda [No Known Drug Allergies]        REVIEW OF SYSTEMS: Ten point review of systems without change as outlined in HPI    SURGICAL HISTORY:    Past Surgical History:   Procedure Laterality Date     BIOPSY  Nov 2015     BIOPSY BREAST NEEDLE LOCALIZATION, BIOPSY NODE SENTINEL, COMBINED  11/5/2012    Procedure: COMBINED BIOPSY BREAST NEEDLE LOCALIZATION, BIOPSY NODE SENTINEL;  Left Breast Wire Locilized Lumpectomy and Sentinal Lymph Node Biopsy;  Surgeon: Jesus Vicente  "MD;  [facility]     C  DELIVERY ONLY      x 2     COLONOSCOPY  10-26-11    Return in 1 yr for Polyp Surveillance     COLONOSCOPY  12    Return for Colonoscopy in 3 yrs.Polyps     COMBINED CYSTOSCOPY, INSERT STENT URETER(S) Right 2017    Procedure: COMBINED CYSTOSCOPY, INSERT STENT URETER(S);  Cystoscopy Right retrograde pyelogram, Right ureteral Stent Placement;  Surgeon: Loida Sánchez MD;  Location[facility]     COMBINED CYSTOSCOPY, RETROGRADES, URETEROSCOPY, LASER HOLMIUM LITHOTRIPSY URETER(S), INSERT STENT Right 2017    Procedure: COMBINED CYSTOSCOPY, RETROGRADES, URETEROSCOPY, LASER HOLMIUM LITHOTRIPSY URETER(S), INSERT STENT;  Cystoscopy, Right Ureteroscopy, Laser Lithotripsy, Right Stent Exchange ;  Surgeon: Ruth Boston MD;  Location[facility]     LITHOTRIPSY       SURGICAL HISTORY OF -       exploratory laparotomy     SURGICAL HISTORY OF -       Right ovary removed     SURGICAL HISTORY OF -       breast biopsy         PHYSICAL EXAM:    /77  Pulse 72  Ht 1.575 m (5' 2\")  Wt 95.7 kg (210 lb 14.4 oz)  BMI 38.57 kg/m2    HEENT: Normocephalic and atraumatic    Cardiac: Not done    Back/Flank: Not done    CNS/PNS: Alert and oriented    Respiratory: Normal nonlabored breathing    Abdomen: Soft nontender and nondistended    Peripheral Vascular: No peripheral edema    Mental Status: Normal    External Genitalia: Not done    Bladder: Not done    Urethra: Not done     Vagina: Not done    Cystoscopy:  Not Done      Urinalysis:  UA RESULTS:  Recent Labs   Lab Test  17   1155   COLOR  Yellow   APPEARANCE  Clear   URINEGLC  Negative   URINEBILI  Negative   URINEKETONE  Negative   SG  1.020   UBLD  Large*   URINEPH  6.0   PROTEIN  100*   UROBILINOGEN  0.2   NITRITE  Negative   LEUKEST  Small*   RBCU  25-50*   WBCU  5-10*         IMPRESSION:    62 y/o F with history of ureteral calculi     PLAN:    CT scan in one year   Follow up in one year   Strongly " encouraged patient to complete 24 hr urine collections and see nephrology stone clinic     Total Time:  15 minutes   Time in Consultation: greater than 50%

## 2017-11-08 NOTE — LETTER
11/8/2017       RE: Kenna Miranda  [address] [place] [address]     Dear Colleague,    Thank you for referring your patient, Kenna Miranda, to the [facility] UROLOGY AND INST FOR PROSTATE AND UROLOGIC CANCERS at [facility]. Please see a copy of my visit note below.    UROLOGIC DIAGNOSES:        CURRENT INTERVENTIONS:        History:    Patient presents for follow up for ureteral and renal calculi.   She presented urgently with large stone burden in the right UPJ and ureter.   She is s/p stone clearance and presents for follow up.     She states she has not completed stone risk assessment (24 hr urine). She states that she knows the stones are coming from her calcium supplements and therefore doesn't need further evaluation.     CT scan today did not reveal any hydronephrosis, stone fragments, or new stones on the right and stable small stone on the left.         Imaging:      Labs:      MEDICATIONS:    Current Outpatient Prescriptions:      anastrozole (ARIMIDEX) 1 MG tablet, TAKE ONE TABLET BY MOUTH EVERY DAY, Disp: 90 tablet, Rfl: 3     levothyroxine (SYNTHROID/LEVOTHROID) 125 MCG tablet, Take 1 tablet (125 mcg) by mouth every morning, Disp: 90 tablet, Rfl: 3     metoprolol (LOPRESSOR) 25 MG tablet, Take 1 tablet (25 mg) by mouth 2 times daily, Disp: 180 tablet, Rfl: 3     atorvastatin (LIPITOR) 20 MG tablet, Take 1 tablet (20 mg) by mouth every morning Patient is taking 2 times a week., Disp: 24 tablet, Rfl: 3     losartan (COZAAR) 50 MG tablet, Take 1 tablet (50 mg) by mouth daily, Disp: 90 tablet, Rfl: 3     amLODIPine (NORVASC) 10 MG tablet, Take 1 tablet (10 mg) by mouth daily, Disp: 90 tablet, Rfl: 1     ASPIRIN 81 MG OR TABS, 1 TABLET DAILY, Disp: , Rfl:     ALLERGIES:     Allergies   Allergen Reactions     Nkda [No Known Drug Allergies]        REVIEW OF SYSTEMS: Ten point review of systems without change as outlined in HPI    SURGICAL HISTORY:   "  Past Surgical History:   Procedure Laterality Date     BIOPSY  2015     BIOPSY BREAST NEEDLE LOCALIZATION, BIOPSY NODE SENTINEL, COMBINED  2012    Procedure: COMBINED BIOPSY BREAST NEEDLE LOCALIZATION, BIOPSY NODE SENTINEL;  Left Breast Wire Locilized Lumpectomy and Sentinal Lymph Node Biopsy;  Surgeon: Jesus Vicente MD;  [facility]     C  DELIVERY ONLY      x 2     COLONOSCOPY  10-26-    Return in 1 yr for Polyp Surveillance     COLONOSCOPY  -12    Return for Colonoscopy in 3 yrs.Polyps     COMBINED CYSTOSCOPY, INSERT STENT URETER(S) Right 2017    Procedure: COMBINED CYSTOSCOPY, INSERT STENT URETER(S);  Cystoscopy Right retrograde pyelogram, Right ureteral Stent Placement;  Surgeon: Loida Sánchez MD;  [facility]     COMBINED CYSTOSCOPY, RETROGRADES, URETEROSCOPY, LASER HOLMIUM LITHOTRIPSY URETER(S), INSERT STENT Right 2017    Procedure: COMBINED CYSTOSCOPY, RETROGRADES, URETEROSCOPY, LASER HOLMIUM LITHOTRIPSY URETER(S), INSERT STENT;  Cystoscopy, Right Ureteroscopy, Laser Lithotripsy, Right Stent Exchange ;  Surgeon: Ruth Boston MD;  [facility]     LITHOTRIPSY       SURGICAL HISTORY OF -       exploratory laparotomy     SURGICAL HISTORY OF -       Right ovary removed     SURGICAL HISTORY OF -       breast biopsy         PHYSICAL EXAM:    /77  Pulse 72  Ht 1.575 m (5' 2\")  Wt 95.7 kg (210 lb 14.4 oz)  BMI 38.57 kg/m2    HEENT: Normocephalic and atraumatic    Cardiac: Not done    Back/Flank: Not done    CNS/PNS: Alert and oriented    Respiratory: Normal nonlabored breathing    Abdomen: Soft nontender and nondistended    Peripheral Vascular: No peripheral edema    Mental Status: Normal    External Genitalia: Not done    Bladder: Not done    Urethra: Not done     Vagina: Not done    Cystoscopy:  Not Done      Urinalysis:  UA RESULTS:  Recent Labs   Lab Test  17   1155   COLOR  Yellow   APPEARANCE  Clear   URINEGLC  Negative "   URINEBILI  Negative   URINEKETONE  Negative   SG  1.020   UBLD  Large*   URINEPH  6.0   PROTEIN  100*   UROBILINOGEN  0.2   NITRITE  Negative   LEUKEST  Small*   RBCU  25-50*   WBCU  5-10*         IMPRESSION:    60 y/o F with history of ureteral calculi     PLAN:    CT scan in one year   Follow up in one year   Strongly encouraged patient to complete 24 hr urine collections and see nephrology stone clinic     Total Time:  15 minutes   Time in Consultation: greater than 50%       Again, thank you for allowing me to participate in the care of your patient.      Sincerely,    Ruth Boston MD

## 2017-11-08 NOTE — MR AVS SNAPSHOT
After Visit Summary   11/8/2017    Kenna Miranda    MRN: [id number]           Patient Information     Date Of Birth          1955        Visit Information        Provider Department      11/8/2017 9:00 AM Ruth Boston MD [facility] and [facility] for Prostate and Urologic Cancers        Care Instructions    Follow up with Dr. Boston in one year with CT scan.    Please complete Litholink urine test.    It was a pleasure meeting with you today.  Thank you for allowing me and my team the privilege of caring for you today.  YOU are the reason we are here, and I truly hope we provided you with the excellent service you deserve.  Please let us know if there is anything else we can do for you so that we can be sure you are leaving completely satisfied with your care experience.      SALTY Mcgee          Follow-ups after your visit        Your next 10 appointments already scheduled     Jan 22, 2018  9:30 AM CST   (Arrive by 9:15 AM)   Return Visit with Kaycee Meng MD   [facility] ([facility])    [address] [place] [id number] [phone]              Who to contact     Please call your clinic at [phone] to:    Ask questions about your health    Make or cancel appointments    Discuss your medicines    Learn about your test results    Speak to your doctor   If you have compliments or concerns about an experience at your clinic, or if you wish to file a complaint, please contact [facility] Patient Relations at [phone] or email us at [email or web address]         Additional Information About Your Visit        MyChart Information     [employer] gives you secure access to your electronic health record. If you see a primary care provider, you can also send messages to your care team and make appointments. If you have questions, please call your primary care  "clinic.  If you do not have a primary care provider, please call [phone] and they will assist you.      [employer] is an electronic gateway that provides easy, online access to your medical records. With [employer], you can request a clinic appointment, read your test results, renew a prescription or communicate with your care team.     To access your existing account, please contact your [facility] or call [phone] for assistance.        Care EveryWhere ID     This is your Care EveryWhere ID. This could be used by other organizations to access your [facility] medical records  [id number]        Your Vitals Were     Pulse Height BMI (Body Mass Index)             72 1.575 m (5' 2\") 38.57 kg/m2          Blood Pressure from Last 3 Encounters:   11/08/17 130/77   09/07/17 136/75   08/11/17 127/71    Weight from Last 3 Encounters:   11/08/17 95.7 kg (210 lb 14.4 oz)   09/07/17 93 kg (205 lb)   08/11/17 90.7 kg (200 lb)              Today, you had the following     No orders found for display       Primary Care Provider Office Phone # Fax #    Carolyne Layne [phone]       [address] [facility] [address]        Equal Access to Services     EBEN ROBINS : Hadii darion jama hadannemarieo Soomaali, waaxda luqadaha, qaybta kaalmada adeegyada, waxflores idiin hayroxyn china fair. So [facility] [phone].    ATENCIÓN: Si habla español, tiene a tam disposición servicios gratuitos de asistencia lingüística. Llame al [phone].    We comply with applicable federal civil rights laws and [place] laws. We do not discriminate on the basis of race, color, national origin, age, disability, sex, sexual orientation, or gender identity.            Thank you!     Thank you for choosing [facility] UROLOGY AND [facility] FOR PROSTATE AND UROLOGIC CANCERS  for your care. Our goal is always to provide you with excellent care. Hearing back from our patients is one way we can continue " to improve our services. Please take a few minutes to complete the written survey that you may receive in the mail after your visit with us. Thank you!             Your Updated Medication List - Protect others around you: Learn how to safely use, store and throw away your medicines at www.disposemymeds.org.          This list is accurate as of: 11/8/17  9:25 AM.  Always use your most recent med list.                   Brand Name Dispense Instructions for use Diagnosis    amLODIPine 10 MG tablet    NORVASC    90 tablet    Take 1 tablet (10 mg) by mouth daily    Benign essential hypertension       anastrozole 1 MG tablet    ARIMIDEX    90 tablet    TAKE ONE TABLET BY MOUTH EVERY DAY    Malignant neoplasm of female breast (H)       aspirin 81 MG tablet      1 TABLET DAILY        atorvastatin 20 MG tablet    LIPITOR    24 tablet    Take 1 tablet (20 mg) by mouth every morning Patient is taking 2 times a week.    Hyperlipidemia LDL goal <100       levothyroxine 125 MCG tablet    SYNTHROID/LEVOTHROID    90 tablet    Take 1 tablet (125 mcg) by mouth every morning    Other specified hypothyroidism       losartan 50 MG tablet    COZAAR    90 tablet    Take 1 tablet (50 mg) by mouth daily    Type 2 diabetes mellitus with microalbuminuria, without long-term current use of insulin (H), Essential hypertension with goal blood pressure less than 140/90       metoprolol 25 MG tablet    LOPRESSOR    180 tablet    Take 1 tablet (25 mg) by mouth 2 times daily    Essential hypertension with goal blood pressure less than 140/90

## 2017-11-08 NOTE — PATIENT INSTRUCTIONS
Follow up with Dr. Boston in one year with CT scan.    Please complete Litholink urine test.    It was a pleasure meeting with you today.  Thank you for allowing me and my team the privilege of caring for you today.  YOU are the reason we are here, and I truly hope we provided you with the excellent service you deserve.  Please let us know if there is anything else we can do for you so that we can be sure you are leaving completely satisfied with your care experience.      SALTY cMgee

## 2017-12-21 ENCOUNTER — RADIANT APPOINTMENT (OUTPATIENT)
Dept: MAMMOGRAPHY | Facility: CLINIC | Age: 62
End: 2017-12-21
Attending: INTERNAL MEDICINE
Payer: COMMERCIAL

## 2017-12-21 DIAGNOSIS — Z12.31 VISIT FOR SCREENING MAMMOGRAM: ICD-10-CM

## 2017-12-21 PROCEDURE — G0202 SCR MAMMO BI INCL CAD: HCPCS | Mod: TC

## 2017-12-21 PROCEDURE — 77063 BREAST TOMOSYNTHESIS BI: CPT | Mod: TC

## 2018-01-05 DIAGNOSIS — R50.9 FEBRILE ILLNESS: ICD-10-CM

## 2018-01-05 DIAGNOSIS — B34.9 VIRAL SYNDROME: Primary | ICD-10-CM

## 2018-01-05 RX ORDER — OSELTAMIVIR PHOSPHATE 75 MG/1
75 CAPSULE ORAL 2 TIMES DAILY
Qty: 10 CAPSULE | Refills: 0 | Status: SHIPPED | OUTPATIENT
Start: 2018-01-05 | End: 2018-01-22

## 2018-01-20 ASSESSMENT — ENCOUNTER SYMPTOMS
SHORTNESS OF BREATH: 1
POSTURAL DYSPNEA: 0
STIFFNESS: 0
DYSPNEA ON EXERTION: 0
ARTHRALGIAS: 0
MUSCLE CRAMPS: 0
MYALGIAS: 0
BACK PAIN: 0
COUGH DISTURBING SLEEP: 0
JOINT SWELLING: 1
SPUTUM PRODUCTION: 1
COUGH: 1
SNORES LOUDLY: 0
HEMOPTYSIS: 0
WHEEZING: 0
NECK PAIN: 0
MUSCLE WEAKNESS: 0

## 2018-01-22 ENCOUNTER — ONCOLOGY VISIT (OUTPATIENT)
Dept: ONCOLOGY | Facility: CLINIC | Age: 63
End: 2018-01-22
Attending: INTERNAL MEDICINE
Payer: COMMERCIAL

## 2018-01-22 VITALS
BODY MASS INDEX: 38.74 KG/M2 | DIASTOLIC BLOOD PRESSURE: 74 MMHG | HEIGHT: 62 IN | TEMPERATURE: 98.8 F | OXYGEN SATURATION: 93 % | HEART RATE: 68 BPM | RESPIRATION RATE: 16 BRPM | WEIGHT: 210.5 LBS | SYSTOLIC BLOOD PRESSURE: 132 MMHG

## 2018-01-22 DIAGNOSIS — Z17.0 MALIGNANT NEOPLASM OF UPPER-OUTER QUADRANT OF BREAST IN FEMALE, ESTROGEN RECEPTOR POSITIVE, UNSPECIFIED LATERALITY (H): Primary | ICD-10-CM

## 2018-01-22 DIAGNOSIS — C50.419 MALIGNANT NEOPLASM OF UPPER-OUTER QUADRANT OF BREAST IN FEMALE, ESTROGEN RECEPTOR POSITIVE, UNSPECIFIED LATERALITY (H): Primary | ICD-10-CM

## 2018-01-22 PROCEDURE — G0463 HOSPITAL OUTPT CLINIC VISIT: HCPCS | Mod: ZF

## 2018-01-22 PROCEDURE — 99214 OFFICE O/P EST MOD 30 MIN: CPT | Mod: ZP | Performed by: INTERNAL MEDICINE

## 2018-01-22 ASSESSMENT — PAIN SCALES - GENERAL: PAINLEVEL: NO PAIN (0)

## 2018-01-22 NOTE — NURSING NOTE
"Oncology Rooming Note    January 22, 2018 9:15 AM   Kenna Miranda is a 50 year old female who presents for: Oncology Clinic Visit    Initial Vitals: /74 (BP Location: Right arm, Patient Position: Chair, Cuff Size: Adult Large)  Pulse 68  Temp 98.8  F (37.1  C) (Oral)  Resp 16  Ht 1.575 m (5' 2.01\")  Wt 95.5 kg (210 lb 8 oz)  SpO2 93%  BMI 38.49 kg/m2 Estimated body mass index is 38.49 kg/(m^2) as calculated from the following:    Height as of this encounter: 1.575 m (5' 2.01\").    Weight as of this encounter: 95.5 kg (210 lb 8 oz). Body surface area is 2.04 meters squared.  No Pain (0) Comment: Data Unavailable   No LMP recorded. Patient is postmenopausal.  Allergies reviewed: Yes  Medications reviewed: Yes    Medications: MEDICATION REFILLS NEEDED TODAY. Provider was NOT notified.  Pharmacy name entered into [employer]:    [facility] - [place], [place] - [address]  [facility] [address] [place], [place] - [address]  [facility] - [place], [place] - [address] [facility]    Clinical concerns:refills on Arimidex.  I informed pt to remind the Provider/ENA about refills in case i dont touch bases with them, if the provider was in the exam room while i attend on rooming the next pt. Pt verbalized understandings.  Mirna Santiago CMA   Pt received flu shot elsewhere. See Immunizations       6 minutes for nursing intake (face to face time)     Mirna Santiago CMA                              "

## 2018-01-22 NOTE — LETTER
1/22/2018       RE: Kenna Miranda  [address] [place] [address]     Dear Colleague,    Thank you for referring your patient, Kenna Miranda, to the [facility] CANCER [facility]. Please see a copy of my visit note below.    [facility]  ONCOLOGY FOLLOW-UP VISIT NOTE    PATIENT NAME: Kenna Miranda    YOB: 1955  MRN :[id number]  DATE OF VISIT: Jan 22, 2018    Diagnosis: Locally advanced breast cancer .     History of present Illness :  Ms. Miranda is a 61-year-old female with a history of hypertension, hyperlipidemia and diabetes and a stage I, ER positive , Her2 negative LEFT breast cancer.     Cancer Treatment Summary:    10/24/12 - Abnormal screening mammogram  11/5/2012 - left lumpectomy with sentinel lymph node biopsy:  Invasive adenocarcinoma, grade 1, %, CT 80%, her 2 negative.  Final size 1.1 x 0.7 cm  - Oncotype DX 12 chance of distant recurrence 8% in 10 years, assuming 5 years of tamoxifen.  - Adjuvant radiation therapy with completion January 2013  - Arimidex started January 2013    INTERVAL HISTORY : Kenna returns for a routine followup visit.   Kenna reports overall that she is doing reasonably well.  She continues on her Arimidex.  It has now been 5 years and she has questions about the next steps.      She just returned from a trip to [employer].  She said this was a very eye-opening experience and very enjoyable for her.  She had no difficulty walking around.  She reported some shortness of breath with going up significant inclines and has questions about this today.       She has no chest pain.  She has no nausea, vomiting, diarrhea or constipation.      She was worked up for hypercalcemia and this was felt to be related to supplements.  She has questions about whether to maintain on this or not.          ROS: 10 point ROS neg other than the symptoms noted above in the HPI.    PHYSICAL EXAM :  /74 (BP  "Location: Right arm, Patient Position: Chair, Cuff Size: Adult Large)  Pulse 68  Temp 98.8  F (37.1  C) (Oral)  Resp 16  Ht 1.575 m (5' 2.01\")  Wt 95.5 kg (210 lb 8 oz)  SpO2 93%  BMI 38.49 kg/m2   GENERAL : Alert and oriented , not in distress .   HEENT: no icterus   NECK: Supple, no thyromegaly.   LYMPH:  No cervical, supraclavicular, axillary, epitrochlear, or inguinal lymphadenopathy.  BREAST: Without palpable abnormalities, no overlying skin abnormalities.  No palpable lesions.   LUNGS: Clear breath sounds bilaterally, no wheezing, no crackles.    CARDIOVASCULAR: S1 and S2 well heard, regular rate and rhythm, no murmur or gallop.   ABDOMEN: Soft, nontender, positive bowel sounds. No organomegaly was appreciated.  EXTREMITIES: No cyanosis, no clubbing, no edema.    SKIN: No skin rash, no purpura or petechiae.    NEUROLOGIC: Normal mental status. Alert and oriented .      Mammogram December 2017 unremarkable    ASSESSMENT AND PLAN:  62-year-old female with a T1c N0 M0, infiltrating ductal carcinoma of the left breast, ER/TN-positive, HER2/kenia-negative.     1.  Left breast cancer.  She is status post left lumpectomy and sentinel lymph node biopsy.  Her Oncotype DX score was 12 and she did not require adjuvant chemotherapy.  She had adjuvant radiation.  She has been on Arimidex since 01/2013.  Now she has now completed 5 years of treatment.  1.  Kenna has some mild arthralgias and myalgias related to her Arimidex versus osteoarthritis.  We reviewed the data from the MA-17 trial looking at prolonged endocrine therapy with an aromatase inhibitor.  She would have a few percent advantage in the risk reduction of distant recurrent disease if she stayed on Arimidex.  At this time, we discussed doing a trial off of the treatment for the next 4 months.  I would like to see her back at that time.  If she continues to have the same arthralgias and myalgias, then I think it would be reasonable to have her resume her " Arimidex for a total of 10 years.  If she feels significantly better off of the medication, given the small benefit we may suggest having her remain off the medication.      She will return in 4 months.       Her mammogram is due in 12/2018.       2.  Bone health.  She had a DEXA scan previously with a T-score of -1.4.  I discussed with her stopping the Arimidex to hold her calcium supplementation.       3.  Hypertension.  She has been seeing her primary care provider for this.  We reviewed the importance of exercise as well as weight management today.  Patients who exercise at least 150 minutes per week of moderate intensity walking have approximately a 30% lower recurrence rate of breast cancer.      4.  Obesity.  We encouraged her to lose weight and exercise regularly.      5.  She continues to see Dr. Layne for her other health care maintenance and cancer screening.          Again, thank you for allowing me to participate in the care of your patient.      Sincerely,    Kaycee Meng MD

## 2018-01-22 NOTE — PROGRESS NOTES
"[facility]  ONCOLOGY FOLLOW-UP VISIT NOTE    PATIENT NAME: Kenna Miranda    YOB: 1955  MRN :[id number]  DATE OF VISIT: Jan 22, 2018    Diagnosis: Locally advanced breast cancer .     History of present Illness :  Ms. Miranda is a 61-year-old female with a history of hypertension, hyperlipidemia and diabetes and a stage I, ER positive , Her2 negative LEFT breast cancer.     Cancer Treatment Summary:    10/24/12 - Abnormal screening mammogram  11/5/2012 - left lumpectomy with sentinel lymph node biopsy:  Invasive adenocarcinoma, grade 1, %, NM 80%, her 2 negative.  Final size 1.1 x 0.7 cm  - Oncotype DX 12 chance of distant recurrence 8% in 10 years, assuming 5 years of tamoxifen.  - Adjuvant radiation therapy with completion January 2013  - Arimidex started January 2013    INTERVAL HISTORY : Kenna returns for a routine followup visit.   Kenna reports overall that she is doing reasonably well.  She continues on her Arimidex.  It has now been 5 years and she has questions about the next steps.      She just returned from a trip to [facility].  She said this was a very eye-opening experience and very enjoyable for her.  She had no difficulty walking around.  She reported some shortness of breath with going up significant inclines and has questions about this today.       She has no chest pain.  She has no nausea, vomiting, diarrhea or constipation.      She was worked up for hypercalcemia and this was felt to be related to supplements.  She has questions about whether to maintain on this or not.          ROS: 10 point ROS neg other than the symptoms noted above in the HPI.    PHYSICAL EXAM :  /74 (BP Location: Right arm, Patient Position: Chair, Cuff Size: Adult Large)  Pulse 68  Temp 98.8  F (37.1  C) (Oral)  Resp 16  Ht 1.575 m (5' 2.01\")  Wt 95.5 kg (210 lb 8 oz)  SpO2 93%  BMI 38.49 kg/m2   GENERAL : Alert and oriented , not in distress . "   HEENT: no icterus   NECK: Supple, no thyromegaly.   LYMPH:  No cervical, supraclavicular, axillary, epitrochlear, or inguinal lymphadenopathy.  BREAST: Without palpable abnormalities, no overlying skin abnormalities.  No palpable lesions.   LUNGS: Clear breath sounds bilaterally, no wheezing, no crackles.    CARDIOVASCULAR: S1 and S2 well heard, regular rate and rhythm, no murmur or gallop.   ABDOMEN: Soft, nontender, positive bowel sounds. No organomegaly was appreciated.  EXTREMITIES: No cyanosis, no clubbing, no edema.    SKIN: No skin rash, no purpura or petechiae.    NEUROLOGIC: Normal mental status. Alert and oriented .      Mammogram December 2017 unremarkable    ASSESSMENT AND PLAN:  62-year-old female with a T1c N0 M0, infiltrating ductal carcinoma of the left breast, ER/NC-positive, HER2/kenia-negative.     1.  Left breast cancer.  She is status post left lumpectomy and sentinel lymph node biopsy.  Her Oncotype DX score was 12 and she did not require adjuvant chemotherapy.  She had adjuvant radiation.  She has been on Arimidex since 01/2013.  Now she has now completed 5 years of treatment.  1.  Kenna has some mild arthralgias and myalgias related to her Arimidex versus osteoarthritis.  We reviewed the data from the MA-17 trial looking at prolonged endocrine therapy with an aromatase inhibitor.  She would have a few percent advantage in the risk reduction of distant recurrent disease if she stayed on Arimidex.  At this time, we discussed doing a trial off of the treatment for the next 4 months.  I would like to see her back at that time.  If she continues to have the same arthralgias and myalgias, then I think it would be reasonable to have her resume her Arimidex for a total of 10 years.  If she feels significantly better off of the medication, given the small benefit we may suggest having her remain off the medication.      She will return in 4 months.       Her mammogram is due in 12/2018.       2.  Bone  health.  She had a DEXA scan previously with a T-score of -1.4.  I discussed with her stopping the Arimidex to hold her calcium supplementation.       3.  Hypertension.  She has been seeing her primary care provider for this.  We reviewed the importance of exercise as well as weight management today.  Patients who exercise at least 150 minutes per week of moderate intensity walking have approximately a 30% lower recurrence rate of breast cancer.      4.  Obesity.  We encouraged her to lose weight and exercise regularly.      5.  She continues to see Dr. Layne for her other health care maintenance and cancer screening.

## 2018-01-22 NOTE — MR AVS SNAPSHOT
After Visit Summary   1/22/2018    Kenna Andersonyclobito    MRN: [id number]           Patient Information     Date Of Birth          1955        Visit Information        Provider Department      1/22/2018 9:30 AM Kaycee Meng MD [facility]        Today's Diagnoses     Malignant neoplasm of upper-outer quadrant of breast in female, estrogen receptor positive, unspecified laterality (H)    -  1       Follow-ups after your visit        Your next 10 appointments already scheduled     Jun 11, 2018 10:00 AM CDT   (Arrive by 9:45 AM)   Return Visit with Kaycee Meng MD   [facility] ([facility])    [address]  [address]  [facility] [place] [facility] [address] [phone]              Who to contact     If you have questions or need follow up information about today's clinic visit or your schedule please contact [facility] directly at [phone].  Normal or non-critical lab and imaging results will be communicated to you by Bandwidthhart, letter or phone within 4 business days after the clinic has received the results. If you do not hear from us within 7 days, please contact the clinic through Bandwidthhart or phone. If you have a critical or abnormal lab result, we will notify you by phone as soon as possible.  Submit refill requests through Convio or call your pharmacy and they will forward the refill request to us. Please allow 3 business days for your refill to be completed.          Additional Information About Your Visit        MyChart Information     Convio gives you secure access to your electronic health record. If you see a primary care provider, you can also send messages to your care team and make appointments. If you have questions, please call your primary care clinic.  If you do not have a primary care provider, please call [phone] and they will assist you.        Care EveryWhere ID     This is your  "Care EveryWhere ID. This could be used by other organizations to access your [facility] medical records  [id number]        Your Vitals Were     Pulse Temperature Respirations Height Pulse Oximetry BMI (Body Mass Index)    68 98.8  F (37.1  C) (Oral) 16 1.575 m (5' 2.01\") 93% 38.49 kg/m2       Blood Pressure from Last 3 Encounters:   01/22/18 132/74   11/08/17 130/77   09/07/17 136/75    Weight from Last 3 Encounters:   01/22/18 95.5 kg (210 lb 8 oz)   11/08/17 95.7 kg (210 lb 14.4 oz)   09/07/17 93 kg (205 lb)              Today, you had the following     No orders found for display       Primary Care Provider Office Phone # Fax #    Carolyne Layne [phone]       [address] [facility] [address]        Equal Access to Services     EBEN ROBINS : Hadii aad ku hadasho Soomaali, waaxda luqadaha, qaybta kaalmada adeegyada, kesha vann . So [facility] [phone].    ATENCIÓN: Si willie smith, tiene a tam disposición servicios gratuitos de asistencia lingüística. Llame al [phone].    We comply with applicable federal civil rights laws and [place] laws. We do not discriminate on the basis of race, color, national origin, age, disability, sex, sexual orientation, or gender identity.            Thank you!     Thank you for choosing [facility]  for your care. Our goal is always to provide you with excellent care. Hearing back from our patients is one way we can continue to improve our services. Please take a few minutes to complete the written survey that you may receive in the mail after your visit with us. Thank you!             Your Updated Medication List - Protect others around you: Learn how to safely use, store and throw away your medicines at www.disposemymeds.org.          This list is accurate as of: 1/22/18 11:59 PM.  Always use your most recent med list.                   Brand Name Dispense Instructions for use Diagnosis    " amLODIPine 10 MG tablet    NORVASC    90 tablet    Take 1 tablet (10 mg) by mouth daily    Benign essential hypertension       anastrozole 1 MG tablet    ARIMIDEX    90 tablet    TAKE ONE TABLET BY MOUTH EVERY DAY    Malignant neoplasm of female breast (H)       aspirin 81 MG tablet      1 TABLET DAILY        atorvastatin 20 MG tablet    LIPITOR    24 tablet    Take 1 tablet (20 mg) by mouth every morning Patient is taking 2 times a week.    Hyperlipidemia LDL goal <100       levothyroxine 125 MCG tablet    SYNTHROID/LEVOTHROID    90 tablet    Take 1 tablet (125 mcg) by mouth every morning    Other specified hypothyroidism       losartan 50 MG tablet    COZAAR    90 tablet    Take 1 tablet (50 mg) by mouth daily    Type 2 diabetes mellitus with microalbuminuria, without long-term current use of insulin (H), Essential hypertension with goal blood pressure less than 140/90       metoprolol tartrate 25 MG tablet    LOPRESSOR    180 tablet    Take 1 tablet (25 mg) by mouth 2 times daily    Essential hypertension with goal blood pressure less than 140/90

## 2018-01-26 ENCOUNTER — TELEPHONE (OUTPATIENT)
Dept: FAMILY MEDICINE | Facility: CLINIC | Age: 63
End: 2018-01-26

## 2018-01-26 DIAGNOSIS — E21.0 PRIMARY HYPERPARATHYROIDISM (H): ICD-10-CM

## 2018-01-26 DIAGNOSIS — R30.0 DYSURIA: ICD-10-CM

## 2018-01-26 DIAGNOSIS — I10 ESSENTIAL HYPERTENSION WITH GOAL BLOOD PRESSURE LESS THAN 140/90: Primary | ICD-10-CM

## 2018-01-26 DIAGNOSIS — R80.9 TYPE 2 DIABETES MELLITUS WITH MICROALBUMINURIA, WITHOUT LONG-TERM CURRENT USE OF INSULIN (H): ICD-10-CM

## 2018-01-26 DIAGNOSIS — E11.29 TYPE 2 DIABETES MELLITUS WITH MICROALBUMINURIA, WITHOUT LONG-TERM CURRENT USE OF INSULIN (H): ICD-10-CM

## 2018-01-26 DIAGNOSIS — R82.90 NONSPECIFIC FINDING ON EXAMINATION OF URINE: Primary | ICD-10-CM

## 2018-01-26 LAB
ALBUMIN UR-MCNC: ABNORMAL MG/DL
APPEARANCE UR: CLEAR
BACTERIA #/AREA URNS HPF: ABNORMAL /HPF
BILIRUB UR QL STRIP: NEGATIVE
COLOR UR AUTO: YELLOW
GLUCOSE UR STRIP-MCNC: NEGATIVE MG/DL
HGB UR QL STRIP: NEGATIVE
KETONES UR STRIP-MCNC: ABNORMAL MG/DL
LEUKOCYTE ESTERASE UR QL STRIP: ABNORMAL
NITRATE UR QL: POSITIVE
NON-SQ EPI CELLS #/AREA URNS LPF: ABNORMAL /LPF
PH UR STRIP: 6 PH (ref 5–7)
RBC #/AREA URNS AUTO: ABNORMAL /HPF
SOURCE: ABNORMAL
SP GR UR STRIP: 1.02 (ref 1–1.03)
UROBILINOGEN UR STRIP-ACNC: 0.2 EU/DL (ref 0.2–1)
WBC #/AREA URNS AUTO: >100 /HPF

## 2018-01-26 PROCEDURE — 87186 SC STD MICRODIL/AGAR DIL: CPT | Performed by: INTERNAL MEDICINE

## 2018-01-26 PROCEDURE — 87088 URINE BACTERIA CULTURE: CPT | Performed by: INTERNAL MEDICINE

## 2018-01-26 PROCEDURE — 81001 URINALYSIS AUTO W/SCOPE: CPT | Performed by: INTERNAL MEDICINE

## 2018-01-26 PROCEDURE — 87086 URINE CULTURE/COLONY COUNT: CPT | Performed by: INTERNAL MEDICINE

## 2018-01-26 RX ORDER — NITROFURANTOIN 25; 75 MG/1; MG/1
100 CAPSULE ORAL 2 TIMES DAILY
Qty: 10 CAPSULE | Refills: 0 | Status: SHIPPED | OUTPATIENT
Start: 2018-01-26 | End: 2018-01-31

## 2018-01-26 NOTE — TELEPHONE ENCOUNTER
Reason for call:  Patient reporting a symptom    Symptom or request: patient thinks she has a bladder infection/ pain down in that area , get up in night every 2 hours to urinate    Duration (how long have symptoms been present):  A couple of days    Have you been treated for this before? Yes    Additional comments:     Phone Number patient can be reached at:  Cell number on file:    Telephone Information:   Mobile [phone]       Best Time:  any    Can we leave a detailed message on this number:  YES    Call taken on 1/26/2018 at 12:24 PM by Brenda Herr

## 2018-01-26 NOTE — TELEPHONE ENCOUNTER
I advised patient of Rx sent, scheduled her to leave UA at [facility] lab before starting med.    Ani Garcia RN  [facility]

## 2018-01-26 NOTE — TELEPHONE ENCOUNTER
rx macrobid sent for 5 days .       Order for UA is in, would she come to check UA today prior to starting med?

## 2018-01-26 NOTE — TELEPHONE ENCOUNTER
Patient was seen in November by urology for kidney stone (passed) follow up.  Last seen by [facility] 9/7/17.  Due for follow up visit in March.    I called patient who denies fever, chills, or blood in urine.   Urinary frequency is the main symptom.  Patient states she could come in and  leave a sample if needed.    Pharmacy verified.    Routed to [facility] to advise on treating patient (strong urinary issues history) or need UA or office visit for this.    Ani Garcia RN  [facility]

## 2018-01-29 LAB
BACTERIA SPEC CULT: ABNORMAL
BACTERIA SPEC CULT: ABNORMAL
SPECIMEN SOURCE: ABNORMAL

## 2018-02-01 DIAGNOSIS — I10 BENIGN ESSENTIAL HYPERTENSION: ICD-10-CM

## 2018-02-01 NOTE — TELEPHONE ENCOUNTER
"Requested Prescriptions   Pending Prescriptions Disp Refills     amLODIPine (NORVASC) 10 MG tablet [Pharmacy Med Name: AMLODIPINE BESYLATE 10MG TABS] 90 tablet 1    Last Written Prescription Date:  8/18/17  Last Fill Quantity: 90,  # refills: 1   Last Office Visit with [facility] provider:  9/7/17   Future Office Visit:      Sig: TAKE ONE TABLET BY MOUTH EVERY DAY    Calcium Channel Blockers Protocol  Failed    2/1/2018 11:29 AM       Failed - Normal serum creatinine on file in past 12 months    Recent Labs   Lab Test  10/02/17   0856   CR  1.30*            Passed - Blood pressure under 140/90    BP Readings from Last 3 Encounters:   01/22/18 132/74   11/08/17 130/77   09/07/17 136/75                Passed - Recent or future visit with authorizing provider    Patient had office visit in the last year or has a visit in the next 30 days with authorizing provider.  See \"Patient Info\" tab in inbasket, or \"Choose Columns\" in Meds & Orders section of the refill encounter.            Passed - Patient is age 18 or older       Passed - No active pregnancy on record       Passed - No positive pregnancy test in past 12 months          "

## 2018-02-01 NOTE — TELEPHONE ENCOUNTER
Routing refill request to provider for review/approval because it failed the protocol:  Labs out of range:    Creatinine   Date Value Ref Range Status   10/02/2017 1.30 (H) 0.52 - 1.04 mg/dL Final         Route to PCP    Naida Tomas RN

## 2018-02-02 RX ORDER — AMLODIPINE BESYLATE 10 MG/1
10 TABLET ORAL DAILY
Qty: 90 TABLET | Refills: 1 | Status: SHIPPED | OUTPATIENT
Start: 2018-02-02 | End: 2018-08-01

## 2018-03-21 NOTE — Clinical Note
- Most likely etiology is Diarrhea and bicarb loss  - Agreed to switch his NaHCO3 to NaCl, however be careful with volume status in the setting of recent NSTEMI and follow 2d Echo   Dr. Layne, See encounter, Kenna came in for RN HTN mgmt but having alarming symptoms for last 2 days and some for past 1-2 weeks.   Scheduled to see Dr. Lozoya to further evaluate. Deferred HTN mgmt at this time. Thanks. Katherine Garcia, RN [facility]

## 2018-05-30 ASSESSMENT — ENCOUNTER SYMPTOMS
SYNCOPE: 0
HYPERTENSION: 0
EXERCISE INTOLERANCE: 0
ORTHOPNEA: 0
LIGHT-HEADEDNESS: 0
PALPITATIONS: 0
SLEEP DISTURBANCES DUE TO BREATHING: 0
LEG PAIN: 0
HYPOTENSION: 0

## 2018-06-11 ENCOUNTER — ONCOLOGY VISIT (OUTPATIENT)
Dept: ONCOLOGY | Facility: CLINIC | Age: 63
End: 2018-06-11
Attending: INTERNAL MEDICINE
Payer: COMMERCIAL

## 2018-06-11 VITALS
TEMPERATURE: 97.6 F | HEIGHT: 62 IN | RESPIRATION RATE: 16 BRPM | BODY MASS INDEX: 39.14 KG/M2 | SYSTOLIC BLOOD PRESSURE: 132 MMHG | WEIGHT: 212.7 LBS | DIASTOLIC BLOOD PRESSURE: 74 MMHG | HEART RATE: 70 BPM | OXYGEN SATURATION: 95 %

## 2018-06-11 DIAGNOSIS — Z17.0 MALIGNANT NEOPLASM OF BREAST IN FEMALE, ESTROGEN RECEPTOR POSITIVE, UNSPECIFIED LATERALITY, UNSPECIFIED SITE OF BREAST (H): ICD-10-CM

## 2018-06-11 DIAGNOSIS — C50.919 MALIGNANT NEOPLASM OF BREAST IN FEMALE, ESTROGEN RECEPTOR POSITIVE, UNSPECIFIED LATERALITY, UNSPECIFIED SITE OF BREAST (H): ICD-10-CM

## 2018-06-11 PROCEDURE — G0463 HOSPITAL OUTPT CLINIC VISIT: HCPCS | Mod: ZF

## 2018-06-11 PROCEDURE — 99214 OFFICE O/P EST MOD 30 MIN: CPT | Mod: ZP | Performed by: INTERNAL MEDICINE

## 2018-06-11 RX ORDER — ANASTROZOLE 1 MG/1
1 TABLET ORAL DAILY
Qty: 90 TABLET | Refills: 3 | Status: SHIPPED | OUTPATIENT
Start: 2018-06-11 | End: 2019-11-12

## 2018-06-11 NOTE — LETTER
"6/11/2018       RE: Kenna Miranda  [address] [place] [address]     Dear Colleague,    Thank you for referring your patient, Kenna Miranda, to the [facility]. Please see a copy of my visit note below.    [facility]IC  ONCOLOGY FOLLOW-UP VISIT NOTE    PATIENT NAME: Kenna Miranda    YOB: 1955  MRN :[id number]  DATE OF VISIT: Jun 11, 2018    Diagnosis: Locally advanced breast cancer .     History of present Illness :  Ms. Miranda is a 61-year-old female with a history of hypertension, hyperlipidemia and diabetes and a stage I, ER positive , Her2 negative LEFT breast cancer.     Cancer Treatment Summary:    10/24/12 - Abnormal screening mammogram  11/5/2012 - left lumpectomy with sentinel lymph node biopsy:  Invasive adenocarcinoma, grade 1, %, RI 80%, her 2 negative.  Final size 1.1 x 0.7 cm  - Oncotype DX 12 chance of distant recurrence 8% in 10 years, assuming 5 years of tamoxifen.  - Adjuvant radiation therapy with completion January 2013  - Arimidex started January 2013    INTERVAL HISTORY : Kenna returns for a routine followup visit.  She stopped her arimidex in January 2018, and then restarted it in May.  She reports that she notices mild arthralgias with her AI.  Other than this, she does not notice any side effects from the arimidex.  We had previously reviewed the pros and cons of taking the medication; she prefers to be on it.      She has no chest pain.  She has no nausea, vomiting, diarrhea or constipation.       No new medical problems.  No new family history.     ROS: 10 point ROS neg other than the symptoms noted above in the HPI.    PHYSICAL EXAM :  /74 (BP Location: Right arm, Patient Position: Chair, Cuff Size: Adult Large)  Pulse 70  Temp 97.6  F (36.4  C) (Oral)  Resp 16  Ht 1.575 m (5' 2.01\")  Wt 96.5 kg (212 lb 11.2 oz)  SpO2 95%  BMI 38.89 kg/m2   GENERAL : Alert and oriented " , not in distress   HEENT: no icterus   NECK: Supple, no thyromegaly.   LYMPH:  No cervical, supraclavicular, axillary, epitrochlear, or inguinal lymphadenopathy.  BREAST: Without palpable abnormalities, no overlying skin abnormalities.  No palpable lesions.   LUNGS: Clear breath sounds bilaterally, no wheezing, no crackles.    CARDIOVASCULAR: S1 and S2 well heard, regular rate and rhythm, no murmur or gallop.   ABDOMEN: Soft, nontender, positive bowel sounds. No organomegaly was appreciated.  EXTREMITIES: No cyanosis, no clubbing, no edema.    SKIN: No skin rash, no purpura or petechiae.    NEUROLOGIC: Normal mental status. Alert and oriented .      Mammogram December 2017 unremarkable    ASSESSMENT AND PLAN:  62-year-old female with a T1c N0 M0, infiltrating ductal carcinoma of the left breast, ER/WA-positive, HER2/kenia-negative.     1.  Left breast cancer.  She is status post left lumpectomy and sentinel lymph node biopsy.  Her Oncotype DX score was 12 and she did not require adjuvant chemotherapy.  She had adjuvant radiation.  She has been on Arimidex since 01/2013.  Now she has now completed 5 years of treatment.  Kenna has some mild arthralgias and myalgias related to her Arimidex versus osteoarthritis.  We reviewed the data from the MA-17 trial looking at prolonged endocrine therapy with an aromatase inhibitor.  She would have a few percent advantage in the risk reduction of distant recurrent disease if she stayed on Arimidex.      We also reviewed the review by the Early Breast Cancer Collaboratists Group published by Artie and colleagues in NEJ 2017.  This demonstrates it will be an approximate 13 percent risk of breast cancer death and slightly higher recurrence 20 years from diagnosis.  Based on this, she will continue her arimidex.    She will return in 12 months with mammogram in 6 months.      Her mammogram is due in 12/2018.       2.  Bone health.  She had a DEXA scan previously with a T-score of -1.4.  Will monitor.  Encouraged weight bearing exercise, vitamin D and calcium.     3.  Hypertension.  She has been seeing her primary care provider for this.  We reviewed the importance of exercise as well as weight management today.  Patients who exercise at least 150 minutes per week of moderate intensity walking have approximately a 30% lower recurrence rate of breast cancer.      4.  Obesity.  We encouraged her to lose weight and exercise regularly.      5.  She continues to see Dr. Layne for her other health care maintenance and cancer screening.     Kaycee Meng

## 2018-06-11 NOTE — PROGRESS NOTES
"[facility] CANCER CLINIC  ONCOLOGY FOLLOW-UP VISIT NOTE    PATIENT NAME: Kenna Miranda    YOB: 1955  MRN :[id number]  DATE OF VISIT: Jun 11, 2018    Diagnosis: Locally advanced breast cancer .     History of present Illness :  Ms. Miranda is a 61-year-old female with a history of hypertension, hyperlipidemia and diabetes and a stage I, ER positive , Her2 negative LEFT breast cancer.     Cancer Treatment Summary:    10/24/12 - Abnormal screening mammogram  11/5/2012 - left lumpectomy with sentinel lymph node biopsy:  Invasive adenocarcinoma, grade 1, %, OR 80%, her 2 negative.  Final size 1.1 x 0.7 cm  - Oncotype DX 12 chance of distant recurrence 8% in 10 years, assuming 5 years of tamoxifen.  - Adjuvant radiation therapy with completion January 2013  - Arimidex started January 2013    INTERVAL HISTORY : Kenna returns for a routine followup visit.  She stopped her arimidex in January 2018, and then restarted it in May.  She reports that she notices mild arthralgias with her AI.  Other than this, she does not notice any side effects from the arimidex.  We had previously reviewed the pros and cons of taking the medication; she prefers to be on it.      She has no chest pain.  She has no nausea, vomiting, diarrhea or constipation.       No new medical problems.  No new family history.     ROS: 10 point ROS neg other than the symptoms noted above in the HPI.    PHYSICAL EXAM :  /74 (BP Location: Right arm, Patient Position: Chair, Cuff Size: Adult Large)  Pulse 70  Temp 97.6  F (36.4  C) (Oral)  Resp 16  Ht 1.575 m (5' 2.01\")  Wt 96.5 kg (212 lb 11.2 oz)  SpO2 95%  BMI 38.89 kg/m2   GENERAL : Alert and oriented , not in distress   HEENT: no icterus   NECK: Supple, no thyromegaly.   LYMPH:  No cervical, supraclavicular, axillary, epitrochlear, or inguinal lymphadenopathy.  BREAST: Without palpable abnormalities, no overlying skin abnormalities.  No " palpable lesions.   LUNGS: Clear breath sounds bilaterally, no wheezing, no crackles.    CARDIOVASCULAR: S1 and S2 well heard, regular rate and rhythm, no murmur or gallop.   ABDOMEN: Soft, nontender, positive bowel sounds. No organomegaly was appreciated.  EXTREMITIES: No cyanosis, no clubbing, no edema.    SKIN: No skin rash, no purpura or petechiae.    NEUROLOGIC: Normal mental status. Alert and oriented .      Mammogram December 2017 unremarkable    ASSESSMENT AND PLAN:  62-year-old female with a T1c N0 M0, infiltrating ductal carcinoma of the left breast, ER/ME-positive, HER2/kenia-negative.     1.  Left breast cancer.  She is status post left lumpectomy and sentinel lymph node biopsy.  Her Oncotype DX score was 12 and she did not require adjuvant chemotherapy.  She had adjuvant radiation.  She has been on Arimidex since 01/2013.  Now she has now completed 5 years of treatment.  Kenna has some mild arthralgias and myalgias related to her Arimidex versus osteoarthritis.  We reviewed the data from the MA-17 trial looking at prolonged endocrine therapy with an aromatase inhibitor.  She would have a few percent advantage in the risk reduction of distant recurrent disease if she stayed on Arimidex.      We also reviewed the review by the Early Breast Cancer Collaboratists Group published by Artie and colleagues in NEJ 2017.  This demonstrates it will be an approximate 13 percent risk of breast cancer death and slightly higher recurrence 20 years from diagnosis.  Based on this, she will continue her arimidex.    She will return in 12 months with mammogram in 6 months.      Her mammogram is due in 12/2018.       2.  Bone health.  She had a DEXA scan previously with a T-score of -1.4. Will monitor.  Encouraged weight bearing exercise, vitamin D and calcium.     3.  Hypertension.  She has been seeing her primary care provider for this.  We reviewed the importance of exercise as well as weight management today.  Patients  who exercise at least 150 minutes per week of moderate intensity walking have approximately a 30% lower recurrence rate of breast cancer.      4.  Obesity.  We encouraged her to lose weight and exercise regularly.      5.  She continues to see Dr. Layne for her other health care maintenance and cancer screening.     Kaycee Meng

## 2018-06-11 NOTE — MR AVS SNAPSHOT
After Visit Summary   6/11/2018    Kenna Bravonimadhuriycz    MRN: [id number]           Patient Information     Date Of Birth          1955        Visit Information        Provider Department      6/11/2018 10:00 AM Kaycee Meng MD [facility]        Today's Diagnoses     Malignant neoplasm of breast in female, estrogen receptor positive, unspecified laterality, unspecified site of breast (H)           Follow-ups after your visit        Future tests that were ordered for you today     Open Future Orders        Priority Expected Expires Ordered    MA Screening Digital Bilateral Routine  6/11/2019 6/11/2018            Who to contact     If you have questions or need follow up information about today's clinic visit or your schedule please contact [facility] directly at [phone].  Normal or non-critical lab and imaging results will be communicated to you by MyChart, letter or phone within 4 business days after the clinic has received the results. If you do not hear from us within 7 days, please contact the clinic through MyChart or phone. If you have a critical or abnormal lab result, we will notify you by phone as soon as possible.  Submit refill requests through BlueMessaging or call your pharmacy and they will forward the refill request to us. Please allow 3 business days for your refill to be completed.          Additional Information About Your Visit        MyChart Information     BlueMessaging gives you secure access to your electronic health record. If you see a primary care provider, you can also send messages to your care team and make appointments. If you have questions, please call your primary care clinic.  If you do not have a primary care provider, please call [phone] and they will assist you.        Care EveryWhere ID     This is your Care EveryWhere ID. This could be used by other organizations to access your [facility]  "records  [id number]        Your Vitals Were     Pulse Temperature Respirations Height Pulse Oximetry BMI (Body Mass Index)    70 97.6  F (36.4  C) (Oral) 16 1.575 m (5' 2.01\") 95% 38.89 kg/m2       Blood Pressure from Last 3 Encounters:   06/11/18 132/74   01/22/18 132/74   11/08/17 130/77    Weight from Last 3 Encounters:   06/11/18 96.5 kg (212 lb 11.2 oz)   01/22/18 95.5 kg (210 lb 8 oz)   11/08/17 95.7 kg (210 lb 14.4 oz)                 Today's Medication Changes          These changes are accurate as of 6/11/18 11:01 AM.  If you have any questions, ask your nurse or doctor.               These medicines have changed or have updated prescriptions.        Dose/Directions    anastrozole 1 MG tablet   Commonly known as:  ARIMIDEX   This may have changed:  See the new instructions.   Used for:  Malignant neoplasm of breast in female, estrogen receptor positive, unspecified laterality, unspecified site of breast (H)   Changed by:  Kaycee Meng MD        Dose:  1 mg   Take 1 tablet (1 mg) by mouth daily   Quantity:  90 tablet   Refills:  3            Where to get your medicines      These medications were sent to [facility] in [place] [address]  [address] [facility] [address]     Phone:  [phone]     anastrozole 1 MG tablet                Primary Care Provider Office Phone # Fax #    Carolyne Layne [phone]       [address] [facility] [address]        Equal Access to Services     [facility] AH: Hadii darion jama hadasho Soomaali, waaxda luqadaha, qaybta kaalmada adeegyada, kesha fair. So [facility] [phone].    ATENCIÓN: Si habla español, tiene a tam disposición servicios gratuitos de asistencia lingüística. Llame al [phone].    We comply with applicable federal civil rights laws and [place] laws. We do not discriminate on the basis of race, color, national origin, age, " disability, sex, sexual orientation, or gender identity.            Thank you!     Thank you for choosing [facility]  for your care. Our goal is always to provide you with excellent care. Hearing back from our patients is one way we can continue to improve our services. Please take a few minutes to complete the written survey that you may receive in the mail after your visit with us. Thank you!             Your Updated Medication List - Protect others around you: Learn how to safely use, store and throw away your medicines at www.disposemymeds.org.          This list is accurate as of 6/11/18 11:01 AM.  Always use your most recent med list.                   Brand Name Dispense Instructions for use Diagnosis    amLODIPine 10 MG tablet    NORVASC    90 tablet    Take 1 tablet (10 mg) by mouth daily    Benign essential hypertension       anastrozole 1 MG tablet    ARIMIDEX    90 tablet    Take 1 tablet (1 mg) by mouth daily    Malignant neoplasm of breast in female, estrogen receptor positive, unspecified laterality, unspecified site of breast (H)       aspirin 81 MG tablet      1 TABLET DAILY        atorvastatin 20 MG tablet    LIPITOR    24 tablet    Take 1 tablet (20 mg) by mouth every morning Patient is taking 2 times a week.    Hyperlipidemia LDL goal <100       calcium citrate-vitamin D 315-200 MG-UNIT Tabs per tablet    CITRACAL     Take 1 tablet by mouth 2 times daily    Malignant neoplasm of breast in female, estrogen receptor positive, unspecified laterality, unspecified site of breast (H)       levothyroxine 125 MCG tablet    SYNTHROID/LEVOTHROID    90 tablet    Take 1 tablet (125 mcg) by mouth every morning    Other specified hypothyroidism       losartan 50 MG tablet    COZAAR    90 tablet    Take 1 tablet (50 mg) by mouth daily    Type 2 diabetes mellitus with microalbuminuria, without long-term current use of insulin (H), Essential hypertension with goal blood pressure less than  140/90       metoprolol tartrate 25 MG tablet    LOPRESSOR    180 tablet    Take 1 tablet (25 mg) by mouth 2 times daily    Essential hypertension with goal blood pressure less than 140/90

## 2018-06-11 NOTE — NURSING NOTE
"Oncology Rooming Note    June 11, 2018 10:15 AM   Kenna Miranda is a 62 year old female who presents for:    Chief Complaint   Patient presents with     Oncology Clinic Visit     return - breast ca      Initial Vitals: /74 (BP Location: Right arm, Patient Position: Chair, Cuff Size: Adult Large)  Pulse 70  Temp 97.6  F (36.4  C) (Oral)  Resp 16  Ht 1.575 m (5' 2.01\")  Wt 96.5 kg (212 lb 11.2 oz)  SpO2 95%  BMI 38.89 kg/m2 Estimated body mass index is 38.89 kg/(m^2) as calculated from the following:    Height as of this encounter: 1.575 m (5' 2.01\").    Weight as of this encounter: 96.5 kg (212 lb 11.2 oz). Body surface area is 2.05 meters squared.  Data Unavailable Comment: Data Unavailable   No LMP recorded. Patient is postmenopausal.  Allergies reviewed: Yes  Medications reviewed: Yes    Medications: Medication refills not needed today.  Pharmacy name entered into Caipiaobao:    [facility] - [place], [place] - [address]  [facility] [address] [place] [facility] - [place], [place] - [address]  [facility] - [place], [place] - [address]    Clinical concerns: no new concerns     6 minutes for nursing intake (face to face time)     Tamanna Hassan CMA            "

## 2018-09-04 DIAGNOSIS — R80.9 TYPE 2 DIABETES MELLITUS WITH MICROALBUMINURIA, WITHOUT LONG-TERM CURRENT USE OF INSULIN (H): ICD-10-CM

## 2018-09-04 DIAGNOSIS — I10 BENIGN ESSENTIAL HYPERTENSION: ICD-10-CM

## 2018-09-04 DIAGNOSIS — E11.29 TYPE 2 DIABETES MELLITUS WITH MICROALBUMINURIA, WITHOUT LONG-TERM CURRENT USE OF INSULIN (H): ICD-10-CM

## 2018-09-04 DIAGNOSIS — I10 ESSENTIAL HYPERTENSION WITH GOAL BLOOD PRESSURE LESS THAN 140/90: ICD-10-CM

## 2018-09-05 RX ORDER — LOSARTAN POTASSIUM 50 MG/1
TABLET ORAL
Qty: 30 TABLET | Refills: 0 | Status: SHIPPED | OUTPATIENT
Start: 2018-09-05 | End: 2018-09-18

## 2018-09-05 RX ORDER — AMLODIPINE BESYLATE 10 MG/1
TABLET ORAL
Qty: 30 TABLET | Refills: 0 | Status: SHIPPED | OUTPATIENT
Start: 2018-09-05 | End: 2018-09-18 | Stop reason: DRUGHIGH

## 2018-09-05 NOTE — TELEPHONE ENCOUNTER
"Requested Prescriptions   Pending Prescriptions Disp Refills     losartan (COZAAR) 50 MG tablet [Pharmacy Med Name: LOSARTAN POTASSIUM 50MG TABS] 30 tablet 0    Last Written Prescription Date:  8-2-18  Last Fill Quantity: 30,  # refills: 0   Last office visit: 9/7/2017 with prescribing provider:  9-7-17   Future Office Visit:   Next 5 appointments (look out 90 days)     Sep 18, 2018 11:00 AM CDT   PHYSICAL with Carolyne Layne MD   [facility] ([facility]    [address] [place] [address] [phone]                  Sig: TAKE ONE TABLET BY MOUTH EVERY DAY (NEED TO BE SEEN FOR LABS)    Angiotensin-II Receptors Failed    9/4/2018  8:53 AM       Failed - Normal serum creatinine on file in past 12 months    Recent Labs   Lab Test  10/02/17   0856   CR  1.30*            Passed - Blood pressure under 140/90 in past 12 months    BP Readings from Last 3 Encounters:   06/11/18 132/74   01/22/18 132/74   11/08/17 130/77                Passed - Recent (12 mo) or future (30 days) visit within the authorizing provider's specialty    Patient had office visit in the last 12 months or has a visit in the next 30 days with authorizing provider or within the authorizing provider's specialty.  See \"Patient Info\" tab in inbasket, or \"Choose Columns\" in Meds & Orders section of the refill encounter.           Passed - Patient is age 18 or older       Passed - No active pregnancy on record       Passed - Normal serum potassium on file in past 12 months    Recent Labs   Lab Test  10/02/17   0856   POTASSIUM  4.1                   Passed - No positive pregnancy test in past 12 months        amLODIPine (NORVASC) 10 MG tablet [Pharmacy Med Name: AMLODIPINE BESYLATE 10MG TABS] 30 tablet 0    Last Written Prescription Date:  8-2-18  Last Fill Quantity: 30,  # refills: 0   Last office visit: 9/7/2017 with prescribing provider:  9-7-17   Future Office Visit: " "  Next 5 appointments (look out 90 days)     Sep 18, 2018 11:00 AM CDT   PHYSICAL with Carolyne Layne MD   [facility] ([facility])    [address] [facility] [address] [phone]                  Sig: TAKE ONE TABLET BY MOUTH EVERY DAY (NEED TO BE SEEN FOR LABS)    Calcium Channel Blockers Protocol  Failed    9/4/2018  8:53 AM       Failed - Normal serum creatinine on file in past 12 months    Recent Labs   Lab Test  10/02/17   0856   CR  1.30*            Passed - Blood pressure under 140/90 in past 12 months    BP Readings from Last 3 Encounters:   06/11/18 132/74   01/22/18 132/74   11/08/17 130/77                Passed - Recent (12 mo) or future (30 days) visit within the authorizing provider's specialty    Patient had office visit in the last 12 months or has a visit in the next 30 days with authorizing provider or within the authorizing provider's specialty.  See \"Patient Info\" tab in inbasket, or \"Choose Columns\" in Meds & Orders section of the refill encounter.           Passed - Patient is age 18 or older       Passed - No active pregnancy on record       Passed - No positive pregnancy test in past 12 months          "

## 2018-09-05 NOTE — TELEPHONE ENCOUNTER
Routing refill request to provider for review/approval because:  Failed protocol below.  Ruth Luz RN [facility] Triage.

## 2018-09-12 ENCOUNTER — OFFICE VISIT (OUTPATIENT)
Dept: OPHTHALMOLOGY | Facility: CLINIC | Age: 63
End: 2018-09-12
Payer: COMMERCIAL

## 2018-09-12 DIAGNOSIS — H43.811 POSTERIOR VITREOUS DETACHMENT OF RIGHT EYE: ICD-10-CM

## 2018-09-12 DIAGNOSIS — H02.839 DERMATOCHALASIS OF EYELID: ICD-10-CM

## 2018-09-12 DIAGNOSIS — R80.9 TYPE 2 DIABETES MELLITUS WITH MICROALBUMINURIA, WITHOUT LONG-TERM CURRENT USE OF INSULIN (H): Primary | ICD-10-CM

## 2018-09-12 DIAGNOSIS — E11.29 TYPE 2 DIABETES MELLITUS WITH MICROALBUMINURIA, WITHOUT LONG-TERM CURRENT USE OF INSULIN (H): Primary | ICD-10-CM

## 2018-09-12 DIAGNOSIS — H52.4 PRESBYOPIA: ICD-10-CM

## 2018-09-12 DIAGNOSIS — H25.13 NUCLEAR SCLEROSIS OF BOTH EYES: ICD-10-CM

## 2018-09-12 PROCEDURE — 92015 DETERMINE REFRACTIVE STATE: CPT | Performed by: STUDENT IN AN ORGANIZED HEALTH CARE EDUCATION/TRAINING PROGRAM

## 2018-09-12 PROCEDURE — 92004 COMPRE OPH EXAM NEW PT 1/>: CPT | Performed by: STUDENT IN AN ORGANIZED HEALTH CARE EDUCATION/TRAINING PROGRAM

## 2018-09-12 ASSESSMENT — EXTERNAL EXAM - LEFT EYE: OS_EXAM: NORMAL

## 2018-09-12 ASSESSMENT — VISUAL ACUITY
OD_CC: J1
CORRECTION_TYPE: GLASSES
METHOD: SNELLEN - LINEAR
OD_CC: 20/25
OS_CC: 20/25
OS_CC: J1

## 2018-09-12 ASSESSMENT — REFRACTION_MANIFEST
OD_AXIS: 007
OD_ADD: +3.00
OD_SPHERE: -0.50
OS_CYLINDER: SPHERE
OS_ADD: +3.00
OD_CYLINDER: +1.25
OS_SPHERE: +0.50

## 2018-09-12 ASSESSMENT — CUP TO DISC RATIO
OD_RATIO: 0.5
OS_RATIO: 0.45

## 2018-09-12 ASSESSMENT — TONOMETRY
IOP_METHOD: APPLANATION
OD_IOP_MMHG: 20
OS_IOP_MMHG: 19

## 2018-09-12 ASSESSMENT — REFRACTION_WEARINGRX
OD_CYLINDER: +0.50
OS_SPHERE: +1.00
OS_ADD: +2.50
OD_AXIS: 004
SPECS_TYPE: BIFOCAL
OS_CYLINDER: SPHERE
OD_ADD: +2.50
OD_SPHERE: -0.75

## 2018-09-12 ASSESSMENT — CONF VISUAL FIELD
OS_NORMAL: 1
OD_NORMAL: 1

## 2018-09-12 ASSESSMENT — SLIT LAMP EXAM - LIDS
COMMENTS: 2+ DERMATOCHALASIS
COMMENTS: 2+ DERMATOCHALASIS

## 2018-09-12 ASSESSMENT — EXTERNAL EXAM - RIGHT EYE: OD_EXAM: NORMAL

## 2018-09-12 NOTE — MR AVS SNAPSHOT
After Visit Summary   9/12/2018    Kenna Andersonycz    MRN: [id number]           Patient Information     Date Of Birth          1955        Visit Information        Provider Department      9/12/2018 1:00 PM Elsa Moncada MD [facility]        Today's Diagnoses     Nuclear sclerosis of both eyes    -  1    Presbyopia        Type 2 diabetes mellitus with microalbuminuria, without long-term current use of insulin (H)          Care Instructions    Glasses prescription given - optional  Keep blood sugar levels under control    Elsa Moncada MD  [phone]    Diabetes weakens the blood vessels all over the body, including the eyes. Damage to the blood vessels in the eyes can cause swelling or bleeding into part of the eye (called the retina). This is called diabetic retinopathy (MANUELA-tin-AH-puh-thee). If not treated, this disease can cause vision loss or blindness.   Symptoms may include blurred or distorted vision, but many people have no symptoms. It's important to see your eye doctor regularly to check for problems.   Early treatment and good control can help protect your vision. Here are the things you can do to help prevent vision loss:      1. Keep your blood sugar levels under tight control.      2. Bring high blood pressure under control.      3. No smoking.      4. Have yearly dilated eye exams.            Follow-ups after your visit        Follow-up notes from your care team     Return in about 1 year (around 9/12/2019) for Complete Exam.      Your next 10 appointments already scheduled     Sep 18, 2018 11:00 AM CDT   PHYSICAL with Carolyne Layne MD   [facility] ([facility]    [address] [facility] [address] [phone]            Dec 31, 2018  1:00 PM CST   MA SCREENING DIGITAL BILATERAL with FKMA1   [facility] ([facility])    [address] [place] [address]  [place]  [place] [address]   [phone]           Do not use any powder, lotion or deodorant under your arms or on your breast. If you do, we will ask you to remove it before your exam.  Wear comfortable, two-piece clothing.  If you have any allergies, tell your care team.  Bring any previous mammograms from other facilities or have them mailed to the breast center.            Jun 11, 2019  1:30 PM CDT   (Arrive by 1:15 PM)   Return Visit with Kaycee Meng MD   [facility] ([facility])    [address]  [address]  [facility] [place] [address] [phone]              Who to contact     If you have questions or need follow up information about today's clinic visit or your schedule please contact [facility] directly at [phone].  Normal or non-critical lab and imaging results will be communicated to you by MyChart, letter or phone within 4 business days after the clinic has received the results. If you do not hear from us within 7 days, please contact the clinic through MyChart or phone. If you have a critical or abnormal lab result, we will notify you by phone as soon as possible.  Submit refill requests through [email or web address] or call your pharmacy and they will forward the refill request to us. Please allow 3 business days for your refill to be completed.          Additional Information About Your Visit        [employer] Information     [email or web address] gives you secure access to your electronic health record. If you see a primary care provider, you can also send messages to your care team and make appointments. If you have questions, please call your primary care clinic.  If you do not have a primary care provider, please call [phone] and they will assist you.        Care EveryWhere ID     This is your Care EveryWhere ID. This could be used by other organizations to access your [facility] medical records  [id number]         Blood Pressure from Last 3  Encounters:   06/11/18 132/74   01/22/18 132/74   11/08/17 130/77    Weight from Last 3 Encounters:   06/11/18 96.5 kg (212 lb 11.2 oz)   01/22/18 95.5 kg (210 lb 8 oz)   11/08/17 95.7 kg (210 lb 14.4 oz)              We Performed the Following     EYE EXAM (SIMPLE-NONBILLABLE)     REFRACTIVE STATUS        Primary Care Provider Office Phone # Fax #    Carolyne Layne [phone]       [address] [facility] [address]        Equal Access to Services     [facility]: Hadii aad ku hadasho [place], waaxda luqadaha, qaybta kaalmada adelaurelyacandelario, kesha vann . So [facility] [phone].    ATENCIÓN: Si habla español, tiene a tam disposición servicios gratuitos de asistencia lingüística. Llame al [phone].    We comply with applicable federal civil rights laws and [place] laws. We do not discriminate on the basis of race, color, national origin, age, disability, sex, sexual orientation, or gender identity.            Thank you!     Thank you for choosing [facility]  for your care. Our goal is always to provide you with excellent care. Hearing back from our patients is one way we can continue to improve our services. Please take a few minutes to complete the written survey that you may receive in the mail after your visit with us. Thank you!             Your Updated Medication List - Protect others around you: Learn how to safely use, store and throw away your medicines at www.disposemymeds.org.          This list is accurate as of 9/12/18  1:54 PM.  Always use your most recent med list.                   Brand Name Dispense Instructions for use Diagnosis    amLODIPine 10 MG tablet    NORVASC    30 tablet    TAKE ONE TABLET BY MOUTH EVERY DAY (NEED TO BE SEEN FOR LABS)    Benign essential hypertension       anastrozole 1 MG tablet    ARIMIDEX    90 tablet    Take 1 tablet (1 mg) by mouth daily    Malignant neoplasm of breast in female, estrogen  receptor positive, unspecified laterality, unspecified site of breast (H)       aspirin 81 MG tablet      1 TABLET DAILY        atorvastatin 20 MG tablet    LIPITOR    24 tablet    Take 1 tablet (20 mg) by mouth every morning Patient is taking 2 times a week.    Hyperlipidemia LDL goal <100       calcium citrate-vitamin D 315-200 MG-UNIT Tabs per tablet    CITRACAL     Take 1 tablet by mouth 2 times daily    Malignant neoplasm of breast in female, estrogen receptor positive, unspecified laterality, unspecified site of breast (H)       levothyroxine 125 MCG tablet    SYNTHROID/LEVOTHROID    90 tablet    Take 1 tablet (125 mcg) by mouth every morning    Other specified hypothyroidism       losartan 50 MG tablet    COZAAR    30 tablet    TAKE ONE TABLET BY MOUTH EVERY DAY (NEED TO BE SEEN FOR LABS)    Type 2 diabetes mellitus with microalbuminuria, without long-term current use of insulin (H), Essential hypertension with goal blood pressure less than 140/90       metoprolol tartrate 25 MG tablet    LOPRESSOR    180 tablet    Take 1 tablet (25 mg) by mouth 2 times daily    Essential hypertension with goal blood pressure less than 140/90

## 2018-09-12 NOTE — PROGRESS NOTES
Current Eye Medications:  None     Subjective:  Diabetic eye exam. Patient feels that her vision at near is blurrier. Denies eye pain or discomfort.     Lab Results   Component Value Date    A1C 6.9 09/05/2017    A1C 7.1 06/29/2017    A1C 7.0 08/04/2016    A1C 6.1 10/26/2015    A1C 5.9 06/25/2015     No previous eye injuries or surgeries.      Objective:  See Ophthalmology Exam.       Assessment:  Kenna Miranda is a 62 year old female who presents with:   Encounter Diagnoses   Name Primary?     Type 2 diabetes mellitus with microalbuminuria, without long-term current use of insulin (H) Negative diabetic retinopathy      Nuclear sclerosis of both eyes      Dermatochalasis of eyelid      Posterior vitreous detachment of right eye        Plan:  Glasses prescription given - optional  Keep blood sugar levels under control    Elsa Moncada MD  [phone]

## 2018-09-12 NOTE — LETTER
9/12/2018         RE: Kenna Miranda  [address] [place] [address]        Dear Colleague,    Thank you for referring your patient, Kenna Miranda, to the [facility].    No signs of diabetic retinopathy in either eye today.   Please see a copy of my visit note below.     Current Eye Medications:  None     Subjective:  Diabetic eye exam. Patient feels that her vision at near is blurrier. Denies eye pain or discomfort.     Lab Results   Component Value Date    A1C 6.9 09/05/2017    A1C 7.1 06/29/2017    A1C 7.0 08/04/2016    A1C 6.1 10/26/2015    A1C 5.9 06/25/2015     No previous eye injuries or surgeries.      Objective:  See Ophthalmology Exam.       Assessment:  Kenna Miranda is a 62 year old female who presents with:   Encounter Diagnoses   Name Primary?     Type 2 diabetes mellitus with microalbuminuria, without long-term current use of insulin (H) Negative diabetic retinopathy      Nuclear sclerosis of both eyes      Dermatochalasis of eyelid      Posterior vitreous detachment of right eye        Plan:  Glasses prescription given - optional  Keep blood sugar levels under control    Elsa Moncada MD  [phone]          Again, thank you for allowing me to participate in the care of your patient.        Sincerely,        Elsa Moncada MD

## 2018-09-12 NOTE — PATIENT INSTRUCTIONS
Glasses prescription given - optional  Keep blood sugar levels under control    Elsa Moncada MD  [phone]    Diabetes weakens the blood vessels all over the body, including the eyes. Damage to the blood vessels in the eyes can cause swelling or bleeding into part of the eye (called the retina). This is called diabetic retinopathy (MANUELA-tin-AH-puh-thee). If not treated, this disease can cause vision loss or blindness.   Symptoms may include blurred or distorted vision, but many people have no symptoms. It's important to see your eye doctor regularly to check for problems.   Early treatment and good control can help protect your vision. Here are the things you can do to help prevent vision loss:      1. Keep your blood sugar levels under tight control.      2. Bring high blood pressure under control.      3. No smoking.      4. Have yearly dilated eye exams.

## 2018-09-16 ASSESSMENT — ENCOUNTER SYMPTOMS
BREAST MASS: 0
DIARRHEA: 0
WEAKNESS: 0
SORE THROAT: 0
EYE PAIN: 0
MYALGIAS: 0
DYSURIA: 0
PALPITATIONS: 0
PARESTHESIAS: 0
JOINT SWELLING: 1
FREQUENCY: 0
ARTHRALGIAS: 0
ABDOMINAL PAIN: 0
NERVOUS/ANXIOUS: 0
COUGH: 1
HEMATOCHEZIA: 0
NAUSEA: 0
DIZZINESS: 0
CONSTIPATION: 0
HEMATURIA: 0
HEARTBURN: 0
SHORTNESS OF BREATH: 0
HEADACHES: 0
FEVER: 0

## 2018-09-18 ENCOUNTER — OFFICE VISIT (OUTPATIENT)
Dept: FAMILY MEDICINE | Facility: CLINIC | Age: 63
End: 2018-09-18
Payer: COMMERCIAL

## 2018-09-18 VITALS
TEMPERATURE: 97.8 F | WEIGHT: 217 LBS | HEIGHT: 62 IN | HEART RATE: 69 BPM | OXYGEN SATURATION: 95 % | BODY MASS INDEX: 39.93 KG/M2 | DIASTOLIC BLOOD PRESSURE: 68 MMHG | SYSTOLIC BLOOD PRESSURE: 111 MMHG

## 2018-09-18 DIAGNOSIS — Z12.4 SCREENING FOR MALIGNANT NEOPLASM OF CERVIX: ICD-10-CM

## 2018-09-18 DIAGNOSIS — M25.471 SWOLLEN ANKLES: ICD-10-CM

## 2018-09-18 DIAGNOSIS — Z85.3 PERSONAL HISTORY OF MALIGNANT NEOPLASM OF BREAST: ICD-10-CM

## 2018-09-18 DIAGNOSIS — Z11.4 SCREENING FOR HIV (HUMAN IMMUNODEFICIENCY VIRUS): ICD-10-CM

## 2018-09-18 DIAGNOSIS — Z00.00 ROUTINE GENERAL MEDICAL EXAMINATION AT A HEALTH CARE FACILITY: Primary | ICD-10-CM

## 2018-09-18 DIAGNOSIS — I10 BENIGN ESSENTIAL HYPERTENSION: ICD-10-CM

## 2018-09-18 DIAGNOSIS — E11.29 TYPE 2 DIABETES MELLITUS WITH MICROALBUMINURIA, WITHOUT LONG-TERM CURRENT USE OF INSULIN (H): ICD-10-CM

## 2018-09-18 DIAGNOSIS — E03.9 HYPOTHYROIDISM, UNSPECIFIED TYPE: ICD-10-CM

## 2018-09-18 DIAGNOSIS — E78.5 HYPERLIPIDEMIA LDL GOAL <100: ICD-10-CM

## 2018-09-18 DIAGNOSIS — Z80.0 FAMILY HISTORY OF COLON CANCER: ICD-10-CM

## 2018-09-18 DIAGNOSIS — I10 ESSENTIAL HYPERTENSION WITH GOAL BLOOD PRESSURE LESS THAN 140/90: ICD-10-CM

## 2018-09-18 DIAGNOSIS — Z13.89 SCREENING FOR DIABETIC PERIPHERAL NEUROPATHY: ICD-10-CM

## 2018-09-18 DIAGNOSIS — E66.01 SEVERE OBESITY (BMI 35.0-35.9 WITH COMORBIDITY) (H): ICD-10-CM

## 2018-09-18 DIAGNOSIS — M25.472 SWOLLEN ANKLES: ICD-10-CM

## 2018-09-18 DIAGNOSIS — R80.9 TYPE 2 DIABETES MELLITUS WITH MICROALBUMINURIA, WITHOUT LONG-TERM CURRENT USE OF INSULIN (H): ICD-10-CM

## 2018-09-18 LAB
ANION GAP SERPL CALCULATED.3IONS-SCNC: 10 MMOL/L (ref 3–14)
BUN SERPL-MCNC: 17 MG/DL (ref 7–30)
CALCIUM SERPL-MCNC: 10 MG/DL (ref 8.5–10.1)
CHLORIDE SERPL-SCNC: 101 MMOL/L (ref 94–109)
CHOLEST SERPL-MCNC: 169 MG/DL
CO2 SERPL-SCNC: 27 MMOL/L (ref 20–32)
CREAT SERPL-MCNC: 1.14 MG/DL (ref 0.52–1.04)
CREAT UR-MCNC: 214 MG/DL
GFR SERPL CREATININE-BSD FRML MDRD: 48 ML/MIN/1.7M2
GLUCOSE SERPL-MCNC: 233 MG/DL (ref 70–99)
HBA1C MFR BLD: 8.6 % (ref 0–5.6)
HDLC SERPL-MCNC: 41 MG/DL
LDLC SERPL CALC-MCNC: 67 MG/DL
MICROALBUMIN UR-MCNC: 94 MG/L
MICROALBUMIN/CREAT UR: 44.11 MG/G CR (ref 0–25)
NONHDLC SERPL-MCNC: 128 MG/DL
POTASSIUM SERPL-SCNC: 4 MMOL/L (ref 3.4–5.3)
SODIUM SERPL-SCNC: 138 MMOL/L (ref 133–144)
TRIGL SERPL-MCNC: 305 MG/DL
TSH SERPL DL<=0.005 MIU/L-ACNC: 3.42 MU/L (ref 0.4–4)

## 2018-09-18 PROCEDURE — 80061 LIPID PANEL: CPT | Performed by: INTERNAL MEDICINE

## 2018-09-18 PROCEDURE — 99214 OFFICE O/P EST MOD 30 MIN: CPT | Mod: 25 | Performed by: INTERNAL MEDICINE

## 2018-09-18 PROCEDURE — 99396 PREV VISIT EST AGE 40-64: CPT | Performed by: INTERNAL MEDICINE

## 2018-09-18 PROCEDURE — 36415 COLL VENOUS BLD VENIPUNCTURE: CPT | Performed by: INTERNAL MEDICINE

## 2018-09-18 PROCEDURE — 84443 ASSAY THYROID STIM HORMONE: CPT | Performed by: INTERNAL MEDICINE

## 2018-09-18 PROCEDURE — 87389 HIV-1 AG W/HIV-1&-2 AB AG IA: CPT | Performed by: INTERNAL MEDICINE

## 2018-09-18 PROCEDURE — 99207 C FOOT EXAM  NO CHARGE: CPT | Mod: 25 | Performed by: INTERNAL MEDICINE

## 2018-09-18 PROCEDURE — G0145 SCR C/V CYTO,THINLAYER,RESCR: HCPCS | Performed by: INTERNAL MEDICINE

## 2018-09-18 PROCEDURE — 82043 UR ALBUMIN QUANTITATIVE: CPT | Performed by: INTERNAL MEDICINE

## 2018-09-18 PROCEDURE — 83036 HEMOGLOBIN GLYCOSYLATED A1C: CPT | Performed by: INTERNAL MEDICINE

## 2018-09-18 PROCEDURE — 87624 HPV HI-RISK TYP POOLED RSLT: CPT | Performed by: INTERNAL MEDICINE

## 2018-09-18 PROCEDURE — 80048 BASIC METABOLIC PNL TOTAL CA: CPT | Performed by: INTERNAL MEDICINE

## 2018-09-18 RX ORDER — METFORMIN HCL 500 MG
2000 TABLET, EXTENDED RELEASE 24 HR ORAL
Qty: 360 TABLET | Refills: 1 | Status: SHIPPED | OUTPATIENT
Start: 2018-09-18 | End: 2019-06-26

## 2018-09-18 RX ORDER — METOPROLOL TARTRATE 25 MG/1
25 TABLET, FILM COATED ORAL 2 TIMES DAILY
Qty: 180 TABLET | Refills: 3 | Status: SHIPPED | OUTPATIENT
Start: 2018-09-18 | End: 2019-11-12

## 2018-09-18 RX ORDER — AMLODIPINE BESYLATE 5 MG/1
5 TABLET ORAL DAILY
Qty: 90 TABLET | Refills: 3 | Status: SHIPPED | OUTPATIENT
Start: 2018-09-18 | End: 2019-06-27

## 2018-09-18 RX ORDER — LOSARTAN POTASSIUM 50 MG/1
TABLET ORAL
Qty: 90 TABLET | Refills: 3 | Status: SHIPPED | OUTPATIENT
Start: 2018-09-18 | End: 2019-06-27

## 2018-09-18 RX ORDER — ATORVASTATIN CALCIUM 20 MG/1
20 TABLET, FILM COATED ORAL EVERY MORNING
Qty: 24 TABLET | Refills: 3 | Status: SHIPPED | OUTPATIENT
Start: 2018-09-18 | End: 2019-06-27

## 2018-09-18 ASSESSMENT — ENCOUNTER SYMPTOMS
WEAKNESS: 0
FEVER: 0
HEMATOCHEZIA: 0
NERVOUS/ANXIOUS: 0
NAUSEA: 0
HEARTBURN: 0
HEADACHES: 0
DIARRHEA: 0
ARTHRALGIAS: 0
SORE THROAT: 0
DYSURIA: 0
SHORTNESS OF BREATH: 0
DIZZINESS: 0
FREQUENCY: 0
COUGH: 1
BREAST MASS: 0
EYE PAIN: 0
ABDOMINAL PAIN: 0
MYALGIAS: 0
PARESTHESIAS: 0
PALPITATIONS: 0
CONSTIPATION: 0
JOINT SWELLING: 1
HEMATURIA: 0

## 2018-09-18 NOTE — PATIENT INSTRUCTIONS
"Get shingles vaccine (2 shots, 2 - 6 months apart) -- you can get on the ancillary schedule    Reduce amlodipine to 5 mg daily (ankle swelling)    Plan movement, at least 25\" per day...     Try apple watch for observing steps only:  Goal is 10,000    Return to clinic 6 months follow up diabetes, weight.   "

## 2018-09-18 NOTE — MR AVS SNAPSHOT
"              After Visit Summary   9/18/2018    Kenna Andersonyclobito    MRN: [id number]           Patient Information     Date Of Birth          1955        Visit Information        Provider Department      9/18/2018 11:00 AM Carolyne Layne MD [facility]        Today's Diagnoses     Routine general medical examination at a health care facility    -  1    Overweight HCC        Type 2 diabetes mellitus with microalbuminuria, without long-term current use of insulin (H)        Personal history of malignant neoplasm of breast        Hypothyroidism, unspecified type        Family history of colon cancer        Essential hypertension with goal blood pressure less than 140/90        Screening for malignant neoplasm of cervix        Screening for diabetic peripheral neuropathy        Hyperlipidemia LDL goal <100        Screening for HIV (human immunodeficiency virus)        Benign essential hypertension        Swollen ankles          Care Instructions    Get shingles vaccine (2 shots, 2 - 6 months apart) -- you can get on the ancillary schedule    Reduce amlodipine to 5 mg daily (ankle swelling)    Plan movement, at least 25\" per day...     Try apple watch for observing steps only:  Goal is 10,000    Return to clinic 6 months follow up diabetes, weight.           Follow-ups after your visit        Follow-up notes from your care team     Return in about 6 months (around 3/18/2019) for diabetes follow up, weight recheck. .      Your next 10 appointments already scheduled     Dec 31, 2018  1:00 PM CST   MA SCREENING DIGITAL BILATERAL with FKMA1   [facility] ([facility])    [address] [place] [id number] [phone]           Do not use any powder, lotion or deodorant under your arms or on your breast. If you do, we will ask you to remove it before your exam.  Wear comfortable, two-piece clothing.  If you have any allergies, tell your care team. " " Bring any previous mammograms from other facilities or have them mailed to the breast center.            Jun 11, 2019  1:30 PM CDT   (Arrive by 1:15 PM)   Return Visit with Kaycee Meng MD   [facility] ([facility])    [address]  [address] [place] [id number] [phone]              Who to contact     If you have questions or need follow up information about today's clinic visit or your schedule please contact [facility] directly at [phone].  Normal or non-critical lab and imaging results will be communicated to you by MyChart, letter or phone within 4 business days after the clinic has received the results. If you do not hear from us within 7 days, please contact the clinic through [employer] or phone. If you have a critical or abnormal lab result, we will notify you by phone as soon as possible.  Submit refill requests through [employer] or call your pharmacy and they will forward the refill request to us. Please allow 3 business days for your refill to be completed.          Additional Information About Your Visit        MyChart Information     [employer] gives you secure access to your electronic health record. If you see a primary care provider, you can also send messages to your care team and make appointments. If you have questions, please call your primary care clinic.  If you do not have a primary care provider, please call [phone] and they will assist you.        Care EveryWhere ID     This is your Care EveryWhere ID. This could be used by other organizations to access your [facility] medical records  [id number]        Your Vitals Were     Pulse Temperature Height Pulse Oximetry BMI (Body Mass Index)       69 97.8  F (36.6  C) (Oral) 5' 1.75\" (1.568 m) 95% 40.01 kg/m2        Blood Pressure from Last 3 Encounters:   09/18/18 111/68   06/11/18 132/74   01/22/18 132/74    Weight from Last 3 Encounters:   09/18/18 " 217 lb (98.4 kg)   06/11/18 212 lb 11.2 oz (96.5 kg)   01/22/18 210 lb 8 oz (95.5 kg)              We Performed the Following     Albumin Random Urine Quantitative with Creat Ratio     BASIC METABOLIC PANEL     FOOT EXAM  NO CHARGE [89400.114]     HEMOGLOBIN A1C     HIV Antigen Antibody Combo     HPV High Risk Types DNA Cervical     Lipid panel reflex to direct LDL Non-fasting     OFFICE/OUTPT VISIT,EST,LEVL IV     Pap imaged thin layer screen with HPV - recommended age 30 - 65 years (select HPV order below)     TSH WITH FREE T4 REFLEX          Today's Medication Changes          These changes are accurate as of 9/18/18 12:18 PM.  If you have any questions, ask your nurse or doctor.               These medicines have changed or have updated prescriptions.        Dose/Directions    amLODIPine 5 MG tablet   Commonly known as:  NORVASC   This may have changed:    - medication strength  - See the new instructions.   Used for:  Essential hypertension with goal blood pressure less than 140/90   Changed by:  Carolyne Layne MD        Dose:  5 mg   Take 1 tablet (5 mg) by mouth daily   Quantity:  90 tablet   Refills:  3            Where to get your medicines      These medications were sent to [facility] - [place], [place] - [address]  [address], [facility] [address]     Phone:  [phone]     amLODIPine 5 MG tablet    atorvastatin 20 MG tablet    losartan 50 MG tablet    metoprolol tartrate 25 MG tablet                Primary Care Provider Office Phone # Fax #    Carolyne Layne [phone]       [address] [facility] [address]        Equal Access to Services     [facility] SHIMON AH: Hadii aad ku hadasho Soomaali, waaxda luqadaha, qaybta kaalmada adeegyada, waxay huyin haynohemy barrett'nohemy fair. So [facility] [phone].    ATENCIÓN: Si habla español, tiene a tam disposición servicios gratuitos de asistencia lingüística. Llame al  [phone].    We comply with applicable federal civil rights laws and [place] laws. We do not discriminate on the basis of race, color, national origin, age, disability, sex, sexual orientation, or gender identity.            Thank you!     Thank you for choosing [facility]  for your care. Our goal is always to provide you with excellent care. Hearing back from our patients is one way we can continue to improve our services. Please take a few minutes to complete the written survey that you may receive in the mail after your visit with us. Thank you!             Your Updated Medication List - Protect others around you: Learn how to safely use, store and throw away your medicines at www.disposemymeds.org.          This list is accurate as of 9/18/18 12:18 PM.  Always use your most recent med list.                   Brand Name Dispense Instructions for use Diagnosis    amLODIPine 5 MG tablet    NORVASC    90 tablet    Take 1 tablet (5 mg) by mouth daily    Essential hypertension with goal blood pressure less than 140/90       anastrozole 1 MG tablet    ARIMIDEX    90 tablet    Take 1 tablet (1 mg) by mouth daily    Malignant neoplasm of breast in female, estrogen receptor positive, unspecified laterality, unspecified site of breast (H)       aspirin 81 MG tablet      1 TABLET DAILY        atorvastatin 20 MG tablet    LIPITOR    24 tablet    Take 1 tablet (20 mg) by mouth every morning Patient is taking 2 times a week.    Hyperlipidemia LDL goal <100       calcium citrate-vitamin D 315-200 MG-UNIT Tabs per tablet    CITRACAL     Take 1 tablet by mouth 2 times daily    Malignant neoplasm of breast in female, estrogen receptor positive, unspecified laterality, unspecified site of breast (H)       levothyroxine 125 MCG tablet    SYNTHROID/LEVOTHROID    90 tablet    Take 1 tablet (125 mcg) by mouth every morning    Other specified hypothyroidism       losartan 50 MG tablet    COZAAR    90 tablet     TAKE ONE TABLET BY MOUTH EVERY DAY (NEED TO BE SEEN FOR LABS)    Type 2 diabetes mellitus with microalbuminuria, without long-term current use of insulin (H), Essential hypertension with goal blood pressure less than 140/90       metoprolol tartrate 25 MG tablet    LOPRESSOR    180 tablet    Take 1 tablet (25 mg) by mouth 2 times daily    Essential hypertension with goal blood pressure less than 140/90

## 2018-09-18 NOTE — LETTER
[facility]  [address] [place]  [address]  [phone]        September 20, 2018    Kenna Miranda  [address] [place] [address]        Dear Kenna,    Your HIV screening is negative:  Thank you for participating in this screening.  As discussed, your HgbA1C is quite elevated, implying that your sugars are probably elevated much of the time  We will start metformin  mg, 4 every evening with supper.  Let me know if you develop any GI issues with this medication such as loose stools.      Return to clinic 3 months (December '18) with labs a day or so prior.     Results for orders placed or performed in visit on 09/18/18   BASIC METABOLIC PANEL   Result Value Ref Range    Sodium 138 133 - 144 mmol/L    Potassium 4.0 3.4 - 5.3 mmol/L    Chloride 101 94 - 109 mmol/L    Carbon Dioxide 27 20 - 32 mmol/L    Anion Gap 10 3 - 14 mmol/L    Glucose 233 (H) 70 - 99 mg/dL    Urea Nitrogen 17 7 - 30 mg/dL    Creatinine 1.14 (H) 0.52 - 1.04 mg/dL    GFR Estimate 48 (L) >60 mL/min/1.7m2    GFR Estimate If Black 58 (L) >60 mL/min/1.7m2    Calcium 10.0 8.5 - 10.1 mg/dL   HEMOGLOBIN A1C   Result Value Ref Range    Hemoglobin A1C 8.6 (H) 0 - 5.6 %   Lipid panel reflex to direct LDL Non-fasting   Result Value Ref Range    Cholesterol 169 <200 mg/dL    Triglycerides 305 (H) <150 mg/dL    HDL Cholesterol 41 (L) >49 mg/dL    LDL Cholesterol Calculated 67 <100 mg/dL    Non HDL Cholesterol 128 <130 mg/dL   Albumin Random Urine Quantitative with Creat Ratio   Result Value Ref Range    Creatinine Urine 214 mg/dL    Albumin Urine mg/L 94 mg/L    Albumin Urine mg/g Cr 44.11 (H) 0 - 25 mg/g Cr   TSH WITH FREE T4 REFLEX   Result Value Ref Range    TSH 3.42 0.40 - 4.00 mU/L   HIV Antigen Antibody Combo   Result Value Ref Range    HIV Antigen Antibody Combo Nonreactive NR^Nonreactive           If you have any questions please call the clinic at [phone].    Sincerely,    Carolyne BARKER  Xi EM

## 2018-09-18 NOTE — PROGRESS NOTES
SUBJECTIVE:   CC: Kenna Miranda is an 62 year old woman who presents for preventive health visit.     62-year-old female with a history of hypertension, hyperlipidemia and diabetes and a stage I, ER positive , Her2 negative LEFT breast cancer. She stopped arimidex in 1/2018, then resumed it a few months later.. Tolerating well. .  ... She is NOT checking BS.   Weight has been stable      Some swelling in ankles,  .    Tolerating statin twice per week.     Physical   Annual:     Getting at least 3 servings of Calcium per day:  NO    Bi-annual eye exam:  Yes    Dental care twice a year:  Yes    Sleep apnea or symptoms of sleep apnea:  Daytime drowsiness    Diet:  Regular (no restrictions)    Frequency of exercise:  1 day/week    Duration of exercise:  Less than 15 minutes    Taking medications regularly:  Yes    Medication side effects:  Other    Additional concerns today:  No        Bone pain from the arimidex, any exercise tips?     Today's PHQ-2 Score:   PHQ-2 ( 1999 [employer]) 9/16/2018   Q1: Little interest or pleasure in doing things 0   Q2: Feeling down, depressed or hopeless 0   PHQ-2 Score 0   Q1: Little interest or pleasure in doing things Not at all   Q2: Feeling down, depressed or hopeless Not at all   PHQ-2 Score 0       Abuse: Current or Past(Physical, Sexual or Emotional)- No  Do you feel safe in your environment - Yes    Social History   Substance Use Topics     Smoking status: Never Smoker     Smokeless tobacco: Never Used      Comment: no second hand exposure.  when young Dad smoked.     Alcohol use Yes      Comment: very, very little     Alcohol Use 9/16/2018   If you drink alcohol do you typically have greater than 3 drinks per day OR greater than 7 drinks per week? No       Reviewed orders with patient.  Reviewed health maintenance and updated orders accordingly - Yes  Labs reviewed in EPIC  BP Readings from Last 3 Encounters:   09/18/18 111/68   06/11/18 132/74   01/22/18 132/74    Wt  Readings from Last 3 Encounters:   18 217 lb (98.4 kg)   18 212 lb 11.2 oz (96.5 kg)   18 210 lb 8 oz (95.5 kg)                  Patient Active Problem List   Diagnosis     Borderline osteopenia     HYPERLIPIDEMIA LDL GOAL <100     Family history of colon cancer     S/P colonoscopic polypectomy     Breast cancer (H)     Cataract of both eyes     Elevated LFTs     Dermatochalasis of eyelid     No diabetic retinopathy in both eyes     Tibialis posterior tendinitis, left     Essential hypertension with goal blood pressure less than 140/90     Hypothyroidism, unspecified type     Primary hyperparathyroidism (H)     PONV (postoperative nausea and vomiting)     Type 2 diabetes mellitus with microalbuminuria, without long-term current use of insulin (H)     Overweight HCC     Past Surgical History:   Procedure Laterality Date     BIOPSY  2015     BIOPSY BREAST NEEDLE LOCALIZATION, BIOPSY NODE SENTINEL, COMBINED  2012    Procedure: COMBINED BIOPSY BREAST NEEDLE LOCALIZATION, BIOPSY NODE SENTINEL;  Left Breast Wire Locilized Lumpectomy and Sentinal Lymph Node Biopsy;  Surgeon: Jesus Vicente MD;  Location: [facility]     C  DELIVERY ONLY      x 2     COLONOSCOPY  10-26-11    Return in 1 yr for Polyp Surveillance     COLONOSCOPY  12    Return for Colonoscopy in 3 yrs.Polyps     COMBINED CYSTOSCOPY, INSERT STENT URETER(S) Right 2017    Procedure: COMBINED CYSTOSCOPY, INSERT STENT URETER(S);  Cystoscopy Right retrograde pyelogram, Right ureteral Stent Placement;  Surgeon: Loida Sánchez MD;  Location: [facility] OR     COMBINED CYSTOSCOPY, RETROGRADES, URETEROSCOPY, LASER HOLMIUM LITHOTRIPSY URETER(S), INSERT STENT Right 2017    Procedure: COMBINED CYSTOSCOPY, RETROGRADES, URETEROSCOPY, LASER HOLMIUM LITHOTRIPSY URETER(S), INSERT STENT;  Cystoscopy, Right Ureteroscopy, Laser Lithotripsy, Right Stent Exchange ;  Surgeon: Ruth Boston MD;  Location: [facility]     LITHOTRIPSY   2007     SURGICAL HISTORY OF -       exploratory laparotomy     SURGICAL HISTORY OF -       Right ovary removed     SURGICAL HISTORY OF -   2008    breast biopsy       Social History   Substance Use Topics     Smoking status: Never Smoker     Smokeless tobacco: Never Used      Comment: no second hand exposure.  when young Dad smoked.     Alcohol use Yes      Comment: very, very little     Family History   Problem Relation Age of Onset     Cancer - colorectal Mother      Thyroid Disease Mother      Colon Cancer Mother      Cancer Father      Lung     Lipids Father      Glaucoma Father      Macular Degeneration Father      Depression Brother      Respiratory Brother      losing hearing in 50's         Current Outpatient Prescriptions   Medication Sig Dispense Refill     amLODIPine (NORVASC) 5 MG tablet Take 1 tablet (5 mg) by mouth daily 90 tablet 3     anastrozole (ARIMIDEX) 1 MG tablet Take 1 tablet (1 mg) by mouth daily 90 tablet 3     ASPIRIN 81 MG OR TABS 1 TABLET DAILY       atorvastatin (LIPITOR) 20 MG tablet Take 1 tablet (20 mg) by mouth every morning Patient is taking 2 times a week. 24 tablet 3     calcium citrate-vitamin D (CITRACAL) 315-200 MG-UNIT TABS per tablet Take 1 tablet by mouth 2 times daily       levothyroxine (SYNTHROID/LEVOTHROID) 125 MCG tablet Take 1 tablet (125 mcg) by mouth every morning 90 tablet 3     losartan (COZAAR) 50 MG tablet TAKE ONE TABLET BY MOUTH EVERY DAY (NEED TO BE SEEN FOR LABS) 90 tablet 3     metoprolol tartrate (LOPRESSOR) 25 MG tablet Take 1 tablet (25 mg) by mouth 2 times daily 180 tablet 3     [DISCONTINUED] amLODIPine (NORVASC) 10 MG tablet TAKE ONE TABLET BY MOUTH EVERY DAY (NEED TO BE SEEN FOR LABS) 30 tablet 0     [DISCONTINUED] atorvastatin (LIPITOR) 20 MG tablet Take 1 tablet (20 mg) by mouth every morning Patient is taking 2 times a week. 24 tablet 3     [DISCONTINUED] losartan (COZAAR) 50 MG tablet TAKE ONE TABLET BY MOUTH EVERY DAY (NEED TO BE SEEN FOR LABS)  30 tablet 0     [DISCONTINUED] metoprolol (LOPRESSOR) 25 MG tablet Take 1 tablet (25 mg) by mouth 2 times daily 180 tablet 3     Allergies   Allergen Reactions     Nkda [No Known Drug Allergies]      Recent Labs   Lab Test  10/02/17   0856  09/05/17   1442   07/02/17   0513   07/01/17   0658   06/30/17   0544   06/29/17   1148   08/04/16   0657   06/25/15   0700   A1C   --   6.9*   --    --    --    --    --    --    --   7.1*   --   7.0*   < >  5.9   LDL  84   --    --    --    --    --    --    --    --    --    --   65   --   64   HDL  44*   --    --    --    --    --    --    --    --    --    --   44*   --   56   TRIG  234*   --    --    --    --    --    --    --    --    --    --   210*   --   114   ALT   --    --    --   27   --   54*   --   74*   --   93*   < >  143*   < >   --    CR  1.30*  1.20*   < >  2.28*   --   2.53*   < >  2.86*   < >  2.91*   < >  1.11*   < >  0.97   GFRESTIMATED  41*  45*   < >  22*   --   19*   < >  17*   < >  16*   < >  50*   < >  58*   GFRESTBLACK  50*  55*   < >  26*   --   23*   < >  20*   < >  20*   < >  60*   < >  71   POTASSIUM  4.1  4.0   < >  3.0*   < >  3.1*   < >  3.0*   < >  3.4   < >  4.3   < >  4.2   TSH  1.54   --    --    --    --    --    --    --    --   4.50*   --   4.68*   --   4.09*    < > = values in this interval not displayed.        Patient over age 50, mutual decision to screen reflected in health maintenance.    Pertinent mammograms are reviewed under the imaging tab.  History of abnormal Pap smear:   Last 3 Pap and HPV Results:   PAP / HPV 10/24/2013 2/2/2010   PAP NIL NIL     PAP / HPV 10/24/2013 2/2/2010   PAP NIL NIL     Reviewed and updated as needed this visit by clinical staff         Reviewed and updated as needed this visit by Provider        Past Medical History:   Diagnosis Date     Benign breast lumps 2008    biopsied.     Borderline osteopenia 2/17/2010     Cancer (H)      Cholesterol serum increased      DM (diabetes mellitus), type 2 (H)       Elevated liver function tests     h/o mild increase of LFT's     HTN (hypertension)      Hypothyroid     as teenager.     Menopause      Personal history of kidney stones     + stones on CT scan , treated with lithotripsy     Rectal fissure     resolved      Past Surgical History:   Procedure Laterality Date     BIOPSY  2015     BIOPSY BREAST NEEDLE LOCALIZATION, BIOPSY NODE SENTINEL, COMBINED  2012    Procedure: COMBINED BIOPSY BREAST NEEDLE LOCALIZATION, BIOPSY NODE SENTINEL;  Left Breast Wire Locilized Lumpectomy and Sentinal Lymph Node Biopsy;  Surgeon: Jesus Vicente MD;  Location: [facility]     C  DELIVERY ONLY      x 2     COLONOSCOPY  10-26-11    Return in 1 yr for Polyp Surveillance     COLONOSCOPY  12    Return for Colonoscopy in 3 yrs.Polyps     COMBINED CYSTOSCOPY, INSERT STENT URETER(S) Right 2017    Procedure: COMBINED CYSTOSCOPY, INSERT STENT URETER(S);  Cystoscopy Right retrograde pyelogram, Right ureteral Stent Placement;  Surgeon: Loida Sánchez MD;  Location: [facility]     COMBINED CYSTOSCOPY, RETROGRADES, URETEROSCOPY, LASER HOLMIUM LITHOTRIPSY URETER(S), INSERT STENT Right 2017    Procedure: COMBINED CYSTOSCOPY, RETROGRADES, URETEROSCOPY, LASER HOLMIUM LITHOTRIPSY URETER(S), INSERT STENT;  Cystoscopy, Right Ureteroscopy, Laser Lithotripsy, Right Stent Exchange ;  Surgeon: Ruth Boston MD;  Location: [facility]     LITHOTRIPSY       SURGICAL HISTORY OF -       exploratory laparotomy     SURGICAL HISTORY OF -       Right ovary removed     SURGICAL HISTORY OF -       breast biopsy       Review of Systems   Constitutional: Negative for fever.   HENT: Negative for congestion, ear pain, hearing loss and sore throat.    Eyes: Negative for pain and visual disturbance.   Respiratory: Positive for cough. Negative for shortness of breath.    Cardiovascular: Positive for peripheral edema (ankles always swelling). Negative for chest pain and  "palpitations.   Gastrointestinal: Negative for abdominal pain, constipation, diarrhea, heartburn, hematochezia and nausea.   Breasts:  Negative for tenderness, breast mass and discharge.   Genitourinary: Negative for dysuria, frequency, genital sores, hematuria, pelvic pain, urgency, vaginal bleeding and vaginal discharge.   Musculoskeletal: Positive for joint swelling. Negative for arthralgias and myalgias.   Skin: Negative for rash.   Neurological: Negative for dizziness, weakness, headaches and paresthesias.   Psychiatric/Behavioral: The patient is not nervous/anxious.      CONSTITUTIONAL: NEGATIVE for fever, chills, change in weight  INTEGUMENTARY/SKIN: NEGATIVE for worrisome rashes, moles or lesions  EYES: NEGATIVE for vision changes or irritation  ENT: NEGATIVE for ear, mouth and throat problems  RESP: NEGATIVE for significant cough or SOB  BREAST: NEGATIVE for masses, tenderness or discharge  BREAST: lumpectomy scar barely visible.   CV: NEGATIVE for chest pain, palpitations or peripheral edema  GI: NEGATIVE for nausea, abdominal pain, heartburn, or change in bowel habits  : NEGATIVE for unusual urinary or vaginal symptoms. No vaginal bleeding.   menopausal female:      MUSCULOSKELETAL: NEGATIVE for significant arthralgias or myalgia  NEURO: NEGATIVE for weakness, dizziness or paresthesias  PSYCHIATRIC: NEGATIVE for changes in mood or affect      OBJECTIVE:   /68 (BP Location: Right arm, Patient Position: Sitting, Cuff Size: Adult Large)  Pulse 69  Temp 97.8  F (36.6  C) (Oral)  Ht 5' 1.75\" (1.568 m)  Wt 217 lb (98.4 kg)  SpO2 95%  BMI 40.01 kg/m2     Physical Exam     GENERAL APPEARANCE: healthy, alert and no distress  EYES: Eyes grossly normal to inspection, PERRL and conjunctivae and sclerae normal  HENT: ear canals and TM's normal, nose and mouth without ulcers or lesions, oropharynx clear and oral mucous membranes moist  NECK: no adenopathy, no asymmetry, masses, or scars and thyroid " normal to palpation  RESP: lungs clear to auscultation - no rales, rhonchi or wheezes  BREAST: normal without masses, tenderness or nipple discharge and no palpable axillary masses or adenopathy  BREAST: hard to see mastectomy scar.  xrt tattoo evident.   CV: regular rate and rhythm, normal S1 S2, no S3 or S4, no murmur, click or rub, no peripheral edema and peripheral pulses strong  ABDOMEN: soft, nontender, no hepatosplenomegaly, no masses and bowel sounds normal   (female): normal female external genitalia, normal urethral meatus, vaginal mucosal atrophy noted, normal cervix, adnexae, and uterus without masses or abnormal discharge   (female)   PAP done with fair view of cervix.   MS: no musculoskeletal defects are noted and gait is age appropriate without ataxia  SKIN: no suspicious lesions or rashes  NEURO: Normal strength and tone, sensory exam grossly normal, mentation intact and speech normal  PSYCH: mentation appears normal and affect normal/bright  Foot exam - both sides normal; no swelling, tenderness or skin or vascular lesions. Color and temperature is normal. Sensation is intact. Peripheral pulses are palpable. Toenails are normal.  Bilateral ankle/pedal edema, 1/4       Diagnostic Test Results:  Results for orders placed or performed in visit on 01/26/18   UA with Microscopic reflex to Culture   Result Value Ref Range    Color Urine Yellow     Appearance Urine Clear     Glucose Urine Negative NEG^Negative mg/dL    Bilirubin Urine Negative NEG^Negative    Ketones Urine Trace (A) NEG^Negative mg/dL    Specific Gravity Urine 1.020 1.003 - 1.035    pH Urine 6.0 5.0 - 7.0 pH    Protein Albumin Urine Trace (A) NEG^Negative mg/dL    Urobilinogen Urine 0.2 0.2 - 1.0 EU/dL    Nitrite Urine Positive (A) NEG^Negative    Blood Urine Negative NEG^Negative    Leukocyte Esterase Urine Moderate (A) NEG^Negative    Source Midstream Urine     WBC Urine >100 (A) OTO2^O - 2 /HPF    RBC Urine 2-5 (A) OTO2^O - 2 /HPF     Squamous Epithelial /LPF Urine Few FEW^Few /LPF    Bacteria Urine Moderate (A) NEG^Negative /HPF   Urine Culture Aerobic Bacterial   Result Value Ref Range    Specimen Description Midstream Urine     Culture Micro >100,000 colonies/mL  Escherichia coli   (A)     Culture Micro <10,000 colonies/mL  mixed urogenital zandra          Susceptibility    Escherichia coli - JESUS ALBERTO     AMPICILLIN <=2 Sensitive ug/mL     CEFAZOLIN* <=4 Sensitive ug/mL      * Cefazolin JESUS ALBERTO breakpoints are for the treatment of uncomplicated urinary tract infections.  For the treatment of systemic infections, please contact the laboratory for additional testing.     CEFOXITIN <=4 Sensitive ug/mL     CEFTAZIDIME <=1 Sensitive ug/mL     CEFTRIAXONE <=1 Sensitive ug/mL     CIPROFLOXACIN <=0.25 Sensitive ug/mL     GENTAMICIN <=1 Sensitive ug/mL     LEVOFLOXACIN <=0.12 Sensitive ug/mL     NITROFURANTOIN <=16 Sensitive ug/mL     TOBRAMYCIN <=1 Sensitive ug/mL     Trimethoprim/Sulfa <=1/19 Sensitive ug/mL     AMPICILLIN/SULBACTAM <=2 Sensitive ug/mL     Piperacillin/Tazo <=4 Sensitive ug/mL     CEFEPIME <=1 Sensitive ug/mL       ASSESSMENT/PLAN:       ICD-10-CM    1. Routine general medical examination at a health care facility Z00.00    2. Overweight HCC E66.01 OFFICE/OUTPT VISIT,EST,LEVL IV    Z68.35    3. Type 2 diabetes mellitus with microalbuminuria, without long-term current use of insulin (H) E11.29 HEMOGLOBIN A1C    R80.9 Lipid panel reflex to direct LDL Non-fasting     Albumin Random Urine Quantitative with Creat Ratio     losartan (COZAAR) 50 MG tablet     OFFICE/OUTPT VISIT,EST,LEVL IV   4. Personal history of malignant neoplasm of breast Z85.3    5. Hypothyroidism, unspecified type E03.9 TSH WITH FREE T4 REFLEX     OFFICE/OUTPT VISIT,EST,LEVL IV   6. Family history of colon cancer Z80.0    7. Essential hypertension with goal blood pressure less than 140/90 I10 BASIC METABOLIC PANEL     amLODIPine (NORVASC) 5 MG tablet     losartan (COZAAR) 50  "MG tablet     metoprolol tartrate (LOPRESSOR) 25 MG tablet     OFFICE/OUTPT VISIT,EST,LEVL IV   8. Screening for malignant neoplasm of cervix Z12.4 Pap imaged thin layer screen with HPV - recommended age 30 - 65 years (select HPV order below)     HPV High Risk Types DNA Cervical   9. Screening for diabetic peripheral neuropathy Z13.89 FOOT EXAM  NO CHARGE [72346.114]   10. Hyperlipidemia LDL goal <100 E78.5 Lipid panel reflex to direct LDL Non-fasting     atorvastatin (LIPITOR) 20 MG tablet   11. Screening for HIV (human immunodeficiency virus) Z11.4 HIV Antigen Antibody Combo   12. Benign essential hypertension I10    13. Swollen ankles M25.471 OFFICE/OUTPT VISIT,EST,LEVL IV    M25.472        Agreeable to HIV testing.   Discussed ways to move daily (rather than intentional exercise)  Declines RAYMUNDO screening.     LATER ENTRY:   A1C is 8.6.   Start Metformin... Return to clinic 3 months with labs prior.    Patient Instructions   Get shingles vaccine (2 shots, 2 - 6 months apart) -- you can get on the ancillary schedule    Reduce amlodipine to 5 mg daily (ankle swelling)    Plan movement, at least 25\" per day...     Try apple watch for observing steps only:  Goal is 10,000    Return to clinic 6 months follow up diabetes, weight.      COUNSELING:  Reviewed preventive health counseling, as reflected in patient instructions       Immunizations     will get flu and shingrix later.  Discussed.   Side effects discussed and questions answered.         BP Readings from Last 1 Encounters:   06/11/18 132/74     Estimated body mass index is 38.89 kg/(m^2) as calculated from the following:    Height as of 6/11/18: 5' 2.01\" (1.575 m).    Weight as of 6/11/18: 212 lb 11.2 oz (96.5 kg).      Weight management plan: Discussed healthy diet and exercise guidelines and patient will follow up in 6 months in clinic to re-evaluate.     reports that she has never smoked. She has never used smokeless tobacco.      Counseling Resources:  ATP " IV Guidelines  Pooled Cohorts Equation Calculator  Breast Cancer Risk Calculator  FRAX Risk Assessment  ICSI Preventive Guidelines  Dietary Guidelines for Americans, 2010  [employer]'s MyPlate  ASA Prophylaxis  Lung CA Screening    Carolyne Layne MD  [facility]  Answers for HPI/ROS submitted by the patient on 9/16/2018   PHQ-2 Score: 0

## 2018-09-19 LAB — HIV 1+2 AB+HIV1 P24 AG SERPL QL IA: NONREACTIVE

## 2018-09-20 LAB
COPATH REPORT: NORMAL
PAP: NORMAL

## 2018-09-20 NOTE — PROGRESS NOTES
Kenna Miranda    Your HIV screening is negative:  Thank you for participating in this screening.  As discussed, your HgbA1C is quite elevated, implying that your sugars are probably elevated much of the time  We will start metformin  mg, 4 every evening with supper.  Let me know if you develop any GI issues with this medication such as loose stools.      Return to clinic 3 months (December '18) with labs a day or so prior.     Sincerely,     GEORGE DEL ANGEL M.D.     Send note

## 2018-09-21 LAB
FINAL DIAGNOSIS: NORMAL
HPV HR 12 DNA CVX QL NAA+PROBE: NEGATIVE
HPV16 DNA SPEC QL NAA+PROBE: NEGATIVE
HPV18 DNA SPEC QL NAA+PROBE: NEGATIVE
SPECIMEN DESCRIPTION: NORMAL
SPECIMEN SOURCE CVX/VAG CYTO: NORMAL

## 2018-09-24 ENCOUNTER — MYC MEDICAL ADVICE (OUTPATIENT)
Dept: FAMILY MEDICINE | Facility: CLINIC | Age: 63
End: 2018-09-24

## 2018-09-25 NOTE — TELEPHONE ENCOUNTER
Routing to PCP to review and advise.    Please see My Chart message.      Yelitza Zambrano RN  [facility]

## 2018-09-26 ENCOUNTER — ALLIED HEALTH/NURSE VISIT (OUTPATIENT)
Dept: NURSING | Facility: CLINIC | Age: 63
End: 2018-09-26
Payer: COMMERCIAL

## 2018-09-26 DIAGNOSIS — Z23 NEED FOR PROPHYLACTIC VACCINATION AND INOCULATION AGAINST INFLUENZA: ICD-10-CM

## 2018-09-26 DIAGNOSIS — Z23 NEED FOR SHINGLES VACCINE: Primary | ICD-10-CM

## 2018-09-26 PROCEDURE — 90471 IMMUNIZATION ADMIN: CPT

## 2018-09-26 PROCEDURE — 99207 ZZC NO CHARGE NURSE ONLY: CPT

## 2018-09-26 PROCEDURE — 90750 HZV VACC RECOMBINANT IM: CPT

## 2018-09-26 PROCEDURE — 90472 IMMUNIZATION ADMIN EACH ADD: CPT

## 2018-09-26 PROCEDURE — 90686 IIV4 VACC NO PRSV 0.5 ML IM: CPT

## 2018-09-26 NOTE — PROGRESS NOTES

## 2018-09-26 NOTE — MR AVS SNAPSHOT
After Visit Summary   9/26/2018    Kenna Andersonyclobito    MRN: [id number]           Patient Information     Date Of Birth          1955        Visit Information        Provider Department      9/26/2018 8:00 AM CP ANCILLARY [facility]        Today's Diagnoses     Need for shingles vaccine    -  1    Need for prophylactic vaccination and inoculation against influenza           Follow-ups after your visit        Your next 10 appointments already scheduled     Nov 28, 2018  8:00 AM CST   Nurse Only with CP ANCILLARY   [facility] ([facility])    [address] [address]   [phone]            Dec 12, 2018  8:15 AM CST   LAB with CP LAB   [facility] ([facility])    [address] [place] [address]   [phone]           Please do not eat 10-12 hours before your appointment if you are coming in fasting for labs on lipids, cholesterol, or glucose (sugar). This does not apply to pregnant women. Water, hot tea and black coffee (with nothing added) are okay. Do not drink other fluids, diet soda or chew gum.            Dec 20, 2018  1:40 PM CST   SHORT with Carolyne Layne MD   [facility] ([facility])    [address] [facility] [address]   [phone]            Dec 31, 2018  1:00 PM CST   MA SCREENING DIGITAL BILATERAL with FKMA1   [facility] ([facility])    [address] [place] [address]   [phone]           How do I prepare for my exam? (Food and drink instructions) No Food and Drink Restrictions.  How do I prepare for my exam? (Other instructions) Do not use any powder, lotion or deodorant under your arms or on your breast. If you do, we will ask you to remove it before your exam.  What should  "I wear: Wear comfortable, two-piece clothing.  How long does the exam take: Most scans will take 15 minutes.  What should I bring: Bring any previous mammograms from other facilities or have them mailed to the breast center.  Do I need a :  No  is needed.  What do I need to tell my doctor: If you have any allergies, tell your care team.  What should I do after the exam: No restrictions, You may resume normal activities.  What is this test: This test is an x-ray of the breast to look for breast disease. The breast is pressed between two plates to flatten and spread the tissue. An X-ray is taken of the breast from different angles.  Who should I call with questions: If you have any questions, please call the Imaging Department where you will have your exam. Directions, parking instructions, and other information is available on our website, [email or web address].  Other information about my exam Three-dimensional (3D) mammograms are available at [place] locations in [facility], [place], [facility], [place], [facility] and [place]. [facility] locations include [place] and the [facility] [place] in [place].  Benefits of 3D mammograms include * Improved rate of cancer detection * Decreases your chance of having to go back for more tests, which means fewer: * \"False-positive\" results (This means that there is an abnormal area but it isn't cancer.) * Invasive testing procedures, such as a biopsy or surgery * Can provide clearer images of the breast if you have dense breast tissue.  *3D mammography is an optional exam that anyone can have with a 2D mammogram. It doesn't replace or take the place of a 2D mammogram. 2D mammograms remain an effective screening test for all women.  Not all insurance companies cover the cost of a 3D mammogram. Check with your insurance.            Jun 11, 2019  1:30 PM CDT   (Arrive by 1:15 PM)   Return Visit with Kaycee Meng MD    " [facility] ([facility] [place])    [address]  [address]  [facility] [address] [phone]              Who to contact     If you have questions or need follow up information about today's clinic visit or your schedule please contact [facility] directly at [phone].  Normal or non-critical lab and imaging results will be communicated to you by MyChart, letter or phone within 4 business days after the clinic has received the results. If you do not hear from us within 7 days, please contact the clinic through MyChart or phone. If you have a critical or abnormal lab result, we will notify you by phone as soon as possible.  Submit refill requests through Piccsy or call your pharmacy and they will forward the refill request to us. Please allow 3 business days for your refill to be completed.          Additional Information About Your Visit        [employer] Information     Pic[employer]y gives you secure access to your electronic health record. If you see a primary care provider, you can also send messages to your care team and make appointments. If you have questions, please call your primary care clinic.  If you do not have a primary care provider, please call [phone] and they will assist you.        Care EveryWhere ID     This is your Care EveryWhere ID. This could be used by other organizations to access your [place] medical records  [id number]         Blood Pressure from Last 3 Encounters:   09/18/18 111/68   06/11/18 132/74   01/22/18 132/74    Weight from Last 3 Encounters:   09/18/18 217 lb (98.4 kg)   06/11/18 212 lb 11.2 oz (96.5 kg)   01/22/18 210 lb 8 oz (95.5 kg)              We Performed the Following     FLU VACCINE, SPLIT VIRUS, IM (QUADRIVALENT) [33737]- >3 YRS     HC ZOSTER VACCINE RECOMBINANT ADJUVANTED IM NJX     Vaccine Administration, Each Additional [83288]     VACCINE ADMINISTRATION, INITIAL        Primary Care Provider  Office Phone # Fax #    Carolyne Layne [phone]       [address] [facility] [address]        Equal Access to Services     EBEN ROBINS : Manisha Hernandez, wahaydenda petersonalishaha, qaelyta kapreethida samra, kesha arriagarebel nagellaurel law edwar fair. So [facility] [phone].    ATENCIÓN: Si habla español, tiene a tam disposición servicios gratuitos de asistencia lingüística. Llame al [phone].    We comply with applicable federal civil rights laws and [place] laws. We do not discriminate on the basis of race, color, national origin, age, disability, sex, sexual orientation, or gender identity.            Thank you!     Thank you for choosing [facility]  for your care. Our goal is always to provide you with excellent care. Hearing back from our patients is one way we can continue to improve our services. Please take a few minutes to complete the written survey that you may receive in the mail after your visit with us. Thank you!             Your Updated Medication List - Protect others around you: Learn how to safely use, store and throw away your medicines at www.disposemymeds.org.          This list is accurate as of 9/26/18 12:01 PM.  Always use your most recent med list.                   Brand Name Dispense Instructions for use Diagnosis    amLODIPine 5 MG tablet    NORVASC    90 tablet    Take 1 tablet (5 mg) by mouth daily    Essential hypertension with goal blood pressure less than 140/90       anastrozole 1 MG tablet    ARIMIDEX    90 tablet    Take 1 tablet (1 mg) by mouth daily    Malignant neoplasm of breast in female, estrogen receptor positive, unspecified laterality, unspecified site of breast (H)       aspirin 81 MG tablet      1 TABLET DAILY        atorvastatin 20 MG tablet    LIPITOR    24 tablet    Take 1 tablet (20 mg) by mouth every morning Patient is taking 2 times a week.    Hyperlipidemia LDL goal <100       calcium  citrate-vitamin D 315-200 MG-UNIT Tabs per tablet    CITRACAL     Take 1 tablet by mouth 2 times daily    Malignant neoplasm of breast in female, estrogen receptor positive, unspecified laterality, unspecified site of breast (H)       levothyroxine 125 MCG tablet    SYNTHROID/LEVOTHROID    90 tablet    Take 1 tablet (125 mcg) by mouth every morning    Other specified hypothyroidism       losartan 50 MG tablet    COZAAR    90 tablet    TAKE ONE TABLET BY MOUTH EVERY DAY (NEED TO BE SEEN FOR LABS)    Type 2 diabetes mellitus with microalbuminuria, without long-term current use of insulin (H), Essential hypertension with goal blood pressure less than 140/90       metFORMIN 500 MG 24 hr tablet    GLUCOPHAGE-XR    360 tablet    Take 4 tablets (2,000 mg) by mouth daily (with dinner)    Type 2 diabetes mellitus with microalbuminuria, without long-term current use of insulin (H)       metoprolol tartrate 25 MG tablet    LOPRESSOR    180 tablet    Take 1 tablet (25 mg) by mouth 2 times daily    Essential hypertension with goal blood pressure less than 140/90

## 2018-09-30 DIAGNOSIS — I10 BENIGN ESSENTIAL HYPERTENSION: ICD-10-CM

## 2018-10-01 NOTE — TELEPHONE ENCOUNTER
"Requested Prescriptions   Pending Prescriptions Disp Refills     amLODIPine (NORVASC) 10 MG tablet [Pharmacy Med Name: AMLODIPINE BESYLATE 10MG TABS] 30 tablet 0    Last Written Prescription Date:  9/18/18  Last Fill Quantity: 90,  # refills: 3   Last office visit: 9/18/2018 with prescribing provider:     Future Office Visit:   Next 5 appointments (look out 90 days)     Nov 28, 2018  8:00 AM CST   Nurse Only with CP ANCILLARY   [facility] ([facility])    [address] [place] [address]   [phone]            Dec 20, 2018  1:40 PM CST   SHORT with Carolyne Layne MD   [facility] ([facility])    [address] [place] [address]   [phone]                  Sig: TAKE ONE TABLET BY MOUTH EVERY DAY (NEED TO BE SEEN FOR LABS)    Calcium Channel Blockers Protocol  Failed    9/30/2018  5:01 AM       Failed - Normal serum creatinine on file in past 12 months    Recent Labs   Lab Test  09/18/18   1221   CR  1.14*            Passed - Blood pressure under 140/90 in past 12 months    BP Readings from Last 3 Encounters:   09/18/18 111/68   06/11/18 132/74   01/22/18 132/74                Passed - Recent (12 mo) or future (30 days) visit within the authorizing provider's specialty    Patient had office visit in the last 12 months or has a visit in the next 30 days with authorizing provider or within the authorizing provider's specialty.  See \"Patient Info\" tab in inbasket, or \"Choose Columns\" in Meds & Orders section of the refill encounter.           Passed - Patient is age 18 or older       Passed - No active pregnancy on record       Passed - No positive pregnancy test in past 12 months          "

## 2018-10-02 RX ORDER — AMLODIPINE BESYLATE 10 MG/1
TABLET ORAL
Qty: 90 TABLET | Refills: 2 | Status: SHIPPED | OUTPATIENT
Start: 2018-10-02 | End: 2019-06-27

## 2018-10-02 NOTE — TELEPHONE ENCOUNTER
Routing refill request to provider for review/approval because does not meet protocol:  Labs out of range:  Per below    Route to PCP    Naida Tomas RN

## 2018-10-30 DIAGNOSIS — E03.8 OTHER SPECIFIED HYPOTHYROIDISM: ICD-10-CM

## 2018-10-30 RX ORDER — LEVOTHYROXINE SODIUM 125 UG/1
TABLET ORAL
Qty: 90 TABLET | Refills: 3 | Status: SHIPPED | OUTPATIENT
Start: 2018-10-30 | End: 2019-06-27

## 2018-10-30 NOTE — TELEPHONE ENCOUNTER
Prescription approved per [facility] Refill Protocol.  Stephany Gibbons RN

## 2018-10-30 NOTE — TELEPHONE ENCOUNTER
"Requested Prescriptions   Pending Prescriptions Disp Refills     levothyroxine (SYNTHROID/LEVOTHROID) 125 MCG tablet [Pharmacy Med Name: LEVOTHYROXINE SODIUM 125MCG TABS] 90 tablet 3    Last Written Prescription Date:  9/7/17  Last Fill Quantity: 90,  # refills: 3   Last office visit: 9/18/2018 with prescribing provider:     Future Office Visit:   Next 5 appointments (look out 90 days)     Nov 28, 2018  8:00 AM CST   Nurse Only with CP ANCILLARY   [facility] ([facility])    [address] [place] [address]   [phone]            Dec 20, 2018  1:40 PM CST   SHORT with Carolyne Layne MD   [facility] ([facility])    [address] [place] [address]   [phone]                  Sig: TAKE ONE TABLET (125 MCG) BY MOUTH EVERY MORNING    Thyroid Protocol Passed    10/30/2018  5:01 AM       Passed - Patient is 12 years or older       Passed - Recent (12 mo) or future (30 days) visit within the authorizing provider's specialty    Patient had office visit in the last 12 months or has a visit in the next 30 days with authorizing provider or within the authorizing provider's specialty.  See \"Patient Info\" tab in inbasket, or \"Choose Columns\" in Meds & Orders section of the refill encounter.             Passed - Normal TSH on file in past 12 months    Recent Labs   Lab Test  09/18/18   1221   TSH  3.42             Passed - No active pregnancy on record    If patient is pregnant or has had a positive pregnancy test, please check TSH.         Passed - No positive pregnancy test in past 12 months    If patient is pregnant or has had a positive pregnancy test, please check TSH.            "

## 2018-11-28 ENCOUNTER — ALLIED HEALTH/NURSE VISIT (OUTPATIENT)
Dept: NURSING | Facility: CLINIC | Age: 63
End: 2018-11-28
Payer: COMMERCIAL

## 2018-11-28 DIAGNOSIS — Z23 NEED FOR SHINGLES VACCINE: Primary | ICD-10-CM

## 2018-11-28 PROCEDURE — 90471 IMMUNIZATION ADMIN: CPT

## 2018-11-28 PROCEDURE — 90750 HZV VACC RECOMBINANT IM: CPT

## 2018-11-28 PROCEDURE — 99207 ZZC NO CHARGE NURSE ONLY: CPT

## 2018-11-28 NOTE — MR AVS SNAPSHOT
After Visit Summary   11/28/2018    Kenna Andersonyclobito    MRN: [id number]           Patient Information     Date Of Birth          1955        Visit Information        Provider Department      11/28/2018 8:00 AM CP ANCILLARY [facility]        Today's Diagnoses     Need for shingles vaccine    -  1       Follow-ups after your visit        Your next 10 appointments already scheduled     Dec 12, 2018  8:15 AM CST   LAB with CP LAB   [facility] ([facility])    [address] [facility] [facility] [address]   [phone]           Please do not eat 10-12 hours before your appointment if you are coming in fasting for labs on lipids, cholesterol, or glucose (sugar). This does not apply to pregnant women. Water, hot tea and black coffee (with nothing added) are okay. Do not drink other fluids, diet soda or chew gum.            Dec 20, 2018  1:40 PM CST   SHORT with Carolyne Layne MD   [facility] ([facility])    [address] [address]   [phone]            Dec 31, 2018  1:00 PM CST   MA SCREENING DIGITAL BILATERAL with FKMA1   [facility] ([facility])    [address] [place] [address] [phone]           How do I prepare for my exam? (Food and drink instructions) No Food and Drink Restrictions.  How do I prepare for my exam? (Other instructions) Do not use any powder, lotion or deodorant under your arms or on your breast. If you do, we will ask you to remove it before your exam.  What should I wear: Wear comfortable, two-piece clothing.  How long does the exam take: Most scans will take 15 minutes.  What should I bring: Bring any previous mammograms from other facilities or have them mailed to the breast center.  Do I need a :  No  is needed.  What do I need to tell  "my doctor: If you have any allergies, tell your care team.  What should I do after the exam: No restrictions, You may resume normal activities.  What is this test: This test is an x-ray of the breast to look for breast disease. The breast is pressed between two plates to flatten and spread the tissue. An X-ray is taken of the breast from different angles.  Who should I call with questions: If you have any questions, please call the Imaging Department where you will have your exam. Directions, parking instructions, and other information is available on our website, [email or web address].  Other information about my exam Three-dimensional (3D) mammograms are available at [place] locations in [facility], [facility], [place], [facility] and [place]. [facility] locations include [place] and the [facility] in [place].  Benefits of 3D mammograms include * Improved rate of cancer detection * Decreases your chance of having to go back for more tests, which means fewer: * \"False-positive\" results (This means that there is an abnormal area but it isn't cancer.) * Invasive testing procedures, such as a biopsy or surgery * Can provide clearer images of the breast if you have dense breast tissue.  *3D mammography is an optional exam that anyone can have with a 2D mammogram. It doesn't replace or take the place of a 2D mammogram. 2D mammograms remain an effective screening test for all women.  Not all insurance companies cover the cost of a 3D mammogram. Check with your insurance.            Jun 11, 2019  1:30 PM CDT   (Arrive by 1:15 PM)   Return Visit with Kaycee Meng MD   [facility] ([facility])    [address]  [address]  [facility] [address] [phone]              Who to contact     If you have questions or need follow up information about today's clinic visit or your schedule please contact [facility] directly at [phone].  Normal or non-critical lab and imaging results will be communicated to you by MyChart, letter or phone within 4 business days after the clinic has received the results. If you do not hear from us within 7 days, please contact the clinic through MyChart or phone. If you have a critical or abnormal lab result, we will notify you by phone as soon as possible.  Submit refill requests through Suso or call your pharmacy and they will forward the refill request to us. Please allow 3 business days for your refill to be completed.          Additional Information About Your Visit        [employer] [facility] Information     [employer] gives you secure access to your electronic health record. If you see a primary care provider, you can also send messages to your care team and make appointments. If you have questions, please call your primary care clinic.  If you do not have a primary care provider, please call [phone] and they will assist you.        Care EveryWhere ID     This is your Care EveryWhere ID. This could be used by other organizations to access your [place] medical records  [id number]         Blood Pressure from Last 3 Encounters:   09/18/18 111/68   06/11/18 132/74   01/22/18 132/74    Weight from Last 3 Encounters:   09/18/18 217 lb (98.4 kg)   06/11/18 212 lb 11.2 oz (96.5 kg)   01/22/18 210 lb 8 oz (95.5 kg)              We Performed the Following     HC ZOSTER VACCINE RECOMBINANT ADJUVANTED IM NJX     SCREENING QUESTIONS FOR ADULT IMMUNIZATIONS     VACCINE ADMINISTRATION, INITIAL        Primary Care Provider Office Phone # Fax #    Carolyne Layne [phone]       [address] [address]        Equal Access to Services     EBEN ROBINS : Manisha Hernandez, santy barnett, kesha hagan. So [facility] [phone].    ATENCIÓN: meño Mcneal  disposición servicios gratuitos de asistencia lingüística. Melo miller [phone].    We comply with applicable federal civil rights laws and [place] laws. We do not discriminate on the basis of race, color, national origin, age, disability, sex, sexual orientation, or gender identity.            Thank you!     Thank you for choosing [facility]  for your care. Our goal is always to provide you with excellent care. Hearing back from our patients is one way we can continue to improve our services. Please take a few minutes to complete the written survey that you may receive in the mail after your visit with us. Thank you!             Your Updated Medication List - Protect others around you: Learn how to safely use, store and throw away your medicines at www.disposemymeds.org.          This list is accurate as of 11/28/18  8:16 AM.  Always use your most recent med list.                   Brand Name Dispense Instructions for use Diagnosis    * amLODIPine 5 MG tablet    NORVASC    90 tablet    Take 1 tablet (5 mg) by mouth daily    Essential hypertension with goal blood pressure less than 140/90       * amLODIPine 10 MG tablet    NORVASC    90 tablet    TAKE ONE TABLET BY MOUTH EVERY DAY (NEED TO BE SEEN FOR LABS)    Benign essential hypertension       anastrozole 1 MG tablet    ARIMIDEX    90 tablet    Take 1 tablet (1 mg) by mouth daily    Malignant neoplasm of breast in female, estrogen receptor positive, unspecified laterality, unspecified site of breast (H)       aspirin 81 MG tablet    ASA     1 TABLET DAILY        atorvastatin 20 MG tablet    LIPITOR    24 tablet    Take 1 tablet (20 mg) by mouth every morning Patient is taking 2 times a week.    Hyperlipidemia LDL goal <100       calcium citrate-vitamin D 315-200 MG-UNIT Tabs per tablet    CITRACAL     Take 1 tablet by mouth 2 times daily    Malignant neoplasm of breast in female, estrogen receptor positive, unspecified laterality,  unspecified site of breast (H)       levothyroxine 125 MCG tablet    SYNTHROID/LEVOTHROID    90 tablet    TAKE ONE TABLET (125 MCG) BY MOUTH EVERY MORNING    Other specified hypothyroidism       losartan 50 MG tablet    COZAAR    90 tablet    TAKE ONE TABLET BY MOUTH EVERY DAY (NEED TO BE SEEN FOR LABS)    Type 2 diabetes mellitus with microalbuminuria, without long-term current use of insulin (H), Essential hypertension with goal blood pressure less than 140/90       metFORMIN 500 MG 24 hr tablet    GLUCOPHAGE-XR    360 tablet    Take 4 tablets (2,000 mg) by mouth daily (with dinner)    Type 2 diabetes mellitus with microalbuminuria, without long-term current use of insulin (H)       metoprolol tartrate 25 MG tablet    LOPRESSOR    180 tablet    Take 1 tablet (25 mg) by mouth 2 times daily    Essential hypertension with goal blood pressure less than 140/90       * Notice:  This list has 2 medication(s) that are the same as other medications prescribed for you. Read the directions carefully, and ask your doctor or other care provider to review them with you.

## 2018-11-28 NOTE — NURSING NOTE
Prior to injection verified patient identity using patient's name and date of birth.  Due to injection administration, patient instructed to remain in clinic for 15 minutes  afterwards, and to report any adverse reaction to me immediately.    HALLIE Horan MA      VIS for Shingles given on same date of administration.  Staff signature/Title: HALLIE Horan MA

## 2018-12-31 ENCOUNTER — RADIANT APPOINTMENT (OUTPATIENT)
Dept: MAMMOGRAPHY | Facility: CLINIC | Age: 63
End: 2018-12-31
Attending: INTERNAL MEDICINE
Payer: COMMERCIAL

## 2018-12-31 DIAGNOSIS — Z12.31 VISIT FOR SCREENING MAMMOGRAM: ICD-10-CM

## 2018-12-31 PROCEDURE — 77067 SCR MAMMO BI INCL CAD: CPT | Mod: TC

## 2018-12-31 PROCEDURE — 77063 BREAST TOMOSYNTHESIS BI: CPT | Mod: TC

## 2019-06-11 ENCOUNTER — ONCOLOGY VISIT (OUTPATIENT)
Dept: ONCOLOGY | Facility: CLINIC | Age: 64
End: 2019-06-11
Attending: INTERNAL MEDICINE
Payer: COMMERCIAL

## 2019-06-11 VITALS
BODY MASS INDEX: 38.35 KG/M2 | SYSTOLIC BLOOD PRESSURE: 153 MMHG | HEIGHT: 62 IN | TEMPERATURE: 98.3 F | HEART RATE: 64 BPM | OXYGEN SATURATION: 96 % | WEIGHT: 208.4 LBS | DIASTOLIC BLOOD PRESSURE: 76 MMHG

## 2019-06-11 DIAGNOSIS — C50.919 MALIGNANT NEOPLASM OF BREAST IN FEMALE, ESTROGEN RECEPTOR POSITIVE, UNSPECIFIED LATERALITY, UNSPECIFIED SITE OF BREAST (H): Primary | ICD-10-CM

## 2019-06-11 DIAGNOSIS — Z17.0 MALIGNANT NEOPLASM OF BREAST IN FEMALE, ESTROGEN RECEPTOR POSITIVE, UNSPECIFIED LATERALITY, UNSPECIFIED SITE OF BREAST (H): Primary | ICD-10-CM

## 2019-06-11 PROCEDURE — G0463 HOSPITAL OUTPT CLINIC VISIT: HCPCS | Mod: ZF

## 2019-06-11 PROCEDURE — 99214 OFFICE O/P EST MOD 30 MIN: CPT | Mod: ZP | Performed by: INTERNAL MEDICINE

## 2019-06-11 ASSESSMENT — PAIN SCALES - GENERAL: PAINLEVEL: NO PAIN (0)

## 2019-06-11 ASSESSMENT — MIFFLIN-ST. JEOR: SCORE: 1439.58

## 2019-06-11 NOTE — NURSING NOTE
"Oncology Rooming Note    June 11, 2019 1:38 PM   Kenna Miranda is a 63 year old female who presents for:    Chief Complaint   Patient presents with     Oncology Clinic Visit     RETURN VISIT; 1 YEAR FOLLOW UP BREAST CA; VITALS COMPLETED BY OUSMANE      Initial Vitals: /76   Pulse 64   Temp 98.3  F (36.8  C) (Oral)   Ht 1.568 m (5' 1.75\")   Wt 94.5 kg (208 lb 6.4 oz)   SpO2 96%   BMI 38.43 kg/m   Estimated body mass index is 38.43 kg/m  as calculated from the following:    Height as of this encounter: 1.568 m (5' 1.75\").    Weight as of this encounter: 94.5 kg (208 lb 6.4 oz). Body surface area is 2.03 meters squared.  No Pain (0) Comment: Data Unavailable   No LMP recorded. Patient is postmenopausal.  Allergies reviewed: Yes  Medications reviewed: Yes    Medications: Medication refills not needed today.  Pharmacy name entered into We[facility]Wall:    [facility] - [place], [place] - [address]  [facility] [address] [place], [place] - [address]  [facility] - [place], [place] - [address]    Clinical concerns: No new concerns today  Dr. Meng was notified.      Loir Archibald              "

## 2019-06-11 NOTE — LETTER
"6/11/2019       RE: Kenna Miranda  [address] [place] [address]     Dear Colleague,    Thank you for referring your patient, Kenna Miranda, to the [facility]. Please see a copy of my visit note below.    [facility]  ONCOLOGY FOLLOW-UP VISIT NOTE    PATIENT NAME: Kenna Miranda    YOB: 1955  MRN :[id number]  DATE OF VISIT: Jun 11, 2019    Diagnosis: Locally advanced breast cancer .     History of present Illness :  Ms. Miranda is a 61-year-old female with a history of hypertension, hyperlipidemia and diabetes and a stage I, ER positive , Her2 negative LEFT breast cancer.     Cancer Treatment Summary:    10/24/12 - Abnormal screening mammogram  11/5/2012 - left lumpectomy with sentinel lymph node biopsy:  Invasive adenocarcinoma, grade 1, %, [place] 80%, her 2 negative.  Final size 1.1 x 0.7 cm  - Oncotype DX 12 chance of distant recurrence 8% in 10 years, assuming 5 years of tamoxifen.  - Adjuvant radiation therapy with completion January 2013  - Arimidex started January 2013    INTERVAL HISTORY : Kenna returns for a routine followup visit.   She reports that she notices moderate arthralgias with her AI.  We had previously reviewed the pros and cons of taking the medication; she prefers to stop it.    She has no chest pain.  She has no nausea, vomiting, diarrhea or constipation.       No new medical problems.  No new family history.     ROS: 10 point ROS neg other than the symptoms noted above in the HPI.    PHYSICAL EXAM :  /76   Pulse 64   Temp 98.3  F (36.8  C) (Oral)   Ht 1.568 m (5' 1.75\")   Wt 94.5 kg (208 lb 6.4 oz)   SpO2 96%   BMI 38.43 kg/m      GENERAL : Alert and oriented , not in distress   HEENT: no icterus   NECK: Supple, no thyromegaly.   LYMPH:  No cervical, supraclavicular, axillary, epitrochlear, or inguinal lymphadenopathy.  BREAST: Without palpable abnormalities, no overlying skin " abnormalities.  No palpable lesions.   LUNGS: Clear breath sounds bilaterally, no wheezing, no crackles.    CARDIOVASCULAR: S1 and S2 well heard, regular rate and rhythm, no murmur or gallop.   ABDOMEN: Soft, nontender, positive bowel sounds. No organomegaly was appreciated.  EXTREMITIES: No cyanosis, no clubbing, no edema.    SKIN: No skin rash, no purpura or petechiae.    NEUROLOGIC: Normal mental status. Alert and oriented .      Mammogram December 2018 unremarkable    ASSESSMENT AND PLAN:  63-year-old female with a T1c N0 M0, infiltrating ductal carcinoma of the left breast, ER/PA-positive, HER2/kenia-negative.     1.  Left breast cancer.  She is status post left lumpectomy and sentinel lymph node biopsy.  Her Oncotype DX score was 12 and she did not require adjuvant chemotherapy.  She had adjuvant radiation.  She has been on Arimidex since 01/2013.  Now she has now completed 6+ years of treatment.  Kenna has some moderate arthralgias and myalgias related to her Arimidex versus osteoarthritis.  We reviewed the data from the MA-17 trial and ultimately decided to stop endocrine therapy.     She will return in 12 months for a final visit and will have a mammogram in 6 months.      Her mammogram is due in 12/2019.       2.  Bone health.  She had a DEXA scan previously with a T-score of -1.4. Will monitor.  Encouraged weight bearing exercise, vitamin D and calcium.     3.  Hypertension.  She has been seeing her primary care provider for this.  We reviewed the importance of exercise as well as weight management today.  Patients who exercise at least 150 minutes per week of moderate intensity walking have approximately a 30% lower recurrence rate of breast cancer.      4.  Obesity.  We encouraged her to lose weight and exercise regularly.      5.  She continues to see Dr. Layne for her other health care maintenance and cancer screening.       Sincerely,    Kaycee Meng MD

## 2019-06-11 NOTE — PROGRESS NOTES
"[facility]  ONCOLOGY FOLLOW-UP VISIT NOTE    PATIENT NAME: Kenna Miranda    YOB: 1955  MRN :[id number]  DATE OF VISIT: Jun 11, 2019    Diagnosis: Locally advanced breast cancer .     History of present Illness :  Ms. Miranda is a 61-year-old female with a history of hypertension, hyperlipidemia and diabetes and a stage I, ER positive , Her2 negative LEFT breast cancer.     Cancer Treatment Summary:    10/24/12 - Abnormal screening mammogram  11/5/2012 - left lumpectomy with sentinel lymph node biopsy:  Invasive adenocarcinoma, grade 1, %, [place] 80%, her 2 negative.  Final size 1.1 x 0.7 cm  - Oncotype DX 12 chance of distant recurrence 8% in 10 years, assuming 5 years of tamoxifen.  - Adjuvant radiation therapy with completion January 2013  - Arimidex started January 2013    INTERVAL HISTORY : Kenna returns for a routine followup visit.   She reports that she notices moderate arthralgias with her AI.  We had previously reviewed the pros and cons of taking the medication; she prefers to stop it.    She has no chest pain.  She has no nausea, vomiting, diarrhea or constipation.       No new medical problems.  No new family history.     ROS: 10 point ROS neg other than the symptoms noted above in the HPI.    PHYSICAL EXAM :  /76   Pulse 64   Temp 98.3  F (36.8  C) (Oral)   Ht 1.568 m (5' 1.75\")   Wt 94.5 kg (208 lb 6.4 oz)   SpO2 96%   BMI 38.43 kg/m     GENERAL : Alert and oriented , not in distress   HEENT: no icterus   NECK: Supple, no thyromegaly.   LYMPH:  No cervical, supraclavicular, axillary, epitrochlear, or inguinal lymphadenopathy.  BREAST: Without palpable abnormalities, no overlying skin abnormalities.  No palpable lesions.   LUNGS: Clear breath sounds bilaterally, no wheezing, no crackles.    CARDIOVASCULAR: S1 and S2 well heard, regular rate and rhythm, no murmur or gallop.   ABDOMEN: Soft, nontender, positive bowel sounds. No " organomegaly was appreciated.  EXTREMITIES: No cyanosis, no clubbing, no edema.    SKIN: No skin rash, no purpura or petechiae.    NEUROLOGIC: Normal mental status. Alert and oriented .      Mammogram December 2018 unremarkable    ASSESSMENT AND PLAN:  63-year-old female with a T1c N0 M0, infiltrating ductal carcinoma of the left breast, ER/FL-positive, HER2/kenia-negative.     1.  Left breast cancer.  She is status post left lumpectomy and sentinel lymph node biopsy.  Her Oncotype DX score was 12 and she did not require adjuvant chemotherapy.  She had adjuvant radiation.  She has been on Arimidex since 01/2013.  Now she has now completed 6+ years of treatment.  Kenna has some moderate arthralgias and myalgias related to her Arimidex versus osteoarthritis.  We reviewed the data from the MA-17 trial and ultimately decided to stop endocrine therapy.     She will return in 12 months for a final visit and will have a mammogram in 6 months.      Her mammogram is due in 12/2019.       2.  Bone health.  She had a DEXA scan previously with a T-score of -1.4. Will monitor.  Encouraged weight bearing exercise, vitamin D and calcium.     3.  Hypertension.  She has been seeing her primary care provider for this.  We reviewed the importance of exercise as well as weight management today.  Patients who exercise at least 150 minutes per week of moderate intensity walking have approximately a 30% lower recurrence rate of breast cancer.      4.  Obesity.  We encouraged her to lose weight and exercise regularly.      5.  She continues to see Dr. Layne for her other health care maintenance and cancer screening.     Kaycee Meng

## 2019-06-20 DIAGNOSIS — E11.29 TYPE 2 DIABETES MELLITUS WITH MICROALBUMINURIA, WITHOUT LONG-TERM CURRENT USE OF INSULIN (H): ICD-10-CM

## 2019-06-20 DIAGNOSIS — R80.9 TYPE 2 DIABETES MELLITUS WITH MICROALBUMINURIA, WITHOUT LONG-TERM CURRENT USE OF INSULIN (H): ICD-10-CM

## 2019-06-20 LAB — HBA1C MFR BLD: 11.4 % (ref 0–5.6)

## 2019-06-20 PROCEDURE — 83036 HEMOGLOBIN GLYCOSYLATED A1C: CPT | Performed by: INTERNAL MEDICINE

## 2019-06-20 PROCEDURE — 36415 COLL VENOUS BLD VENIPUNCTURE: CPT | Performed by: INTERNAL MEDICINE

## 2019-06-26 PROBLEM — N18.30 CKD (CHRONIC KIDNEY DISEASE) STAGE 3, GFR 30-59 ML/MIN (H): Status: ACTIVE | Noted: 2019-06-26

## 2019-06-26 PROBLEM — E21.0 PRIMARY HYPERPARATHYROIDISM (H): Status: RESOLVED | Noted: 2017-07-12 | Resolved: 2019-06-26

## 2019-06-27 ENCOUNTER — TELEPHONE (OUTPATIENT)
Dept: FAMILY MEDICINE | Facility: CLINIC | Age: 64
End: 2019-06-27

## 2019-06-27 ENCOUNTER — OFFICE VISIT (OUTPATIENT)
Dept: FAMILY MEDICINE | Facility: CLINIC | Age: 64
End: 2019-06-27
Payer: COMMERCIAL

## 2019-06-27 VITALS
SYSTOLIC BLOOD PRESSURE: 130 MMHG | WEIGHT: 208 LBS | TEMPERATURE: 98.3 F | DIASTOLIC BLOOD PRESSURE: 62 MMHG | OXYGEN SATURATION: 95 % | HEART RATE: 69 BPM | BODY MASS INDEX: 38.35 KG/M2

## 2019-06-27 DIAGNOSIS — E78.5 HYPERLIPIDEMIA LDL GOAL <100: ICD-10-CM

## 2019-06-27 DIAGNOSIS — R80.9 TYPE 2 DIABETES MELLITUS WITH MICROALBUMINURIA, WITHOUT LONG-TERM CURRENT USE OF INSULIN (H): ICD-10-CM

## 2019-06-27 DIAGNOSIS — E11.29 TYPE 2 DIABETES MELLITUS WITH MICROALBUMINURIA, WITHOUT LONG-TERM CURRENT USE OF INSULIN (H): ICD-10-CM

## 2019-06-27 DIAGNOSIS — E03.8 OTHER SPECIFIED HYPOTHYROIDISM: ICD-10-CM

## 2019-06-27 DIAGNOSIS — I10 BENIGN ESSENTIAL HYPERTENSION: ICD-10-CM

## 2019-06-27 DIAGNOSIS — E11.65 UNCONTROLLED TYPE 2 DIABETES MELLITUS WITH HYPERGLYCEMIA (H): Primary | ICD-10-CM

## 2019-06-27 DIAGNOSIS — E03.9 HYPOTHYROIDISM, UNSPECIFIED TYPE: ICD-10-CM

## 2019-06-27 DIAGNOSIS — N18.30 CKD (CHRONIC KIDNEY DISEASE) STAGE 3, GFR 30-59 ML/MIN (H): ICD-10-CM

## 2019-06-27 DIAGNOSIS — I10 ESSENTIAL HYPERTENSION WITH GOAL BLOOD PRESSURE LESS THAN 140/90: ICD-10-CM

## 2019-06-27 PROCEDURE — 99214 OFFICE O/P EST MOD 30 MIN: CPT | Performed by: INTERNAL MEDICINE

## 2019-06-27 RX ORDER — FLASH GLUCOSE SENSOR
1 KIT MISCELLANEOUS 3 TIMES DAILY
Qty: 1 DEVICE | Refills: 0 | Status: SHIPPED | OUTPATIENT
Start: 2019-06-27 | End: 2020-11-13

## 2019-06-27 RX ORDER — FLASH GLUCOSE SENSOR
KIT MISCELLANEOUS
Qty: 14 EACH | Refills: 3 | Status: SHIPPED | OUTPATIENT
Start: 2019-06-27 | End: 2019-06-27

## 2019-06-27 RX ORDER — ATORVASTATIN CALCIUM 20 MG/1
20 TABLET, FILM COATED ORAL EVERY MORNING
Qty: 24 TABLET | Refills: 3 | Status: SHIPPED | OUTPATIENT
Start: 2019-06-27 | End: 2019-11-12

## 2019-06-27 RX ORDER — FLASH GLUCOSE SENSOR
KIT MISCELLANEOUS
Qty: 14 EACH | Refills: 3 | Status: SHIPPED | OUTPATIENT
Start: 2019-06-27 | End: 2020-11-13

## 2019-06-27 RX ORDER — FLASH GLUCOSE SENSOR
KIT MISCELLANEOUS
Qty: 10 EACH | Refills: 3 | Status: SHIPPED | OUTPATIENT
Start: 2019-06-27 | End: 2019-06-27

## 2019-06-27 RX ORDER — LOSARTAN POTASSIUM 50 MG/1
50 TABLET ORAL DAILY
Qty: 90 TABLET | Refills: 3 | Status: SHIPPED | OUTPATIENT
Start: 2019-06-27 | End: 2020-07-29

## 2019-06-27 RX ORDER — LIRAGLUTIDE 6 MG/ML
1.8 INJECTION SUBCUTANEOUS DAILY
Qty: 9 ML | Refills: 3 | Status: SHIPPED | OUTPATIENT
Start: 2019-06-27 | End: 2019-07-18 | Stop reason: ALTCHOICE

## 2019-06-27 RX ORDER — LEVOTHYROXINE SODIUM 125 UG/1
125 TABLET ORAL DAILY
Qty: 90 TABLET | Refills: 3 | Status: SHIPPED | OUTPATIENT
Start: 2019-06-27 | End: 2020-07-08

## 2019-06-27 RX ORDER — AMLODIPINE BESYLATE 10 MG/1
10 TABLET ORAL DAILY
Qty: 90 TABLET | Refills: 2 | Status: SHIPPED | OUTPATIENT
Start: 2019-06-27 | End: 2019-11-12

## 2019-06-27 NOTE — PROGRESS NOTES
Subjective     Kenna Miranda is a 63 year old female who presents to clinic today for the following health issues:    64 y/o  F here for DM f/u.   DM is uncontrolled, recent A1C 11.x   had been on metformin, d/c due to diarrhea .   Has not been to clinic for DM f/u since 9/2017.           HPI   Diabetes Follow-up      How often are you checking your blood sugar? Not at all    What time of day are you checking your blood sugars (select all that apply)?      Have you had any blood sugars above 200?      Have you had any blood sugars below 70?      What symptoms do you notice when your blood sugar is low?  None    What concerns do you have today about your diabetes? None     Do you have any of these symptoms? (Select all that apply)  No numbness or tingling in feet.  No redness, sores or blisters on feet.  No complaints of excessive thirst.  No reports of blurry vision.  No significant changes to weight.     Have you had a diabetic eye exam in the last 12 months?     BP Readings from Last 2 Encounters:   06/27/19 130/83   06/11/19 153/76     Hemoglobin A1C (%)   Date Value   06/20/2019 11.4 (H)   09/18/2018 8.6 (H)     LDL Cholesterol Calculated (mg/dL)   Date Value   09/18/2018 67   10/02/2017 84       Diabetes Management Resources    Amount of exercise or physical activity: 2-3 days/week for an average of 15-30 minutes    Problems taking medications regularly: No    Medication side effects: none    Diet: regular (no restrictions)           Patient Active Problem List   Diagnosis     Borderline osteopenia     HYPERLIPIDEMIA LDL GOAL <100     Family history of colon cancer     S/P colonoscopic polypectomy     Breast cancer (H)     Cataract of both eyes     Elevated LFTs     Dermatochalasis of eyelid     No diabetic retinopathy in both eyes     Tibialis posterior tendinitis, left     Essential hypertension with goal blood pressure less than 140/90     Hypothyroidism, unspecified type     PONV (postoperative  nausea and vomiting)     Type 2 diabetes mellitus with microalbuminuria, without long-term current use of insulin (H)     Overweight HCC     CKD (chronic kidney disease) stage 3, GFR 30-59 ml/min (H)     Past Surgical History:   Procedure Laterality Date     BIOPSY  2015     BIOPSY BREAST NEEDLE LOCALIZATION, BIOPSY NODE SENTINEL, COMBINED  2012    Procedure: COMBINED BIOPSY BREAST NEEDLE LOCALIZATION, BIOPSY NODE SENTINEL;  Left Breast Wire Locilized Lumpectomy and Sentinal Lymph Node Biopsy;  Surgeon: Jesus Vicente MD;  Location: [facility]     C  DELIVERY ONLY      x 2     COLONOSCOPY  10-26-11    Return in 1 yr for Polyp Surveillance     COLONOSCOPY  12    Return for Colonoscopy in 3 yrs.Polyps     COMBINED CYSTOSCOPY, INSERT STENT URETER(S) Right 2017    Procedure: COMBINED CYSTOSCOPY, INSERT STENT URETER(S);  Cystoscopy Right retrograde pyelogram, Right ureteral Stent Placement;  Surgeon: Loida Sánchez MD;  Location: [facility] OR     COMBINED CYSTOSCOPY, RETROGRADES, URETEROSCOPY, LASER HOLMIUM LITHOTRIPSY URETER(S), INSERT STENT Right 2017    Procedure: COMBINED CYSTOSCOPY, RETROGRADES, URETEROSCOPY, LASER HOLMIUM LITHOTRIPSY URETER(S), INSERT STENT;  Cystoscopy, Right Ureteroscopy, Laser Lithotripsy, Right Stent Exchange ;  Surgeon: Ruth Boston MD;  Location: [facility] OR     LITHOTRIPSY       SURGICAL HISTORY OF -       exploratory laparotomy     SURGICAL HISTORY OF -       Right ovary removed     SURGICAL HISTORY OF -       breast biopsy       Social History     Tobacco Use     Smoking status: Never Smoker     Smokeless tobacco: Never Used     Tobacco comment: no second hand exposure.  when young Dad smoked.   Substance Use Topics     Alcohol use: Yes     Comment: very, very little     Family History   Problem Relation Age of Onset     Cancer - colorectal Mother      Thyroid Disease Mother      Colon Cancer Mother      Cancer Father         Lung     Lipids Father       Glaucoma Father      Macular Degeneration Father      Depression Brother      Respiratory Brother         losing hearing in 50's         Current Outpatient Medications   Medication Sig Dispense Refill     amLODIPine (NORVASC) 10 MG tablet Take 1 tablet (10 mg) by mouth daily 90 tablet 2     ASPIRIN 81 MG OR TABS 1 TABLET DAILY       atorvastatin (LIPITOR) 20 MG tablet Take 1 tablet (20 mg) by mouth every morning Patient is taking 2 times a week. 24 tablet 3     blood glucose monitoring (NO BRAND SPECIFIED) meter device kit Use to test blood sugar 3 times daily or as directed.   -- Preferred. 1 kit 3     Calcium Carbonate-Vitamin D (CALCIUM 500 + D PO) Take by mouth daily       Continuous Blood Gluc  (FREESTYLE MOLLY READER) SHIRIN 1 each 3 times daily 1 Device 0     continuous blood glucose monitoring (FREESTYLE MOLLY) sensor For use with Freestyle Molly Flash  for continuous monitioring of blood glucose levels. Replace sensor every 14 days. 14 each 3     insulin degludec (TRESIBA) 100 UNIT/ML pen Inject 20 Units Subcutaneous daily With pen needle for daily insulin injection 9 mL 3     insulin pen needle (32G X 6 MM) 32G X 6 MM miscellaneous Use 2 pen needles daily or as directed. 200 each 3     levothyroxine (SYNTHROID/LEVOTHROID) 125 MCG tablet Take 1 tablet (125 mcg) by mouth daily 90 tablet 3     liraglutide (VICTOZA) 18 MG/3ML solution Inject 1.8 mg Subcutaneous daily 9 mL 3     losartan (COZAAR) 50 MG tablet Take 1 tablet (50 mg) by mouth daily 90 tablet 3     anastrozole (ARIMIDEX) 1 MG tablet Take 1 tablet (1 mg) by mouth daily (Patient not taking: Reported on 6/27/2019) 90 tablet 3     metoprolol tartrate (LOPRESSOR) 25 MG tablet Take 1 tablet (25 mg) by mouth 2 times daily (Patient not taking: Reported on 6/27/2019) 180 tablet 3     Allergies   Allergen Reactions     Nkda [No Known Drug Allergies]      Recent Labs   Lab Test 06/20/19  0848 09/18/18  1221 10/02/17  0856 09/05/17  1442   07/02/17  0513  07/01/17  0658  06/30/17  0544  08/04/16  0657   A1C 11.4* 8.6*  --  6.9*  --   --   --   --   --   --    < > 7.0*   LDL  --  67 84  --   --   --   --   --   --   --   --  65   HDL  --  41* 44*  --   --   --   --   --   --   --   --  44*   TRIG  --  305* 234*  --   --   --   --   --   --   --   --  210*   ALT  --   --   --   --   --  27  --  54*  --  74*   < > 143*   CR  --  1.14* 1.30* 1.20*   < > 2.28*  --  2.53*   < > 2.86*   < > 1.11*   GFRESTIMATED  --  48* 41* 45*   < > 22*  --  19*   < > 17*   < > 50*   GFRESTBLACK  --  58* 50* 55*   < > 26*  --  23*   < > 20*   < > 60*   POTASSIUM  --  4.0 4.1 4.0   < > 3.0*   < > 3.1*   < > 3.0*   < > 4.3   TSH  --  3.42 1.54  --   --   --   --   --   --   --    < > 4.68*    < > = values in this interval not displayed.      BP Readings from Last 3 Encounters:   06/27/19 130/62   06/11/19 153/76   09/18/18 111/68    Wt Readings from Last 3 Encounters:   06/27/19 94.3 kg (208 lb)   06/11/19 94.5 kg (208 lb 6.4 oz)   09/18/18 98.4 kg (217 lb)                       Reviewed and updated as needed this visit by Provider         Review of Systems   ROS COMP: Constitutional, HEENT, cardiovascular, pulmonary, gi and gu systems are negative, except as otherwise noted.      Objective    /83 (BP Location: Right arm, Patient Position: Sitting, Cuff Size: Adult Large)   Pulse 69   Temp 98.3  F (36.8  C) (Oral)   Wt 94.3 kg (208 lb)   SpO2 95%   BMI 38.35 kg/m    Body mass index is 38.35 kg/m .  Physical Exam   GENERAL: alert, no distress and obese  EYES: Eyes grossly normal to inspection, PERRL and conjunctivae and sclerae normal  MS: no gross musculoskeletal defects noted, no edema  PSYCH: mentation appears normal, affect normal/bright.  Becomes slightly tearful and fearful when discussing her current medication needs (Insulin Start)  Diabetic foot exam: normal DP and PT pulses, no trophic changes or ulcerative lesions and normal sensory exam    Diagnostic  "Test Results:  Labs reviewed in Epic  Results for orders placed or performed in visit on 06/20/19   Hemoglobin A1c   Result Value Ref Range    Hemoglobin A1C 11.4 (H) 0 - 5.6 %           Assessment & Plan       ICD-10-CM    1. Uncontrolled type 2 diabetes mellitus with hyperglycemia (H) E11.65 DIABETES EDUCATOR REFERRAL   2. CKD (chronic kidney disease) stage 3, GFR 30-59 ml/min (H) N18.3    3. Essential hypertension with goal blood pressure less than 140/90 I10 losartan (COZAAR) 50 MG tablet   4. Hypothyroidism, unspecified type E03.9    5. Hyperlipidemia LDL goal <100 E78.5 atorvastatin (LIPITOR) 20 MG tablet   6. Other specified hypothyroidism E03.8 levothyroxine (SYNTHROID/LEVOTHROID) 125 MCG tablet   7. Type 2 diabetes mellitus with microalbuminuria, without long-term current use of insulin (H) E11.29 losartan (COZAAR) 50 MG tablet    R80.9 Continuous Blood Gluc  (FREESTYLE MOLLY READER) SHIRIN     blood glucose monitoring (NO BRAND SPECIFIED) meter device kit     liraglutide (VICTOZA) 18 MG/3ML solution     continuous blood glucose monitoring (FREESTYLE MOLLY) sensor     insulin degludec (TRESIBA) 100 UNIT/ML pen     insulin pen needle (32G X 6 MM) 32G X 6 MM miscellaneous     DISCONTINUED: continuous blood glucose monitoring (FREESTYLE MOLLY) sensor     DISCONTINUED: continuous blood glucose monitoring (FREESTYLE MOLLY) sensor   8. Benign essential hypertension I10 amLODIPine (NORVASC) 10 MG tablet        BMI:   Estimated body mass index is 38.35 kg/m  as calculated from the following:    Height as of 6/11/19: 1.568 m (5' 1.75\").    Weight as of this encounter: 94.3 kg (208 lb).   Weight management plan: DM diet.    she has been successful with WW in past.     Patient Instructions   Diabetic Educators will call you    StartTresiba Insulin 20 Units once daily (morning or evening, whichever is easiest for you)    Start victoza once weekly    See about getting Freestyle Molly Blood sugar meter   Or " standard meter   .... Start checking sugars three times daily, write them down    Return to clinic 6 weeks (early August)    No follow-ups on file.    Carolyne Layne MD  [facility]

## 2019-06-27 NOTE — PATIENT INSTRUCTIONS
Diabetic Educators will call you    StartTresiba Insulin 20 Units once daily (morning or evening, whichever is easiest for you)    Start victoza once weekly    See about getting Freestyle Geoffrey Blood sugar meter   Or standard meter   .... Start checking sugars three times daily, write them down    Return to clinic 6 weeks (early August)

## 2019-06-27 NOTE — TELEPHONE ENCOUNTER
Diabetes Education Scheduling Outreach #1:    Call to patient to schedule. Left message with phone number to call to schedule.    Plan for 2nd outreach attempt within 2 business days.    Maryanne Ceja OnCall  Diabetes and Nutrition Scheduling

## 2019-07-15 ENCOUNTER — ALLIED HEALTH/NURSE VISIT (OUTPATIENT)
Dept: EDUCATION SERVICES | Facility: CLINIC | Age: 64
End: 2019-07-15
Payer: COMMERCIAL

## 2019-07-15 DIAGNOSIS — E11.9 DIABETES MELLITUS WITHOUT COMPLICATION (H): Primary | ICD-10-CM

## 2019-07-15 PROCEDURE — G0108 DIAB MANAGE TRN  PER INDIV: HCPCS

## 2019-07-15 PROCEDURE — 99207 ZZC DROP WITH A PROCEDURE: CPT

## 2019-07-15 NOTE — PATIENT INSTRUCTIONS
"Diabetes Support Resources:  Taking Insulin:    1. Take Tresiba - 20 units at bedtime.     - Do a 2 unit \"prime\" before each injection, be sure a stream of insulin comes out of the needle before you give your injection.    - After you inject, hold the needle under the skin to the count of 10 to be sure all of the insulin goes in.     - Rotate injection sites, keeping at least 1 inch apart from last injection site and 2 inches away from belly button or surgical scars.    2. Pen - Use a new pen needle for each injection. Always remove pen needle from the insulin pen after use and do not store insulin pens with the needle on the pen.     3. Store insulin you are not using in the refrigerator (do not freeze). Take new insulin out of the refrigerator a few hours prior to use to bring to room temperature.     4. Once opened Tresiba can be kept at room temperature for 56 days after opened.. Do not use the opened insulin after this time has passed, even if there is still medicine inside.     5. Always carry your blood sugar meter and a sugar source like glucose tablets with you in case of a low blood sugar. Treat a low blood sugar (less than 70) with 15 grams of carbohydrate (1 carb choice). Wait 15 minutes, recheck blood sugar. If blood sugar is still below 70, repeat the treatment.    6. Wear Medical ID or carry a wallet card stating you have Diabetes.    7. Call your doctor or diabetes educator if you begin having low blood sugars or if you have questions or concerns.     8. Complete and mail in the form to inform the [place] [employer] that you have started taking insulin.    GLP-1 administration technique taught today. Patient verbalized understanding and was able to perform an accurate return demonstration of administration technique. Side effects were discussed, if patient has any abdominal pain, with or without nausea and/or vomiting, stop medication, call provider, clinic or go to the emergency " room.        [place] [facility] for the [facility]:  For Your Diabetes Education or Nutrition Appointments Call:  [phone]   For Nutrition Related Questions Call:   Phone: [phone]  E-mail: [email or web address]  Fax: [phone]   If you need a medication refill please contact your pharmacy. Please allow 3 business days for your refills to be completed.        Bring blood glucose meter and logbook with you to all doctor and follow-up appointments.    Diabetes Education Telephone Visit Follow-up:    We realize your time is valuable and your health is important! We offer a convenient Telephone Visit follow up! It s a quick way to check in for a medication dose adjustment without having to come back to clinic as soon.    Telephone Visits are often covered by insurance. Please check with your insurance plan to see if this type of visit is covered. If not, the cost is less expensive than an office visit:      Up to 10 minutes (Code 64854): $30    11-20 minutes (Code 08979): $59    More than 20 minutes (Code 46112): $85    Talk with your Diabetes Educator if you want to learn more.      [place] [facility]:  For Your Diabetes Education and Nutrition Appointments Call:  [phone]   For Diabetes Education or Nutrition Related Questions:   Phone: [phone]  E-mail: [email or web address]  Fax: [phone]   If you need a medication refill please contact your pharmacy. Please allow 3 business days for your refills to be completed.

## 2019-07-15 NOTE — LETTER
"    7/15/2019         RE: Kenna Miranda  [address] [place] [address]        Dear Colleague,    Thank you for referring your patient, Kenna Miranda, to the [facility]. Please see a copy of my visit note below.    Diabetes Self-Management Education & Support    Diabetes Education Self Management & Training    SUBJECTIVE/OBJECTIVE:  Presents for: Individual review  Accompanied by: Self  Diabetes education in the past 24mo: No  Focus of Visit: Monitoring, Taking Medication, Healthy Eating, Diabetes Pathophysiology, Assistance w/ making life changes  Diabetes type: Type 2  Disease course: Worsening  Transportation concerns: Yes  Other concerns:: None  Cultural Influences/Ethnic Background:  American      Diabetes Symptoms & Complications  Blurred vision: No  Fatigue: Yes  Neuropathy: No  Foot ulcerations: No  Polydipsia: No  Polyphagia: No  Polyuria: Yes  Visual change: No  Weakness: No  Weight loss: No  Slow healing wounds: No  Recent Infection(s): No  Symptom course: Stable  Weight trend: Stable  Autonomic neuropathy: No  CVA: No  Heart disease: No  Nephropathy: No  Peripheral neuropathy: No  Peripheral Vascular Disease: No  Retinopathy: No  Sexual dysfunction: No    Patient Problem List and Family Medical History reviewed for relevant medical history, current medical status, and diabetes risk factors.    Vitals:  There were no vitals taken for this visit.  Estimated body mass index is 38.35 kg/m  as calculated from the following:    Height as of 6/11/19: 1.568 m (5' 1.75\").    Weight as of 6/27/19: 94.3 kg (208 lb).   Last 3 BP:   BP Readings from Last 3 Encounters:   06/27/19 130/62   06/11/19 153/76   09/18/18 111/68       History   Smoking Status     Never Smoker   Smokeless Tobacco     Never Used     Comment: no second hand exposure.  when young Dad smoked.       Labs:  Lab Results   Component Value Date    A1C 11.4 06/20/2019     Lab Results   Component Value Date "     09/18/2018     Lab Results   Component Value Date    LDL 67 09/18/2018     HDL Cholesterol   Date Value Ref Range Status   09/18/2018 41 (L) >49 mg/dL Final   ]  GFR Estimate   Date Value Ref Range Status   09/18/2018 48 (L) >60 mL/min/1.7m2 Final     Comment:     Non  GFR Calc     GFR Estimate If Black   Date Value Ref Range Status   09/18/2018 58 (L) >60 mL/min/1.7m2 Final     Comment:      GFR Calc     Lab Results   Component Value Date    CR 1.14 09/18/2018     No results found for: MICROALBUMIN    Healthy Eating  Healthy Eating Assessed Today: Yes  Cultural/Zoroastrian diet restrictions?: No  Patient on a regular basis: Skips meals regularly, Eats out more than once a week  Meal planning: None  Meals include: Breakfast, Lunch, Dinner  Breakfast: gets up 8:00 and eats 8:30  cheerios and raspberries and milk,or pancakes, scrambled eggs. makes toast butter or   Lunch:  @ 12:30cottage cheese and raspberries and crackers, or ramen soup,   Dinner: @ 7:30 or some days 4:00 depends on her husbands schedule.  usually 7:00, patello's italian beef sandwich, onion rings, sprite  Snacks: usually does not snack, if she does fruit and nut bars  Beverages: Coffee, Juice, Water, Tea(sweetened ice tea)  Has patient met with a dietitian in the past?: No    Being Active  Barrier to exercise: None    Monitoring  Monitoring Assessed Today: Yes  Blood Glucose Meter: CGM  Home Glucose (Sugar) Monitoring: Never    Newly DX BG today was 294    Taking Medications  Diabetes Medication(s)     Insulin       insulin degludec (TRESIBA) 100 UNIT/ML pen    Inject 20 Units Subcutaneous daily With pen needle for daily insulin injection    Incretin Mimetic Agents (GLP-1 Receptor Agonists)       liraglutide (VICTOZA) 18 MG/3ML solution    Inject 1.8 mg Subcutaneous daily          Current Treatments: None    Problem Solving  Problem Solving Assessed Today: Yes  Hypoglycemia Frequency: Never  Patient carries a  carbohydrate source: No  Medical alert: No  Severe weather/disaster plan for diabetes management?: No  DKA prevention plan?: No  Sick day plan for diabetes management?: (P) No              Reducing Risks  Reducing Risks Assessed Today: Yes  Diabetes Risks: Age over 45 years, History of gestational diabetes, Sedentary Lifestyle  CAD Risks: Obesity, Diabetes Mellitus, Hypertension, Post-menopausal, Sedentary lifestyle  Has dilated eye exam at least once a year?: Yes  Sees dentist every 6 months?: No  Sees podiatrist (foot doctor)?: No    Healthy Coping  Healthy Coping Assessed Today: Yes  Emotional response to diabetes: Ready to learn, Acceptance, Confidence diabetes can be controlled  Informal Support system:: Rosa based, Family, Friends, Spouse  Stage of change: PREPARATION (Decided to change - considering how)  Difficulty affording diabetes management supplies?: No  Patient Activation Measure Survey Score:  ADRIANO Score (Last Two) 9/1/2016   ADRIANO Raw Score 24   Activation Score 40.9   ADRIANO Level 1       ASSESSMENT:  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  She did both here in the office.  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    Goals Addressed as of 7/15/2019 at 3:16 PM       Medication 1 (pt-stated)     Added 7/15/19 by Ani Simon, RN     My Goal: I will take the following medications:    What I need to meet my goal: 1. Tresiba give 20 units daily,  Prime the new needle each time of 2 units  2. Victoza start 0.6 mg daily X 7 days, next MOnday, day 8 increase to 1.2 mg daily    I plan to meet my goal by this date: 1 month          Monitoring (pt-stated)     Added 7/15/19 by Ani Simon, RN     My Goal: I will monitor my BG often    What I need to meet my goal: 1. Check when I first wake up  ()  2. Check 2 hrs after a meal (<180)  3. Check your BG often    I plan to meet my goal by this date: 1 month             Patient's most recent   Lab Results   Component Value Date    A1C 11.4 06/20/2019    is not meeting goal of <7.0    INTERVENTION:   Diabetes knowledge and skills assessment:     Patient is knowledgeable in diabetes management concepts related to: Healthy Coping    Patient needs further education on the following diabetes management concepts: Healthy Eating, Being Active, Monitoring, Taking Medication and Problem Solving    Based on learning assessment above, most appropriate setting for further diabetes education would be: Individual setting.    Education provided today on:  AADE Self-Care Behaviors:  Diabetes Pathophysiology  Healthy Eating: carbohydrate counting, consistency in amount, composition, and timing of food intake, weight reduction, heart healthy diet, eating out, portion control, plate planning method and label reading  Being Active: relationship to blood glucose and describe appropriate activity program  Monitoring: purpose, proper technique, log and interpret results, individual blood glucose targets and frequency of monitoring  Taking Medication: action of prescribed medication, drawing up, administering and storing injectable diabetes medications, proper site selection and rotation for injections, side effects of prescribed medications and when to take medications  Patient was instructed on Geoffrey sensor meter and was able to provide an accurate return demonstration. Patient's blood glucose reading today was 294 mg/dL.  Insulin administration technique taught today. Patient verbalized understanding and was able to perform an accurate return demonstration of administration technique.  GLP-1 administration technique taught today. Patient verbalized understanding and was able to perform an accurate return demonstration of administration technique. Side effects were discussed, if patient has any abdominal pain, with or without nausea    Diabetes Self-Management Education & Support - Personal CGM  Start        Patient seen today for Personal CGM Start:        CGM-specific education:   Freestyle Geoffrey sensor: insertion technique, sensor site location and rotation, insulin administration in relation to sensor placement, sensor wear, reasons to remove sensor (MRI, CT, diathermy), Vitamin C & Aspirin effects on sensor, Geoffrey [place]: frequency of scanning sensor, length of time data is visible, use of built in glucose meter, Precision X-tra test strips and Use of trends and graphs for pattern management and problem solving      PLAN  See Patient Instructions for co-developed, patient-stated behavior change goals.  AVS printed and provided to patient today. See Follow-Up section for recommended follow-up.    Katherine Simon RN/DANIEL Ceja Diabetes Educator    Time Spent: 60 minutes  Encounter Type: Individual    Any diabetes medication dose changes were made via the CDE Protocol and Collaborative Practice Agreement with the patient's primary care provider. A copy of this encounter was shared with the provider. and/or vomiting, stop medication, call provider, clinic or go to the emergency room.    Opportunities for ongoing education and support in diabetes-self management were discussed.    Pt verbalized understanding of concepts discussed and recommendations provided today.       Education Materials Provided:  Brenna Understanding Diabetes Booklet, Carbohydrate Counting, Medication Information on Victoza , Tresiba  Insulin information and Hypoglycemia Signs and Symptoms    PLAN:  See Patient Instructions for co-developed, patient-stated behavior change goals.  AVS printed and provided to patient today. See Follow-Up section for recommended follow-up.    Katherine Simon RN/DANIEL Ceja Diabetes Educator    Time Spent: 60 minutes  Encounter Type: Individual    Any diabetes medication dose changes were made via the CDE Protocol and Collaborative Practice Agreement with the patient's primary care provider. A copy of this  encounter was shared with the provider.

## 2019-07-15 NOTE — PROGRESS NOTES
"Diabetes Self-Management Education & Support    Diabetes Education Self Management & Training    SUBJECTIVE/OBJECTIVE:  Presents for: Individual review  Accompanied by: Self  Diabetes education in the past 24mo: No  Focus of Visit: Monitoring, Taking Medication, Healthy Eating, Diabetes Pathophysiology, Assistance w/ making life changes  Diabetes type: Type 2  Disease course: Worsening  Transportation concerns: Yes  Other concerns:: None  Cultural Influences/Ethnic Background:  American      Diabetes Symptoms & Complications  Blurred vision: No  Fatigue: Yes  Neuropathy: No  Foot ulcerations: No  Polydipsia: No  Polyphagia: No  Polyuria: Yes  Visual change: No  Weakness: No  Weight loss: No  Slow healing wounds: No  Recent Infection(s): No  Symptom course: Stable  Weight trend: Stable  Autonomic neuropathy: No  CVA: No  Heart disease: No  Nephropathy: No  Peripheral neuropathy: No  Peripheral Vascular Disease: No  Retinopathy: No  Sexual dysfunction: No    Patient Problem List and Family Medical History reviewed for relevant medical history, current medical status, and diabetes risk factors.    Vitals:  There were no vitals taken for this visit.  Estimated body mass index is 38.35 kg/m  as calculated from the following:    Height as of 6/11/19: 1.568 m (5' 1.75\").    Weight as of 6/27/19: 94.3 kg (208 lb).   Last 3 BP:   BP Readings from Last 3 Encounters:   06/27/19 130/62   06/11/19 153/76   09/18/18 111/68       History   Smoking Status     Never Smoker   Smokeless Tobacco     Never Used     Comment: no second hand exposure.  when young Dad smoked.       Labs:  Lab Results   Component Value Date    A1C 11.4 06/20/2019     Lab Results   Component Value Date     09/18/2018     Lab Results   Component Value Date    LDL 67 09/18/2018     HDL Cholesterol   Date Value Ref Range Status   09/18/2018 41 (L) >49 mg/dL Final   ]  GFR Estimate   Date Value Ref Range Status   09/18/2018 48 (L) >60 mL/min/1.7m2 Final    "  Comment:     Non  GFR Calc     GFR Estimate If Black   Date Value Ref Range Status   09/18/2018 58 (L) >60 mL/min/1.7m2 Final     Comment:      GFR Calc     Lab Results   Component Value Date    CR 1.14 09/18/2018     No results found for: MICROALBUMIN    Healthy Eating  Healthy Eating Assessed Today: Yes  Cultural/Amish diet restrictions?: No  Patient on a regular basis: Skips meals regularly, Eats out more than once a week  Meal planning: None  Meals include: Breakfast, Lunch, Dinner  Breakfast: gets up 8:00 and eats 8:30  cheerios and raspberries and milk,or pancakes, scrambled eggs. makes toast butter or   Lunch:  @ 12:30cottage cheese and raspberries and crackers, or ramen soup,   Dinner: @ 7:30 or some days 4:00 depends on her husbands schedule.  usually 7:00, patello's italian beef sandwich, onion rings, sprite  Snacks: usually does not snack, if she does fruit and nut bars  Beverages: Coffee, Juice, Water, Tea(sweetened ice tea)  Has patient met with a dietitian in the past?: No    Being Active  Barrier to exercise: None    Monitoring  Monitoring Assessed Today: Yes  Blood Glucose Meter: CGM  Home Glucose (Sugar) Monitoring: Never    Newly DX BG today was 294    Taking Medications  Diabetes Medication(s)     Insulin       insulin degludec (TRESIBA) 100 UNIT/ML pen    Inject 20 Units Subcutaneous daily With pen needle for daily insulin injection    Incretin Mimetic Agents (GLP-1 Receptor Agonists)       liraglutide (VICTOZA) 18 MG/3ML solution    Inject 1.8 mg Subcutaneous daily          Current Treatments: None    Problem Solving  Problem Solving Assessed Today: Yes  Hypoglycemia Frequency: Never  Patient carries a carbohydrate source: No  Medical alert: No  Severe weather/disaster plan for diabetes management?: No  DKA prevention plan?: No  Sick day plan for diabetes management?: (P) No              Reducing Risks  Reducing Risks Assessed Today: Yes  Diabetes Risks: Age  over 45 years, History of gestational diabetes, Sedentary Lifestyle  CAD Risks: Obesity, Diabetes Mellitus, Hypertension, Post-menopausal, Sedentary lifestyle  Has dilated eye exam at least once a year?: Yes  Sees dentist every 6 months?: No  Sees podiatrist (foot doctor)?: No    Healthy Coping  Healthy Coping Assessed Today: Yes  Emotional response to diabetes: Ready to learn, Acceptance, Confidence diabetes can be controlled  Informal Support system:: Rosa based, Family, Friends, Spouse  Stage of change: PREPARATION (Decided to change - considering how)  Difficulty affording diabetes management supplies?: No  Patient Activation Measure Survey Score:  ADRIANO Score (Last Two) 9/1/2016   ADRIANO Raw Score 24   Activation Score 40.9   ADRIANO Level 1       ASSESSMENT:  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  She did both here in the office.  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    Goals Addressed as of 7/15/2019 at 3:16 PM       Medication 1 (pt-stated)     Added 7/15/19 by Ani Simon, RN     My Goal: I will take the following medications:    What I need to meet my goal: 1. Tresiba give 20 units daily,  Prime the new needle each time of 2 units  2. Victoza start 0.6 mg daily X 7 days, next MOnday, day 8 increase to 1.2 mg daily    I plan to meet my goal by this date: 1 month          Monitoring (pt-stated)     Added 7/15/19 by Ani Simon, RN     My Goal: I will monitor my BG often    What I need to meet my goal: 1. Check when I first wake up  ()  2. Check 2 hrs after a meal (<180)  3. Check your BG often    I plan to meet my goal by this date: 1 month            Patient's most recent   Lab Results   Component Value Date    A1C 11.4 06/20/2019    is not meeting goal of <7.0    INTERVENTION:   Diabetes knowledge and skills assessment:     Patient is knowledgeable in diabetes management concepts related to: Healthy  Coping    Patient needs further education on the following diabetes management concepts: Healthy Eating, Being Active, Monitoring, Taking Medication and Problem Solving    Based on learning assessment above, most appropriate setting for further diabetes education would be: Individual setting.    Education provided today on:  AADE Self-Care Behaviors:  Diabetes Pathophysiology  Healthy Eating: carbohydrate counting, consistency in amount, composition, and timing of food intake, weight reduction, heart healthy diet, eating out, portion control, plate planning method and label reading  Being Active: relationship to blood glucose and describe appropriate activity program  Monitoring: purpose, proper technique, log and interpret results, individual blood glucose targets and frequency of monitoring  Taking Medication: action of prescribed medication, drawing up, administering and storing injectable diabetes medications, proper site selection and rotation for injections, side effects of prescribed medications and when to take medications  Patient was instructed on Geoffrey sensor meter and was able to provide an accurate return demonstration. Patient's blood glucose reading today was 294 mg/dL.  Insulin administration technique taught today. Patient verbalized understanding and was able to perform an accurate return demonstration of administration technique.  GLP-1 administration technique taught today. Patient verbalized understanding and was able to perform an accurate return demonstration of administration technique. Side effects were discussed, if patient has any abdominal pain, with or without nausea    Diabetes Self-Management Education & Support - Personal CGM Start        Patient seen today for Personal CGM Start:        CGM-specific education:   Freestyle Geoffrey sensor: insertion technique, sensor site location and rotation, insulin administration in relation to sensor placement, sensor wear, reasons to remove sensor  (MRI, CT, diathermy), Vitamin C & Aspirin effects on sensor, Geoffrey [place]: frequency of scanning sensor, length of time data is visible, use of built in glucose meter, Precision X-tra test strips and Use of trends and graphs for pattern management and problem solving      PLAN  See Patient Instructions for co-developed, patient-stated behavior change goals.  AVS printed and provided to patient today. See Follow-Up section for recommended follow-up.    Katherine Simon RN/DANIEL Ceja Diabetes Educator    Time Spent: 60 minutes  Encounter Type: Individual    Any diabetes medication dose changes were made via the CDE Protocol and Collaborative Practice Agreement with the patient's primary care provider. A copy of this encounter was shared with the provider. and/or vomiting, stop medication, call provider, clinic or go to the emergency room.    Opportunities for ongoing education and support in diabetes-self management were discussed.    Pt verbalized understanding of concepts discussed and recommendations provided today.       Education Materials Provided:  Brenna Understanding Diabetes Booklet, Carbohydrate Counting, Medication Information on Victoza , Tresiba  Insulin information and Hypoglycemia Signs and Symptoms    PLAN:  See Patient Instructions for co-developed, patient-stated behavior change goals.  AVS printed and provided to patient today. See Follow-Up section for recommended follow-up.    Katherine Simon RN/DANIEL Ceja Diabetes Educator    Time Spent: 60 minutes  Encounter Type: Individual    Any diabetes medication dose changes were made via the CDE Protocol and Collaborative Practice Agreement with the patient's primary care provider. A copy of this encounter was shared with the provider.

## 2019-07-18 ENCOUNTER — TELEPHONE (OUTPATIENT)
Dept: FAMILY MEDICINE | Facility: CLINIC | Age: 64
End: 2019-07-18

## 2019-07-18 DIAGNOSIS — E11.29 TYPE 2 DIABETES MELLITUS WITH MICROALBUMINURIA, WITHOUT LONG-TERM CURRENT USE OF INSULIN (H): Primary | ICD-10-CM

## 2019-07-18 DIAGNOSIS — R80.9 TYPE 2 DIABETES MELLITUS WITH MICROALBUMINURIA, WITHOUT LONG-TERM CURRENT USE OF INSULIN (H): Primary | ICD-10-CM

## 2019-07-18 NOTE — LETTER
[address] [id number] [phone]          2019    RE:   Kenna Miranda           51                                                                                                           [address]  [place] [address]          To Whom It May Concern     Please continue covering the Home Continuous glucose monitor.   I do not feel Kenna should be required/ to try/fail Accucheck or OneTouch meters due to the following    -- frequent BS checks are needed in order to manage labile sugars, > 4 times per day  -- the CGM is helpful in curbing behaviours due to easily access to Blood sugar results.  This is helping with obesity problem  --- She is on insulin      Your time and understanding are appreciated.         Sincerely,         Carolyne Layne MD  [phone] ()  [phone] (fax)

## 2019-07-18 NOTE — TELEPHONE ENCOUNTER
"Please fill in the \"times per day for testing\"    [facility]  [facility]   Ext #[phone], [phone]  #2    "

## 2019-07-18 NOTE — TELEPHONE ENCOUNTER
Prior Authorization Retail Medication Request    Medication/Dose: FreeStyle Precision Mark Test Strips  ICD code (if different than what is on RX):    Previously Tried and Failed:    Rationale:      Insurance Name: [facility] [place]  Insurance ID:  [id number]       Pharmacy Information (if different than what is on RX)  Name:  DORI Herring   Phone:

## 2019-07-19 ENCOUNTER — PATIENT OUTREACH (OUTPATIENT)
Dept: EDUCATION SERVICES | Facility: CLINIC | Age: 64
End: 2019-07-19

## 2019-07-19 DIAGNOSIS — E11.29 TYPE 2 DIABETES MELLITUS WITH MICROALBUMINURIA, WITHOUT LONG-TERM CURRENT USE OF INSULIN (H): Primary | ICD-10-CM

## 2019-07-19 DIAGNOSIS — R80.9 TYPE 2 DIABETES MELLITUS WITH MICROALBUMINURIA, WITHOUT LONG-TERM CURRENT USE OF INSULIN (H): Primary | ICD-10-CM

## 2019-07-19 NOTE — PROGRESS NOTES
"Kenna called the triage line:  She has questions:  1) \"I know I have to prep that insulin pen with 2 units - it seems like there is a lot of insulin coming out\":  She has been dialing up to 2 and doing an air shot, we reviewed this in detail that this is indeed to \"prep the pen\" and make sure she gets an accurate dose.    However, she has only been taking 2 units of Tresiba. Reviewed in detail that the Rx is for 20 units of Tresiba daily. Again reviewed that after the 2 unit air shot, she will then dial up 20 units of insulin and poke the needle into her stomach, then deliver the insulin.     She voices understanding. Recognizes that BG numbers have been high and not changing.   Described it may take 3-5 days for her to see the full impact of the 20 units Tresiba.     Discussed that she can call in numbers next week in 5 days or so if she would like us to take a look and we can adjust if numbers are not to goal.     She voices understanding and thanks.     Joanie Man RD, LD, CDE          "

## 2019-07-29 NOTE — TELEPHONE ENCOUNTER
Central Prior Authorization Team   Phone: [phone]    PA Initiation    Medication: FreeStyle Precision Mark Test Strips  Insurance Company: [employer] - Phone [phone] Fax [phone]  Pharmacy Filling the Rx: [facility] [place], [place] - [address]  Filling Pharmacy Phone: [phone]  Filling Pharmacy Fax: [phone]  Start Date: 7/29/2019

## 2019-07-31 NOTE — TELEPHONE ENCOUNTER
PA was DENIED.    Do you want to Appeal? If so please write a letter explaining why the patient needs to be on this medication. Then route this encounter with the letter to [facility] [place]

## 2019-07-31 NOTE — TELEPHONE ENCOUNTER
PRIOR AUTHORIZATION DENIED    Medication: FreeStyle Precision Mark Test Strips-DENIED    Denial Date: 7/30/2019    Denial Rational: PATIENT MUST TRY/FAIL ACCU-CHEK AND ONETOUCH PRODUCTS.        Appeal Information:  IF PATIENT IS UNABLE TO TRY/FAIL ALTERNATIVE(S) PLEASE SUPPLY PA TEAM WITH A LETTER OF MEDICAL NECESSITY WITH CLINICAL REASON.

## 2019-08-01 NOTE — TELEPHONE ENCOUNTER
Medication Appeal Initiation    We have initiated an appeal for the requested medication:  Medication: FreeStyle Precision Mark Test Strips-APPEAL INITIATED  Appeal Start Date:  8/1/2019  Insurance Company: [employer] - Phone [phone] Fax [phone]  Comments:  APPEAL LETTER FAXED.

## 2019-08-01 NOTE — TELEPHONE ENCOUNTER
Letter done, signed.  Please forward to PA   ( I was unable to fwd to [facility] [place] [place] MED as insstructed)

## 2019-08-06 ENCOUNTER — ALLIED HEALTH/NURSE VISIT (OUTPATIENT)
Dept: EDUCATION SERVICES | Facility: CLINIC | Age: 64
End: 2019-08-06
Payer: COMMERCIAL

## 2019-08-06 DIAGNOSIS — R80.9 TYPE 2 DIABETES MELLITUS WITH MICROALBUMINURIA, WITHOUT LONG-TERM CURRENT USE OF INSULIN (H): Primary | ICD-10-CM

## 2019-08-06 DIAGNOSIS — E11.9 DIABETES MELLITUS WITHOUT COMPLICATION (H): ICD-10-CM

## 2019-08-06 DIAGNOSIS — E11.29 TYPE 2 DIABETES MELLITUS WITH MICROALBUMINURIA, WITHOUT LONG-TERM CURRENT USE OF INSULIN (H): Primary | ICD-10-CM

## 2019-08-06 PROCEDURE — 99207 ZZC DROP WITH A PROCEDURE: CPT

## 2019-08-06 PROCEDURE — G0108 DIAB MANAGE TRN  PER INDIV: HCPCS

## 2019-08-06 RX ORDER — METFORMIN HCL 500 MG
1000 TABLET, EXTENDED RELEASE 24 HR ORAL
Qty: 180 TABLET | Refills: 1 | Status: SHIPPED | OUTPATIENT
Start: 2019-08-06 | End: 2019-09-17

## 2019-08-06 NOTE — PROGRESS NOTES
"Diabetes Self-Management Education & Support    Diabetes Education Self Management & Training    SUBJECTIVE/OBJECTIVE:  Presents for: Follow-up  Accompanied by: Self  Diabetes education in the past 24mo: No  Focus of Visit: Monitoring, Taking Medication, Healthy Eating, Diabetes Pathophysiology, Assistance w/ making life changes  Diabetes type: Type 2  Disease course: Worsening  Transportation concerns: Yes  Other concerns:: None  Cultural Influences/Ethnic Background:  American      Diabetes Symptoms & Complications  Blurred vision: No  Fatigue: No  Neuropathy: No  Foot ulcerations: No  Polydipsia: No  Polyphagia: No  Polyuria: No  Visual change: No  Weakness: No  Weight loss: No  Slow healing wounds: No  Recent Infection(s): No  Symptom course: Stable  Weight trend: Decreasing steadily  Autonomic neuropathy: No  CVA: No  Heart disease: No  Nephropathy: No  Peripheral neuropathy: No  Peripheral Vascular Disease: No  Retinopathy: No  Sexual dysfunction: No    Patient Problem List and Family Medical History reviewed for relevant medical history, current medical status, and diabetes risk factors.    Vitals:  There were no vitals taken for this visit.  Estimated body mass index is 38.35 kg/m  as calculated from the following:    Height as of 6/11/19: 1.568 m (5' 1.75\").    Weight as of 6/27/19: 94.3 kg (208 lb).   Last 3 BP:   BP Readings from Last 3 Encounters:   06/27/19 130/62   06/11/19 153/76   09/18/18 111/68       History   Smoking Status     Never Smoker   Smokeless Tobacco     Never Used     Comment: no second hand exposure.  when young Dad smoked.       Labs:  Lab Results   Component Value Date    A1C 11.4 06/20/2019     Lab Results   Component Value Date     09/18/2018     Lab Results   Component Value Date    LDL 67 09/18/2018     HDL Cholesterol   Date Value Ref Range Status   09/18/2018 41 (L) >49 mg/dL Final   ]  GFR Estimate   Date Value Ref Range Status   09/18/2018 48 (L) >60 mL/min/1.7m2 Final "     Comment:     Non  GFR Calc     GFR Estimate If Black   Date Value Ref Range Status   2018 58 (L) >60 mL/min/1.7m2 Final     Comment:      GFR Calc     Lab Results   Component Value Date    CR 1.14 2018     No results found for: MICROALBUMIN    Healthy Eating  Healthy Eating Assessed Today: Yes  Cultural/Shinto diet restrictions?: No  Patient on a regular basis: Eats 3 meals a day  Meal planning: None  Meals include: Breakfast, Lunch, Dinner  Breakfast: gets up 8:00 and eats 8:30  cheerios and raspberries and milk,or pancakes, scrambled eggs. makes toast butter or   Lunch:  @ 12:30cottage cheese and raspberries and crackers, or ramen soup,   Dinner: @ 7:30 or some days 4:00 depends on her husbands schedule.  usually 7:00, patello's italian beef sandwich, onion rings, sprite  Snacks: usually does not snack, if she does fruit and nut bars  Beverages: Coffee, Juice, Water, Tea(sweetened ice tea)  Has patient met with a dietitian in the past?: No    Being Active  Being Active Assessed Today: Yes  Barrier to exercise: None    Monitoring  Monitoring Assessed Today: Yes  Blood Glucose Meter: CGM  Home Glucose (Sugar) Monitorin+ times per day  Blood glucose trend: Decreasing steadily  Low Glucose Range (mg/dL):   High Glucose Range (mg/dL): >200  Overall Range (mg/dL): 130-140                                Taking Medications  Diabetes Medication(s)     Biguanides       metFORMIN (GLUCOPHAGE-XR) 500 MG 24 hr tablet    Take 2 tablets (1,000 mg) by mouth daily (with dinner)    Insulin       insulin degludec (TRESIBA) 100 UNIT/ML pen    Inject 20 Units Subcutaneous daily With pen needle for daily insulin injection          Current Treatments: None  Problems taking diabetes medications regularly?: No  Diabetes medication side effects?: No  Treatment Compliance: All of the time    Problem Solving  Problem Solving Assessed Today: Yes  Hypoglycemia Frequency:  Never  Patient carries a carbohydrate source: No  Medical alert: No  Severe weather/disaster plan for diabetes management?: No  DKA prevention plan?: No              Reducing Risks  Reducing Risks Assessed Today: Yes  Diabetes Risks: Age over 45 years, History of gestational diabetes, Sedentary Lifestyle  CAD Risks: Obesity, Diabetes Mellitus, Hypertension, Post-menopausal, Sedentary lifestyle  Has dilated eye exam at least once a year?: Yes  Sees dentist every 6 months?: No  Sees podiatrist (foot doctor)?: No    Healthy Coping  Healthy Coping Assessed Today: Yes  Emotional response to diabetes: Ready to learn, Acceptance, Confidence diabetes can be controlled  Informal Support system:: Rosa based, Family, Friends, Spouse  Stage of change: ACTION (Actively working towards change)  Difficulty affording diabetes management supplies?: No  Patient Activation Measure Survey Score:  ADRIANO Score (Last Two) 9/1/2016   ADRIANO Raw Score 24   Activation Score 40.9   ADRIANO Level 1       ASSESSMENT:  Kenna has made great changes and her BG are within normal range.  Will wean her off of Tresiba soon, once she is at goal of strength of medications.   She is very happy with her numbers. We went over in detail on insulin usage and Victoza usage.  She now understands and review last months notes on diabetes.      Goals Addressed as of 8/6/2019 at 4:32 PM       Being Active (pt-stated)     Added 8/6/19 by Ani Simon RN     My Goal: I will increase my activity    What I need to meet my goal: 1. Begin treadmill for 10 minutes twice per day    I plan to meet my goal by this date: 1 month          Medication 1 (pt-stated)     Added 7/15/19 by Ani Simon RN     My Goal: I will take the following medications:    What I need to meet my goal: 1. Tresiba give 20 units daily,  Prime the new needle each time of 2 units  2. Victoza start 0.6 mg daily  3. Go to 0.6 mg and add 1 click the first day  4. 2nd day, go to 0.6 mg and 2 clicks  5. Add  1 click each day as long as you feel ok.   6. If you do not feel good go back one click and stay there until you feel better.  7. Metformin  mg take 1 pill with dinner for 7 days,  8. If you are tolerating Metformin then increase to 2 pills with dinner    I plan to meet my goal by this date: 1 month            Patient's most recent   Lab Results   Component Value Date    A1C 11.4 06/20/2019    is not meeting goal of <7.0    INTERVENTION:   Diabetes knowledge and skills assessment:     Patient is knowledgeable in diabetes management concepts related to: Healthy Eating, Being Active, Monitoring and Taking Medication    Patient needs further education on the following diabetes management concepts: Healthy Eating, Being Active, Monitoring and Taking Medication    Based on learning assessment above, most appropriate setting for further diabetes education would be: Individual setting.    Education provided today on:  AADE Self-Care Behaviors:  Diabetes Pathophysiology  Healthy Eating: carbohydrate counting, consistency in amount, composition, and timing of food intake, weight reduction, heart healthy diet, eating out, portion control, plate planning method and label reading  Being Active: relationship to blood glucose and describe appropriate activity program  Monitoring: purpose, proper technique, log and interpret results, individual blood glucose targets and frequency of monitoring  Taking Medication: action of prescribed medication, drawing up, administering and storing injectable diabetes medications, proper site selection and rotation for injections, side effects of prescribed medications and when to take medications  Problem Solving: high blood glucose - causes, signs/symptoms, treatment and prevention, low blood glucose - causes, signs/symptoms, treatment and prevention, carrying a carbohydrate source at all times, safe travel and when to call health care provider  Insulin administration technique taught  today. Patient verbalized understanding and was able to perform an accurate return demonstration of administration technique.    Opportunities for ongoing education and support in diabetes-self management were discussed.    Pt verbalized understanding of concepts discussed and recommendations provided today.       Education Materials Provided:  Living healthy with diabetes    PLAN:  See Patient Instructions for co-developed, patient-stated behavior change goals.  AVS printed and provided to patient today. See Follow-Up section for recommended follow-up.    Katherine Simon RN/DANIEL Ceja Diabetes Educator    Time Spent: 60 minutes  Encounter Type: Individual    Any diabetes medication dose changes were made via the CDE Protocol and Collaborative Practice Agreement with the patient's primary care provider. A copy of this encounter was shared with the provider.

## 2019-08-06 NOTE — PATIENT INSTRUCTIONS
"Diabetes Support Resources:  Taking Insulin:    1. Take Tresiba - 20 units at bedtime.     - Do a 2 unit \"prime\" before each injection, be sure a stream of insulin comes out of the needle before you give your injection.    -then dial up to 20 units of insulin and inject    - After you inject, hold the needle under the skin to the count of 10 to be sure all of the insulin goes in.     - Rotate injection sites, keeping at least 1 inch apart from last injection site and 2 inches away from belly button or surgical scars.    2. Pen - Use a new pen needle for each injection. Always remove pen needle from the insulin pen after use and do not store insulin pens with the needle on the pen.     3. Store insulin you are not using in the refrigerator (do not freeze). Take new insulin out of the refrigerator a few hours prior to use to bring to room temperature.     4. Once opened Tresiba can be kept at room temperature for 56 days after opened.. Do not use the opened insulin after this time has passed, even if there is still medicine inside.     5. Always carry your blood sugar meter and a sugar source like glucose tablets with you in case of a low blood sugar. Treat a low blood sugar (less than 70) with 15 grams of carbohydrate (1 carb choice). Wait 15 minutes, recheck blood sugar. If blood sugar is still below 70, repeat the treatment.    6. Wear Medical ID or carry a wallet card stating you have Diabetes.    7. Call your doctor or diabetes educator if you begin having low blood sugars or if you have questions or concerns.       [facility]:  For Your Diabetes Education or Nutrition Appointments Call:  [phone]   For Nutrition Related Questions Call:   Phone: [phone]  E-mail: [email or web address]  Fax: [phone]   If you need a medication refill please contact your pharmacy. Please allow 3 business days for your refills to be completed.      "   Bring blood glucose meter and logbook with you to all doctor and follow-up appointments.    Diabetes Education Telephone Visit Follow-up:    We realize your time is valuable and your health is important! We offer a convenient Telephone Visit follow up! It s a quick way to check in for a medication dose adjustment without having to come back to clinic as soon.    Telephone Visits are often covered by insurance. Please check with your insurance plan to see if this type of visit is covered. If not, the cost is less expensive than an office visit:      Up to 10 minutes (Code 26980): $30    11-20 minutes (Code 54625): $59    More than 20 minutes (Code 55334): $85    Talk with your Diabetes Educator if you want to learn more.      [facility]:  For Your Diabetes Education and Nutrition Appointments Call:  [phone]   For Diabetes Education or Nutrition Related Questions:   Phone: [phone]  E-mail: [email or web address]  Fax: [phone]   If you need a medication refill please contact your pharmacy. Please allow 3 business days for your refills to be completed.

## 2019-08-07 DIAGNOSIS — I10 ESSENTIAL HYPERTENSION WITH GOAL BLOOD PRESSURE LESS THAN 140/90: ICD-10-CM

## 2019-08-07 NOTE — TELEPHONE ENCOUNTER
"Requested Prescriptions   Pending Prescriptions Disp Refills     amLODIPine (NORVASC) 5 MG tablet [Pharmacy Med Name: AMLODIPINE BESYLATE 5MG TABS] 90 tablet 3     Sig: TAKE ONE TABLET BY MOUTH EVERY DAY   Last Written Prescription Date:  6-27-19  Last Fill Quantity: 90,  # refills: 2   Last office visit: 6/27/2019 with prescribing provider:  6-27-19   Future Office Visit:   Next 5 appointments (look out 90 days)    Aug 13, 2019  8:40 AM CDT  SHORT with Carolyne Layne MD  [facility] [address] [place] [id number]  [phone]             Calcium Channel Blockers Protocol  Failed - 8/7/2019  5:03 AM        Failed - Normal serum creatinine on file in past 12 months     Recent Labs   Lab Test 09/18/18  1221   CR 1.14*             Passed - Blood pressure under 140/90 in past 12 months     BP Readings from Last 3 Encounters:   06/27/19 130/62   06/11/19 153/76   09/18/18 111/68                 Passed - Recent (12 mo) or future (30 days) visit within the authorizing provider's specialty     Patient had office visit in the last 12 months or has a visit in the next 30 days with authorizing provider or within the authorizing provider's specialty.  See \"Patient Info\" tab in inbasket, or \"Choose Columns\" in Meds & Orders section of the refill encounter.              Passed - Medication is active on med list        Passed - Patient is age 18 or older        Passed - No active pregnancy on record        Passed - No positive pregnancy test in past 12 months          "

## 2019-08-08 RX ORDER — AMLODIPINE BESYLATE 5 MG/1
TABLET ORAL
Qty: 90 TABLET | Refills: 3 | OUTPATIENT
Start: 2019-08-08

## 2019-08-13 ENCOUNTER — OFFICE VISIT (OUTPATIENT)
Dept: FAMILY MEDICINE | Facility: CLINIC | Age: 64
End: 2019-08-13
Payer: COMMERCIAL

## 2019-08-13 VITALS
SYSTOLIC BLOOD PRESSURE: 90 MMHG | OXYGEN SATURATION: 94 % | HEART RATE: 79 BPM | WEIGHT: 207 LBS | DIASTOLIC BLOOD PRESSURE: 57 MMHG | BODY MASS INDEX: 38.17 KG/M2 | TEMPERATURE: 98.1 F

## 2019-08-13 DIAGNOSIS — E11.29 TYPE 2 DIABETES MELLITUS WITH MICROALBUMINURIA, WITHOUT LONG-TERM CURRENT USE OF INSULIN (H): Primary | ICD-10-CM

## 2019-08-13 DIAGNOSIS — R80.9 TYPE 2 DIABETES MELLITUS WITH MICROALBUMINURIA, WITHOUT LONG-TERM CURRENT USE OF INSULIN (H): Primary | ICD-10-CM

## 2019-08-13 DIAGNOSIS — N18.30 CKD (CHRONIC KIDNEY DISEASE) STAGE 3, GFR 30-59 ML/MIN (H): ICD-10-CM

## 2019-08-13 DIAGNOSIS — E78.5 HYPERLIPIDEMIA LDL GOAL <100: ICD-10-CM

## 2019-08-13 DIAGNOSIS — E03.9 HYPOTHYROIDISM, UNSPECIFIED TYPE: ICD-10-CM

## 2019-08-13 DIAGNOSIS — I10 ESSENTIAL HYPERTENSION WITH GOAL BLOOD PRESSURE LESS THAN 140/90: ICD-10-CM

## 2019-08-13 PROCEDURE — 99214 OFFICE O/P EST MOD 30 MIN: CPT | Performed by: INTERNAL MEDICINE

## 2019-08-13 RX ORDER — LIRAGLUTIDE 6 MG/ML
0.6 INJECTION SUBCUTANEOUS DAILY
COMMUNITY
End: 2019-09-17

## 2019-08-13 NOTE — PATIENT INSTRUCTIONS
"EYE exam due September    Watch BP:  If less than 100/60, or if you feel dizzy, let me know. ....      Change from victoza to Ozempic    Check your Hgb A1C (make lab appointment) 15\" prior to the Diabetic Educator appointment in September.     Return to clinic Oct/Nov 2019 for annual physical        "

## 2019-08-13 NOTE — PROGRESS NOTES
Subjective     Kenna Miranda is a 63 year old female who presents to clinic today for the following health issues:    64 y/o F here for DM f/u.  DM has been uncontrolled, recent A1C 11.x   Had been on metformin, but compelled to d/c due to diarrhea .    6 weeks ago started Tresiba insulin 20 U daily, & Victoza once weekly.   She did manage to get Freestyle Geoffrey home CGM.   BS have been 100 - 160.        Has some loose stools and urgency with victoza, even when taking the lower dose.  Would like to try Ozempic weekly with next refill.        HPI   Diabetes Follow-up      How often are you checking your blood sugar? Four or more times daily    What time of day are you checking your blood sugars (select all that apply)?  Before meals    Have you had any blood sugars above 200?  Yes 210    Have you had any blood sugars below 70?  No    What symptoms do you notice when your blood sugar is low?  None    What concerns do you have today about your diabetes?   Discuss if she has to take her meds all the time now and if she can ever be DM free, Discuss seeing a dietician      Do you have any of these symptoms? (Select all that apply)  No numbness or tingling in feet.  No redness, sores or blisters on feet.  No complaints of excessive thirst.  No reports of blurry vision.  No significant changes to weight.     Have you had a diabetic eye exam in the last 12 months? Yes- Date of last eye exam: 9/12/18    BP Readings from Last 2 Encounters:   08/13/19 90/57   06/27/19 130/62     Hemoglobin A1C (%)   Date Value   06/20/2019 11.4 (H)   09/18/2018 8.6 (H)     LDL Cholesterol Calculated (mg/dL)   Date Value   09/18/2018 67   10/02/2017 84       Diabetes Management Resources      How many servings of fruits and vegetables do you eat daily?  2-3    On average, how many sweetened beverages do you drink each day (soda, juice, sweet tea, etc)?   1    How many days per week do you miss taking your medication? 0             Patient  Active Problem List   Diagnosis     Borderline osteopenia     HYPERLIPIDEMIA LDL GOAL <100     Family history of colon cancer     S/P colonoscopic polypectomy     Breast cancer (H)     Cataract of both eyes     Elevated LFTs     Dermatochalasis of eyelid     No diabetic retinopathy in both eyes     Tibialis posterior tendinitis, left     Essential hypertension with goal blood pressure less than 140/90     Hypothyroidism, unspecified type     PONV (postoperative nausea and vomiting)     Type 2 diabetes mellitus with microalbuminuria, without long-term current use of insulin (H)     Overweight HCC     CKD (chronic kidney disease) stage 3, GFR 30-59 ml/min (H)     Past Surgical History:   Procedure Laterality Date     BIOPSY  2015     BIOPSY BREAST NEEDLE LOCALIZATION, BIOPSY NODE SENTINEL, COMBINED  2012    Procedure: COMBINED BIOPSY BREAST NEEDLE LOCALIZATION, BIOPSY NODE SENTINEL;  Left Breast Wire Locilized Lumpectomy and Sentinal Lymph Node Biopsy;  Surgeon: Jesus Vicente MD;  Location: [facility] OR     C  DELIVERY ONLY      x 2     COLONOSCOPY  10-26-11    Return in 1 yr for Polyp Surveillance     COLONOSCOPY  12    Return for Colonoscopy in 3 yrs.Polyps     COMBINED CYSTOSCOPY, INSERT STENT URETER(S) Right 2017    Procedure: COMBINED CYSTOSCOPY, INSERT STENT URETER(S);  Cystoscopy Right retrograde pyelogram, Right ureteral Stent Placement;  Surgeon: Loida Sánchez MD;  Location: [facility] OR     COMBINED CYSTOSCOPY, RETROGRADES, URETEROSCOPY, LASER HOLMIUM LITHOTRIPSY URETER(S), INSERT STENT Right 2017    Procedure: COMBINED CYSTOSCOPY, RETROGRADES, URETEROSCOPY, LASER HOLMIUM LITHOTRIPSY URETER(S), INSERT STENT;  Cystoscopy, Right Ureteroscopy, Laser Lithotripsy, Right Stent Exchange ;  Surgeon: Ruth Boston MD;  Location: [facility]     LITHOTRIPSY       SURGICAL HISTORY OF -       exploratory laparotomy     SURGICAL HISTORY OF -       Right ovary removed     SURGICAL  HISTORY OF -   2008    breast biopsy       Social History     Tobacco Use     Smoking status: Never Smoker     Smokeless tobacco: Never Used     Tobacco comment: no second hand exposure.  when young Dad smoked.   Substance Use Topics     Alcohol use: Not Currently     Comment: very, very little     Family History   Problem Relation Age of Onset     Cancer - colorectal Mother      Thyroid Disease Mother      Colon Cancer Mother      Cancer Father         Lung     Lipids Father      Glaucoma Father      Macular Degeneration Father      Depression Brother      Respiratory Brother         losing hearing in 50's         Current Outpatient Medications   Medication Sig Dispense Refill     amLODIPine (NORVASC) 10 MG tablet Take 1 tablet (10 mg) by mouth daily 90 tablet 2     ASPIRIN 81 MG OR TABS 1 TABLET DAILY       atorvastatin (LIPITOR) 20 MG tablet Take 1 tablet (20 mg) by mouth every morning Patient is taking 2 times a week. 24 tablet 3     Continuous Blood Gluc  (FREESTYLE MOLLY READER) SHIRIN 1 each 3 times daily 1 Device 0     continuous blood glucose monitoring (FREESTYLE MOLLY) sensor For use with Freestyle Molly Flash  for continuous monitioring of blood glucose levels. Replace sensor every 14 days. 14 each 3     insulin degludec (TRESIBA) 100 UNIT/ML pen Inject 20 Units Subcutaneous daily With pen needle for daily insulin injection 9 mL 3     insulin pen needle (31G X 6 MM) 31G X 6 MM miscellaneous Use 1 pen needle twice daily 200 each 3     insulin pen needle (32G X 6 MM) 32G X 6 MM miscellaneous Use 2 pen needles daily or as directed. 200 each 3     levothyroxine (SYNTHROID/LEVOTHROID) 125 MCG tablet Take 1 tablet (125 mcg) by mouth daily 90 tablet 3     liraglutide (VICTOZA) 18 MG/3ML solution Inject 0.6 mg Subcutaneous daily       losartan (COZAAR) 50 MG tablet Take 1 tablet (50 mg) by mouth daily 90 tablet 3     metoprolol tartrate (LOPRESSOR) 25 MG tablet Take 1 tablet (25 mg) by mouth 2  times daily 180 tablet 3     Semaglutide 0.25 or 0.5 MG/DOSE SOPN Inject 0.25 mg Subcutaneous once a week for 28 days, THEN 0.5 mg once a week. 3 mL 3     anastrozole (ARIMIDEX) 1 MG tablet Take 1 tablet (1 mg) by mouth daily (Patient not taking: Reported on 6/27/2019) 90 tablet 3     blood glucose (FREESTYLE PRECISION ORACIO TEST) test strip Use to test blood sugar 3  times daily or as directed. (Patient not taking: Reported on 8/13/2019) 300 each 3     blood glucose monitoring (NO BRAND SPECIFIED) meter device kit Use to test blood sugar 3 times daily or as directed.   -- Preferred. (Patient not taking: Reported on 8/13/2019) 1 kit 3     Calcium Carbonate-Vitamin D (CALCIUM 500 + D PO) Take by mouth daily       metFORMIN (GLUCOPHAGE-XR) 500 MG 24 hr tablet Take 2 tablets (1,000 mg) by mouth daily (with dinner) (Patient not taking: Reported on 8/13/2019) 180 tablet 1     Allergies   Allergen Reactions     Nkda [No Known Drug Allergies]      Recent Labs   Lab Test 06/20/19  0848 09/18/18  1221 10/02/17  0856 09/05/17  1442  07/02/17  0513  07/01/17  0658  06/30/17  0544  08/04/16  0657   A1C 11.4* 8.6*  --  6.9*  --   --   --   --   --   --    < > 7.0*   LDL  --  67 84  --   --   --   --   --   --   --   --  65   HDL  --  41* 44*  --   --   --   --   --   --   --   --  44*   TRIG  --  305* 234*  --   --   --   --   --   --   --   --  210*   ALT  --   --   --   --   --  27  --  54*  --  74*   < > 143*   CR  --  1.14* 1.30* 1.20*   < > 2.28*  --  2.53*   < > 2.86*   < > 1.11*   GFRESTIMATED  --  48* 41* 45*   < > 22*  --  19*   < > 17*   < > 50*   GFRESTBLACK  --  58* 50* 55*   < > 26*  --  23*   < > 20*   < > 60*   POTASSIUM  --  4.0 4.1 4.0   < > 3.0*   < > 3.1*   < > 3.0*   < > 4.3   TSH  --  3.42 1.54  --   --   --   --   --   --   --    < > 4.68*    < > = values in this interval not displayed.      BP Readings from Last 3 Encounters:   08/13/19 90/57   06/27/19 130/62   06/11/19 153/76    Wt Readings from Last 3  "Encounters:   08/13/19 93.9 kg (207 lb)   06/27/19 94.3 kg (208 lb)   06/11/19 94.5 kg (208 lb 6.4 oz)                       Reviewed and updated as needed this visit by Provider         Review of Systems   ROS COMP: Constitutional, HEENT, cardiovascular, pulmonary, gi and gu systems are negative, except as otherwise noted.      Objective    BP 90/57 (BP Location: Right arm, Patient Position: Sitting, Cuff Size: Adult Large)   Pulse 79   Temp 98.1  F (36.7  C) (Oral)   Wt 93.9 kg (207 lb)   SpO2 94%   BMI 38.17 kg/m    Body mass index is 38.17 kg/m .  Physical Exam   GENERAL: healthy, alert and no distress  EYES: Eyes grossly normal to inspection, PERRL and conjunctivae and sclerae normal  MS: no gross musculoskeletal defects noted, no edema  SKIN: no suspicious lesions or rashes  NEURO: Normal strength and tone, mentation intact and speech normal  PSYCH: mentation appears normal, affect normal/bright    Diagnostic Test Results:   Reviewed previous labs and labs due for next month.   none         Assessment & Plan       ICD-10-CM    1. Type 2 diabetes mellitus with microalbuminuria, without long-term current use of insulin (H) E11.29 Semaglutide 0.25 or 0.5 MG/DOSE SOPN    R80.9 Albumin Random Urine Quantitative with Creat Ratio     Hemoglobin A1c   2. Hyperlipidemia LDL goal <100 E78.5 Lipid panel reflex to direct LDL Fasting   3. CKD (chronic kidney disease) stage 3, GFR 30-59 ml/min (H) N18.3 Basic metabolic panel   4. Essential hypertension with goal blood pressure less than 140/90 I10 Basic metabolic panel   5. Hypothyroidism, unspecified type E03.9 TSH with free T4 reflex      BP is low, but this is unusual.  She is asymptomatic:  Will check BP at home this week.    Patient Instructions   EYE exam due September    Watch BP:  If less than 100/60, or if you feel dizzy, let me know. ....  Today's BP seems like a \"fluke\".  Consider decrease metoprolol if still low at home.      Change from victoza to Ozempic  " "Having urgency with loose stools on victoza, would like to try weekly Ozempic.      Check your Hgb A1C (make lab appointment) 15\" prior to the Diabetic Educator appointment in September.   She is due for fasting labs as well, all \"due\" labs ordered.  She will come for AFE the following month or two.     Return to clinic Oct/Nov 2019 for annual physical    No follow-ups on file.    Carolyne Layne MD  [facility]    BMI:   Estimated body mass index is 38.17 kg/m  as calculated from the following:    Height as of 6/11/19: 1.568 m (5' 1.75\").    Weight as of this encounter: 93.9 kg (207 lb).   Weight management plan: on lirglutide with weight trending down.               "

## 2019-09-16 ENCOUNTER — OFFICE VISIT (OUTPATIENT)
Dept: OPHTHALMOLOGY | Facility: CLINIC | Age: 64
End: 2019-09-16
Payer: COMMERCIAL

## 2019-09-16 DIAGNOSIS — E11.29 TYPE 2 DIABETES MELLITUS WITH MICROALBUMINURIA, WITHOUT LONG-TERM CURRENT USE OF INSULIN (H): ICD-10-CM

## 2019-09-16 DIAGNOSIS — H52.4 PRESBYOPIA: ICD-10-CM

## 2019-09-16 DIAGNOSIS — H25.13 NUCLEAR SCLEROSIS OF BOTH EYES: ICD-10-CM

## 2019-09-16 DIAGNOSIS — Z01.01 ENCOUNTER FOR EXAMINATION OF EYES AND VISION WITH ABNORMAL FINDINGS: Primary | ICD-10-CM

## 2019-09-16 DIAGNOSIS — R80.9 TYPE 2 DIABETES MELLITUS WITH MICROALBUMINURIA, WITHOUT LONG-TERM CURRENT USE OF INSULIN (H): ICD-10-CM

## 2019-09-16 DIAGNOSIS — H02.839 DERMATOCHALASIS OF EYELID: ICD-10-CM

## 2019-09-16 DIAGNOSIS — H43.811 POSTERIOR VITREOUS DETACHMENT OF RIGHT EYE: ICD-10-CM

## 2019-09-16 PROCEDURE — 92014 COMPRE OPH EXAM EST PT 1/>: CPT | Performed by: STUDENT IN AN ORGANIZED HEALTH CARE EDUCATION/TRAINING PROGRAM

## 2019-09-16 PROCEDURE — 92015 DETERMINE REFRACTIVE STATE: CPT | Performed by: STUDENT IN AN ORGANIZED HEALTH CARE EDUCATION/TRAINING PROGRAM

## 2019-09-16 ASSESSMENT — CONF VISUAL FIELD
OS_NORMAL: 1
OD_NORMAL: 1

## 2019-09-16 ASSESSMENT — REFRACTION_WEARINGRX
OD_CYLINDER: +1.25
OD_SPHERE: -0.50
OD_AXIS: 005
SPECS_TYPE: BIFOCAL
OS_ADD: +3.00
OS_CYLINDER: SPHERE
OS_SPHERE: +0.50
OD_ADD: +3.00

## 2019-09-16 ASSESSMENT — TONOMETRY
OD_IOP_MMHG: 18
OS_IOP_MMHG: 18
IOP_METHOD: APPLANATION

## 2019-09-16 ASSESSMENT — VISUAL ACUITY
CORRECTION_TYPE: GLASSES
METHOD: SNELLEN - LINEAR
OS_CC: 20/25
OS_CC+: -1
OD_CC: 20/20

## 2019-09-16 ASSESSMENT — CUP TO DISC RATIO
OD_RATIO: 0.55
OS_RATIO: 0.5

## 2019-09-16 ASSESSMENT — REFRACTION_MANIFEST
OS_SPHERE: +0.75
OD_SPHERE: -0.25
OD_CYLINDER: +1.00
OD_ADD: +2.75
OS_CYLINDER: SPHERE
OD_AXIS: 009
OS_ADD: +2.75

## 2019-09-16 ASSESSMENT — SLIT LAMP EXAM - LIDS
COMMENTS: 2+ DERMATOCHALASIS
COMMENTS: 2+ DERMATOCHALASIS

## 2019-09-16 ASSESSMENT — EXTERNAL EXAM - RIGHT EYE: OD_EXAM: NORMAL

## 2019-09-16 ASSESSMENT — EXTERNAL EXAM - LEFT EYE: OS_EXAM: NORMAL

## 2019-09-16 NOTE — LETTER
9/16/2019         RE: Kenna Miranda  [address] [place] [address]        Dear Colleague,    Thank you for referring your patient, Kenna Miranda, to the [facility]. Please see a copy of my visit note below.     Current Eye Medications: none     Subjective:  Here for complete today. DM, last a1c was 11.4 on 6-20-19. Going again tomorrow to have it checked again along with diabetic educator.  Also checking at home, they are under the 200 range. No flashes or floaters, but vision is fluctuating now.  Was able to read without glasses before, but can no longer do that.     Lab Results   Component Value Date    A1C 11.4 06/20/2019    A1C 8.6 09/18/2018    A1C 6.9 09/05/2017    A1C 7.1 06/29/2017    A1C 7.0 08/04/2016         Objective:  See Ophthalmology Exam.       Assessment:  Kenna Miranda is a 63 year old female who presents with:     Type 2 diabetes mellitus with microalbuminuria, without long-term current use of insulin (H) Negative diabetic retinopathy      Nuclear sclerosis of both eyes      Posterior vitreous detachment of right eye      Dermatochalasis of eyelid        Plan:  Glasses prescription given - optional to update    Keep blood sugars and blood pressure under good control.    Elsa Moncada MD  [phone]        Again, thank you for allowing me to participate in the care of your patient.        Sincerely,        Elsa Moncada MD

## 2019-09-16 NOTE — PATIENT INSTRUCTIONS
Glasses prescription given - optional to update    Keep blood sugars and blood pressure under good control.    Elsa Moncada MD  [phone]    Patient Education   Diabetes weakens the blood vessels all over the body, including the eyes. Damage to the blood vessels in the eyes can cause swelling or bleeding into part of the eye (called the retina). This is called diabetic retinopathy (MANUELA-tin--puh-thee). If not treated, this disease can cause vision loss or blindness.   Symptoms may include blurred or distorted vision, but many people have no symptoms. It's important to see your eye doctor regularly to check for problems.   Early treatment and good control can help protect your vision. Here are the things you can do to help prevent vision loss:      1. Keep your blood sugar levels under tight control.      2. Bring high blood pressure under control.      3. No smoking.      4. Have yearly dilated eye exams.

## 2019-09-17 ENCOUNTER — ALLIED HEALTH/NURSE VISIT (OUTPATIENT)
Dept: EDUCATION SERVICES | Facility: CLINIC | Age: 64
End: 2019-09-17
Payer: COMMERCIAL

## 2019-09-17 DIAGNOSIS — N18.30 CKD (CHRONIC KIDNEY DISEASE) STAGE 3, GFR 30-59 ML/MIN (H): ICD-10-CM

## 2019-09-17 DIAGNOSIS — E11.9 DIABETES MELLITUS WITHOUT COMPLICATION (H): Primary | ICD-10-CM

## 2019-09-17 DIAGNOSIS — E11.29 TYPE 2 DIABETES MELLITUS WITH MICROALBUMINURIA, WITHOUT LONG-TERM CURRENT USE OF INSULIN (H): ICD-10-CM

## 2019-09-17 DIAGNOSIS — R80.9 TYPE 2 DIABETES MELLITUS WITH MICROALBUMINURIA, WITHOUT LONG-TERM CURRENT USE OF INSULIN (H): ICD-10-CM

## 2019-09-17 DIAGNOSIS — I10 ESSENTIAL HYPERTENSION WITH GOAL BLOOD PRESSURE LESS THAN 140/90: ICD-10-CM

## 2019-09-17 DIAGNOSIS — E78.5 HYPERLIPIDEMIA LDL GOAL <100: ICD-10-CM

## 2019-09-17 DIAGNOSIS — E03.9 HYPOTHYROIDISM, UNSPECIFIED TYPE: ICD-10-CM

## 2019-09-17 LAB
ANION GAP SERPL CALCULATED.3IONS-SCNC: 7 MMOL/L (ref 3–14)
BUN SERPL-MCNC: 17 MG/DL (ref 7–30)
CALCIUM SERPL-MCNC: 9.5 MG/DL (ref 8.5–10.1)
CHLORIDE SERPL-SCNC: 108 MMOL/L (ref 94–109)
CHOLEST SERPL-MCNC: 138 MG/DL
CO2 SERPL-SCNC: 27 MMOL/L (ref 20–32)
CREAT SERPL-MCNC: 1.12 MG/DL (ref 0.52–1.04)
GFR SERPL CREATININE-BSD FRML MDRD: 51 ML/MIN/{1.73_M2}
GLUCOSE SERPL-MCNC: 145 MG/DL (ref 70–99)
HBA1C MFR BLD: 7.1 % (ref 0–5.6)
HDLC SERPL-MCNC: 38 MG/DL
LDLC SERPL CALC-MCNC: 65 MG/DL
NONHDLC SERPL-MCNC: 100 MG/DL
POTASSIUM SERPL-SCNC: 4 MMOL/L (ref 3.4–5.3)
SODIUM SERPL-SCNC: 142 MMOL/L (ref 133–144)
TRIGL SERPL-MCNC: 177 MG/DL
TSH SERPL DL<=0.005 MIU/L-ACNC: 1.66 MU/L (ref 0.4–4)

## 2019-09-17 PROCEDURE — 80048 BASIC METABOLIC PNL TOTAL CA: CPT | Performed by: INTERNAL MEDICINE

## 2019-09-17 PROCEDURE — 99207 ZZC DROP WITH A PROCEDURE: CPT

## 2019-09-17 PROCEDURE — G0108 DIAB MANAGE TRN  PER INDIV: HCPCS

## 2019-09-17 PROCEDURE — 84443 ASSAY THYROID STIM HORMONE: CPT | Performed by: INTERNAL MEDICINE

## 2019-09-17 PROCEDURE — 36415 COLL VENOUS BLD VENIPUNCTURE: CPT | Performed by: INTERNAL MEDICINE

## 2019-09-17 PROCEDURE — 83036 HEMOGLOBIN GLYCOSYLATED A1C: CPT | Performed by: INTERNAL MEDICINE

## 2019-09-17 PROCEDURE — 80061 LIPID PANEL: CPT | Performed by: INTERNAL MEDICINE

## 2019-09-17 RX ORDER — METFORMIN HCL 500 MG
1000 TABLET, EXTENDED RELEASE 24 HR ORAL 2 TIMES DAILY WITH MEALS
Qty: 360 TABLET | Refills: 1 | Status: SHIPPED | OUTPATIENT
Start: 2019-09-17 | End: 2019-11-12 | Stop reason: SINTOL

## 2019-09-17 NOTE — PROGRESS NOTES
"Diabetes Self-Management Education & Support    Diabetes Education Self Management & Training    SUBJECTIVE/OBJECTIVE:  Presents for: Follow-up  Accompanied by: Self  Diabetes education in the past 24mo: Yes  Focus of Visit: Taking Medication  Diabetes type: Type 2  Disease course: Improving  Transportation concerns: No  Other concerns:: None  Cultural Influences/Ethnic Background:  American      Diabetes Symptoms & Complications  Blurred vision: No  Fatigue: No  Neuropathy: No  Foot ulcerations: No  Polydipsia: No  Polyphagia: No  Polyuria: No  Visual change: No  Weakness: No  Weight loss: No  Slow healing wounds: No  Recent Infection(s): No  Symptom course: Improving  Weight trend: Fluctuating minimally  Autonomic neuropathy: No  CVA: No  Heart disease: No  Nephropathy: No  Peripheral neuropathy: No  Peripheral Vascular Disease: No  Retinopathy: No    Patient Problem List and Family Medical History reviewed for relevant medical history, current medical status, and diabetes risk factors.    Vitals:  There were no vitals taken for this visit.  Estimated body mass index is 38.17 kg/m  as calculated from the following:    Height as of 6/11/19: 1.568 m (5' 1.75\").    Weight as of 8/13/19: 93.9 kg (207 lb).   Last 3 BP:   BP Readings from Last 3 Encounters:   08/13/19 90/57   06/27/19 130/62   06/11/19 153/76       History   Smoking Status     Never Smoker   Smokeless Tobacco     Never Used     Comment: no second hand exposure.  when young Dad smoked.       Labs:  Lab Results   Component Value Date    A1C 7.1 09/17/2019     Lab Results   Component Value Date     09/18/2018     Lab Results   Component Value Date    LDL 67 09/18/2018     HDL Cholesterol   Date Value Ref Range Status   09/18/2018 41 (L) >49 mg/dL Final   ]  GFR Estimate   Date Value Ref Range Status   09/18/2018 48 (L) >60 mL/min/1.7m2 Final     Comment:     Non  GFR Calc     GFR Estimate If Black   Date Value Ref Range Status "   09/18/2018 58 (L) >60 mL/min/1.7m2 Final     Comment:      GFR Calc     Lab Results   Component Value Date    CR 1.14 09/18/2018     No results found for: MICROALBUMIN    Healthy Eating  Healthy Eating Assessed Today: Yes  Cultural/Anabaptism diet restrictions?: No  Patient on a regular basis: Eats 3 meals a day  Meal planning: Avoiding sweets, Carbohydrate counting, Smaller portions  Meals include: Breakfast, Lunch, Dinner, Snacks  Breakfast: pancakes and sausage, cereal (granola) and coffee, raspberries  Lunch: cottage cheese and fruit, bread with PB or ham, yogurt  Dinner: meat and veggies and starch  Snacks: Belvitas, or banana, crackers and cheese,   Beverages: Water, Coffee  Has patient met with a dietitian in the past?: No    Being Active  Being Active Assessed Today: Yes  Exercise:: Yes(may walk the dogs once in awhile)  How intense was your typical exercise? : Light (like stretching or slow walking)  Barrier to exercise: None    Monitoring  Monitoring Assessed Today: Yes  Did patient bring glucose meter to appointment? : Yes  Blood Glucose Meter: FreeStyle  Home Glucose (Sugar) Monitoring: 3-4 times per day  Blood glucose trend: Decreasing steadily  Low Glucose Range (mg/dL):   High Glucose Range (mg/dL): 180-200  Overall Range (mg/dL): 130-140                  Taking Medications  Diabetes Medication(s)     Biguanides       metFORMIN (GLUCOPHAGE-XR) 500 MG 24 hr tablet    Take 2 tablets (1,000 mg) by mouth 2 times daily (with meals)    Incretin Mimetic Agents (GLP-1 Receptor Agonists)       Semaglutide 0.25 or 0.5 MG/DOSE SOPN    Inject 0.25 mg Subcutaneous once a week for 28 days, THEN 0.5 mg once a week.          Taking Medication Assessed Today: Yes  Current Treatments: Insulin Injections  Dose schedule: at bedtime  Given by: Patient  Injection/Infusion sites: Abdomen    Problem Solving  Hypoglycemia Frequency: Rarely  Hypoglycemia Treatment: Juice  Patient carries a carbohydrate  source: No  Medical alert: No  Severe weather/disaster plan for diabetes management?: No  DKA prevention plan?: No  Sick day plan for diabetes management?: (P) No    Hypoglycemia symptoms  Confusion: No  Dizziness or Light-Headedness: No  Headaches: No  Hunger: No  Mood changes: No  Nervousness/Anxiety: No  Sleepiness: No  Speech difficulty: No  Sweats: No  Tremors: No    Hypoglycemia Complications  Blackouts: No  Hospitalization: No  Nocturnal hypoglycemia: No  Required assistance: No  Required glucagon injection: No  Seizures: No    Reducing Risks  CAD Risks: Obesity, Sedentary lifestyle  Has dilated eye exam at least once a year?: Yes  Sees dentist every 6 months?: Yes  Sees podiatrist (foot doctor)?: No    Healthy Coping  Informal Support system:: Rosa based, Family, Friends, Spouse  Difficulty affording diabetes management supplies?: No  Patient Activation Measure Survey Score:  ADRIANO Score (Last Two) 9/1/2016   ADRIANO Raw Score 24   Activation Score 40.9   ADRIANO Level 1       ASSESSMENT:  Plan is to wean Kenna off insulin and manage diabetes with Metformin and Ozempic only.      Goals Addressed as of 9/17/2019 at 10:24 AM                 Today      Being Active (pt-stated)   20%    Added 8/6/19 by Ani Simon, RN     My Goal: I will increase my activity    What I need to meet my goal: 1. Begin treadmill for 10 minutes twice per day    I plan to meet my goal by this date: 1 month          Medication 1 (pt-stated)       Added 9/17/19 by Ani Simon, RN     My Goal: I will take the following medications for managing diabetes    What I need to meet my goal: 1. Metformin  mg take 2 pills with dinner and 2 pills with breakfast  2. Ozempic 0.25 mg weekly X 4 wks then week 5 increase to 0.5 mg weekly  3. Tresiba take 16 units tonight and then after 3-4 days, go to 10 units or stop when pen is finished.    I plan to meet my goal by this date: 2 months            Patient's most recent   Lab Results   Component  Value Date    A1C 7.1 09/17/2019    is not meeting goal of <7.0    INTERVENTION:   Diabetes knowledge and skills assessment:     Patient is knowledgeable in diabetes management concepts related to: Healthy Eating, Being Active, Monitoring and Taking Medication    Patient needs further education on the following diabetes management concepts: Monitoring and Taking Medication    Based on learning assessment above, most appropriate setting for further diabetes education would be: Individual setting.    Education provided today on:  AADE Self-Care Behaviors:  Healthy Eating: carbohydrate counting, consistency in amount, composition, and timing of food intake, weight reduction, heart healthy diet, eating out, portion control, plate planning method and label reading  Monitoring: purpose, proper technique, log and interpret results, individual blood glucose targets and frequency of monitoring  Taking Medication: action of prescribed medication, drawing up, administering and storing injectable diabetes medications, proper site selection and rotation for injections, side effects of prescribed medications and when to take medications    Opportunities for ongoing education and support in diabetes-self management were discussed.    Pt verbalized understanding of concepts discussed and recommendations provided today.       Education Materials Provided:  Living healthy with diabetes    PLAN:  See Patient Instructions for co-developed, patient-stated behavior change goals.  AVS printed and provided to patient today. See Follow-Up section for recommended follow-up.    Katherine Simon RN/DANIEL Ceja Diabetes Educator    Time Spent: 30 minutes  Encounter Type: Individual    Any diabetes medication dose changes were made via the CDE Protocol and Collaborative Practice Agreement with the patient's primary care provider. A copy of this encounter was shared with the provider.

## 2019-09-17 NOTE — LETTER
[facility]  [address] [place]  [address]  [phone]        September 17, 2019    Kenna Miranda  [address]  [place] [address]        Dear Kenna,    What an improvement!   We will have to discuss what you think helped get better control (the new meter? The meds? All of it? ).     I look forward to seeing you for the annual exam in 1-2 months (October or November)    Results for orders placed or performed in visit on 09/17/19   TSH with free T4 reflex   Result Value Ref Range    TSH 1.66 0.40 - 4.00 mU/L   Hemoglobin A1c   Result Value Ref Range    Hemoglobin A1C 7.1 (H) 0 - 5.6 %   Lipid panel reflex to direct LDL Fasting   Result Value Ref Range    Cholesterol 138 <200 mg/dL    Triglycerides 177 (H) <150 mg/dL    HDL Cholesterol 38 (L) >49 mg/dL    LDL Cholesterol Calculated 65 <100 mg/dL    Non HDL Cholesterol 100 <130 mg/dL   Basic metabolic panel   Result Value Ref Range    Sodium 142 133 - 144 mmol/L    Potassium 4.0 3.4 - 5.3 mmol/L    Chloride 108 94 - 109 mmol/L    Carbon Dioxide 27 20 - 32 mmol/L    Anion Gap 7 3 - 14 mmol/L    Glucose 145 (H) 70 - 99 mg/dL    Urea Nitrogen 17 7 - 30 mg/dL    Creatinine 1.12 (H) 0.52 - 1.04 mg/dL    GFR Estimate 51 (L) >60 mL/min/[1.73_m2]    GFR Estimate If Black 59 (L) >60 mL/min/[1.73_m2]    Calcium 9.5 8.5 - 10.1 mg/dL       If you have any questions please call the clinic at [phone].    Sincerely,    Carolyne EM

## 2019-09-17 NOTE — PATIENT INSTRUCTIONS
Diabetes Support Resources:  Weaning off Tresiba, finish the pen,  Increase Metformin  Begin Ozempic and taper up to 0.5 mg weekly    Protein for breakfast     Bring blood glucose meter and logbook with you to all doctor and follow-up appointments.    Diabetes Education Telephone Visit Follow-up:    We realize your time is valuable and your health is important! We offer a convenient Telephone Visit follow up! It s a quick way to check in for a medication dose adjustment without having to come back to clinic as soon.    Telephone Visits are often covered by insurance. Please check with your insurance plan to see if this type of visit is covered. If not, the cost is less expensive than an office visit:      Up to 10 minutes (Code 31275): $30    11-20 minutes (Code 12636): $59    More than 20 minutes (Code 14366): $85    Talk with your Diabetes Educator if you want to learn more.      [facility]:  For Your Diabetes Education and Nutrition Appointments Call:  [phone]   For Diabetes Education or Nutrition Related Questions:   Phone: [phone]  E-mail: [email or web address]  Fax: [phone]   If you need a medication refill please contact your pharmacy. Please allow 3 business days for your refills to be completed.

## 2019-09-17 NOTE — RESULT ENCOUNTER NOTE
Kenna Miranda    What an improvement!   We will have to discuss what you think helped get better control (the new meter? The meds? All of it? ).     I look forward to seeing you for the annual exam in 1-2 months (October or November)    GEORGE Rodriguez M.D.
Kenna Miranda    What an improvement!   We will have to discuss what you think helped get better control (the new meter? The meds? All of it? ).     I look forward to seeing you for the annual exam in 1-2 months (October or November)    GEORGE Rodriguez M.D.      Send note
flank

## 2019-09-19 NOTE — TELEPHONE ENCOUNTER
Prior Authorization Approval    Authorization Effective Date: 9/5/2019  Authorization Expiration Date: 9/4/2020  Medication: FreeStyle Precision Mark Test Strips-APPEAL APPROVED  Approved Dose/Quantity:    Reference #: [id number]   Insurance Company: [employer] - Phone [phone] Fax [phone]  Expected CoPay:       CoPay Card Available:      Foundation Assistance Needed:    Which Pharmacy is filling the prescription (Not needed for infusion/clinic administered): [facility] [place], [place] - [address] [facility] [facility]  Pharmacy Notified: Yes  Patient Notified: Yes  **Instructed pharmacy to notify patient when script is ready to /ship.**

## 2019-10-02 ENCOUNTER — RELEASE OF INFORMATION (OUTPATIENT)
Dept: FAMILY MEDICINE | Facility: CLINIC | Age: 64
End: 2019-10-02

## 2019-10-23 ENCOUNTER — ALLIED HEALTH/NURSE VISIT (OUTPATIENT)
Dept: NURSING | Facility: CLINIC | Age: 64
End: 2019-10-23
Payer: COMMERCIAL

## 2019-10-23 DIAGNOSIS — Z23 NEED FOR PROPHYLACTIC VACCINATION AND INOCULATION AGAINST INFLUENZA: Primary | ICD-10-CM

## 2019-10-23 PROCEDURE — 99207 ZZC NO CHARGE NURSE ONLY: CPT

## 2019-10-23 PROCEDURE — 90471 IMMUNIZATION ADMIN: CPT

## 2019-10-23 PROCEDURE — 90662 IIV NO PRSV INCREASED AG IM: CPT

## 2019-10-23 NOTE — NURSING NOTE
Patient was given Flu vaccine. Prior to administration, verified patients identity using patient s name and date of birth. Patient instructed to remain in clinic for 15 minutes and report any adverse reaction to staff immediately .    Was entire vial of medication used? Yes  Vial/Syringe: Syringe  Expiration Date:  05/26/2020  Was this medication supplied by the patient? No   Vaccine information supplied.  Lizy Clarke MA on 10/23/2019 at 4:13 PM

## 2019-11-10 PROBLEM — Z98.890 PONV (POSTOPERATIVE NAUSEA AND VOMITING): Chronic | Status: RESOLVED | Noted: 2017-07-31 | Resolved: 2019-11-10

## 2019-11-10 PROBLEM — Z79.811 AROMATASE INHIBITOR USE: Status: ACTIVE | Noted: 2019-11-10

## 2019-11-10 PROBLEM — R11.2 PONV (POSTOPERATIVE NAUSEA AND VOMITING): Chronic | Status: RESOLVED | Noted: 2017-07-31 | Resolved: 2019-11-10

## 2019-11-11 ASSESSMENT — ENCOUNTER SYMPTOMS
DIZZINESS: 0
JOINT SWELLING: 1
WEAKNESS: 0
CONSTIPATION: 0
FEVER: 0
CHILLS: 0
HEMATOCHEZIA: 0
FREQUENCY: 0
HEMATURIA: 0
SHORTNESS OF BREATH: 0
ABDOMINAL PAIN: 0
COUGH: 0
HEADACHES: 0
EYE PAIN: 0
HEARTBURN: 0
DYSURIA: 0
MYALGIAS: 0
NAUSEA: 0
ARTHRALGIAS: 1
PARESTHESIAS: 0
BREAST MASS: 0
NERVOUS/ANXIOUS: 0
DIARRHEA: 0
PALPITATIONS: 0
SORE THROAT: 0

## 2019-11-11 ASSESSMENT — ACTIVITIES OF DAILY LIVING (ADL): CURRENT_FUNCTION: NO ASSISTANCE NEEDED

## 2019-11-12 ENCOUNTER — OFFICE VISIT (OUTPATIENT)
Dept: FAMILY MEDICINE | Facility: CLINIC | Age: 64
End: 2019-11-12
Payer: COMMERCIAL

## 2019-11-12 VITALS
OXYGEN SATURATION: 95 % | TEMPERATURE: 98.1 F | HEART RATE: 79 BPM | SYSTOLIC BLOOD PRESSURE: 136 MMHG | HEIGHT: 62 IN | BODY MASS INDEX: 37.17 KG/M2 | DIASTOLIC BLOOD PRESSURE: 79 MMHG | WEIGHT: 202 LBS

## 2019-11-12 DIAGNOSIS — E03.9 HYPOTHYROIDISM, UNSPECIFIED TYPE: ICD-10-CM

## 2019-11-12 DIAGNOSIS — Z23 NEED FOR PNEUMOCOCCAL VACCINATION: ICD-10-CM

## 2019-11-12 DIAGNOSIS — Z00.00 ENCOUNTER FOR MEDICARE ANNUAL WELLNESS EXAM: ICD-10-CM

## 2019-11-12 DIAGNOSIS — M70.61 TROCHANTERIC BURSITIS OF RIGHT HIP: ICD-10-CM

## 2019-11-12 DIAGNOSIS — N18.30 CKD (CHRONIC KIDNEY DISEASE) STAGE 3, GFR 30-59 ML/MIN (H): ICD-10-CM

## 2019-11-12 DIAGNOSIS — Z00.01 ENCOUNTER FOR GENERAL ADULT MEDICAL EXAMINATION WITH ABNORMAL FINDINGS: Primary | ICD-10-CM

## 2019-11-12 DIAGNOSIS — I10 ESSENTIAL HYPERTENSION WITH GOAL BLOOD PRESSURE LESS THAN 140/90: ICD-10-CM

## 2019-11-12 DIAGNOSIS — R80.9 TYPE 2 DIABETES MELLITUS WITH MICROALBUMINURIA, WITHOUT LONG-TERM CURRENT USE OF INSULIN (H): ICD-10-CM

## 2019-11-12 DIAGNOSIS — M85.80 OSTEOPENIA, UNSPECIFIED LOCATION: ICD-10-CM

## 2019-11-12 DIAGNOSIS — E78.5 HYPERLIPIDEMIA LDL GOAL <100: ICD-10-CM

## 2019-11-12 DIAGNOSIS — Z79.811 AROMATASE INHIBITOR USE: ICD-10-CM

## 2019-11-12 DIAGNOSIS — E11.29 TYPE 2 DIABETES MELLITUS WITH MICROALBUMINURIA, WITHOUT LONG-TERM CURRENT USE OF INSULIN (H): ICD-10-CM

## 2019-11-12 PROCEDURE — 99213 OFFICE O/P EST LOW 20 MIN: CPT | Mod: 25 | Performed by: INTERNAL MEDICINE

## 2019-11-12 PROCEDURE — 90670 PCV13 VACCINE IM: CPT | Performed by: INTERNAL MEDICINE

## 2019-11-12 PROCEDURE — 90471 IMMUNIZATION ADMIN: CPT | Performed by: INTERNAL MEDICINE

## 2019-11-12 PROCEDURE — 99397 PER PM REEVAL EST PAT 65+ YR: CPT | Mod: 25 | Performed by: INTERNAL MEDICINE

## 2019-11-12 RX ORDER — ATORVASTATIN CALCIUM 20 MG/1
20 TABLET, FILM COATED ORAL EVERY MORNING
Qty: 24 TABLET | Refills: 3 | Status: SHIPPED | OUTPATIENT
Start: 2019-11-12 | End: 2021-03-15

## 2019-11-12 RX ORDER — METOPROLOL TARTRATE 25 MG/1
25 TABLET, FILM COATED ORAL 2 TIMES DAILY
Qty: 180 TABLET | Refills: 3 | Status: SHIPPED | OUTPATIENT
Start: 2019-11-12 | End: 2021-01-19

## 2019-11-12 ASSESSMENT — ACTIVITIES OF DAILY LIVING (ADL): CURRENT_FUNCTION: NO ASSISTANCE NEEDED

## 2019-11-12 ASSESSMENT — ENCOUNTER SYMPTOMS
MYALGIAS: 0
NERVOUS/ANXIOUS: 0
SORE THROAT: 0
HEADACHES: 0
DYSURIA: 0
FEVER: 0
FREQUENCY: 0
PALPITATIONS: 0
BREAST MASS: 0
DIARRHEA: 0
HEMATURIA: 0
WEAKNESS: 0
HEARTBURN: 0
CHILLS: 0
ABDOMINAL PAIN: 0
EYE PAIN: 0
NAUSEA: 0
ARTHRALGIAS: 1
COUGH: 0
SHORTNESS OF BREATH: 0
JOINT SWELLING: 1
PARESTHESIAS: 0
CONSTIPATION: 0
DIZZINESS: 0
HEMATOCHEZIA: 0

## 2019-11-12 ASSESSMENT — MIFFLIN-ST. JEOR: SCORE: 1406.58

## 2019-11-12 NOTE — PATIENT INSTRUCTIONS
Patient Education   Personalized Prevention Plan  You are due for the preventive services outlined below.  Your care team is available to assist you in scheduling these services.  If you have already completed any of these items, please share that information with your care team to update in your medical record.  Health Maintenance Due   Topic Date Due     Pneumococcal Vaccine (1 of 2 - PCV13) 12/18/2018     Annual Wellness Visit  09/18/2019     Kidney Microalbumin Urine Test  09/18/2019           For hip:  Daily stretching, ice/heat  Consider orthopedic evaluation if not improving

## 2019-11-12 NOTE — PROGRESS NOTES
"SUBJECTIVE:   Kenna Miranda is a 63 year old female who presents for Preventive Visit.    64 y/o  F here for AFE.  H/o uncontrolled DMII (can't tolerate metformin--due to GI, now on Tresiba and Trulicity [semaglutide]) -        Other history includes HTN, Hyperlipidemia, Stage I Er/GA+ (HER2 [-]) L Breast cancer, (Completed Aromatase inhibitor therapy), osteopenia ,  obesity.       Her BS are running \"good\": 120-130's....  She is only on the semaglutide.  Too many GI side effects for metformin, had to d/c.     Is losing weight by sticking with diabetic diet.  Has lost 15# this past year.      She has been having pain in right leg during ambulation   ... pain is at right lateral hip, radiates to femur during ambulation. Positional at night.  Aleve helps.       Are you in the first 12 months of your Medicare coverage?  No    Healthy Habits:     In general, how would you rate your overall health?  Good    Frequency of exercise:  1 day/week    Duration of exercise:  Less than 15 minutes    Do you usually eat at least 4 servings of fruit and vegetables a day, include whole grains    & fiber and avoid regularly eating high fat or \"junk\" foods?  Yes    Taking medications regularly:  Yes    Ability to successfully perform activities of daily living:  No assistance needed    Home Safety:  No safety concerns identified    Hearing Impairment:  No hearing concerns    In the past 6 months, have you been bothered by leaking of urine?  No    In general, how would you rate your overall mental or emotional health?  Good      PHQ-2 Total Score: 0    Additional concerns today:  Yes    Do you feel safe in your environment? Yes    Have you ever done Advance Care Planning? (For example, a Health Directive, POLST, or a discussion with a medical provider or your loved ones about your wishes): Yes, patient states has an Advance Care Planning document and will bring a copy to the clinic.      Fall risk       Cognitive Screening "   1) Repeat 3 items (Leader, Season, Table)    2) Clock draw: NORMAL  3) 3 item recall: Recalls 3 objects  Results: 3 items recalled: COGNITIVE IMPAIRMENT LESS LIKELY    Mini-CogTM Copyright S Elian. Licensed by the author for use in [facility]; reprinted with permission (kobi@[facility]). All rights reserved.      Do you have sleep apnea, excessive snoring or daytime drowsiness?: yes-snoring     Reviewed and updated as needed this visit by clinical staff         Reviewed and updated as needed this visit by Provider        Social History     Tobacco Use     Smoking status: Never Smoker     Smokeless tobacco: Never Used     Tobacco comment: no second hand exposure.  when young Dad smoked.   Substance Use Topics     Alcohol use: Not Currently     Comment: very, very little     If you drink alcohol do you typically have >3 drinks per day or >7 drinks per week? No    Alcohol Use 11/11/2019   Prescreen: >3 drinks/day or >7 drinks/week? No   Prescreen: >3 drinks/day or >7 drinks/week? -           Right leg pain when she walks,    Current providers sharing in care for this patient include:   Patient Care Team:  Carolyne Layne MD as PCP - General (Internal Medicine)  Ruth Boston MD as MD (Urology)  Karen Salvador, RN as Registered Nurse  Carolyne Layne MD as Assigned PCP  Ani Simon, RN as Diabetes Educator (Diabetes Education)    The following health maintenance items are reviewed in Epic and correct as of today:  Health Maintenance   Topic Date Due     PNEUMOCOCCAL IMMUNIZATION 65+ HIGH/HIGHEST RISK (1 of 2 - PCV13) 12/18/2018     MEDICARE ANNUAL WELLNESS VISIT  09/18/2019     MICROALBUMIN  09/18/2019     A1C  03/17/2020     DIABETIC FOOT EXAM  06/27/2020     FALL RISK ASSESSMENT  06/27/2020     EYE EXAM  09/16/2020     BMP  09/17/2020     LIPID  09/17/2020     TSH W/FREE T4 REFLEX  09/17/2020     COLONOSCOPY  12/14/2020     MAMMO SCREENING  12/31/2020     DTAP/TDAP/TD  IMMUNIZATION (3 - Td) 2021     ADVANCE CARE PLANNING  2022     DEXA  Completed     HEPATITIS C SCREENING  Completed     HIV SCREENING  Completed     PHQ-2  Completed     INFLUENZA VACCINE  Completed     ZOSTER IMMUNIZATION  Completed     IPV IMMUNIZATION  Aged Out     MENINGITIS IMMUNIZATION  Aged Out     Labs reviewed in EPIC  BP Readings from Last 3 Encounters:   19 136/79   19 90/57   19 130/62    Wt Readings from Last 3 Encounters:   19 91.6 kg (202 lb)   19 93.9 kg (207 lb)   19 94.3 kg (208 lb)                  Patient Active Problem List   Diagnosis     Borderline osteopenia     HYPERLIPIDEMIA LDL GOAL <100     Family history of colon cancer     S/P colonoscopic polypectomy     Breast cancer (H)     Cataract of both eyes     Elevated LFTs     Dermatochalasis of eyelid     No diabetic retinopathy in both eyes     Tibialis posterior tendinitis, left     Essential hypertension with goal blood pressure less than 140/90     Hypothyroidism, unspecified type     Type 2 diabetes mellitus with microalbuminuria, without long-term current use of insulin (H)     Overweight HCC     CKD (chronic kidney disease) stage 3, GFR 30-59 ml/min (H)     Aromatase inhibitor use     Past Surgical History:   Procedure Laterality Date     BIOPSY  2015     BIOPSY BREAST NEEDLE LOCALIZATION, BIOPSY NODE SENTINEL, COMBINED  2012    Procedure: COMBINED BIOPSY BREAST NEEDLE LOCALIZATION, BIOPSY NODE SENTINEL;  Left Breast Wire Locilized Lumpectomy and Sentinal Lymph Node Biopsy;  Surgeon: Jesus Vicente MD;  Location: [facility] OR     C  DELIVERY ONLY      x 2     COLONOSCOPY  10-26-11    Return in 1 yr for Polyp Surveillance     COLONOSCOPY  -12    Return for Colonoscopy in 3 yrs.Polyps     COMBINED CYSTOSCOPY, INSERT STENT URETER(S) Right 2017    Procedure: COMBINED CYSTOSCOPY, INSERT STENT URETER(S);  Cystoscopy Right retrograde pyelogram, Right ureteral Stent Placement;   Surgeon: Loida Sánchez MD;  Location: [facility] OR     COMBINED CYSTOSCOPY, RETROGRADES, URETEROSCOPY, LASER HOLMIUM LITHOTRIPSY URETER(S), INSERT STENT Right 7/31/2017    Procedure: COMBINED CYSTOSCOPY, RETROGRADES, URETEROSCOPY, LASER HOLMIUM LITHOTRIPSY URETER(S), INSERT STENT;  Cystoscopy, Right Ureteroscopy, Laser Lithotripsy, Right Stent Exchange ;  Surgeon: Ruth Boston MD;  Location: [facility] OR     LITHOTRIPSY  2007     SURGICAL HISTORY OF -       exploratory laparotomy     SURGICAL HISTORY OF -       Right ovary removed     SURGICAL HISTORY OF -   2008    breast biopsy       Social History     Tobacco Use     Smoking status: Never Smoker     Smokeless tobacco: Never Used     Tobacco comment: no second hand exposure.  when young Dad smoked.   Substance Use Topics     Alcohol use: Not Currently     Comment: very, very little     Family History   Problem Relation Age of Onset     Cancer - colorectal Mother      Thyroid Disease Mother      Colon Cancer Mother      Cancer Father         Lung     Lipids Father      Macular Degeneration Father      Depression Brother      Respiratory Brother         losing hearing in 50's         Current Outpatient Medications   Medication Sig Dispense Refill     ASPIRIN 81 MG OR TABS 1 TABLET DAILY       atorvastatin (LIPITOR) 20 MG tablet Take 1 tablet (20 mg) by mouth every morning Patient is taking 2 times a week. 24 tablet 3     blood glucose (FREESTYLE PRECISION ORACIO TEST) test strip Use to test blood sugar 3  times daily or as directed. 300 each 3     blood glucose monitoring (NO BRAND SPECIFIED) meter device kit Use to test blood sugar 3 times daily or as directed.   -- Preferred. 1 kit 3     Continuous Blood Gluc  (FREESTYLE GEOFFREY READER) SHIRIN 1 each 3 times daily 1 Device 0     continuous blood glucose monitoring (FREESTYLE GEOFFREY) sensor For use with Freestyle Geoffrey Flash  for continuous monitioring of blood glucose levels. Replace sensor every 14  days. 14 each 3     levothyroxine (SYNTHROID/LEVOTHROID) 125 MCG tablet Take 1 tablet (125 mcg) by mouth daily 90 tablet 3     losartan (COZAAR) 50 MG tablet Take 1 tablet (50 mg) by mouth daily 90 tablet 3     metoprolol tartrate (LOPRESSOR) 25 MG tablet Take 1 tablet (25 mg) by mouth 2 times daily 180 tablet 3     Semaglutide,0.25 or 0.5MG/DOS, 2 MG/1.5ML SOPN Inject 0.5 mg Subcutaneous once a week 3 mL 11     Calcium Carbonate-Vitamin D (CALCIUM 500 + D PO) Take by mouth daily       Allergies   Allergen Reactions     Metformin GI Disturbance     Nkda [No Known Drug Allergies]      Recent Labs   Lab Test 09/17/19  0756 06/20/19  0848 09/18/18  1221 10/02/17  0856  07/02/17  0513  07/01/17  0658  06/30/17  0544   A1C 7.1* 11.4* 8.6*  --    < >  --   --   --   --   --    LDL 65  --  67 84  --   --   --   --   --   --    HDL 38*  --  41* 44*  --   --   --   --   --   --    TRIG 177*  --  305* 234*  --   --   --   --   --   --    ALT  --   --   --   --   --  27  --  54*  --  74*   CR 1.12*  --  1.14* 1.30*   < > 2.28*  --  2.53*   < > 2.86*   GFRESTIMATED 51*  --  48* 41*   < > 22*  --  19*   < > 17*   GFRESTBLACK 59*  --  58* 50*   < > 26*  --  23*   < > 20*   POTASSIUM 4.0  --  4.0 4.1   < > 3.0*   < > 3.1*   < > 3.0*   TSH 1.66  --  3.42 1.54  --   --   --   --   --   --     < > = values in this interval not displayed.      Pneumonia Vaccine:Adults age 65+ who received their first dose of Pneumovax (PPSV23) prior to age 65 years: Should be given PCV 13 > 1 year after their most recent PPSV23 AND should be given a another dose of PPSV23 > 5 years after their most recent dose of PPSV23    Review of Systems   Constitutional: Negative for chills and fever.   HENT: Negative for congestion, ear pain, hearing loss and sore throat.    Eyes: Negative for pain and visual disturbance.   Respiratory: Negative for cough and shortness of breath.    Cardiovascular: Negative for chest pain, palpitations and peripheral edema.  "  Gastrointestinal: Negative for abdominal pain, constipation, diarrhea, heartburn, hematochezia and nausea.   Breasts:  Negative for tenderness, breast mass and discharge.   Genitourinary: Negative for dysuria, frequency, genital sores, hematuria, pelvic pain, urgency, vaginal bleeding and vaginal discharge.   Musculoskeletal: Positive for arthralgias and joint swelling. Negative for myalgias.        Improving as she has lost weight.    Skin: Negative for rash.   Neurological: Negative for dizziness, weakness, headaches and paresthesias.   Psychiatric/Behavioral: Negative for mood changes. The patient is not nervous/anxious.           OBJECTIVE:   /79 (BP Location: Right arm, Patient Position: Sitting, Cuff Size: Adult Large)   Pulse 79   Temp 98.1  F (36.7  C) (Oral)   Ht 1.562 m (5' 1.5\")   Wt 91.6 kg (202 lb)   SpO2 95%   BMI 37.55 kg/m   Estimated body mass index is 38.17 kg/m  as calculated from the following:    Height as of 6/11/19: 1.568 m (5' 1.75\").    Weight as of 8/13/19: 93.9 kg (207 lb).  Physical Exam  GENERAL APPEARANCE: healthy, alert and no distress  EYES: Eyes grossly normal to inspection, PERRL and conjunctivae and sclerae normal  HENT: ear canals and TM's normal, nose and mouth without ulcers or lesions, oropharynx clear and oral mucous membranes moist  NECK: no adenopathy, no asymmetry, masses, or scars and thyroid normal to palpation  RESP: lungs clear to auscultation - no rales, rhonchi or wheezes  BREAST: normal without masses, tenderness or nipple discharge and no palpable axillary masses or adenopathy  BREAST: left lumpectomy at upper inner quadrant  CV: regular rate and rhythm, normal S1 S2, no S3 or S4, no murmur, click or rub, no peripheral edema and peripheral pulses strong  ABDOMEN: soft, nontender, no hepatosplenomegaly, no masses and bowel sounds normal   (female): normal female external genitalia, normal urethral meatus, vaginal mucosal atrophy noted, normal cervix, " "adnexae, and uterus without masses or abnormal discharge   (female): bimanual only  MS: no musculoskeletal defects are noted and gait is age appropriate without ataxia.  Normal range of motion right hip, no pain to palpation at R hip trochanter.  Gait not antalgic (\"Today is a better day\")   SKIN: no suspicious lesions or rashes/  Scattered SKs   NEURO: Normal strength and tone, sensory exam grossly normal, mentation intact and speech normal  PSYCH: mentation appears normal and affect normal/bright    Diagnostic Test Results:  Labs reviewed in Epic  No results found for any visits on 11/12/19.    ASSESSMENT / PLAN:       ICD-10-CM    1. Encounter for general adult medical examination with abnormal findings Z00.01    2. Type 2 diabetes mellitus with microalbuminuria, without long-term current use of insulin (H) E11.29 Semaglutide,0.25 or 0.5MG/DOS, 2 MG/1.5ML SOPN    R80.9 OFFICE/OUTPT VISIT,EST,LEVL III     CANCELED: Albumin Random Urine Quantitative with Creat Ratio     CANCELED: Hemoglobin A1c   3. CKD (chronic kidney disease) stage 3, GFR 30-59 ml/min (H) N18.3 OFFICE/OUTPT VISIT,EST,LEVL III   4. Essential hypertension with goal blood pressure less than 140/90 I10 metoprolol tartrate (LOPRESSOR) 25 MG tablet     OFFICE/OUTPT VISIT,EST,LEVL III   5. Hypothyroidism, unspecified type E03.9 OFFICE/OUTPT VISIT,EST,LEVL III   6. Aromatase inhibitor use Z79.811    7. Osteopenia, unspecified location M85.80    8. Encounter for Medicare annual wellness exam Z00.00    9. Hyperlipidemia LDL goal <100 E78.5 atorvastatin (LIPITOR) 20 MG tablet   10. Need for pneumococcal vaccination Z23 PNEUMOCOCCAL CONJ VACCINE 13 VALENT IM     VACCINE ADMINISTRATION, INITIAL   11. Trochanteric bursitis of right hip M70.61 OFFICE/OUTPT VISIT,EST,LEVL III        stopped amlodipine in error, but BP wnl.  ww and consider resume amlodipine if BP is elevated.   DM controlled with Ozempic weekly.   Discussed Ozempic    Patient Instructions " "      For hip:  Daily stretching, ice/heat  Consider orthopedic evaluation if not improving  Suspect trochanteric bursitis.          COUNSELING:  Reviewed preventive health counseling, as reflected in patient instructions       Immunizations    Vaccinated for: Pneumococcal          Estimated body mass index is 38.17 kg/m  as calculated from the following:    Height as of 6/11/19: 1.568 m (5' 1.75\").    Weight as of 8/13/19: 93.9 kg (207 lb).    Weight management plan: she is avoiding sweets and successful weight loss.      reports that she has never smoked. She has never used smokeless tobacco.      Appropriate preventive services were discussed with this patient, including applicable screening as appropriate for cardiovascular disease, diabetes, osteopenia/osteoporosis, and glaucoma.  As appropriate for age/gender, discussed screening for colorectal cancer, prostate cancer, breast cancer, and cervical cancer. Checklist reviewing preventive services available has been given to the patient.    Reviewed patients plan of care and provided an AVS. The Basic Care Plan (routine screening as documented in Health Maintenance) for Kenna meets the Care Plan requirement. This Care Plan has been established and reviewed with the Patient.    Counseling Resources:  ATP IV Guidelines  Pooled Cohorts Equation Calculator  Breast Cancer Risk Calculator  FRAX Risk Assessment  ICSI Preventive Guidelines  Dietary Guidelines for Americans, 2010  USDA's MyPlate  ASA Prophylaxis  Lung CA Screening    Carolyne Layne MD  [facility]    Identified Health Risks:  "

## 2019-11-12 NOTE — NURSING NOTE
Prior to immunization administration, verified patients identity using patient s name and date of birth. Please see Immunization Activity for additional information.     Screening Questionnaire for Adult Immunization    Are you sick today?   No   Do you have allergies to medications, food, a vaccine component or latex?   No   Have you ever had a serious reaction after receiving a vaccination?   No   Do you have a long-term health problem with heart disease, lung disease, asthma, kidney disease, metabolic disease (e.g. diabetes), anemia, or other blood disorder?   yes   Do you have cancer, leukemia, HIV/AIDS, or any other immune system problem?   No   In the past 3 months, have you taken medications that affect  your immune system, such as prednisone, other steroids, or anticancer drugs; drugs for the treatment of rheumatoid arthritis, Crohn s disease, or psoriasis; or have you had radiation treatments?   No   Have you had a seizure, or a brain or other nervous system problem?   No   During the past year, have you received a transfusion of blood or blood     products, or been given immune (gamma) globulin or antiviral drug?   No   For women: Are you pregnant or is there a chance you could become        pregnant during the next month?   No   Have you received any vaccinations in the past 4 weeks?   No     Immunization questionnaire was positive for at least one answer.  Notified Dr. Layne.        Per orders of Dr. Layne, injection of Prevnar 13 given by Maryann Palencia CMA. Patient instructed to remain in clinic for 15 minutes afterwards, and to report any adverse reaction to me immediately.  Vaccine information supplied.       Screening performed by Maryann Palencia CMA on 11/12/2019 at 10:12 AM.

## 2020-02-11 ENCOUNTER — ANCILLARY PROCEDURE (OUTPATIENT)
Dept: MAMMOGRAPHY | Facility: CLINIC | Age: 65
End: 2020-02-11
Payer: COMMERCIAL

## 2020-02-11 DIAGNOSIS — Z17.0 MALIGNANT NEOPLASM OF BREAST IN FEMALE, ESTROGEN RECEPTOR POSITIVE, UNSPECIFIED LATERALITY, UNSPECIFIED SITE OF BREAST (H): ICD-10-CM

## 2020-02-11 DIAGNOSIS — C50.919 MALIGNANT NEOPLASM OF BREAST IN FEMALE, ESTROGEN RECEPTOR POSITIVE, UNSPECIFIED LATERALITY, UNSPECIFIED SITE OF BREAST (H): ICD-10-CM

## 2020-02-11 DIAGNOSIS — Z12.31 VISIT FOR SCREENING MAMMOGRAM: ICD-10-CM

## 2020-02-11 PROCEDURE — 77063 BREAST TOMOSYNTHESIS BI: CPT | Mod: TC

## 2020-02-11 PROCEDURE — 77067 SCR MAMMO BI INCL CAD: CPT | Mod: TC

## 2020-02-21 ENCOUNTER — MYC MEDICAL ADVICE (OUTPATIENT)
Dept: FAMILY MEDICINE | Facility: CLINIC | Age: 65
End: 2020-02-21

## 2020-02-21 DIAGNOSIS — T75.3XXS SEA SICKNESS, SEQUELA: Primary | ICD-10-CM

## 2020-02-21 RX ORDER — SCOLOPAMINE TRANSDERMAL SYSTEM 1 MG/1
1 PATCH, EXTENDED RELEASE TRANSDERMAL
Qty: 2 PATCH | Refills: 0 | Status: SHIPPED | OUTPATIENT
Start: 2020-02-21 | End: 2020-11-12

## 2020-02-21 NOTE — TELEPHONE ENCOUNTER
"Please see MyChart message.     \"We will be going to [place] in March.  We are planning on taking a boat to [place].  Would it be possible to get patches to affix behind the ears for the trip?\"    Pharmacy cued.     Routed to PCP to review and advise.     Jenna Valdes RN, BSN, PHN  [facility] [place]      "

## 2020-03-02 ENCOUNTER — TELEPHONE (OUTPATIENT)
Dept: FAMILY MEDICINE | Facility: CLINIC | Age: 65
End: 2020-03-02

## 2020-03-02 NOTE — TELEPHONE ENCOUNTER
Patient was informed that her Ozempic will not be covered for the coming refill due to formulary change. She will be leaving town this weekend and will need the medication.    Prior Authorization Retail Medication Request    Medication/Dose: Ozempic  ICD code (if different than what is on RX):    Previously Tried and Failed:  Trulicity  Rationale:  Failed replacements    Insurance Name:  [facility]  Insurance ID:  [id number]      Pharmacy Information (if different than what is on RX)  Name:    [facility]      [facility]   [phone], [phone]  #[phone]

## 2020-03-02 NOTE — TELEPHONE ENCOUNTER
PA is needed for the medication, Trulicity 1.5 MG/0.5ML Pen. Would you like to start PA or Rx alternative medication? If PA, please list all medications this patient has tried along with any contraindications that may have been experienced in the past. Thank you.    Pharmacy:[facility]  Phone: [phone]    Insurance Plan: [employer]   Phone:   Pt ID: [id number]   Group:     Forwarded to JAS NIELSEN  for review.

## 2020-03-04 NOTE — TELEPHONE ENCOUNTER
Attempt # 1  Called patient at home [phone]  Was call answered?  No answer, left message to read/respond to my chart message or call nurse line at [phone]    Sent my chart message      Yelitza Zambrano RN  [facility]

## 2020-03-04 NOTE — TELEPHONE ENCOUNTER
According to chart notes Trulicity has been discontinued. If a PA is still needed for Trulicity, please route back to PA team. Thank you.

## 2020-03-05 NOTE — TELEPHONE ENCOUNTER
Prior Authorization Approval    Authorization Effective Date: 2/4/2020  Authorization Expiration Date: 3/5/2023  Medication: Ozempic-APPROVED  Approved Dose/Quantity:   Reference #:     Insurance Company: [employer] - Phone [phone] Fax [phone]  Expected CoPay:       CoPay Card Available:      Foundation Assistance Needed:    Which Pharmacy is filling the prescription (Not needed for infusion/clinic administered): [facility] - [place], [place] - [address] [place] [address]  Pharmacy Notified: Yes  Patient Notified: No    Pharmacy will notify patient when medication is ready.

## 2020-03-05 NOTE — TELEPHONE ENCOUNTER
Central Prior Authorization Team   Phone: [phone]      PA Initiation    Medication: Ozempic-Initiated  Insurance Company: [employer] - Phone [phone] Fax [phone]  Pharmacy Filling the Rx: [facility] - [place], [place] - [address]  Filling Pharmacy Phone: [phone]  Filling Pharmacy Fax:    Start Date: 3/5/2020

## 2020-03-05 NOTE — TELEPHONE ENCOUNTER
Patient returned call to RN line; states she is not on trulicity, she is on semaglutide (ozempic) and that she got a supply but then [facility] sent her a letter saying they need PA for that.    I called [facility] pharmacy.  Spoke to Vernell in pharmacy, they have supply of semaglutide ready for , is on [facility] formulary for $45, no PA needed.    I called patient back, advised her of this.   No further action needed.      Ani Garcia RN  [facility]

## 2020-04-09 ENCOUNTER — TELEPHONE (OUTPATIENT)
Dept: FAMILY MEDICINE | Facility: CLINIC | Age: 65
End: 2020-04-09

## 2020-04-09 NOTE — TELEPHONE ENCOUNTER
Central Prior Authorization Team   Phone: [phone]      PRIOR AUTHORIZATION DENIED    Medication: Freestyle Geoffrey sensors - DENIED    Denial Date: 4/9/2020    Denial Rational:     Appeal Information:

## 2020-04-09 NOTE — TELEPHONE ENCOUNTER
Prior Authorization Retail Medication Request    Medication/Dose: Freestyle Geoffrey sensors  ICD code (if different than what is on RX):    Previously Tried and Failed:  Unk  Rationale:  Unk    Insurance Name:  [employer] D  Insurance ID:  [id number]      Pharmacy Information (if different than what is on RX)  Name:  [facility]  Phone:  [phone]

## 2020-04-09 NOTE — TELEPHONE ENCOUNTER
Central Prior Authorization Team   Phone: [phone]      PA Initiation    Medication: Freestyle Geoffrey sensors - INITIATED  Insurance Company: [employer] - Phone [phone] Fax [phone]  Pharmacy Filling the Rx: [facility] - [place], [place] - [address] [place] [address] [address] [place]  Filling Pharmacy Phone: [phone]  Filling Pharmacy Fax: [phone]  Start Date: 4/9/2020

## 2020-04-09 NOTE — TELEPHONE ENCOUNTER
Central Prior Authorization Team   Phone: [phone]      Insurance called with clinical questions - questions answered.    Reference #[id number]

## 2020-10-01 DIAGNOSIS — E03.8 OTHER SPECIFIED HYPOTHYROIDISM: ICD-10-CM

## 2020-10-01 RX ORDER — LEVOTHYROXINE SODIUM 125 UG/1
TABLET ORAL
Qty: 90 TABLET | Refills: 0 | Status: SHIPPED | OUTPATIENT
Start: 2020-10-01 | End: 2020-11-13

## 2020-10-26 ENCOUNTER — TELEPHONE (OUTPATIENT)
Dept: FAMILY MEDICINE | Facility: CLINIC | Age: 65
End: 2020-10-26

## 2020-10-26 DIAGNOSIS — R80.9 TYPE 2 DIABETES MELLITUS WITH MICROALBUMINURIA, WITHOUT LONG-TERM CURRENT USE OF INSULIN (H): Primary | ICD-10-CM

## 2020-10-26 DIAGNOSIS — E11.29 TYPE 2 DIABETES MELLITUS WITH MICROALBUMINURIA, WITHOUT LONG-TERM CURRENT USE OF INSULIN (H): Primary | ICD-10-CM

## 2020-10-26 NOTE — TELEPHONE ENCOUNTER
Reason for call:  Order   Order or referral being requested: nutrition counseling  Reason for request: nutrition counseling for diabetes  Date needed: before my next appointment  Has the patient been seen by the PCP for this problem? Not Applicable    Additional comments: Patient is scheduled to meet with dietitian on 11/02 and needs referral in place prior to her visit.    Phone number to reach patient:  Cell number on file:    Telephone Information:   Mobile [phone]       Best Time:  NA    Can we leave a detailed message on this number?  Not Applicable    Travel screening: Not Applicable     Maryanne MAE  Central Scheduler

## 2020-10-26 NOTE — TELEPHONE ENCOUNTER
I called patient and advised her of referral placed for DM educator, call if any issues.    Patient verbalized understanding of and agreement with plan.  Ani Garcia RN  [facility]

## 2020-11-02 ENCOUNTER — VIRTUAL VISIT (OUTPATIENT)
Dept: NUTRITION | Facility: CLINIC | Age: 65
End: 2020-11-02
Payer: COMMERCIAL

## 2020-11-02 DIAGNOSIS — R80.9 TYPE 2 DIABETES MELLITUS WITH MICROALBUMINURIA, WITHOUT LONG-TERM CURRENT USE OF INSULIN (H): Primary | ICD-10-CM

## 2020-11-02 DIAGNOSIS — E11.29 TYPE 2 DIABETES MELLITUS WITH MICROALBUMINURIA, WITHOUT LONG-TERM CURRENT USE OF INSULIN (H): Primary | ICD-10-CM

## 2020-11-02 PROCEDURE — 97802 MEDICAL NUTRITION INDIV IN: CPT | Mod: 95 | Performed by: DIETITIAN, REGISTERED

## 2020-11-02 NOTE — PROGRESS NOTES
Patient has given verbal consent for Video visit? Yes  Patient would like the video invitation sent by: Other e-mail: [employer]     Type of service:  Video Visit  Originating Location (pt. Location): Home  Distant Location (provider location):  Home  Mode of Communication:  Video Conference via [employer]  Video Start Time: 2:35  Video End Time (time video stopped): 3:13  Patient would like to obtain AVS? [email or web address]      Medical Nutrition Therapy  Visit Type:Initial assessment and intervention    Kenna Miranda presents today for MNT and education related to type 2 diabetes.   She is accompanied by spouse.     ASSESSMENT:   Patient comments/concerns relating to nutrition: I need a refresher on eating well with diabetes    NUTRITION HISTORY:  Doesn't ofteh eat fruits and vegetables - they are in the house,  will eat them but not her    Breakfast: muffin (poppseed), coffee with milk  Lunch: brat with bun, potato chips and dip, water  Dinner: pizza OR grilled cheese with ramen OR chicken, fries, water  Snacks: no (sometimes Ritlobito crackers 4)  Beverages: water, coffee in the AM    Misses meals? no  Eats out:  seldom     Previous diet education:  No     Food allergies/intolerances: no    Diet is high in: carbs  Diet is low in: fiber, fruits and vegetables    EXERCISE: no regular exercise program    SOCIO/ECONOMIC:   Lives with: spouse    MEDICATIONS:  Current Outpatient Medications   Medication     ASPIRIN 81 MG OR TABS     atorvastatin (LIPITOR) 20 MG tablet     blood glucose (FREESTYLE PRECISION ORACIO TEST) test strip     blood glucose monitoring (NO BRAND SPECIFIED) meter device kit     Calcium Carbonate-Vitamin D (CALCIUM 500 + D PO)     Continuous Blood Gluc  (FREESTYLE MOLLY READER) SHIRIN     continuous blood glucose monitoring (FREESTYLE MOLLY) sensor     levothyroxine (SYNTHROID/LEVOTHROID) 125 MCG tablet     losartan (COZAAR) 50 MG tablet     metoprolol tartrate (LOPRESSOR) 25 MG tablet      scopolamine 1 MG/3DAYS TD 72 hr patch     Semaglutide,0.25 or 0.5MG/DOS, 2 MG/1.5ML SOPN     No current facility-administered medications for this visit.        LABS:  Last Basic Metabolic Panel:  Lab Results   Component Value Date     09/17/2019      Lab Results   Component Value Date    POTASSIUM 4.0 09/17/2019     Lab Results   Component Value Date    CHLORIDE 108 09/17/2019     Lab Results   Component Value Date    CARLYN 9.5 09/17/2019     Lab Results   Component Value Date    CO2 27 09/17/2019     Lab Results   Component Value Date    BUN 17 09/17/2019     Lab Results   Component Value Date    CR 1.12 09/17/2019     Lab Results   Component Value Date     09/17/2019       ANTHROPOMETRICS:  Vitals: There were no vitals taken for this visit.  There is no height or weight on file to calculate BMI.      Wt Readings from Last 5 Encounters:   11/12/19 91.6 kg (202 lb)   08/13/19 93.9 kg (207 lb)   06/27/19 94.3 kg (208 lb)   06/11/19 94.5 kg (208 lb 6.4 oz)   09/18/18 98.4 kg (217 lb)       NUTRITION DIAGNOSIS: Overweight/Obesity related to energy dense food intake and lack of physical activity as evidenced by BMI >30    NUTRITION INTERVENTION:  Kenna is reporting a relatively consistent carbohydrate diet, however still complains that her A1C is not where she wants it. Explained that if we were to focus on food, we may need to look at either changing the quality of her diet or help with promoting weight loss to help blood sugars come down. She was open to both of these ideas, though admits that she does not like eating a lot of fruits or vegetables. We are starting with small goals right now to hopefully build a habit of incorporating theses foods more often, and then working towards reducing portions elsewhere.       Education given to support: general nutrition guidelines, weight reduction, consistent meals, behavior modification and portion control  Education Materials Provided: My Plate Planner/Choose  My Plate  Motivational Interviewing    PATIENT'S BEHAVIOR CHANGE GOALS:   See Patient Instructions for patient stated behavior change goals. AVS was printed and given to patient at today's appointment.    MONITOR / EVALUATE:  RD will monitor/evaluate:  Blood Glucose / A1c  Readiness to change nutrition-related behaviors  Weight change    FOLLOW-UP:  Follow-up appointment recommended within 1 month    DANIE Mathew CDE  Time spent in minutes: 41  Encounter: Individual

## 2020-11-03 NOTE — PATIENT INSTRUCTIONS
Goals:  1. Make sure there is at least 1 fruit or vegetable at each meal  2. For now, I am ok with them being 'dressed' to make them enjoyable (We can work on this part later!)  3. Try to find swaps for those fruits and vegetables at meals (for example, instead of brat with potato chips, try brat with fruit/veg)  4. Aim for at least 50% of your grains to be whole grain (ie brown rice, whole wheat bread, etc)  5. If you feel comfortable with these steps, can work on Plate Planner image (sent via [employer])

## 2020-11-09 ASSESSMENT — ENCOUNTER SYMPTOMS
COUGH: 0
BREAST MASS: 0
PARESTHESIAS: 0
FEVER: 0
HEMATURIA: 0
JOINT SWELLING: 1
SHORTNESS OF BREATH: 0
ARTHRALGIAS: 0
DIARRHEA: 0
HEMATOCHEZIA: 0
NERVOUS/ANXIOUS: 0
PALPITATIONS: 0
MYALGIAS: 0
CHILLS: 0
ABDOMINAL PAIN: 0
HEARTBURN: 0
EYE PAIN: 0
DIZZINESS: 0
CONSTIPATION: 0
NAUSEA: 0
WEAKNESS: 0
HEADACHES: 0
DYSURIA: 0
SORE THROAT: 0
FREQUENCY: 1

## 2020-11-09 ASSESSMENT — ACTIVITIES OF DAILY LIVING (ADL): CURRENT_FUNCTION: NO ASSISTANCE NEEDED

## 2020-11-10 DIAGNOSIS — E03.9 HYPOTHYROIDISM, UNSPECIFIED TYPE: ICD-10-CM

## 2020-11-10 DIAGNOSIS — R80.9 TYPE 2 DIABETES MELLITUS WITH MICROALBUMINURIA, WITHOUT LONG-TERM CURRENT USE OF INSULIN (H): ICD-10-CM

## 2020-11-10 DIAGNOSIS — N18.30 CKD (CHRONIC KIDNEY DISEASE) STAGE 3, GFR 30-59 ML/MIN (H): ICD-10-CM

## 2020-11-10 DIAGNOSIS — E11.29 TYPE 2 DIABETES MELLITUS WITH MICROALBUMINURIA, WITHOUT LONG-TERM CURRENT USE OF INSULIN (H): ICD-10-CM

## 2020-11-10 DIAGNOSIS — E78.5 HYPERLIPIDEMIA LDL GOAL <100: ICD-10-CM

## 2020-11-10 DIAGNOSIS — I10 ESSENTIAL HYPERTENSION WITH GOAL BLOOD PRESSURE LESS THAN 140/90: ICD-10-CM

## 2020-11-10 LAB
ANION GAP SERPL CALCULATED.3IONS-SCNC: 3 MMOL/L (ref 3–14)
BUN SERPL-MCNC: 19 MG/DL (ref 7–30)
CALCIUM SERPL-MCNC: 9.2 MG/DL (ref 8.5–10.1)
CHLORIDE SERPL-SCNC: 104 MMOL/L (ref 94–109)
CHOLEST SERPL-MCNC: 173 MG/DL
CO2 SERPL-SCNC: 30 MMOL/L (ref 20–32)
CREAT SERPL-MCNC: 1.1 MG/DL (ref 0.52–1.04)
GFR SERPL CREATININE-BSD FRML MDRD: 52 ML/MIN/{1.73_M2}
GLUCOSE SERPL-MCNC: 93 MG/DL (ref 70–99)
HBA1C MFR BLD: 6.7 % (ref 0–5.6)
HDLC SERPL-MCNC: 48 MG/DL
LDLC SERPL CALC-MCNC: 86 MG/DL
NONHDLC SERPL-MCNC: 125 MG/DL
POTASSIUM SERPL-SCNC: 4.2 MMOL/L (ref 3.4–5.3)
SODIUM SERPL-SCNC: 137 MMOL/L (ref 133–144)
T4 FREE SERPL-MCNC: 1.42 NG/DL (ref 0.76–1.46)
TRIGL SERPL-MCNC: 193 MG/DL
TSH SERPL DL<=0.005 MIU/L-ACNC: 0.14 MU/L (ref 0.4–4)

## 2020-11-10 PROCEDURE — 80048 BASIC METABOLIC PNL TOTAL CA: CPT | Performed by: INTERNAL MEDICINE

## 2020-11-10 PROCEDURE — 84439 ASSAY OF FREE THYROXINE: CPT | Performed by: INTERNAL MEDICINE

## 2020-11-10 PROCEDURE — 80061 LIPID PANEL: CPT | Performed by: INTERNAL MEDICINE

## 2020-11-10 PROCEDURE — 82043 UR ALBUMIN QUANTITATIVE: CPT | Performed by: INTERNAL MEDICINE

## 2020-11-10 PROCEDURE — 84443 ASSAY THYROID STIM HORMONE: CPT | Performed by: INTERNAL MEDICINE

## 2020-11-10 PROCEDURE — 83036 HEMOGLOBIN GLYCOSYLATED A1C: CPT | Performed by: INTERNAL MEDICINE

## 2020-11-10 PROCEDURE — 36415 COLL VENOUS BLD VENIPUNCTURE: CPT | Performed by: INTERNAL MEDICINE

## 2020-11-12 DIAGNOSIS — R80.9 TYPE 2 DIABETES MELLITUS WITH MICROALBUMINURIA, WITHOUT LONG-TERM CURRENT USE OF INSULIN (H): ICD-10-CM

## 2020-11-12 DIAGNOSIS — E11.29 TYPE 2 DIABETES MELLITUS WITH MICROALBUMINURIA, WITHOUT LONG-TERM CURRENT USE OF INSULIN (H): ICD-10-CM

## 2020-11-12 LAB
CREAT UR-MCNC: 209 MG/DL
MICROALBUMIN UR-MCNC: 112 MG/L
MICROALBUMIN/CREAT UR: 53.59 MG/G CR (ref 0–25)

## 2020-11-12 NOTE — RESULT ENCOUNTER NOTE
Kenna Miranda    Diabetes control looks GREAT.   I will likely need to lower the thyroid dose slightly at our upcoming appointment.  I look forward to seeing you then!    Sincerely,     GEORGE DEL ANGEL M.D.     Send note

## 2020-11-12 NOTE — TELEPHONE ENCOUNTER
"Requested Prescriptions   Pending Prescriptions Disp Refills    Semaglutide,0.25 or 0.5MG/DOS, 2 MG/1.5ML SOPN 3 mL 11     Sig: Inject 0.5 mg Subcutaneous once a week       GLP-1 Agonists Protocol Failed - 11/12/2020  1:50 PM        Failed - Normal serum creatinine on file in past 12 months     Recent Labs   Lab Test 11/10/20  1138   CR 1.10*       Ok to refill medication if creatinine is low          Passed - HgbA1C in past 3 or 6 months     If HgbA1C is 8 or greater, it needs to be on file within the past 3 months.  If less than 8, must be on file within the past 6 months.     Recent Labs   Lab Test 11/10/20  1138   A1C 6.7*             Passed - Medication is active on med list        Passed - Patient is age 18 or older        Passed - No active pregnancy on record        Passed - No positive pregnancy test in past 12 months        Passed - Recent (6 mo) or future (30 days) visit within the authorizing provider's specialty     Patient had office visit in the last 6 months or has a visit in the next 30 days with authorizing provider.  See \"Patient Info\" tab in inbasket, or \"Choose Columns\" in Meds & Orders section of the refill encounter.               Routing refill request to provider for review/approval because:  Drug not on the [facility] refill protocol   ANTONIO Thao-RN  [facility]          "

## 2020-11-13 ENCOUNTER — OFFICE VISIT (OUTPATIENT)
Dept: FAMILY MEDICINE | Facility: CLINIC | Age: 65
End: 2020-11-13
Payer: COMMERCIAL

## 2020-11-13 VITALS
HEIGHT: 61 IN | HEART RATE: 72 BPM | TEMPERATURE: 97.9 F | SYSTOLIC BLOOD PRESSURE: 131 MMHG | BODY MASS INDEX: 39.27 KG/M2 | OXYGEN SATURATION: 95 % | DIASTOLIC BLOOD PRESSURE: 82 MMHG | WEIGHT: 208 LBS

## 2020-11-13 DIAGNOSIS — Z00.00 ENCOUNTER FOR MEDICARE ANNUAL WELLNESS EXAM: ICD-10-CM

## 2020-11-13 DIAGNOSIS — Z00.01 ENCOUNTER FOR GENERAL ADULT MEDICAL EXAMINATION WITH ABNORMAL FINDINGS: Primary | ICD-10-CM

## 2020-11-13 DIAGNOSIS — N18.31 STAGE 3A CHRONIC KIDNEY DISEASE (H): ICD-10-CM

## 2020-11-13 DIAGNOSIS — C50.419 MALIGNANT NEOPLASM OF UPPER-OUTER QUADRANT OF BREAST IN FEMALE, ESTROGEN RECEPTOR POSITIVE, UNSPECIFIED LATERALITY (H): ICD-10-CM

## 2020-11-13 DIAGNOSIS — E03.9 HYPOTHYROIDISM, UNSPECIFIED TYPE: ICD-10-CM

## 2020-11-13 DIAGNOSIS — Z17.0 MALIGNANT NEOPLASM OF UPPER-OUTER QUADRANT OF BREAST IN FEMALE, ESTROGEN RECEPTOR POSITIVE, UNSPECIFIED LATERALITY (H): ICD-10-CM

## 2020-11-13 DIAGNOSIS — E11.29 TYPE 2 DIABETES MELLITUS WITH MICROALBUMINURIA, WITHOUT LONG-TERM CURRENT USE OF INSULIN (H): ICD-10-CM

## 2020-11-13 DIAGNOSIS — E78.5 HYPERLIPIDEMIA LDL GOAL <100: ICD-10-CM

## 2020-11-13 DIAGNOSIS — E03.8 OTHER SPECIFIED HYPOTHYROIDISM: ICD-10-CM

## 2020-11-13 DIAGNOSIS — I10 ESSENTIAL HYPERTENSION WITH GOAL BLOOD PRESSURE LESS THAN 140/90: ICD-10-CM

## 2020-11-13 DIAGNOSIS — Z86.0100 HISTORY OF COLONIC POLYPS: ICD-10-CM

## 2020-11-13 DIAGNOSIS — R80.9 TYPE 2 DIABETES MELLITUS WITH MICROALBUMINURIA, WITHOUT LONG-TERM CURRENT USE OF INSULIN (H): ICD-10-CM

## 2020-11-13 PROCEDURE — 99397 PER PM REEVAL EST PAT 65+ YR: CPT | Performed by: INTERNAL MEDICINE

## 2020-11-13 PROCEDURE — 99213 OFFICE O/P EST LOW 20 MIN: CPT | Mod: 25 | Performed by: INTERNAL MEDICINE

## 2020-11-13 RX ORDER — LEVOTHYROXINE SODIUM 112 UG/1
112 TABLET ORAL DAILY
Qty: 90 TABLET | Refills: 3 | Status: SHIPPED | OUTPATIENT
Start: 2020-11-13 | End: 2021-12-10

## 2020-11-13 ASSESSMENT — MIFFLIN-ST. JEOR: SCORE: 1424.82

## 2020-11-13 ASSESSMENT — ENCOUNTER SYMPTOMS
CONSTIPATION: 0
HEARTBURN: 0
PALPITATIONS: 0
DYSURIA: 0
HEADACHES: 0
HEMATURIA: 0
WEAKNESS: 0
FEVER: 0
BREAST MASS: 0
FREQUENCY: 1
MYALGIAS: 0
DIZZINESS: 0
ARTHRALGIAS: 0
EYE PAIN: 0
DIARRHEA: 0
COUGH: 0
HEMATOCHEZIA: 0
ABDOMINAL PAIN: 0
SORE THROAT: 0
NERVOUS/ANXIOUS: 0
CHILLS: 0
PARESTHESIAS: 0
NAUSEA: 0
SHORTNESS OF BREATH: 0

## 2020-11-13 ASSESSMENT — ACTIVITIES OF DAILY LIVING (ADL): CURRENT_FUNCTION: NO ASSISTANCE NEEDED

## 2020-11-13 NOTE — PATIENT INSTRUCTIONS
"  Patient Education   Personalized Prevention Plan  You are due for the preventive services outlined below.  Your care team is available to assist you in scheduling these services.  If you have already completed any of these items, please share that information with your care team to update in your medical record.  Health Maintenance Due   Topic Date Due     Diabetic Foot Exam  06/27/2020     Eye Exam  09/16/2020     FALL RISK ASSESSMENT  11/12/2020     Annual Wellness Visit  11/12/2020     Pneumococcal Vaccine (2 of 2 - PPSV23) 11/12/2020         Thyroid dose reduction (from 125 to 112)  You can used up your 125 by taking them 6 days per week... then the next Rx will be for the 112.     Recheck thyroid tests in 3 months.     Consider restarting diuretic (hydrochlorothiazide... or lasix)    Work on weight loss  - to improve the swelling.   Consider compression stockings (\"Knee High Teds\").      You are due for EYE exam      "

## 2020-11-13 NOTE — PROGRESS NOTES
"SUBJECTIVE:   Kenna Miranda is a 64 year old female who presents for Preventive Visit.    65 y/o  F here for AFE.  H/o  DMII (can't tolerate metformin--due to GI, ... on Trulicity [semaglutide]),  HTN, Hyperlipidemia, Stage I Er/DE+ (HER2 [-]) L Breast cancer, (Completed Aromatase inhibitor therapy), osteopenia ,  obesity.   Weight has been stable .  BP has been       Labs are NOrmal/stable but she needs thyroid  Reduced    She thinks her BS improved due to d/c snacking     has colonoscopy lined up for next week.     Patient has been advised of split billing requirements and indicates understanding: Yes   Are you in the first 12 months of your Medicare coverage?  Yes,  Visual Acuity:  Right Eye: 10/10   Left Eye: 10/12.5  Both Eyes: 10/10    Healthy Habits:     In general, how would you rate your overall health?  Good    Frequency of exercise:  1 day/week    Duration of exercise:  Less than 15 minutes    Do you usually eat at least 4 servings of fruit and vegetables a day, include whole grains    & fiber and avoid regularly eating high fat or \"junk\" foods?  No    Taking medications regularly:  Yes    Medication side effects:  None    Ability to successfully perform activities of daily living:  No assistance needed    Home Safety:  No safety concerns identified    Hearing Impairment:  No hearing concerns    In the past 6 months, have you been bothered by leaking of urine? Yes    In general, how would you rate your overall mental or emotional health?  Good      PHQ-2 Total Score: 0    Additional concerns today:  No    Do you feel safe in your environment? Yes    Have you ever done Advance Care Planning? (For example, a Health Directive, POLST, or a discussion with a medical provider or your loved ones about your wishes): Yes, patient states has an Advance Care Planning document and will bring a copy to the clinic.      Fall risk       Cognitive Screening   1) Repeat 3 items (Leader, Season, Table)    2) " Clock draw: NORMAL  3) 3 item recall: Recalls 3 objects  Results: 3 items recalled: COGNITIVE IMPAIRMENT LESS LIKELY    Mini-CogTM Copyright S Elian. Licensed by the author for use in [facility]; reprinted with permission (kobi@[facility]). All rights reserved.      Do you have sleep apnea, excessive snoring or daytime drowsiness?: no    Reviewed and updated as needed this visit by clinical staff                 Reviewed and updated as needed this visit by Provider                Social History     Tobacco Use     Smoking status: Never Smoker     Smokeless tobacco: Never Used     Tobacco comment: no second hand exposure.  when young Dad smoked.   Substance Use Topics     Alcohol use: Not Currently     Comment: very, very little     If you drink alcohol do you typically have >3 drinks per day or >7 drinks per week? No    Alcohol Use 11/9/2020   Prescreen: >3 drinks/day or >7 drinks/week? Not Applicable   Prescreen: >3 drinks/day or >7 drinks/week? -           No Concerns     Current providers sharing in care for this patient include:   Patient Care Team:  Carolyne Layne MD as PCP - General (Internal Medicine)  Ruth Boston MD as MD (Urology)  Karen Salvador, RN as Registered Nurse  Carolyne Layne MD as Assigned PCP  Ani Simon, RN as Diabetes Educator (Diabetes Education)  Kaycee Meng MD as Assigned Cancer Care Provider  Elsa Moncada MD as Assigned Surgical Provider    The following health maintenance items are reviewed in Epic and correct as of today:  Health Maintenance   Topic Date Due     DIABETIC FOOT EXAM  06/27/2020     EYE EXAM  09/16/2020     FALL RISK ASSESSMENT  11/12/2020     MEDICARE ANNUAL WELLNESS VISIT  11/12/2020     Pneumococcal Vaccine: 65+ Years (2 of 2 - PPSV23) 11/12/2020     COLORECTAL CANCER SCREENING  12/14/2020     DTAP/TDAP/TD IMMUNIZATION (3 - Td) 02/07/2021     A1C  05/10/2021     BMP  11/10/2021     LIPID  11/10/2021      MICROALBUMIN  11/10/2021     TSH W/FREE T4 REFLEX  11/10/2021     MAMMO SCREENING  2022     ADVANCE CARE PLANNING  2024     DEXA  Completed     HEPATITIS C SCREENING  Completed     PHQ-2  Completed     INFLUENZA VACCINE  Completed     ZOSTER IMMUNIZATION  Completed     Pneumococcal Vaccine: Pediatrics (0 to 5 Years) and At-Risk Patients (6 to 64 Years)  Aged Out     IPV IMMUNIZATION  Aged Out     MENINGITIS IMMUNIZATION  Aged Out     Labs reviewed in EPIC  BP Readings from Last 3 Encounters:   20 131/82   19 136/79   19 90/57    Wt Readings from Last 3 Encounters:   20 94.3 kg (208 lb)   19 91.6 kg (202 lb)   19 93.9 kg (207 lb)                  Patient Active Problem List   Diagnosis     Borderline osteopenia     HYPERLIPIDEMIA LDL GOAL <100     Family history of colon cancer     S/P colonoscopic polypectomy     Breast cancer (H)     Cataract of both eyes     Elevated LFTs     Dermatochalasis of eyelid     No diabetic retinopathy in both eyes     Tibialis posterior tendinitis, left     Essential hypertension with goal blood pressure less than 140/90     Hypothyroidism, unspecified type     Type 2 diabetes mellitus with microalbuminuria, without long-term current use of insulin (H)     Overweight HCC     CKD (chronic kidney disease) stage 3, GFR 30-59 ml/min     Aromatase inhibitor use     Past Surgical History:   Procedure Laterality Date     BIOPSY  2015     BIOPSY BREAST NEEDLE LOCALIZATION, BIOPSY NODE SENTINEL, COMBINED  2012    Procedure: COMBINED BIOPSY BREAST NEEDLE LOCALIZATION, BIOPSY NODE SENTINEL;  Left Breast Wire Locilized Lumpectomy and Sentinal Lymph Node Biopsy;  Surgeon: Jesus Vicente MD;  Location: [facility] OR     C  DELIVERY ONLY      x 2     COLONOSCOPY  10-26-11    Return in 1 yr for Polyp Surveillance     COLONOSCOPY  12    Return for Colonoscopy in 3 yrs.Polyps     COMBINED CYSTOSCOPY, INSERT STENT URETER(S) Right 2017     Procedure: COMBINED CYSTOSCOPY, INSERT STENT URETER(S);  Cystoscopy Right retrograde pyelogram, Right ureteral Stent Placement;  Surgeon: Loida Sánchez MD;  Location: [facility] OR     COMBINED CYSTOSCOPY, RETROGRADES, URETEROSCOPY, LASER HOLMIUM LITHOTRIPSY URETER(S), INSERT STENT Right 7/31/2017    Procedure: COMBINED CYSTOSCOPY, RETROGRADES, URETEROSCOPY, LASER HOLMIUM LITHOTRIPSY URETER(S), INSERT STENT;  Cystoscopy, Right Ureteroscopy, Laser Lithotripsy, Right Stent Exchange ;  Surgeon: Ruth Boston MD;  Location: [facility] OR     LITHOTRIPSY  2007     SURGICAL HISTORY OF -       exploratory laparotomy     SURGICAL HISTORY OF -       Right ovary removed     SURGICAL HISTORY OF -   2008    breast biopsy       Social History     Tobacco Use     Smoking status: Never Smoker     Smokeless tobacco: Never Used     Tobacco comment: no second hand exposure.  when young Dad smoked.   Substance Use Topics     Alcohol use: Not Currently     Comment: very, very little     Family History   Problem Relation Age of Onset     Cancer - colorectal Mother      Thyroid Disease Mother      Colon Cancer Mother      Cancer Father         Lung     Lipids Father      Macular Degeneration Father      Depression Brother      Respiratory Brother         losing hearing in 50's         Current Outpatient Medications   Medication Sig Dispense Refill     ASPIRIN 81 MG OR TABS 1 TABLET DAILY       atorvastatin (LIPITOR) 20 MG tablet Take 1 tablet (20 mg) by mouth every morning Patient is taking 2 times a week. 24 tablet 3     levothyroxine (SYNTHROID/LEVOTHROID) 125 MCG tablet TAKE ONE TABLET BY MOUTH ONCE DAILY 90 tablet 0     losartan (COZAAR) 50 MG tablet TAKE ONE TABLET BY MOUTH ONCE DAILY 30 tablet 0     metoprolol tartrate (LOPRESSOR) 25 MG tablet Take 1 tablet (25 mg) by mouth 2 times daily 180 tablet 3     Semaglutide,0.25 or 0.5MG/DOS, 2 MG/1.5ML SOPN Inject 0.5 mg Subcutaneous once a week 3 mL 11     blood glucose (FREESTYLE  PRECISION ORACIO TEST) test strip Use to test blood sugar 3  times daily or as directed. (Patient not taking: Reported on 11/13/2020) 300 each 3     blood glucose monitoring (NO BRAND SPECIFIED) meter device kit Use to test blood sugar 3 times daily or as directed.   -- Preferred. (Patient not taking: Reported on 11/13/2020) 1 kit 3     Calcium Carbonate-Vitamin D (CALCIUM 500 + D PO) Take by mouth daily       Continuous Blood Gluc  (FREESTYLE MOLLY READER) SHIRIN 1 each 3 times daily (Patient not taking: Reported on 11/13/2020) 1 Device 0     continuous blood glucose monitoring (FREESTYLE MOLLY) sensor For use with Freestyle Molly Flash  for continuous monitioring of blood glucose levels. Replace sensor every 14 days. (Patient not taking: Reported on 11/13/2020) 14 each 3     Allergies   Allergen Reactions     Metformin GI Disturbance     Nkda [No Known Drug Allergies]      Recent Labs   Lab Test 11/10/20  1138 09/17/19  0756 06/20/19  0848 09/18/18  1221 07/02/17  0513 07/02/17  0513 07/01/17  0658 07/01/17  0658 06/30/17  0544 06/30/17  0544   A1C 6.7* 7.1* 11.4* 8.6*   < >  --   --   --   --   --    LDL 86 65  --  67   < >  --   --   --   --   --    HDL 48* 38*  --  41*   < >  --   --   --   --   --    TRIG 193* 177*  --  305*   < >  --   --   --   --   --    ALT  --   --   --   --   --  27  --  54*  --  74*   CR 1.10* 1.12*  --  1.14*   < > 2.28*  --  2.53*   < > 2.86*   GFRESTIMATED 52* 51*  --  48*   < > 22*  --  19*   < > 17*   GFRESTBLACK 60* 59*  --  58*   < > 26*  --  23*   < > 20*   POTASSIUM 4.2 4.0  --  4.0   < > 3.0*   < > 3.1*   < > 3.0*   TSH 0.14* 1.66  --  3.42   < >  --   --   --   --   --     < > = values in this interval not displayed.           Review of Systems   Constitutional: Negative for chills and fever.   HENT: Negative for congestion, ear pain, hearing loss and sore throat.    Eyes: Negative for pain and visual disturbance.   Respiratory: Negative for cough and shortness of  "breath.    Cardiovascular: Positive for peripheral edema. Negative for chest pain and palpitations.   Gastrointestinal: Negative for abdominal pain, constipation, diarrhea, heartburn, hematochezia and nausea.   Breasts:  Negative for tenderness, breast mass and discharge.   Genitourinary: Positive for frequency and urgency. Negative for dysuria, genital sores, hematuria, pelvic pain, vaginal bleeding and vaginal discharge.        Improving symptoms with Kegel   Musculoskeletal: Negative for arthralgias and myalgias.   Skin: Negative for rash.   Neurological: Negative for dizziness, weakness, headaches and paresthesias.   Psychiatric/Behavioral: Negative for mood changes. The patient is not nervous/anxious.           OBJECTIVE:   /82 (BP Location: Right arm, Patient Position: Sitting, Cuff Size: Adult Large)   Pulse 72   Temp 97.9  F (36.6  C) (Oral)   Ht 1.556 m (5' 1.25\")   Wt 94.3 kg (208 lb)   SpO2 95%   BMI 38.98 kg/m   Estimated body mass index is 37.55 kg/m  as calculated from the following:    Height as of 11/12/19: 1.562 m (5' 1.5\").    Weight as of 11/12/19: 91.6 kg (202 lb).     Physical Exam     GENERAL APPEARANCE: healthy, alert and no distress  EYES: Eyes grossly normal to inspection, PERRL and conjunctivae and sclerae normal  HENT: ear canals and TM's normal, nose and mouth without ulcers or lesions, oropharynx clear and oral mucous membranes moist  NECK: no adenopathy, no asymmetry, masses, or scars and thyroid normal to palpation  RESP: lungs clear to auscultation - no rales, rhonchi or wheezes  BREAST: normal without masses, tenderness or nipple discharge and no palpable axillary masses or adenopathy  CV: regular rate and rhythm, normal S1 S2, no S3 or S4, no murmur, click or rub, no peripheral edema and peripheral pulses strong  ABDOMEN: soft, nontender, no hepatosplenomegaly, no masses and bowel sounds normal   (female): normal female external genitalia, normal urethral meatus, " "vaginal mucosal atrophy noted,  Inspection only  MS: no musculoskeletal defects are noted and gait is age appropriate without ataxia  MS: chronic bilateral lower extremity lymphedema.   SKIN: no suspicious lesions or rashes  NEURO: Normal strength and tone, sensory exam grossly normal, mentation intact and speech normal  PSYCH: mentation appears normal and affect normal/bright    Diagnostic Test Results:   Labs reviewed in Epic and discussed with Kenna RICE  No results found for this or any previous visit (from the past 24 hour(s)).    ASSESSMENT / PLAN:       ICD-10-CM    1. Encounter for general adult medical examination with abnormal findings  Z00.01    2. Type 2 diabetes mellitus with microalbuminuria, without long-term current use of insulin (H)  E11.29 Hemoglobin A1c    R80.9    3. Malignant neoplasm of upper-outer quadrant of breast in female, estrogen receptor positive, unspecified laterality (H)  C50.419     Z17.0    4. Hypothyroidism, unspecified type  E03.9 TSH with free T4 reflex   5. BMI 38.0-38.9,adult  Z68.38    6. Hyperlipidemia LDL goal <100  E78.5    7. Essential hypertension with goal blood pressure less than 140/90  I10    8. Stage 3a chronic kidney disease  N18.31    9. History of colonic polyps  Z86.010    10. Encounter for Medicare annual wellness exam  Z00.00    11. Other specified hypothyroidism  E03.8 levothyroxine (SYNTHROID/LEVOTHROID) 112 MCG tablet       Patient has been advised of split billing requirements and indicates understanding: Yes     COUNSELING:  Reviewed preventive health counseling, as reflected in patient instructions       Regular exercise    Estimated body mass index is 37.55 kg/m  as calculated from the following:    Height as of 11/12/19: 1.562 m (5' 1.5\").    Weight as of 11/12/19: 91.6 kg (202 lb).    Weight management plan:      She reports that she has never smoked. She has never used smokeless tobacco.      Appropriate preventive services were discussed with this " patient, including applicable screening as appropriate for cardiovascular disease, diabetes, osteopenia/osteoporosis, and glaucoma.  As appropriate for age/gender, discussed screening for colorectal cancer, prostate cancer, breast cancer, and cervical cancer. Checklist reviewing preventive services available has been given to the patient.    Reviewed patients plan of care and provided an AVS. The Basic Care Plan (routine screening as documented in Health Maintenance) for Kenna meets the Care Plan requirement. This Care Plan has been established and reviewed with the Patient.    Counseling Resources:  ATP IV Guidelines  Pooled Cohorts Equation Calculator  Breast Cancer Risk Calculator  Breast Cancer: Medication to Reduce Risk  FRAX Risk Assessment  ICSI Preventive Guidelines  Dietary Guidelines for Americans, 2010  USDA's MyPlate  ASA Prophylaxis  Lung CA Screening    Carolyne Layne MD  [facility]    Identified Health Risks:

## 2020-11-28 DIAGNOSIS — R80.9 TYPE 2 DIABETES MELLITUS WITH MICROALBUMINURIA, WITHOUT LONG-TERM CURRENT USE OF INSULIN (H): ICD-10-CM

## 2020-11-28 DIAGNOSIS — E11.29 TYPE 2 DIABETES MELLITUS WITH MICROALBUMINURIA, WITHOUT LONG-TERM CURRENT USE OF INSULIN (H): ICD-10-CM

## 2020-11-28 DIAGNOSIS — I10 ESSENTIAL HYPERTENSION WITH GOAL BLOOD PRESSURE LESS THAN 140/90: ICD-10-CM

## 2020-11-30 RX ORDER — LOSARTAN POTASSIUM 50 MG/1
TABLET ORAL
Qty: 90 TABLET | Refills: 3 | Status: SHIPPED | OUTPATIENT
Start: 2020-11-30 | End: 2021-12-10

## 2020-12-08 ENCOUNTER — OFFICE VISIT (OUTPATIENT)
Dept: OPTOMETRY | Facility: CLINIC | Age: 65
End: 2020-12-08
Payer: COMMERCIAL

## 2020-12-08 DIAGNOSIS — H52.4 PRESBYOPIA: ICD-10-CM

## 2020-12-08 DIAGNOSIS — H52.31 ANISOMETROPIA: ICD-10-CM

## 2020-12-08 DIAGNOSIS — H25.13 NUCLEAR AGE-RELATED CATARACT, BOTH EYES: ICD-10-CM

## 2020-12-08 DIAGNOSIS — H52.223 REGULAR ASTIGMATISM OF BOTH EYES: ICD-10-CM

## 2020-12-08 DIAGNOSIS — E11.9 TYPE 2 DIABETES MELLITUS WITHOUT RETINOPATHY (H): ICD-10-CM

## 2020-12-08 DIAGNOSIS — Z01.01 ENCOUNTER FOR EXAMINATION OF EYES AND VISION WITH ABNORMAL FINDINGS: Primary | ICD-10-CM

## 2020-12-08 PROCEDURE — 92004 COMPRE OPH EXAM NEW PT 1/>: CPT | Performed by: OPTOMETRIST

## 2020-12-08 ASSESSMENT — REFRACTION_MANIFEST
OS_ADD: +2.50
OS_SPHERE: +1.50
OS_CYLINDER: +0.50
OD_SPHERE: -1.00
OD_AXIS: 013
OD_ADD: +2.50
OD_CYLINDER: +1.25
OS_AXIS: 008

## 2020-12-08 ASSESSMENT — CUP TO DISC RATIO
OD_RATIO: 0.4
OS_RATIO: 0.45

## 2020-12-08 ASSESSMENT — VISUAL ACUITY
METHOD: SNELLEN - LINEAR
OD_CC: 20/20 -1
CORRECTION_TYPE: GLASSES
OS_CC: 20/40
OD_CC: 20/20
OS_CC: 20/40 -1

## 2020-12-08 ASSESSMENT — CONF VISUAL FIELD
METHOD: COUNTING FINGERS
OS_NORMAL: 1
OD_NORMAL: 1

## 2020-12-08 ASSESSMENT — EXTERNAL EXAM - RIGHT EYE: OD_EXAM: NORMAL

## 2020-12-08 ASSESSMENT — TONOMETRY
IOP_METHOD: APPLANATION
OS_IOP_MMHG: 15
OD_IOP_MMHG: 16

## 2020-12-08 ASSESSMENT — REFRACTION_WEARINGRX
SPECS_TYPE: BIFOCAL
OS_SPHERE: +0.75
OD_ADD: +2.75
OD_SPHERE: -0.50
OD_AXIS: 005
OS_ADD: +2.75
OD_CYLINDER: +1.25

## 2020-12-08 ASSESSMENT — SLIT LAMP EXAM - LIDS
COMMENTS: 2+ DERMATOCHALASIS
COMMENTS: 2+ DERMATOCHALASIS

## 2020-12-08 ASSESSMENT — EXTERNAL EXAM - LEFT EYE: OS_EXAM: NORMAL

## 2020-12-08 NOTE — LETTER
12/8/2020         RE: Kenna MANE Ruben  [address] [place] [address]        Dear Colleague,    Thank you for referring your patient, Kenna Miranda, to the [facility]. Please see a copy of my visit note below.    Chief Complaint   Patient presents with     Diabetic Eye Exam       Hemoglobin A1C   Date Value Ref Range Status   11/10/2020 6.7 (H) 0 - 5.6 % Final     Comment:     Normal <5.7% Prediabetes 5.7-6.4%  Diabetes 6.5% or higher - adopted from ADA   consensus guidelines.     09/17/2019 7.1 (H) 0 - 5.6 % Final     Comment:     Normal <5.7% Prediabetes 5.7-6.4%  Diabetes 6.5% or higher - adopted from ADA   consensus guidelines.     06/20/2019 11.4 (H) 0 - 5.6 % Final     Comment:     Normal <5.7% Prediabetes 5.7-6.4%  Diabetes 6.5% or higher - adopted from ADA   consensus guidelines.         Last Eye Exam: 1 year  Dilated Previously: Yes    What are you currently using to see?  glasses    Distance Vision Acuity: Satisfied with vision    Near Vision Acuity: Not satisfied     Eye Comfort: good  Do you use eye drops? : No       Medical, surgical and family histories reviewed and updated 12/8/2020.       OBJECTIVE: See Ophthalmology exam    ASSESSMENT:    ICD-10-CM    1. Encounter for examination of eyes and vision with abnormal findings  Z01.01    2. Type 2 diabetes mellitus without retinopathy (H)  E11.9    3. Nuclear age-related cataract, both eyes  H25.13    4. Anisometropia  H52.31    5. Regular astigmatism of both eyes  H52.223    6. Presbyopia  H52.4       PLAN:    Kenna ALHAJI Ruben aware  eye exam results will be sent to Carolyne Layne  Patient Instructions   Patient educated on importance of good blood sugar control.  Letter sent to primary care provider with diabetic eye exam report.     You have the start of mild cataracts.  You may notice some blurred vision or glare with night driving.  It is important that you wear good sunglasses to protect your  eyes from the ultraviolet light from the sun.     Kenna was advised of today's exam findings.  Fill glasses prescription  Allow 2 weeks to adapt to change in glasses  Wear glasses full time  Copy of glasses Rx provided today.    Return in 1 year for eye exam, or sooner if needed.    The effects of the dilating drops last for 4- 6 hours.  You will be more sensitive to light and vision will be blurry up close.  Mydriatic sunglasses were given if needed.    Fracisco Lopes O.D.  [address] [place]  [address]    [phone]                 Again, thank you for allowing me to participate in the care of your patient.        Sincerely,        Fracisco Lopes, OD

## 2020-12-08 NOTE — PATIENT INSTRUCTIONS
Patient educated on importance of good blood sugar control.  Letter sent to primary care provider with diabetic eye exam report.     You have the start of mild cataracts.  You may notice some blurred vision or glare with night driving.  It is important that you wear good sunglasses to protect your eyes from the ultraviolet light from the sun.     Kenna was advised of today's exam findings.  Fill glasses prescription  Allow 2 weeks to adapt to change in glasses  Wear glasses full time  Copy of glasses Rx provided today.    Return in 1 year for eye exam, or sooner if needed.    The effects of the dilating drops last for 4- 6 hours.  You will be more sensitive to light and vision will be blurry up close.  Mydriatic sunglasses were given if needed.    Fracisco Lopes O.D.  [address] [place]  [address]    [phone]

## 2020-12-08 NOTE — PROGRESS NOTES
Chief Complaint   Patient presents with     Diabetic Eye Exam       Hemoglobin A1C   Date Value Ref Range Status   11/10/2020 6.7 (H) 0 - 5.6 % Final     Comment:     Normal <5.7% Prediabetes 5.7-6.4%  Diabetes 6.5% or higher - adopted from ADA   consensus guidelines.     09/17/2019 7.1 (H) 0 - 5.6 % Final     Comment:     Normal <5.7% Prediabetes 5.7-6.4%  Diabetes 6.5% or higher - adopted from ADA   consensus guidelines.     06/20/2019 11.4 (H) 0 - 5.6 % Final     Comment:     Normal <5.7% Prediabetes 5.7-6.4%  Diabetes 6.5% or higher - adopted from ADA   consensus guidelines.         Last Eye Exam: 1 year  Dilated Previously: Yes    What are you currently using to see?  glasses    Distance Vision Acuity: Satisfied with vision    Near Vision Acuity: Not satisfied     Eye Comfort: good  Do you use eye drops? : No       Medical, surgical and family histories reviewed and updated 12/8/2020.       OBJECTIVE: See Ophthalmology exam    ASSESSMENT:    ICD-10-CM    1. Encounter for examination of eyes and vision with abnormal findings  Z01.01    2. Type 2 diabetes mellitus without retinopathy (H)  E11.9    3. Nuclear age-related cataract, both eyes  H25.13    4. Anisometropia  H52.31    5. Regular astigmatism of both eyes  H52.223    6. Presbyopia  H52.4       PLAN:    Kenna Miranda aware  eye exam results will be sent to Carolyne Layne  Patient Instructions   Patient educated on importance of good blood sugar control.  Letter sent to primary care provider with diabetic eye exam report.     You have the start of mild cataracts.  You may notice some blurred vision or glare with night driving.  It is important that you wear good sunglasses to protect your eyes from the ultraviolet light from the sun.     Kenna was advised of today's exam findings.  Fill glasses prescription  Allow 2 weeks to adapt to change in glasses  Wear glasses full time  Copy of glasses Rx provided today.    Return in 1 year for eye exam,  or sooner if needed.    The effects of the dilating drops last for 4- 6 hours.  You will be more sensitive to light and vision will be blurry up close.  Mydriatic sunglasses were given if needed.    Fracisco Lopes O.D.  [address]  [place]  [address]    [phone]

## 2020-12-14 ENCOUNTER — TRANSFERRED RECORDS (OUTPATIENT)
Dept: HEALTH INFORMATION MANAGEMENT | Facility: CLINIC | Age: 65
End: 2020-12-14

## 2020-12-29 DIAGNOSIS — E03.8 OTHER SPECIFIED HYPOTHYROIDISM: ICD-10-CM

## 2020-12-30 RX ORDER — LEVOTHYROXINE SODIUM 125 UG/1
TABLET ORAL
Qty: 90 TABLET | Refills: 1 | Status: SHIPPED | OUTPATIENT
Start: 2020-12-30 | End: 2021-12-09 | Stop reason: DRUGHIGH

## 2020-12-30 RX ORDER — LEVOTHYROXINE SODIUM 112 UG/1
112 TABLET ORAL DAILY
Qty: 90 TABLET | Refills: 3 | OUTPATIENT
Start: 2020-12-30

## 2021-03-15 DIAGNOSIS — E78.5 HYPERLIPIDEMIA LDL GOAL <100: ICD-10-CM

## 2021-03-15 RX ORDER — ATORVASTATIN CALCIUM 20 MG/1
20 TABLET, FILM COATED ORAL EVERY MORNING
Qty: 24 TABLET | Refills: 3 | Status: SHIPPED | OUTPATIENT
Start: 2021-03-15 | End: 2021-12-10

## 2021-04-26 ENCOUNTER — ANCILLARY PROCEDURE (OUTPATIENT)
Dept: MAMMOGRAPHY | Facility: CLINIC | Age: 66
End: 2021-04-26
Attending: INTERNAL MEDICINE
Payer: COMMERCIAL

## 2021-04-26 DIAGNOSIS — Z12.31 VISIT FOR SCREENING MAMMOGRAM: ICD-10-CM

## 2021-04-26 PROCEDURE — 77067 SCR MAMMO BI INCL CAD: CPT | Mod: TC | Performed by: RADIOLOGY

## 2021-04-26 PROCEDURE — 77063 BREAST TOMOSYNTHESIS BI: CPT | Mod: TC | Performed by: RADIOLOGY

## 2021-06-06 ENCOUNTER — HEALTH MAINTENANCE LETTER (OUTPATIENT)
Age: 66
End: 2021-06-06

## 2021-09-26 ENCOUNTER — HEALTH MAINTENANCE LETTER (OUTPATIENT)
Age: 66
End: 2021-09-26

## 2021-12-06 ENCOUNTER — LAB (OUTPATIENT)
Dept: LAB | Facility: CLINIC | Age: 66
End: 2021-12-06
Payer: COMMERCIAL

## 2021-12-06 DIAGNOSIS — E11.29 TYPE 2 DIABETES MELLITUS WITH MICROALBUMINURIA, WITHOUT LONG-TERM CURRENT USE OF INSULIN (H): ICD-10-CM

## 2021-12-06 DIAGNOSIS — E03.9 HYPOTHYROIDISM, UNSPECIFIED TYPE: ICD-10-CM

## 2021-12-06 DIAGNOSIS — R80.9 TYPE 2 DIABETES MELLITUS WITH MICROALBUMINURIA, WITHOUT LONG-TERM CURRENT USE OF INSULIN (H): ICD-10-CM

## 2021-12-06 DIAGNOSIS — E78.5 HYPERLIPIDEMIA LDL GOAL <100: ICD-10-CM

## 2021-12-06 DIAGNOSIS — I10 ESSENTIAL HYPERTENSION WITH GOAL BLOOD PRESSURE LESS THAN 140/90: ICD-10-CM

## 2021-12-06 LAB
HBA1C MFR BLD: 7.7 % (ref 0–5.6)
T4 FREE SERPL-MCNC: 1.2 NG/DL (ref 0.76–1.46)
TSH SERPL DL<=0.005 MIU/L-ACNC: 4.65 MU/L (ref 0.4–4)

## 2021-12-06 PROCEDURE — 80048 BASIC METABOLIC PNL TOTAL CA: CPT

## 2021-12-06 PROCEDURE — 83036 HEMOGLOBIN GLYCOSYLATED A1C: CPT

## 2021-12-06 PROCEDURE — 84443 ASSAY THYROID STIM HORMONE: CPT

## 2021-12-06 PROCEDURE — 84439 ASSAY OF FREE THYROXINE: CPT

## 2021-12-06 PROCEDURE — 36415 COLL VENOUS BLD VENIPUNCTURE: CPT

## 2021-12-06 PROCEDURE — 80061 LIPID PANEL: CPT

## 2021-12-06 NOTE — RESULT ENCOUNTER NOTE
Kenna Miranda    Taken together, thyroid function tests look good.  The HgbA1C is at goal, but is higher than last year.     I will see you soon, we can review together    Sincerely,       GEORGE DEL ANGEL M.D.

## 2021-12-07 ASSESSMENT — ENCOUNTER SYMPTOMS
FREQUENCY: 0
CHILLS: 0
WEAKNESS: 0
MYALGIAS: 0
PARESTHESIAS: 0
ARTHRALGIAS: 0
COUGH: 1
BREAST MASS: 0
HEMATURIA: 0
SHORTNESS OF BREATH: 0
FEVER: 0
JOINT SWELLING: 1
SORE THROAT: 0
NAUSEA: 0
EYE PAIN: 0
DYSURIA: 0
ABDOMINAL PAIN: 0
HEADACHES: 0
DIARRHEA: 1
NERVOUS/ANXIOUS: 0
CONSTIPATION: 0
PALPITATIONS: 0
HEARTBURN: 0
DIZZINESS: 0
HEMATOCHEZIA: 0

## 2021-12-07 ASSESSMENT — ACTIVITIES OF DAILY LIVING (ADL): CURRENT_FUNCTION: NO ASSISTANCE NEEDED

## 2021-12-09 DIAGNOSIS — E11.29 TYPE 2 DIABETES MELLITUS WITH MICROALBUMINURIA, WITHOUT LONG-TERM CURRENT USE OF INSULIN (H): Primary | ICD-10-CM

## 2021-12-09 DIAGNOSIS — E78.5 HYPERLIPIDEMIA LDL GOAL <100: ICD-10-CM

## 2021-12-09 DIAGNOSIS — I10 ESSENTIAL HYPERTENSION WITH GOAL BLOOD PRESSURE LESS THAN 140/90: ICD-10-CM

## 2021-12-09 DIAGNOSIS — R80.9 TYPE 2 DIABETES MELLITUS WITH MICROALBUMINURIA, WITHOUT LONG-TERM CURRENT USE OF INSULIN (H): Primary | ICD-10-CM

## 2021-12-09 PROBLEM — Z92.21: Status: ACTIVE | Noted: 2019-11-10

## 2021-12-09 LAB
ANION GAP SERPL CALCULATED.3IONS-SCNC: 7 MMOL/L (ref 3–14)
BUN SERPL-MCNC: 14 MG/DL (ref 7–30)
CALCIUM SERPL-MCNC: 8.9 MG/DL (ref 8.5–10.1)
CHLORIDE BLD-SCNC: 108 MMOL/L (ref 94–109)
CHOLEST SERPL-MCNC: 149 MG/DL
CO2 SERPL-SCNC: 25 MMOL/L (ref 20–32)
CREAT SERPL-MCNC: 1.24 MG/DL (ref 0.52–1.04)
GFR SERPL CREATININE-BSD FRML MDRD: 45 ML/MIN/1.73M2
GLUCOSE BLD-MCNC: 139 MG/DL (ref 70–99)
HDLC SERPL-MCNC: 46 MG/DL
LDLC SERPL CALC-MCNC: 66 MG/DL
NONHDLC SERPL-MCNC: 103 MG/DL
POTASSIUM BLD-SCNC: 4.6 MMOL/L (ref 3.4–5.3)
SODIUM SERPL-SCNC: 140 MMOL/L (ref 133–144)
TRIGL SERPL-MCNC: 184 MG/DL

## 2021-12-10 ENCOUNTER — OFFICE VISIT (OUTPATIENT)
Dept: INTERNAL MEDICINE | Facility: CLINIC | Age: 66
End: 2021-12-10
Payer: COMMERCIAL

## 2021-12-10 ENCOUNTER — TELEPHONE (OUTPATIENT)
Dept: FAMILY MEDICINE | Facility: CLINIC | Age: 66
End: 2021-12-10

## 2021-12-10 VITALS
DIASTOLIC BLOOD PRESSURE: 82 MMHG | BODY MASS INDEX: 39.93 KG/M2 | HEART RATE: 72 BPM | WEIGHT: 217 LBS | TEMPERATURE: 97.9 F | HEIGHT: 62 IN | OXYGEN SATURATION: 92 % | SYSTOLIC BLOOD PRESSURE: 134 MMHG

## 2021-12-10 DIAGNOSIS — I10 ESSENTIAL HYPERTENSION WITH GOAL BLOOD PRESSURE LESS THAN 140/90: ICD-10-CM

## 2021-12-10 DIAGNOSIS — E11.29 TYPE 2 DIABETES MELLITUS WITH MICROALBUMINURIA, WITHOUT LONG-TERM CURRENT USE OF INSULIN (H): Primary | ICD-10-CM

## 2021-12-10 DIAGNOSIS — E78.5 HYPERLIPIDEMIA LDL GOAL <100: ICD-10-CM

## 2021-12-10 DIAGNOSIS — E03.8 OTHER SPECIFIED HYPOTHYROIDISM: ICD-10-CM

## 2021-12-10 DIAGNOSIS — Z23 NEED FOR IMMUNIZATION AGAINST TETANUS ALONE: ICD-10-CM

## 2021-12-10 DIAGNOSIS — E11.29 TYPE 2 DIABETES MELLITUS WITH MICROALBUMINURIA, WITHOUT LONG-TERM CURRENT USE OF INSULIN (H): ICD-10-CM

## 2021-12-10 DIAGNOSIS — N18.31 STAGE 3A CHRONIC KIDNEY DISEASE (H): ICD-10-CM

## 2021-12-10 DIAGNOSIS — E03.9 HYPOTHYROIDISM, UNSPECIFIED TYPE: ICD-10-CM

## 2021-12-10 DIAGNOSIS — R80.9 TYPE 2 DIABETES MELLITUS WITH MICROALBUMINURIA, WITHOUT LONG-TERM CURRENT USE OF INSULIN (H): ICD-10-CM

## 2021-12-10 DIAGNOSIS — Z92.21 HX OF AROMATASE INHIBITOR THERAPY: ICD-10-CM

## 2021-12-10 DIAGNOSIS — E66.01 SEVERE OBESITY (BMI 35.0-35.9 WITH COMORBIDITY) (H): ICD-10-CM

## 2021-12-10 DIAGNOSIS — Z00.00 ENCOUNTER FOR MEDICARE ANNUAL WELLNESS EXAM: ICD-10-CM

## 2021-12-10 DIAGNOSIS — R80.9 TYPE 2 DIABETES MELLITUS WITH MICROALBUMINURIA, WITHOUT LONG-TERM CURRENT USE OF INSULIN (H): Primary | ICD-10-CM

## 2021-12-10 DIAGNOSIS — Z00.01 ENCOUNTER FOR GENERAL ADULT MEDICAL EXAMINATION WITH ABNORMAL FINDINGS: Primary | ICD-10-CM

## 2021-12-10 DIAGNOSIS — H90.3 BILATERAL SENSORINEURAL HEARING LOSS: ICD-10-CM

## 2021-12-10 LAB
CREAT UR-MCNC: 178 MG/DL
MICROALBUMIN UR-MCNC: 71 MG/L
MICROALBUMIN/CREAT UR: 39.89 MG/G CR (ref 0–25)

## 2021-12-10 PROCEDURE — G0438 PPPS, INITIAL VISIT: HCPCS | Performed by: INTERNAL MEDICINE

## 2021-12-10 PROCEDURE — 90471 IMMUNIZATION ADMIN: CPT | Performed by: INTERNAL MEDICINE

## 2021-12-10 PROCEDURE — 90714 TD VACC NO PRESV 7 YRS+ IM: CPT | Performed by: INTERNAL MEDICINE

## 2021-12-10 PROCEDURE — 99214 OFFICE O/P EST MOD 30 MIN: CPT | Mod: 25 | Performed by: INTERNAL MEDICINE

## 2021-12-10 PROCEDURE — 82043 UR ALBUMIN QUANTITATIVE: CPT | Performed by: INTERNAL MEDICINE

## 2021-12-10 RX ORDER — FLASH GLUCOSE SENSOR
KIT MISCELLANEOUS
Qty: 6 EACH | Refills: 11 | Status: SHIPPED | OUTPATIENT
Start: 2021-12-10 | End: 2022-05-26

## 2021-12-10 RX ORDER — ATORVASTATIN CALCIUM 20 MG/1
20 TABLET, FILM COATED ORAL EVERY MORNING
Qty: 24 TABLET | Refills: 3 | Status: SHIPPED | OUTPATIENT
Start: 2021-12-10 | End: 2023-01-11

## 2021-12-10 RX ORDER — METOPROLOL TARTRATE 25 MG/1
25 TABLET, FILM COATED ORAL 2 TIMES DAILY
Qty: 180 TABLET | Refills: 3 | Status: SHIPPED | OUTPATIENT
Start: 2021-12-10 | End: 2023-01-26

## 2021-12-10 RX ORDER — LEVOTHYROXINE SODIUM 112 UG/1
112 TABLET ORAL DAILY
Qty: 90 TABLET | Refills: 3 | Status: SHIPPED | OUTPATIENT
Start: 2021-12-10 | End: 2023-01-11

## 2021-12-10 RX ORDER — LOSARTAN POTASSIUM 50 MG/1
50 TABLET ORAL DAILY
Qty: 90 TABLET | Refills: 3 | Status: SHIPPED | OUTPATIENT
Start: 2021-12-10 | End: 2023-01-11

## 2021-12-10 ASSESSMENT — ENCOUNTER SYMPTOMS
JOINT SWELLING: 1
DIZZINESS: 0
FREQUENCY: 0
MYALGIAS: 0
HEARTBURN: 0
HEMATURIA: 0
ABDOMINAL PAIN: 0
HEMATOCHEZIA: 0
FEVER: 0
COUGH: 1
WEAKNESS: 0
PALPITATIONS: 0
EYE PAIN: 0
HEADACHES: 0
PARESTHESIAS: 0
NAUSEA: 0
CHILLS: 0
DYSURIA: 0
DIARRHEA: 1
SHORTNESS OF BREATH: 0
SORE THROAT: 0
BREAST MASS: 0
CONSTIPATION: 0
NERVOUS/ANXIOUS: 0
ARTHRALGIAS: 0

## 2021-12-10 ASSESSMENT — MIFFLIN-ST. JEOR: SCORE: 1464.62

## 2021-12-10 ASSESSMENT — ACTIVITIES OF DAILY LIVING (ADL): CURRENT_FUNCTION: NO ASSISTANCE NEEDED

## 2021-12-10 NOTE — TELEPHONE ENCOUNTER
Please route determinations to the Pharm Diabetes pool ([id number]).      Thank you,  [facility] Team

## 2021-12-10 NOTE — PROGRESS NOTES
"SUBJECTIVE:   Kenna Miranda is a 65 year old female who presents for Preventive Visit.    66 y/o F here for AFE.  h/o DMII (can't tolerate metformin--due to GI, ... on Trulicity [semaglutide]),  HTN, Hyperlipidemia, Stage I Er/MO+ (HER2 [-]) L Breast cancer, (Completed Aromatase inhibitor therapy), osteopenia ,  obesity  Recent A1C 7.7 , TFTs wnl     Reports she is not checking BS lately.  Used to have CGM, but the sensor wouldn't stick.   Willing to give it another shot.       Patient has been advised of split billing requirements and indicates understanding: Yes   Are you in the first 12 months of your Medicare coverage?  No    Healthy Habits:     In general, how would you rate your overall health?  Good    Frequency of exercise:  1 day/week    Duration of exercise:  Less than 15 minutes    Do you usually eat at least 4 servings of fruit and vegetables a day, include whole grains    & fiber and avoid regularly eating high fat or \"junk\" foods?  No    Taking medications regularly:  Yes    Ability to successfully perform activities of daily living:  No assistance needed    Home Safety:  No safety concerns identified    Hearing Impairment:  Feel that people are mumbling or not speaking clearly and need to ask people to speak up or repeat themselves    In the past 6 months, have you been bothered by leaking of urine?  No    In general, how would you rate your overall mental or emotional health?  Good      PHQ-2 Total Score: 0    Additional concerns today:  No    Do you feel safe in your environment? Yes    Have you ever done Advance Care Planning? (For example, a Health Directive, POLST, or a discussion with a medical provider or your loved ones about your wishes): Yes, patient states has an Advance Care Planning document and will bring a copy to the clinic.       Fall risk       Cognitive Screening   1) Repeat 3 items (Leader, Season, Table)    2) Clock draw: NORMAL  3) 3 item recall: Recalls 2 objects "   Results: NORMAL clock, 1-2 items recalled: COGNITIVE IMPAIRMENT LESS LIKELY    Mini-CogTM Copyright DEWAYNE Plummer. Licensed by the author for use in [facility]; reprinted with permission (kobi@[facility]). All rights reserved.      Do you have sleep apnea, excessive snoring or daytime drowsiness?: snoring, daytime drowsiness     Reviewed and updated as needed this visit by clinical staff                Reviewed and updated as needed this visit by Provider               Social History     Tobacco Use     Smoking status: Never Smoker     Smokeless tobacco: Never Used     Tobacco comment: no second hand exposure.  when young Dad smoked.   Substance Use Topics     Alcohol use: Not Currently     Comment: very, very little     If you drink alcohol do you typically have >3 drinks per day or >7 drinks per week? No    Alcohol Use 12/7/2021   Prescreen: >3 drinks/day or >7 drinks/week? No   Prescreen: >3 drinks/day or >7 drinks/week? -           Discuss hearing issues, Issues with daughter     Current providers sharing in care for this patient include:   Patient Care Team:  Carolyne Layne MD as PCP - General (Internal Medicine)  Ruth Boston MD as MD (Urology)  Karen Salvador, RN as Registered Nurse  Carolyne Layne MD as Assigned PCP  Ani Simon, RN as Diabetes Educator (Diabetes Education)  Fracisco Lopes OD as Assigned Surgical Provider    The following health maintenance items are reviewed in Epic and correct as of today:  Health Maintenance Due   Topic Date Due     ANNUAL REVIEW OF HM ORDERS  Never done     HEMOGLOBIN  09/05/2018     DIABETIC FOOT EXAM  06/27/2020     DTAP/TDAP/TD IMMUNIZATION (3 - Td or Tdap) 02/07/2021     MICROALBUMIN  11/10/2021     FALL RISK ASSESSMENT  11/13/2021     EYE EXAM  12/08/2021     MEDICARE ANNUAL WELLNESS VISIT  11/13/2021     Labs reviewed in EPIC  BP Readings from Last 3 Encounters:   12/10/21 134/82   11/13/20 131/82   11/12/19 136/79    Wt  Readings from Last 3 Encounters:   12/10/21 98.4 kg (217 lb)   20 94.3 kg (208 lb)   19 91.6 kg (202 lb)                  Patient Active Problem List   Diagnosis     Borderline osteopenia     HYPERLIPIDEMIA LDL GOAL <100     Family history of colon cancer     S/P colonoscopic polypectomy     Cataract of both eyes     Elevated LFTs     Dermatochalasis of eyelid     No diabetic retinopathy in both eyes     Tibialis posterior tendinitis, left     Essential hypertension with goal blood pressure less than 140/90     Hypothyroidism, unspecified type     Type 2 diabetes mellitus with microalbuminuria, without long-term current use of insulin (H)     Overweight HCC     CKD (chronic kidney disease) stage 3, GFR 30-59 ml/min (H)     Hx of aromatase inhibitor therapy     Past Surgical History:   Procedure Laterality Date     BIOPSY  2015     BIOPSY BREAST NEEDLE LOCALIZATION, BIOPSY NODE SENTINEL, COMBINED  2012    Procedure: COMBINED BIOPSY BREAST NEEDLE LOCALIZATION, BIOPSY NODE SENTINEL;  Left Breast Wire Locilized Lumpectomy and Sentinal Lymph Node Biopsy;  Surgeon: Jesus Vicente MD;  Location: [facility] OR     C  DELIVERY ONLY      x 2     COLONOSCOPY  10-26-11    Return in 1 yr for Polyp Surveillance     COLONOSCOPY  12    Return for Colonoscopy in 3 yrs.Polyps     COMBINED CYSTOSCOPY, INSERT STENT URETER(S) Right 2017    Procedure: COMBINED CYSTOSCOPY, INSERT STENT URETER(S);  Cystoscopy Right retrograde pyelogram, Right ureteral Stent Placement;  Surgeon: Loida Sánchez MD;  Location: [facility] OR     COMBINED CYSTOSCOPY, RETROGRADES, URETEROSCOPY, LASER HOLMIUM LITHOTRIPSY URETER(S), INSERT STENT Right 2017    Procedure: COMBINED CYSTOSCOPY, RETROGRADES, URETEROSCOPY, LASER HOLMIUM LITHOTRIPSY URETER(S), INSERT STENT;  Cystoscopy, Right Ureteroscopy, Laser Lithotripsy, Right Stent Exchange ;  Surgeon: Ruth Boston MD;  Location: [facility] OR     LITHOTRIPSY        SURGICAL HISTORY OF -       exploratory laparotomy     SURGICAL HISTORY OF -       Right ovary removed     SURGICAL HISTORY OF -   2008    breast biopsy       Social History     Tobacco Use     Smoking status: Never Smoker     Smokeless tobacco: Never Used     Tobacco comment: no second hand exposure.  when young Dad smoked.   Substance Use Topics     Alcohol use: Not Currently     Comment: very, very little     Family History   Problem Relation Age of Onset     Cancer - colorectal Mother      Thyroid Disease Mother      Colon Cancer Mother      Cancer Father         Lung     Lipids Father      Macular Degeneration Father      Depression Brother      Respiratory Brother         losing hearing in 50's         Current Outpatient Medications   Medication Sig Dispense Refill     ASPIRIN 81 MG OR TABS 1 TABLET DAILY       atorvastatin (LIPITOR) 20 MG tablet Take 1 tablet (20 mg) by mouth every morning Patient is taking 2 times a week. 24 tablet 3     Continuous Blood Gluc Sensor (FREESTYLE MOLLY 14 DAY SENSOR) MISC Change every 14 days. 6 each 11     levothyroxine (SYNTHROID/LEVOTHROID) 112 MCG tablet Take 1 tablet (112 mcg) by mouth daily 90 tablet 3     losartan (COZAAR) 50 MG tablet Take 1 tablet (50 mg) by mouth daily 90 tablet 3     metoprolol tartrate (LOPRESSOR) 25 MG tablet Take 1 tablet (25 mg) by mouth 2 times daily 180 tablet 3     semaglutide (OZEMPIC) 2 MG/1.5ML SOPN pen Inject 0.5 mg Subcutaneous once a week 3 mL 11     Allergies   Allergen Reactions     Metformin GI Disturbance     Nkda [No Known Drug Allergies]      Recent Labs   Lab Test 12/06/21  0712 11/10/20  1138 09/17/19  0756 07/05/17  0929 07/02/17  0513 07/01/17  1655 07/01/17  0658 06/30/17  1410 06/30/17  0544   A1C 7.7* 6.7* 7.1*   < >  --   --   --   --   --    LDL 66 86 65   < >  --   --   --   --   --    HDL 46* 48* 38*   < >  --   --   --   --   --    TRIG 184* 193* 177*   < >  --   --   --   --   --    ALT  --   --   --   --  27  --  54*   "--  74*   CR 1.24* 1.10* 1.12*   < > 2.28*  --  2.53*   < > 2.86*   GFRESTIMATED 45* 52* 51*   < > 22*  --  19*   < > 17*   GFRESTBLACK  --  60* 59*   < > 26*  --  23*   < > 20*   POTASSIUM 4.6 4.2 4.0   < > 3.0*   < > 3.1*   < > 3.0*   TSH 4.65* 0.14* 1.66   < >  --   --   --   --   --     < > = values in this interval not displayed.         Any new diagnosis of family breast, ovarian, or bowel cancer? Yes   Not sure if she wants genetic screening.      FHS-7: No flowsheet data found.  click delete button to remove this line now     Pertinent mammograms are reviewed under the imaging tab.    Review of Systems   Constitutional: Negative for chills and fever.   HENT: Positive for congestion and hearing loss. Negative for ear pain and sore throat.    Eyes: Negative for pain and visual disturbance.   Respiratory: Positive for cough. Negative for shortness of breath.    Cardiovascular: Negative for chest pain, palpitations and peripheral edema.   Gastrointestinal: Positive for diarrhea. Negative for abdominal pain, constipation, heartburn, hematochezia and nausea.   Breasts:  Negative for tenderness, breast mass and discharge.   Genitourinary: Negative for dysuria, frequency, genital sores, hematuria, pelvic pain, urgency, vaginal bleeding and vaginal discharge.   Musculoskeletal: Positive for joint swelling. Negative for arthralgias and myalgias.   Skin: Negative for rash.   Neurological: Negative for dizziness, weakness, headaches and paresthesias.   Psychiatric/Behavioral: Negative for mood changes. The patient is not nervous/anxious.          OBJECTIVE:   /82 (BP Location: Other (Comment), Patient Position: Sitting, Cuff Size: Adult Regular)   Pulse 72   Temp 97.9  F (36.6  C) (Oral)   Ht 1.562 m (5' 1.5\")   Wt 98.4 kg (217 lb)   SpO2 92%   BMI 40.34 kg/m   Estimated body mass index is 38.98 kg/m  as calculated from the following:    Height as of 11/13/20: 1.556 m (5' 1.25\").    Weight as of 11/13/20: " 94.3 kg (208 lb).     Physical Exam  GENERAL APPEARANCE: MO, alert and no distress  EYES: Eyes grossly normal to inspection, PERRL and conjunctivae and sclerae normal  HENT: ear canals and TM's normal, nose and mouth without ulcers or lesions, oropharynx clear and oral mucous membranes moist  NECK: no adenopathy, no asymmetry, masses, or scars and thyroid normal to palpation  RESP: lungs clear to auscultation - no rales, rhonchi or wheezes  BREAST: normal without masses, tenderness or nipple discharge and no palpable axillary masses or adenopathy   S/p lumpectomy/XRT on left.   CV: regular rate and rhythm, normal S1 S2, no S3 or S4, no murmur, click or rub, no peripheral edema and peripheral pulses strong  ABDOMEN: soft, nontender, no hepatosplenomegaly, no masses and bowel sounds normal   (female) defer  MS: no musculoskeletal defects are noted and gait is age appropriate without ataxia  SKIN: no suspicious lesions or rashes  SKIN: scattered SKs  NEURO: Normal strength and tone, sensory exam grossly normal, mentation intact and speech normal  PSYCH: mentation appears normal and affect normal/bright    Diagnostic Test Results:  Labs reviewed in Epic, done last month.    Reviewed with patient     ASSESSMENT / PLAN:   (Z00.01) Encounter for general adult medical examination with abnormal findings  (primary encounter diagnosis)  (Z00.00) Encounter for Medicare annual wellness exam    (E11.29,  R80.9) Type 2 diabetes mellitus with microalbuminuria, without long-term current use of insulin (H)  Comment:   Controlled.   But rising HgbA1c.  She will start checking her BS again, will work on abiding by DM diet.  She will need to check her BS more than TID and respond with diet.    Offered DM ed . She declines for now, may be interested at our next visit.   Plan: OPTOMETRY REFERRAL, losartan (COZAAR) 50 MG         tablet, semaglutide (OZEMPIC) 2 MG/1.5ML SOPN         pen, Continuous Blood Gluc Sensor (FREESTYLE          "MOLLY 14 DAY SENSOR) [facility] [place], OFFICE/OUTPT         VISIT,EST,LEVL IV, Albumin Random Urine         Quantitative with Creat Ratio, CANCELED:         Albumin Random Urine Quantitative with Creat         Ratio          (N18.31) Stage 3a chronic kidney disease (H)  Comment: stable.    Plan: OFFICE/OUTPT VISIT,EST,LEVL IV             (I10) Essential hypertension with goal blood pressure less than 140/90  Comment: well controlled  BMP stable.   Plan: losartan (COZAAR) 50 MG tablet, metoprolol         tartrate (LOPRESSOR) 25 MG tablet, OFFICE/OUTPT        VISIT,EST,LEVL IV             (E03.9) Hypothyroidism, unspecified type  Comment:   Plan: OFFICE/OUTPT VISIT,EST,LEVL IV   (E03.8) Other specified hypothyroidism  Plan: levothyroxine (SYNTHROID/LEVOTHROID) 112 MCG         tablet        Continue same dose.  TFTs are normal when interpreted together.      (E66.01,  Z68.35) Overweight HCC  Comment:  Not gaining.   She does not have a plan, but the hope is she will get results with weight by minding her BS more   Plan:      (Z92.21) Hx of aromatase inhibitor therapy  Comment:  DEXA repeat 10/22   Will discuss at our summer follow up   Plan:      (E78.5) Hyperlipidemia LDL goal <100  Comment:   Plan: atorvastatin (LIPITOR) 20 MG tablet             (H90.3) Bilateral sensorineural hearing loss  Comment: she might be open to trial HAids if eligible   Plan: Adult Audiology Referral       (Z23) Need for immunization against tetanus alone  Comment:    Plan: CANCELED: TD (Adult), PF, Adsorbed (TDVAX)         [IMM09]               Patient has been advised of split billing requirements and indicates understanding: Yes     COUNSELING:    Reviewed preventive health counseling, as reflected in patient instructions       Immunizations    Vaccinated for: Td          Estimated body mass index is 38.98 kg/m  as calculated from the following:    Height as of 11/13/20: 1.556 m (5' 1.25\").    Weight as of 11/13/20: 94.3 kg (208 lb).    Weight " "management plan: abide by DM diet more.     She reports that she has never smoked. She has never used smokeless tobacco.      Appropriate preventive services were discussed with this patient, including applicable screening as appropriate for cardiovascular disease, diabetes, osteopenia/osteoporosis, and glaucoma.  As appropriate for age/gender, discussed screening for colorectal cancer, prostate cancer, breast cancer, and cervical cancer. Checklist reviewing preventive services available has been given to the patient.    Reviewed patients plan of care and provided an AVS. The Basic Care Plan (routine screening as documented in Health Maintenance) for Kenna meets the Care Plan requirement. This Care Plan has been established and reviewed with the Patient.    Counseling Resources:  ATP IV Guidelines  Pooled Cohorts Equation Calculator  Breast Cancer Risk Calculator  Breast Cancer: Medication to Reduce Risk  FRAX Risk Assessment  ICSI Preventive Guidelines  Dietary Guidelines for Americans, 2010  DIN Forumsâ„¢ Network's MyPlate  ASA Prophylaxis  Lung CA Screening    Carolyne Layne MD  [facility]    Identified Health Risks:s  Answers for HPI/ROS submitted by the patient on 12/7/2021  In general, how would you rate your overall physical health?: good  Frequency of exercise:: 1 day/week  Do you usually eat at least 4 servings of fruit and vegetables a day, include whole grains & fiber, and avoid regularly eating high fat or \"junk\" foods? : No  Taking medications regularly:: Yes  Activities of Daily Living: no assistance needed  Home safety: no safety concerns identified  Hearing Impairment:: feel that people are mumbling or not speaking clearly, need to ask people to speak up or repeat themselves  In the past 6 months, have you been bothered by leaking of urine?: No  abdominal pain: No  Blood in stool: No  Blood in urine: No  chest pain: No  chills: No  congestion: Yes  constipation: No  cough: Yes  diarrhea: " Yes  dizziness: No  ear pain: No  eye pain: No  nervous/anxious: No  fever: No  frequency: No  genital sores: No  headaches: No  hearing loss: Yes  heartburn: No  arthralgias: No  joint swelling: Yes  peripheral edema: No  mood changes: No  myalgias: No  nausea: No  dysuria: No  palpitations: No  Skin sensation changes: No  sore throat: No  urgency: No  rash: No  shortness of breath: No  visual disturbance: No  weakness: No  pelvic pain: No  vaginal bleeding: No  vaginal discharge: No  tenderness: No  breast mass: No  breast discharge: No  In general, how would you rate your overall mental or emotional health?: good  Additional concerns today:: No  Duration of exercise:: Less than 15 minutes        She is at risk for lack of exercise and has been provided with information to increase physical activity for the benefit of her well-being.  The patient was counseled and encouraged to consider modifying their diet and eating habits. She was provided with information on recommended healthy diet options.  The patient was provided with written information regarding signs of hearing loss.

## 2021-12-10 NOTE — RESULT ENCOUNTER NOTE
Kenna Miranda    Electrolytes and kidney function are normal/stable. The cholesterol looks great     Sincerely,       GEORGE DEL ANGEL M.D.

## 2021-12-10 NOTE — NURSING NOTE
Prior to immunization administration, verified patients identity using patient s name and date of birth. Please see Immunization Activity for additional information.     Screening Questionnaire for Adult Immunization    Are you sick today?   No   Do you have allergies to medications, food, a vaccine component or latex?   No   Have you ever had a serious reaction after receiving a vaccination?   No   Do you have a long-term health problem with heart, lung, kidney, or metabolic disease (e.g., diabetes), asthma, a blood disorder, no spleen, complement component deficiency, a cochlear implant, or a spinal fluid leak?  Are you on long-term aspirin therapy? yes   Do you have cancer, leukemia, HIV/AIDS, or any other immune system problem?   No   Do you have a parent, brother, or sister with an immune system problem?   No   In the past 3 months, have you taken medications that affect  your immune system, such as prednisone, other steroids, or anticancer drugs; drugs for the treatment of rheumatoid arthritis, Crohn s disease, or psoriasis; or have you had radiation treatments?   No   Have you had a seizure, or a brain or other nervous system problem?   No   During the past year, have you received a transfusion of blood or blood    products, or been given immune (gamma) globulin or antiviral drug?   No   For women: Are you pregnant or is there a chance you could become       pregnant during the next month?   No   Have you received any vaccinations in the past 4 weeks?   No     Immunization questionnaire was positive for at least one answer.  Notified Dr. Layne.        Per orders of Dr. Layne, injection of TD given by Maryann Palencia CMA. Patient instructed to remain in clinic for 15 minutes afterwards, and to report any adverse reaction to me immediately.       Screening performed by Maryann Palencia CMA on 12/10/2021 at 10:18 AM.

## 2021-12-10 NOTE — PATIENT INSTRUCTIONS
EYE visit due    You are eligible for genetic screening due to your family history of colon cancer.           Patient Education   Personalized Prevention Plan  You are due for the preventive services outlined below.  Your care team is available to assist you in scheduling these services.  If you have already completed any of these items, please share that information with your care team to update in your medical record.  Health Maintenance Due   Topic Date Due     Hemoglobin  09/05/2018     Diabetic Foot Exam  06/27/2020     Diptheria Tetanus Pertussis (DTAP/TDAP/TD) Vaccine (3 - Td or Tdap) 02/07/2021     Kidney Microalbumin Urine Test  11/10/2021     FALL RISK ASSESSMENT  11/13/2021     Eye Exam  12/08/2021       Exercise for a Healthier Heart  You may wonder how you can improve the health of your heart. If you re thinking about exercise, you re on the right track. You don t need to become an athlete. But you do need a certain amount of brisk exercise to help strengthen your heart. If you have been diagnosed with a heart condition, your healthcare provider may advise exercise to help stabilize your condition. To help make exercise a habit, choose safe, fun activities.      Exercise with a friend. When activity is fun, you're more likely to stick with it.   Before you start  Check with your healthcare provider before starting an exercise program. This is especially important if you have not been active for a while. It's also important if you have a long-term (chronic) health problem such as heart disease, diabetes, or obesity. Or if you are at high risk for having these problems.   Why exercise?  Exercising regularly offers many healthy rewards. It can help you do all of the following:     Improve your blood cholesterol level to help prevent further heart trouble    Lower your blood pressure to help prevent a stroke or heart attack    Control diabetes, or reduce your risk of getting this disease    Improve your  heart and lung function    Reach and stay at a healthy weight    Make your muscles stronger so you can stay active    Prevent falls and fractures by slowing the loss of bone mass (osteoporosis)    Manage stress better    Reduce your blood pressure    Improve your sense of self and your body image  Exercise tips      Ease into your routine. Set small goals. Then build on them. If you are not sure what your activity level should be, talk with your healthcare provider first before starting an exercise routine.    Exercise on most days. Aim for a total of 150 minutes (2 hours and 30 minutes) or more of moderate-intensity aerobic activity each week. Or 75 minutes (1 hour and 15 minutes) or more of vigorous-intensity aerobic activity each week. Or try for a combination of both. Moderate activity means that you breathe heavier and your heart rate increases but you can still talk. Think about doing 40 minutes of moderate exercise, 3 to 4 times a week. For best results, activity should last for about 40 minutes to lower blood pressure and cholesterol. It's OK to work up to the 40-minute period over time. Examples of moderate-intensity activity are walking 1 mile in 15 minutes. Or doing 30 to 45 minutes of yard work.    Step up your daily activity level.  Along with your exercise program, try being more active the whole day. Walk instead of drive. Or park further away so that you take more steps each day. Do more household tasks or yard work. You may not be able to meet the advised mount of physical activity. But doing some moderate- or vigorous-intensity aerobic activity can help reduce your risk for heart disease. Your healthcare provider can help you figure out what is best for you.    Choose 1 or more activities you enjoy.  Walking is one of the easiest things you can do. You can also try swimming, riding a bike, dancing, or taking an exercise class.    When to call your healthcare provider  Call your healthcare provider  if you have any of these:     Chest pain or feel dizzy or lightheaded    Burning, tightness, pressure, or heaviness in your chest, neck, shoulders, back, or arms    Abnormal shortness of breath    More joint or muscle pain    A very fast or irregular heartbeat (palpitations)  Melanie last reviewed this educational content on 7/1/2019 2000-2021 [employer]. All rights reserved. This information is not intended as a substitute for professional medical care. Always follow your healthcare professional's instructions.          Understanding USDA MyPlate  The USDA has guidelines to help you make healthy food choices. These are called MyPlate. MyPlate shows the food groups that make up healthy meals using the image of a place setting. Before you eat, think about the healthiest choices for what to put on your plate or in your cup or bowl. To learn more about building a healthy plate, visit www.choosemyplate.gov.    The food groups    Fruits. Any fruit or 100% fruit juice counts as part of the Fruit Group. Fruits may be fresh, canned, frozen, or dried, and may be whole, cut-up, or pureed. Make 1/2 of your plate fruits and vegetables.    Vegetables. Any vegetable or 100% vegetable juice counts as a member of the Vegetable Group. Vegetables may be fresh, frozen, canned, or dried. They can be served raw or cooked and may be whole, cut-up, or mashed. Make 1/2 of your plate fruits and vegetables.    Grains. All foods made from grains are part of the Grains Group. These include wheat, rice, oats, cornmeal, and barley. Grains are often used to make foods such as bread, pasta, oatmeal, cereal, tortillas, and grits. Grains should be no more than 1/4 of your plate. At least half of your grains should be whole grains.    Protein. This group includes meat, poultry, seafood, beans and peas, eggs, processed soy products (such as tofu), nuts (including nut butters), and seeds. Make protein choices no more than 1/4 of your  plate. Meat and poultry choices should be lean or low fat.    Dairy. The Dairy Group includes all fluid milk products and foods made from milk that contain calcium, such as yogurt and cheese. (Foods that have little calcium, such as cream, butter, and cream cheese, are not part of this group.) Most dairy choices should be low-fat or fat-free.    Oils. Oils aren't a food group, but they do contain essential nutrients. However it's important to watch your intake of oils. These are fats that are liquid at room temperature. They include canola, corn, olive, soybean, vegetable, and sunflower oil. Foods that are mainly oil include mayonnaise, certain salad dressings, and soft margarines. You likely already get your daily oil allowance from the foods you eat.  Things to limit  Eating healthy also means limiting these things in your diet:       Salt (sodium). Many processed foods have a lot of sodium. To keep sodium intake down, eat fresh vegetables, meats, poultry, and seafood when possible. Purchase low-sodium, reduced-sodium, or no-salt-added food products at the store. And don't add salt to your meals at home. Instead, season them with herbs and spices such as dill, oregano, cumin, and paprika. Or try adding flavor with lemon or lime zest and juice.    Saturated fat. Saturated fats are most often found in animal products such as beef, pork, and chicken. They are often solid at room temperature, such as butter. To reduce your saturated fat intake, choose leaner cuts of meat and poultry. And try healthier cooking methods such as grilling, broiling, roasting, or baking. For a simple lower-fat swap, use plain nonfat yogurt instead of mayonnaise when making potato salad or macaroni salad.    Added sugars. These are sugars added to foods. They are in foods such as ice cream, candy, soda, fruit drinks, sports drinks, energy drinks, cookies, pastries, jams, and syrups. Cut down on added sugars by sharing sweet treats with a  family member or friend. You can also choose fruit for dessert, and drink water or other unsweetened beverages.     StayWell last reviewed this educational content on 6/1/2020 2000-2021 [employer]. All rights reserved. This information is not intended as a substitute for professional medical care. Always follow your healthcare professional's instructions.          Signs of Hearing Loss      Hearing much better with one ear can be a sign of hearing loss.   Hearing loss is a problem shared by many people. In fact, it is one of the most common health problems, particularly as people age. Most people age 65 and older have some hearing loss. By age 80, almost everyone does. Hearing loss often occurs slowly over the years. So you may not realize your hearing has gotten worse.  Have your hearing checked  Call your healthcare provider if you:    Have to strain to hear normal conversation    Have to watch other people s faces very carefully to follow what they re saying    Need to ask people to repeat what they ve said    Often misunderstand what people are saying    Turn the volume of the television or radio up so high that others complain    Feel that people are mumbling when they re talking to you    Find that the effort to hear leaves you feeling tired and irritated    Notice, when using the phone, that you hear better with one ear than the other  Heekya last reviewed this educational content on 1/1/2020 2000-2021 [employer]. All rights reserved. This information is not intended as a substitute for professional medical care. Always follow your healthcare professional's instructions.         [facility]   [address] [place] [id number]   Phone: [phone]     Watch BS and curb behaviors to keep BS well below 160 during day

## 2021-12-13 RX ORDER — LANCETS
EACH MISCELLANEOUS
Qty: 100 EACH | Refills: 3 | Status: SHIPPED | OUTPATIENT
Start: 2021-12-13 | End: 2022-05-26

## 2021-12-13 RX ORDER — GLUCOSAMINE HCL/CHONDROITIN SU 500-400 MG
CAPSULE ORAL
Qty: 100 EACH | Refills: 3 | Status: SHIPPED | OUTPATIENT
Start: 2021-12-13 | End: 2022-05-26

## 2021-12-13 NOTE — TELEPHONE ENCOUNTER
MD called and notified patient  .    She will pay oop if she wants it.   She will let me know.

## 2021-12-13 NOTE — TELEPHONE ENCOUNTER
PRIOR AUTHORIZATION DENIED    Medication: Freestyle [employer]- DENIED     Denial Date: 12/13/2021    Denial Rational: Patient did not meet criteria.      Appeal Information: If provider would like to appeal please provide a letter of medical necessity.

## 2021-12-13 NOTE — TELEPHONE ENCOUNTER
Central Prior Authorization Team  Phone: [phone]    PA Initiation    Medication: Freestyle Jc GLG  Insurance Company: [employer] - Phone [phone] Fax [phone]  Pharmacy Filling the Rx: [facility] - [place], [place] - [address] [place] [address].  Filling Pharmacy Phone: [phone]  Filling Pharmacy Fax:    Start Date: 12/13/2021

## 2022-01-20 ENCOUNTER — TELEPHONE (OUTPATIENT)
Dept: INTERNAL MEDICINE | Facility: CLINIC | Age: 67
End: 2022-01-20
Payer: COMMERCIAL

## 2022-01-20 NOTE — TELEPHONE ENCOUNTER
$30 gift card (Annual Diabetes Blood Glucose (A1c) test from [facility] received and given to Dr. Layne to sign.    Bhavana Toledo,

## 2022-01-20 NOTE — TELEPHONE ENCOUNTER
Reason for Call:  Form, our goal is to have forms completed with 72 hours, however, some forms may require a visit or additional information.    Type of letter, form or note:  medical    Who is the form from?: [facility] (if other please explain)    Where did the form come from: form was mailed in    What clinic location was the form placed at?: [facility]    Where the form was placed: Given to physician    Call taken on 1/20/2022 at 2:44 PM by Bhavana Toledo

## 2022-01-21 NOTE — TELEPHONE ENCOUNTER
Form signed and mailed to [facility], [address], [place] [address] in envelope that patient provided.    Bhavana Toledo,

## 2022-03-29 PROBLEM — K63.5 POLYP OF COLON: Status: ACTIVE | Noted: 2020-12-14

## 2022-03-29 PROBLEM — K59.00 CONSTIPATION: Status: ACTIVE | Noted: 2020-12-14

## 2022-03-29 PROBLEM — D12.3 BENIGN NEOPLASM OF TRANSVERSE COLON: Status: ACTIVE | Noted: 2020-12-16

## 2022-03-30 ENCOUNTER — OFFICE VISIT (OUTPATIENT)
Dept: OPTOMETRY | Facility: CLINIC | Age: 67
End: 2022-03-30
Payer: COMMERCIAL

## 2022-03-30 DIAGNOSIS — E11.9 TYPE 2 DIABETES MELLITUS WITHOUT RETINOPATHY (H): ICD-10-CM

## 2022-03-30 DIAGNOSIS — H52.221 REGULAR ASTIGMATISM OF RIGHT EYE: ICD-10-CM

## 2022-03-30 DIAGNOSIS — H52.4 PRESBYOPIA: ICD-10-CM

## 2022-03-30 DIAGNOSIS — H52.31 ANISOMETROPIA: ICD-10-CM

## 2022-03-30 DIAGNOSIS — H25.13 NUCLEAR AGE-RELATED CATARACT, BOTH EYES: ICD-10-CM

## 2022-03-30 DIAGNOSIS — Z01.01 ENCOUNTER FOR EXAMINATION OF EYES AND VISION WITH ABNORMAL FINDINGS: Primary | ICD-10-CM

## 2022-03-30 PROCEDURE — 92015 DETERMINE REFRACTIVE STATE: CPT | Performed by: OPTOMETRIST

## 2022-03-30 PROCEDURE — 92014 COMPRE OPH EXAM EST PT 1/>: CPT | Performed by: OPTOMETRIST

## 2022-03-30 ASSESSMENT — REFRACTION_MANIFEST
OD_SPHERE: -1.25
OD_AXIS: 017
OS_CYLINDER: SPHERE
OS_ADD: +2.50
OD_ADD: +2.50
OD_CYLINDER: +1.75
OS_SPHERE: +1.00

## 2022-03-30 ASSESSMENT — VISUAL ACUITY
METHOD: SNELLEN - LINEAR
OD_CC: 20/20
OS_CC: 20/20-1
OS_SC: 20/200
OS_SC: 20/50
OS_SC+: -2
OD_SC: 20/120
OD_CC: 20/20
OD_SC: 20/30
OS_CC: 20/25
OD_CC+: -1
CORRECTION_TYPE: GLASSES
OD_SC+: -1

## 2022-03-30 ASSESSMENT — EXTERNAL EXAM - RIGHT EYE: OD_EXAM: NORMAL

## 2022-03-30 ASSESSMENT — SLIT LAMP EXAM - LIDS
COMMENTS: 2+ DERMATOCHALASIS
COMMENTS: 2-3+ DERMATOCHALASIS

## 2022-03-30 ASSESSMENT — REFRACTION_WEARINGRX
OD_ADD: +2.50
OD_SPHERE: -1.00
OS_CYLINDER: +0.50
SPECS_TYPE: BIFOCAL
OS_ADD: +2.50
OD_AXIS: 013
OD_CYLINDER: +1.25
OS_AXIS: 008
OS_SPHERE: +1.25

## 2022-03-30 ASSESSMENT — CONF VISUAL FIELD
OD_NORMAL: 1
OS_NORMAL: 1
METHOD: COUNTING FINGERS

## 2022-03-30 ASSESSMENT — CUP TO DISC RATIO
OD_RATIO: 0.4
OS_RATIO: 0.45

## 2022-03-30 ASSESSMENT — TONOMETRY
OD_IOP_MMHG: 17
OS_IOP_MMHG: 16
IOP_METHOD: APPLANATION

## 2022-03-30 ASSESSMENT — EXTERNAL EXAM - LEFT EYE: OS_EXAM: NORMAL

## 2022-03-30 NOTE — PATIENT INSTRUCTIONS
Patient educated on importance of good blood sugar control.  Letter sent to primary care provider with diabetic eye exam report.     You have the start of mild cataracts.  You may notice some blurred vision or glare with night driving.  It is important that you wear good sunglasses to protect your eyes from the ultraviolet light from the sun.     Kenna was advised of today's exam findings.  Optional to fill new glasses prescription, mild change  Copy of glasses Rx provided today.    Return in 1 year for eye exam, or sooner if needed.    The effects of the dilating drops last for 4- 6 hours.  You will be more sensitive to light and vision will be blurry up close.  Mydriatic sunglasses were given if needed.      Fracisco Lopes O.D.  [facility], [place]  [address] [place] [address]. [place]  [address]    [phone]

## 2022-03-30 NOTE — PROGRESS NOTES
Chief Complaint   Patient presents with     Diabetic Eye Exam        Chief Complaint(s) and History of Present Illness(es)     Diabetic Eye Exam     Diabetes Type: Type 2 and taking oral medications    Duration: 4 years               Lab Results   Component Value Date    A1C 7.7 12/06/2021    A1C 6.7 11/10/2020    A1C 7.1 09/17/2019    A1C 11.4 06/20/2019    A1C 8.6 09/18/2018    A1C 6.9 09/05/2017          Last Eye Exam: 12/8/2020  Dilated Previously: Yes, side effects of dilation explained today    What are you currently using to see?  Glasses - wears full time    Distance Vision Acuity: Satisfied with vision    Near Vision Acuity: Satisfied with vision while reading and using computer with glasses    Eye Comfort: good  Do you use eye drops? : No  Occupation or Hobbies: Retired    [facility]     Medical, surgical and family histories reviewed and updated 3/30/2022.       OBJECTIVE: See Ophthalmology exam    ASSESSMENT:    ICD-10-CM    1. Encounter for examination of eyes and vision with abnormal findings  Z01.01    2. Type 2 diabetes mellitus without retinopathy (H)  E11.9    3. Nuclear age-related cataract, both eyes  H25.13    4. Anisometropia  H52.31    5. Regular astigmatism of right eye  H52.221    6. Presbyopia  H52.4        PLAN:    Kenna Miranda aware  eye exam results will be sent to Carolyne Layne  Patient Instructions   Patient educated on importance of good blood sugar control.  Letter sent to primary care provider with diabetic eye exam report.     You have the start of mild cataracts.  You may notice some blurred vision or glare with night driving.  It is important that you wear good sunglasses to protect your eyes from the ultraviolet light from the sun.     Kenna was advised of today's exam findings.  Optional to fill new glasses prescription, mild change  Copy of glasses Rx provided today.    Return in 1 year for eye exam, or sooner if needed.    The effects of the dilating drops  last for 4- 6 hours.  You will be more sensitive to light and vision will be blurry up close.  Mydriatic sunglasses were given if needed.      Fracisco Lopes O.D.  [address]. [place]  [address]    [phone]

## 2022-03-30 NOTE — LETTER
3/30/2022         RE: Kenna MANE Ruben  [address] [place] [address]        Dear Colleague,    Thank you for referring your patient, Kenna Miranda, to the [facility]. Please see a copy of my visit note below.    Chief Complaint   Patient presents with     Diabetic Eye Exam        Chief Complaint(s) and History of Present Illness(es)     Diabetic Eye Exam     Diabetes Type: Type 2 and taking oral medications    Duration: 4 years               Lab Results   Component Value Date    A1C 7.7 12/06/2021    A1C 6.7 11/10/2020    A1C 7.1 09/17/2019    A1C 11.4 06/20/2019    A1C 8.6 09/18/2018    A1C 6.9 09/05/2017          Last Eye Exam: 12/8/2020  Dilated Previously: Yes, side effects of dilation explained today    What are you currently using to see?  Glasses - wears full time    Distance Vision Acuity: Satisfied with vision    Near Vision Acuity: Satisfied with vision while reading and using computer with glasses    Eye Comfort: good  Do you use eye drops? : No  Occupation or Hobbies: Retired    [facility]     Medical, surgical and family histories reviewed and updated 3/30/2022.       OBJECTIVE: See Ophthalmology exam    ASSESSMENT:    ICD-10-CM    1. Encounter for examination of eyes and vision with abnormal findings  Z01.01    2. Type 2 diabetes mellitus without retinopathy (H)  E11.9    3. Nuclear age-related cataract, both eyes  H25.13    4. Anisometropia  H52.31    5. Regular astigmatism of right eye  H52.221    6. Presbyopia  H52.4        PLAN:    Kenna Miranda aware  eye exam results will be sent to Carolyne Layne  Patient Instructions   Patient educated on importance of good blood sugar control.  Letter sent to primary care provider with diabetic eye exam report.     You have the start of mild cataracts.  You may notice some blurred vision or glare with night driving.  It is important that you wear good sunglasses to protect your eyes from the  ultraviolet light from the sun.     Kenna was advised of today's exam findings.  Optional to fill new glasses prescription, mild change  Copy of glasses Rx provided today.    Return in 1 year for eye exam, or sooner if needed.    The effects of the dilating drops last for 4- 6 hours.  You will be more sensitive to light and vision will be blurry up close.  Mydriatic sunglasses were given if needed.      Fracisco Lopes O.D.  [address]. [facility] [place]  [address]    [phone]               Again, thank you for allowing me to participate in the care of your patient.        Sincerely,        Fracisco Lopes, OD

## 2022-05-02 ENCOUNTER — ANCILLARY PROCEDURE (OUTPATIENT)
Dept: MAMMOGRAPHY | Facility: CLINIC | Age: 67
End: 2022-05-02
Attending: INTERNAL MEDICINE
Payer: COMMERCIAL

## 2022-05-02 DIAGNOSIS — Z12.31 VISIT FOR SCREENING MAMMOGRAM: ICD-10-CM

## 2022-05-02 PROCEDURE — 77063 BREAST TOMOSYNTHESIS BI: CPT | Mod: TC | Performed by: RADIOLOGY

## 2022-05-02 PROCEDURE — 77067 SCR MAMMO BI INCL CAD: CPT | Mod: TC | Performed by: RADIOLOGY

## 2022-05-13 ENCOUNTER — ANCILLARY PROCEDURE (OUTPATIENT)
Dept: ULTRASOUND IMAGING | Facility: CLINIC | Age: 67
End: 2022-05-13
Attending: INTERNAL MEDICINE
Payer: COMMERCIAL

## 2022-05-13 DIAGNOSIS — R92.8 ABNORMAL MAMMOGRAM: ICD-10-CM

## 2022-05-13 PROCEDURE — 76642 ULTRASOUND BREAST LIMITED: CPT | Mod: RT | Performed by: RADIOLOGY

## 2022-05-26 ENCOUNTER — OFFICE VISIT (OUTPATIENT)
Dept: INTERNAL MEDICINE | Facility: CLINIC | Age: 67
End: 2022-05-26
Payer: COMMERCIAL

## 2022-05-26 VITALS
OXYGEN SATURATION: 94 % | SYSTOLIC BLOOD PRESSURE: 138 MMHG | BODY MASS INDEX: 40.12 KG/M2 | WEIGHT: 218 LBS | HEIGHT: 62 IN | TEMPERATURE: 97.6 F | HEART RATE: 73 BPM | DIASTOLIC BLOOD PRESSURE: 82 MMHG

## 2022-05-26 DIAGNOSIS — R60.0 PEDAL EDEMA: ICD-10-CM

## 2022-05-26 DIAGNOSIS — E11.29 TYPE 2 DIABETES MELLITUS WITH MICROALBUMINURIA, WITHOUT LONG-TERM CURRENT USE OF INSULIN (H): Primary | ICD-10-CM

## 2022-05-26 DIAGNOSIS — E66.01 SEVERE OBESITY (BMI 35.0-35.9 WITH COMORBIDITY) (H): ICD-10-CM

## 2022-05-26 DIAGNOSIS — R80.9 TYPE 2 DIABETES MELLITUS WITH MICROALBUMINURIA, WITHOUT LONG-TERM CURRENT USE OF INSULIN (H): Primary | ICD-10-CM

## 2022-05-26 DIAGNOSIS — I10 ESSENTIAL HYPERTENSION WITH GOAL BLOOD PRESSURE LESS THAN 140/90: ICD-10-CM

## 2022-05-26 DIAGNOSIS — N18.31 STAGE 3A CHRONIC KIDNEY DISEASE (H): ICD-10-CM

## 2022-05-26 LAB
ERYTHROCYTE [DISTWIDTH] IN BLOOD BY AUTOMATED COUNT: 14 % (ref 10–15)
HBA1C MFR BLD: 7.3 % (ref 0–5.6)
HCT VFR BLD AUTO: 43.6 % (ref 35–47)
HGB BLD-MCNC: 14.2 G/DL (ref 11.7–15.7)
MCH RBC QN AUTO: 27.3 PG (ref 26.5–33)
MCHC RBC AUTO-ENTMCNC: 32.6 G/DL (ref 31.5–36.5)
MCV RBC AUTO: 84 FL (ref 78–100)
PLATELET # BLD AUTO: 198 10E3/UL (ref 150–450)
RBC # BLD AUTO: 5.2 10E6/UL (ref 3.8–5.2)
WBC # BLD AUTO: 6.1 10E3/UL (ref 4–11)

## 2022-05-26 PROCEDURE — 83036 HEMOGLOBIN GLYCOSYLATED A1C: CPT | Performed by: INTERNAL MEDICINE

## 2022-05-26 PROCEDURE — 36415 COLL VENOUS BLD VENIPUNCTURE: CPT | Performed by: INTERNAL MEDICINE

## 2022-05-26 PROCEDURE — 99214 OFFICE O/P EST MOD 30 MIN: CPT | Performed by: INTERNAL MEDICINE

## 2022-05-26 PROCEDURE — 85027 COMPLETE CBC AUTOMATED: CPT | Performed by: INTERNAL MEDICINE

## 2022-05-26 NOTE — PROGRESS NOTES
Assessment & Plan     Type 2 diabetes mellitus with microalbuminuria, without long-term current use of insulin (H)  Well controlled   Continue current management    - Hemoglobin A1c; Future  - Hemoglobin A1c    Essential hypertension with goal blood pressure less than 140/90  Controlled     Stage 3a chronic kidney disease (H)  Stable.   - CBC with platelets; Future  - CBC with platelets    Severe obesity (BMI 35.0-35.9 with comorbidity) (H)   encouraged weight loss, she had been successful before.     Pedal edema   2/2 obesity.   Offered diuretic, she declined.   Work on losing 5-10# next several months.          I spent a total of 30 minutes on the day of the visit.   Time spent doing chart review, history and exam, documentation and further activities per the note             Return in about 6 months (around 11/26/2022) for diabetes follow up.    [facility] Layne MD  [facility]    ====================================================  =====================================================    Subjective   Kenna is a 66 year old who presents for the following health issues     67 y/o F here for DM recheck.  h/o DMII (can't tolerate metformin--due to GI, ... on Trulicity [semaglutide]),  HTN, Hyperlipidemia, Stage I Er/KY+ (HER2 [-]) L Breast cancer, (Completed Aromatase inhibitor therapy), osteopenia ,  Obesity    Leaving 7/8/22 for [place] in [place].               History of Present Illness       Diabetes:   She presents for follow up of diabetes.  She is not checking blood glucose. She has no concerns regarding her diabetes at this time.  She is not experiencing numbness or burning in feet, excessive thirst, blurry vision, weight changes or redness, sores or blisters on feet.     She consumes 1 sweetened beverage(s) daily.She exercises with enough effort to increase her heart rate 9 or less minutes per day.  She exercises with enough effort to increase her heart rate 3 or less days  "per week.   She is taking medications regularly.             Review of Systems   Constitutional, HEENT, cardiovascular, pulmonary, gi and gu systems are negative, except as otherwise noted.      Objective    /82 (BP Location: Right arm, Patient Position: Sitting, Cuff Size: Adult Large)   Pulse 73   Temp 97.6  F (36.4  C) (Oral)   Ht 1.562 m (5' 1.5\")   Wt 98.9 kg (218 lb)   SpO2 94%   BMI 40.52 kg/m    There is no height or weight on file to calculate BMI.     Physical Exam   GENERAL: alert, no distress and obese  MS: no gross musculoskeletal defects noted, no edema  NEURO: Normal strength and tone, mentation intact and speech normal  PSYCH: mentation appears normal, affect normal/bright  Diabetic foot exam: normal DP and PT pulses, no trophic changes or ulcerative lesions and reduced sensation at scaling at bases of toes.  Pedal edema (this was improve at a time when she had lost 50#)     Results for orders placed or performed in visit on 05/26/22 (from the past 24 hour(s))   Hemoglobin A1c   Result Value Ref Range    Hemoglobin A1C 7.3 (H) 0.0 - 5.6 %   CBC with platelets   Result Value Ref Range    WBC Count 6.1 4.0 - 11.0 10e3/uL    RBC Count 5.20 3.80 - 5.20 10e6/uL    Hemoglobin 14.2 11.7 - 15.7 g/dL    Hematocrit 43.6 35.0 - 47.0 %    MCV 84 78 - 100 fL    MCH 27.3 26.5 - 33.0 pg    MCHC 32.6 31.5 - 36.5 g/dL    RDW 14.0 10.0 - 15.0 %    Platelet Count 198 150 - 450 10e3/uL               "

## 2022-05-27 NOTE — RESULT ENCOUNTER NOTE
Kenna Miranda    A1C at goal, blood count normal as discussed.  Work on some weight loss over these months, will help with the feet swelling.     It was great to see you today!!  Have a wonderful trip and stay healthy    Sincerely,       GEORGE DEL ANGEL M.D.

## 2022-07-24 ENCOUNTER — NURSE TRIAGE (OUTPATIENT)
Dept: NURSING | Facility: CLINIC | Age: 67
End: 2022-07-24

## 2022-07-24 NOTE — TELEPHONE ENCOUNTER
Triage Call:    Caller: Patient and     She tested positive for COVID.    Has nasal congestion, sore throat, chills, and coughing.  Symptoms started on Friday.    Got back from [place] 2 weeks ago from a cruise.    O2 levels was 97% at home, but does have a light nail polish on right now.       Monoclonal Antibody Administration    You may be eligible to receive a new treatment with a monoclonal antibody for preventing hospitalization in patients at high risk for complications from COVID-19. This medication is still experimental and available on a limited basis; it is given through an IV and must be given at an infusion center. Please note that not all people who are eligible will receive the medication since it is in limited supply.  Is the patient symptomatic and onset of symptoms within the last 7 days?  Yes  Is the patient interested in a visit with a provider to discuss treatment options?: Yes  Is the patient seen at [facility]?  No: Warm transfer caller to [phone] to be scheduled with a virtual urgent provider.  During transfer, instruct  on appropriate time frame for visit      After discussing visit, they verbalized they are at [employer] associated with [employer] and they are going to go there.  They were told they would bill their insurance.        Virtual Visit recommended disposition.  Patient and . verbalized understanding about recommendations and disposition.  Encouraged to call back with any further questions.      Marlen Rodas RN on 7/24/2022 at 11:08 AM                      Reason for Disposition    HIGH RISK for severe COVID complications (e.g., weak immune system, age > 64 years, obesity with BMI > 25, pregnant, chronic lung disease or other chronic medical condition)  (Exception: Already seen by PCP and no new or worsening symptoms.)    Additional Information    Negative: SEVERE difficulty breathing (e.g., struggling for each breath, speaks in  single words)    Negative: Difficult to awaken or acting confused (e.g., disoriented, slurred speech)    Negative: Bluish (or gray) lips or face now    Negative: Shock suspected (e.g., cold/pale/clammy skin, too weak to stand, low BP, rapid pulse)    Negative: Sounds like a life-threatening emergency to the triager    Negative: [1] Diagnosed or suspected COVID-19 AND [2] symptoms lasting 3 or more weeks    Negative: [1] COVID-19 exposure AND [2] no symptoms    Negative: COVID-19 vaccine reaction suspected (e.g., fever, headache, muscle aches) occurring 1 to 3 days after getting vaccine    Negative: COVID-19 vaccine, questions about    Negative: [1] Lives with someone known to have influenza (flu test positive) AND [2] flu-like symptoms (e.g., cough, runny nose, sore throat, SOB; with or without fever)    Negative: [1] Adult with possible COVID-19 symptoms AND [2] triager concerned about severity of symptoms or other causes    Negative: COVID-19 and breastfeeding, questions about    Negative: SEVERE or constant chest pain or pressure  (Exception: Mild central chest pain, present only when coughing.)    Negative: MODERATE difficulty breathing (e.g., speaks in phrases, SOB even at rest, pulse 100-120)    Negative: [1] Headache AND [2] stiff neck (can't touch chin to chest)    Negative: Oxygen level (e.g., pulse oximetry) 90 percent or lower    Negative: Chest pain or pressure    Negative: Patient sounds very sick or weak to the triager    Negative: MILD difficulty breathing (e.g., minimal/no SOB at rest, SOB with walking, pulse <100)    Negative: Fever > 103 F (39.4 C)    Negative: [1] Fever > 101 F (38.3 C) AND [2] age > 60 years    Negative: [1] Fever > 100.0 F (37.8 C) AND [2] bedridden (e.g., nursing home patient, CVA, chronic illness, recovering from surgery)    Protocols used: CORONAVIRUS (COVID-19) DIAGNOSED OR [id number] 1.18.2022

## 2022-10-21 ENCOUNTER — IMMUNIZATION (OUTPATIENT)
Dept: FAMILY MEDICINE | Facility: CLINIC | Age: 67
End: 2022-10-21
Payer: COMMERCIAL

## 2022-10-21 DIAGNOSIS — Z23 NEED FOR PROPHYLACTIC VACCINATION AND INOCULATION AGAINST INFLUENZA: Primary | ICD-10-CM

## 2022-10-21 PROCEDURE — G0008 ADMIN INFLUENZA VIRUS VAC: HCPCS

## 2022-10-21 PROCEDURE — 90662 IIV NO PRSV INCREASED AG IM: CPT

## 2022-10-21 PROCEDURE — 99207 PR NO CHARGE NURSE ONLY: CPT

## 2022-11-28 ENCOUNTER — IMMUNIZATION (OUTPATIENT)
Dept: FAMILY MEDICINE | Facility: CLINIC | Age: 67
End: 2022-11-28
Payer: COMMERCIAL

## 2022-11-28 DIAGNOSIS — Z23 HIGH PRIORITY FOR 2019-NCOV VACCINE: Primary | ICD-10-CM

## 2022-11-28 PROCEDURE — 91313 COVID-19 VACCINE BIVALENT BOOSTER 18+ (MODERNA): CPT

## 2022-11-28 PROCEDURE — 0134A COVID-19 VACCINE BIVALENT BOOSTER 18+ (MODERNA): CPT

## 2023-01-10 ENCOUNTER — TELEPHONE (OUTPATIENT)
Dept: INTERNAL MEDICINE | Facility: CLINIC | Age: 68
End: 2023-01-10

## 2023-01-10 DIAGNOSIS — E03.8 OTHER SPECIFIED HYPOTHYROIDISM: ICD-10-CM

## 2023-01-10 DIAGNOSIS — I10 ESSENTIAL HYPERTENSION WITH GOAL BLOOD PRESSURE LESS THAN 140/90: ICD-10-CM

## 2023-01-10 DIAGNOSIS — E78.5 HYPERLIPIDEMIA LDL GOAL <100: ICD-10-CM

## 2023-01-10 DIAGNOSIS — E11.29 TYPE 2 DIABETES MELLITUS WITH MICROALBUMINURIA, WITHOUT LONG-TERM CURRENT USE OF INSULIN (H): ICD-10-CM

## 2023-01-10 DIAGNOSIS — R80.9 TYPE 2 DIABETES MELLITUS WITH MICROALBUMINURIA, WITHOUT LONG-TERM CURRENT USE OF INSULIN (H): ICD-10-CM

## 2023-01-11 RX ORDER — LOSARTAN POTASSIUM 50 MG/1
50 TABLET ORAL DAILY
Qty: 90 TABLET | Refills: 3 | Status: SHIPPED | OUTPATIENT
Start: 2023-01-11 | End: 2023-10-24

## 2023-01-11 RX ORDER — LEVOTHYROXINE SODIUM 112 UG/1
112 TABLET ORAL DAILY
Qty: 90 TABLET | Refills: 3 | Status: SHIPPED | OUTPATIENT
Start: 2023-01-11 | End: 2023-10-24

## 2023-01-11 RX ORDER — ATORVASTATIN CALCIUM 20 MG/1
TABLET, FILM COATED ORAL
Qty: 24 TABLET | Refills: 3 | Status: SHIPPED | OUTPATIENT
Start: 2023-01-11 | End: 2023-10-24

## 2023-01-11 RX ORDER — SEMAGLUTIDE 1.34 MG/ML
INJECTION, SOLUTION SUBCUTANEOUS
Qty: 3 ML | Refills: 11 | Status: SHIPPED | OUTPATIENT
Start: 2023-01-11 | End: 2023-10-24

## 2023-01-11 NOTE — TELEPHONE ENCOUNTER
Call patient.   Refills are done.  However, overdue for several labs for ongoing medication surveillance  I have appt with her in May, which is fine.      Please assist her in fasting lab appt.  [facility] [place] is signed.       When I see her in May, she will be due for another HgbA1C by then.      Thank you!!!!

## 2023-01-18 NOTE — ANESTHESIA POSTPROCEDURE EVALUATION
PRIMARY DIAGNOSIS: PLACEMENT   OUTPATIENT/OBSERVATION GOALS TO BE MET BEFORE DISCHARGE:  1. ADLs back to baseline: No    2. Activity and level of assistance: Total care    3. Pain status: not showing not verbal signs of pain    4. Return to near baseline physical activity: No     Vitals are Temp: 99.3  F (37.4  C) Temp src: Axillary BP: 97/64 Pulse: 100   Resp: 16 SpO2: 95 %.  Patient is Disorientated x4. Pt is a total care.  Pt is a mechanical soft diet.  pt is not showing any nonverbal signs of being in pain.  Patient has no IV access. Family at bedside and helping. A list of pt's preference on food is on the board. Will cont to monitor and provide care.    Discharge Planner Nurse   Safe discharge environment identified: Yes  Barriers to discharge: Yes       Entered by: Irene Ashley RN 01/17/2023      Please review provider order for any additional goals.   Nurse to notify provider when observation goals have been met and patient is ready for discharge.                         Patient: Kenna Miranda    Procedure(s):  Cystoscopy, Right Ureteroscopy, Laser Lithotripsy, Right Stent Exchange  - Wound Class: II-Clean Contaminated    Diagnosis:Right Ureteral and Renal Calculi, Calculus Of Right Kidney and Ureter.   Diagnosis Additional Information: No value filed.    Anesthesia Type:  General, ETT    Note:  Anesthesia Post Evaluation    Patient location during evaluation: PACU  Patient participation: Able to fully participate in evaluation  Level of consciousness: awake and alert  Pain management: satisfactory to patient  Airway patency: patent  Cardiovascular status: blood pressure returned to baseline and hemodynamically stable  Respiratory status: room air  Hydration status: euvolemic  PONV: controlled     Anesthetic complications: None          Last vitals:  Vitals:    07/31/17 0901 07/31/17 1628 07/31/17 1645   BP: 137/80 (!) 123/94 157/82   Resp: 18 16 18   Temp: 36.4  C (97.6  F) 36.4  C (97.6  F)    SpO2: 98% 97% 95%         Electronically Signed By: Carlos Wen MD  July 31, 2017  5:01 PM

## 2023-01-25 DIAGNOSIS — I10 ESSENTIAL HYPERTENSION WITH GOAL BLOOD PRESSURE LESS THAN 140/90: ICD-10-CM

## 2023-01-26 RX ORDER — METOPROLOL TARTRATE 25 MG/1
25 TABLET, FILM COATED ORAL 2 TIMES DAILY
Qty: 180 TABLET | Refills: 1 | Status: SHIPPED | OUTPATIENT
Start: 2023-01-26 | End: 2023-07-26

## 2023-01-29 ENCOUNTER — NURSE TRIAGE (OUTPATIENT)
Dept: NURSING | Facility: CLINIC | Age: 68
End: 2023-01-29
Payer: COMMERCIAL

## 2023-01-29 DIAGNOSIS — U07.1 INFECTION DUE TO 2019 NOVEL CORONAVIRUS: Primary | ICD-10-CM

## 2023-01-29 NOTE — TELEPHONE ENCOUNTER
COVID Positive/Requesting COVID treatment    Patient is positive for COVID and requesting treatment options.    Date of positive COVID test (PCR or at home)? yes  Current COVID symptoms: fever or chills, cough, fatigue and congestion or runny nose   Mild SOB with activities  No chest pain   Date COVID symptoms began: yesterday   Patient has been in Paxlovid before and it has worked for her.  Message routed to clinic RN pool. Best phone number to use for call back: [phone]    [facility] Carolyne Mesa MD    Caller encouraged to continue to monitor symptoms, and to watch for worsening or not responding to measures taken. Call back nurse line with any concerns, line available 24/7.  Patient verbalized understanding and agrees with plan  Keke Dunlap RN [place] Nurse Advisor,  4:01 PM 1/29/2023

## 2023-01-30 NOTE — TELEPHONE ENCOUNTER
RN COVID TREATMENT VISIT  01/30/23    Kenna MANE Ruben  67 year old  Current weight? 210 lbs    Last time taken Paxlovid: A long time ago, not within the past two weeks.   Pt was triaged and does not require a higher level of care.    Has the patient been seen by a primary care provider at an [facility] or [facility] within the past two years? Yes.   Have you been in close proximity to/do you have a known exposure to a person with a confirmed case of influenza? No.     Date of positive COVID test (PCR or at home)?  01/29/2023    Current COVID symptoms: fever or chills, cough, shortness of breath or difficulty breathing, fatigue, sore throat and congestion or runny nose    Date COVID symptoms began: 01/28/2023    Do you have any of the following conditions that place you at risk of being very sick from COVID-19? 65 years or older, chronic kidney disease and diabetes    Is patient eligible to continue? Yes, established patient, 12 years or older weighing at least 88.2 lbs, who has COVID symptoms that started in the past 5 days and is at risk for being very sick from COVID-19.       Have you received monoclonal antibodies or oral antiviral medications since testing positive to COVID-19? No    Are you currently hospitalized for COVID-19? No    Do you have a history of hepatitis? No    Are you currently pregnant or nursing? No    Do you have a clinically significant hypersensitivity to nirmatrelvir, ritonavir, or molnupiravir? No    Do you have any history of severe renal impairment (eGFR < 30mL/min)? No    Do you have any history of hepatic impairment or abnormalities (e.g. hepatic panel, ALT, AST, ALK Phos, bilirubin)? No    Have you had a coronary stent placed in the previous 6 months? No    Is patient eligible to continue?   Yes, patient meets all eligibility requirements for the RN COVID treatment (as denoted by all no responses above).     Current Outpatient Medications   Medication  Sig Dispense Refill     ASPIRIN 81 MG OR TABS 1 TABLET DAILY       atorvastatin (LIPITOR) 20 MG tablet TAKE ONE TABLET BY MOUTH IN THE MORNING TWICE WEEKLY 24 tablet 3     levothyroxine (SYNTHROID/LEVOTHROID) 112 MCG tablet TAKE 1 TABLET (112 MCG) BY MOUTH DAILY 90 tablet 3     losartan (COZAAR) 50 MG tablet TAKE 1 TABLET (50 MG) BY MOUTH DAILY 90 tablet 3     metoprolol tartrate (LOPRESSOR) 25 MG tablet TAKE 1 TABLET (25 MG) BY MOUTH 2 TIMES DAILY 180 tablet 1     OZEMPIC, 0.25 OR 0.5 MG/DOSE, 2 MG/1.5ML SOPN pen INJECT 0.5 MG UNDER THE SKIN ONCE A WEEK 3 mL 11     Medications from List 1 of the standing order (on medications that exclude the use of Paxlovid) that patient is taking: NONE. Is patient taking Frank's Wort? No  Is patient taking Frank's Wort or any meds from List 1? No.   Medications from List 2 of the standing order (on meds that provider needs to adjust) that patient is taking: NONE. Is patient on any of the meds from List 2? No.   Medications from List 3 of standing order (on meds that a RN needs to adjust) that patient is taking: atorvastatin (Lipitor): Instructed patient to stop atorvastatin while taking Paxlovid and restart atorvastatin 1 day after the completion of Paxlovid.  Is patient on any meds from List 3? Yes. Patient is on meds from list 3. No meds require a provider visit and at least one med required RN to adjust.   In order of efficacy, Paxlovid has an approximate 90% reduction in hospitalization. Paxlovid can possibly cause altered sense of taste, diarrhea (loose, watery stools), high blood pressure, muscle aches.  The other option is molnupiravir which has an approximate 30% reduction in hospitalization. Molnupirarivr can possibly cause diarrhea (loose, watery stools), nausea (feeling sick to your stomach), dizziness, headaches.    Which treatment option does the patient prefer?   Paxlovid.   Lab Results   Component Value Date    GFRESTIMATED 45 (L) 12/06/2021       Was last  eGFR reduced? Yes, eGFR 30-59 and will require reduced renal function dose of Paxlovid. Prescription sent to [facility].       Temporary change to home medications: Instructed patient to stop atorvastatin while taking Paxlovid and restart atorvastatin 1 day after the completion of Paxlovid.    All medication adjustments (holds, etc) were discussed with the patient and patient was asked to repeat back (teachback) their med adjustment.  Did patient understand med adjustment? Yes, patient repeated back and understood correctly.        Reviewed the following instructions with the patient:    Paxlovid (nimatrelvir and ritonavir)    How it works  Two medicines (nirmatrelvir and ritonavir) are taken together. They stop the virus from growing. Less amount of virus is easier for your body to fight.    How to take    Medicine comes in a daily container with both medicine tablets. Take by mouth twice daily (once in the morning, once at night) for 5 days.    The number of tablets to take varies by patient.    Don't chew or break capsules. Swallow whole.    When to take  Take as soon as possible after positive COVID-19 test result, and within 5 days of your first symptoms.    Possible side effects  Can cause altered sense of taste, diarrhea (loose, watery stools), high blood pressure, muscle aches.    Martita Norman RN

## 2023-04-11 ENCOUNTER — MYC MEDICAL ADVICE (OUTPATIENT)
Dept: INTERNAL MEDICINE | Facility: CLINIC | Age: 68
End: 2023-04-11
Payer: COMMERCIAL

## 2023-04-11 DIAGNOSIS — Z78.0 ASYMPTOMATIC POSTMENOPAUSAL STATUS: Primary | ICD-10-CM

## 2023-04-23 ENCOUNTER — HEALTH MAINTENANCE LETTER (OUTPATIENT)
Age: 68
End: 2023-04-23

## 2023-04-24 ENCOUNTER — LAB (OUTPATIENT)
Dept: LAB | Facility: CLINIC | Age: 68
End: 2023-04-24
Payer: COMMERCIAL

## 2023-04-24 DIAGNOSIS — Z13.220 SCREENING FOR HYPERLIPIDEMIA: ICD-10-CM

## 2023-04-24 DIAGNOSIS — N18.30 CKD (CHRONIC KIDNEY DISEASE) STAGE 3, GFR 30-59 ML/MIN (H): Primary | ICD-10-CM

## 2023-04-24 DIAGNOSIS — R80.9 TYPE 2 DIABETES MELLITUS WITH MICROALBUMINURIA, WITHOUT LONG-TERM CURRENT USE OF INSULIN (H): ICD-10-CM

## 2023-04-24 DIAGNOSIS — E11.29 TYPE 2 DIABETES MELLITUS WITH MICROALBUMINURIA, WITHOUT LONG-TERM CURRENT USE OF INSULIN (H): ICD-10-CM

## 2023-04-24 DIAGNOSIS — E03.9 HYPOTHYROIDISM, UNSPECIFIED TYPE: ICD-10-CM

## 2023-04-24 LAB
ANION GAP SERPL CALCULATED.3IONS-SCNC: 13 MMOL/L (ref 7–15)
BUN SERPL-MCNC: 12.1 MG/DL (ref 8–23)
CALCIUM SERPL-MCNC: 10.3 MG/DL (ref 8.8–10.2)
CHLORIDE SERPL-SCNC: 103 MMOL/L (ref 98–107)
CHOLEST SERPL-MCNC: 153 MG/DL
CREAT SERPL-MCNC: 1.11 MG/DL (ref 0.51–0.95)
CREAT UR-MCNC: 123 MG/DL
DEPRECATED HCO3 PLAS-SCNC: 24 MMOL/L (ref 22–29)
GFR SERPL CREATININE-BSD FRML MDRD: 54 ML/MIN/1.73M2
GLUCOSE SERPL-MCNC: 134 MG/DL (ref 70–99)
HBA1C MFR BLD: 7.1 % (ref 0–5.6)
HDLC SERPL-MCNC: 50 MG/DL
HGB BLD-MCNC: 13.6 G/DL (ref 11.7–15.7)
LDLC SERPL CALC-MCNC: 69 MG/DL
MICROALBUMIN UR-MCNC: <12 MG/L
MICROALBUMIN/CREAT UR: NORMAL MG/G{CREAT}
NONHDLC SERPL-MCNC: 103 MG/DL
POTASSIUM SERPL-SCNC: 4.1 MMOL/L (ref 3.4–5.3)
SODIUM SERPL-SCNC: 140 MMOL/L (ref 136–145)
TRIGL SERPL-MCNC: 169 MG/DL
TSH SERPL DL<=0.005 MIU/L-ACNC: 0.52 UIU/ML (ref 0.3–4.2)

## 2023-04-24 PROCEDURE — 82570 ASSAY OF URINE CREATININE: CPT

## 2023-04-24 PROCEDURE — 36415 COLL VENOUS BLD VENIPUNCTURE: CPT

## 2023-04-24 PROCEDURE — 80048 BASIC METABOLIC PNL TOTAL CA: CPT

## 2023-04-24 PROCEDURE — 83036 HEMOGLOBIN GLYCOSYLATED A1C: CPT

## 2023-04-24 PROCEDURE — 84443 ASSAY THYROID STIM HORMONE: CPT

## 2023-04-24 PROCEDURE — 82043 UR ALBUMIN QUANTITATIVE: CPT

## 2023-04-24 PROCEDURE — 85018 HEMOGLOBIN: CPT

## 2023-04-24 PROCEDURE — 80061 LIPID PANEL: CPT

## 2023-04-24 NOTE — LETTER
April 25, 2023      Kenna Miranda  [address] [place] [address]        Dear Kenna:    We are writing to inform you of your test results.    Electrolytes and kidney function are stable.        Thyroid is normal (no dose change needed).       Blood count is normal.      Cholesterol and HgbA1C are at goal.     Resulted Orders   Hemoglobin   Result Value Ref Range    Hemoglobin 13.6 11.7 - 15.7 g/dL   Albumin Random Urine Quantitative with Creat Ratio   Result Value Ref Range    Creatinine Urine mg/dL 123.0 mg/dL      Comment:      The reference ranges have not been established in urine creatinine. The results should be integrated into the clinical context for interpretation.    Albumin Urine mg/L <12.0 mg/L      Comment:      The reference ranges have not been established in urine albumin. The results should be integrated into the clinical context for interpretation.    Albumin Urine mg/g Cr        Comment:      Unable to calculate, urine albumin and/or urine creatinine is outside detectable limits.  Microalbuminuria is defined as an albumin:creatinine ratio of 17 to 299 for males and 25 to 299 for females. A ratio of albumin:creatinine of 300 or higher is indicative of overt proteinuria.  Due to biologic variability, positive results should be confirmed by a second, first-morning random or 24-hour timed urine specimen. If there is discrepancy, a third specimen is recommended. When 2 out of 3 results are in the microalbuminuria range, this is evidence for incipient nephropathy and warrants increased efforts at glucose control, blood pressure control, and institution of therapy with an angiotensin-converting-enzyme (ACE) inhibitor (if the patient can tolerate it).     TSH WITH FREE T4 REFLEX   Result Value Ref Range    TSH 0.52 0.30 - 4.20 uIU/mL   Lipid panel reflex to direct LDL Non-fasting   Result Value Ref Range    Cholesterol 153 <200 mg/dL    Triglycerides 169 (H) <150 mg/dL    Direct Measure HDL  50 >=50 mg/dL    LDL Cholesterol Calculated 69 <=100 mg/dL    Non HDL Cholesterol 103 <130 mg/dL    Narrative    Cholesterol  Desirable:  <200 mg/dL    Triglycerides  Normal:  Less than 150 mg/dL  Borderline High:  150-199 mg/dL  High:  200-499 mg/dL  Very High:  Greater than or equal to 500 mg/dL    Direct Measure HDL  Female:  Greater than or equal to 50 mg/dL   Male:  Greater than or equal to 40 mg/dL    LDL Cholesterol  Desirable:  <100mg/dL  Above Desirable:  100-129 mg/dL   Borderline High:  130-159 mg/dL   High:  160-189 mg/dL   Very High:  >= 190 mg/dL    Non HDL Cholesterol  Desirable:  130 mg/dL  Above Desirable:  130-159 mg/dL  Borderline High:  160-189 mg/dL  High:  190-219 mg/dL  Very High:  Greater than or equal to 220 mg/dL   BASIC METABOLIC PANEL   Result Value Ref Range    Sodium 140 136 - 145 mmol/L    Potassium 4.1 3.4 - 5.3 mmol/L    Chloride 103 98 - 107 mmol/L    Carbon Dioxide (CO2) 24 22 - 29 mmol/L    Anion Gap 13 7 - 15 mmol/L    Urea Nitrogen 12.1 8.0 - 23.0 mg/dL    Creatinine 1.11 (H) 0.51 - 0.95 mg/dL    Calcium 10.3 (H) 8.8 - 10.2 mg/dL    Glucose 134 (H) 70 - 99 mg/dL    GFR Estimate 54 (L) >60 mL/min/1.73m2      Comment:      eGFR calculated using 2021 CKD-EPI equation.   HEMOGLOBIN A1C   Result Value Ref Range    Hemoglobin A1C 7.1 (H) 0.0 - 5.6 %      Comment:      Normal <5.7%   Prediabetes 5.7-6.4%    Diabetes 6.5% or higher     Note: Adopted from ADA consensus guidelines.     If you have any questions or concerns, please call the clinic at the number listed above.     Sincerely,      Carolyne Layne MD/el

## 2023-04-25 NOTE — RESULT ENCOUNTER NOTE
Kenna Miranda    Electrolytes and kidney function are stable.    Thyroid is normal (no dose change needed).   Blood count is normal.    Cholesterol and HgbA1C are at goal.     Sincerely,       GEORGE DEL ANGEL M.D.

## 2023-05-01 ASSESSMENT — ENCOUNTER SYMPTOMS
PALPITATIONS: 0
DIARRHEA: 0
SHORTNESS OF BREATH: 0
CHILLS: 0
EYE PAIN: 0
NERVOUS/ANXIOUS: 0
MYALGIAS: 0
BREAST MASS: 0
DIZZINESS: 0
WEAKNESS: 0
HEADACHES: 0
COUGH: 1
NAUSEA: 0
ABDOMINAL PAIN: 0
FREQUENCY: 0
JOINT SWELLING: 0
DYSURIA: 0
SORE THROAT: 0
CONSTIPATION: 0
HEMATURIA: 0
HEMATOCHEZIA: 0
FEVER: 0
HEARTBURN: 0
PARESTHESIAS: 0
ARTHRALGIAS: 0

## 2023-05-01 ASSESSMENT — ACTIVITIES OF DAILY LIVING (ADL): CURRENT_FUNCTION: NO ASSISTANCE NEEDED

## 2023-05-03 ENCOUNTER — ANCILLARY PROCEDURE (OUTPATIENT)
Dept: BONE DENSITY | Facility: CLINIC | Age: 68
End: 2023-05-03
Attending: INTERNAL MEDICINE
Payer: COMMERCIAL

## 2023-05-03 DIAGNOSIS — Z78.0 ASYMPTOMATIC POSTMENOPAUSAL STATUS: ICD-10-CM

## 2023-05-03 PROBLEM — Z85.3 HX: BREAST CANCER: Status: ACTIVE | Noted: 2023-05-03

## 2023-05-03 PROBLEM — E66.813 CLASS 3 SEVERE OBESITY DUE TO EXCESS CALORIES WITH SERIOUS COMORBIDITY AND BODY MASS INDEX (BMI) OF 40.0 TO 44.9 IN ADULT (H): Status: ACTIVE | Noted: 2018-09-16

## 2023-05-03 PROBLEM — E66.01 CLASS 3 SEVERE OBESITY DUE TO EXCESS CALORIES WITH SERIOUS COMORBIDITY AND BODY MASS INDEX (BMI) OF 40.0 TO 44.9 IN ADULT (H): Status: ACTIVE | Noted: 2018-09-16

## 2023-05-03 PROCEDURE — 77080 DXA BONE DENSITY AXIAL: CPT | Performed by: INTERNAL MEDICINE

## 2023-05-03 NOTE — LETTER
May 8, 2023    Kenna Andersonzachary  [address] [place] [address]          Dear ,    We are writing to inform you of your test results.  As discussed, bone density shows that there has been no significant change in bone density of the lumbar spine nor hips.     Continue current management   Recheck bone density in 5 years ()      Resulted Orders   DX Hip/Pelvis/Spine    Narrative    BONE DENSITOMETRY  [place] [id number] [facility]  [place] [address]  5/3/2023      PATIENT: Kenna Miranda  CHART: [id number]   :  1955  AGE:  67 year old  SEX:  female   REFERRING PROVIDER:  Carolyne Layne MD     PROCEDURE:  Bone density scanning was performed using DXA technology of   the lumbar spine and hip.  Scanning was performed on a Lunar Prodigy   scanner.  Reporting is completed in the form of a T-score.  The T-score   represents the standard deviation from peak bone mass based on a young   healthy adult.     REFERENCE T-SCORES:       Normal                -1.0 and greater                                 Osteopenia         Between -1.0 and -2.5                                             Osteoporosis     -2.5 and less                                       RISK FACTORS:  Post-menopausal, follow up Low Bone Density (osteopenia)    CURRENT TREATMENT:  None listed     FINDINGS:               Lumbar Spine (L1-L4)      T-score:  -0.1, degenerative   changes present               Left Femoral Neck            T-score:  -1.7               Right Femoral Neck          T-score:  -1.5                            Lumbar (L1-L4) BMD: 1.164  Previous: 1.125                          Total Hip Mean BMD: 0.979  Previous: 1.000     Comparison is made to another DXA performed on the same Lunar Prodigy    machine on 10/2/2017.      IMPRESSION  Low Bone Density (osteopenia).  Degenerative changes at the lumbar spine   may falsely elevate results.     There has been no significant  "change in bone density of the lumbar spine.   There has been no significant change in bone density of the hip(s).    Recommendations include ensuring adequate Calcium and Vitamin D.    The current NOF Guidelines recommend treatment for patients with prior hip   or vertebral fracture, T-score -2.5 or below, or 10 year risk of any major   osteoporotic fracture 20% or greater, or 10 year risk of hip fracture 3%   or greater as calculated using the FRAX calculator (www.shef.ac.uk/FRAX or   you can google \"FRAX\").    This patient's risks based on available information, with the use of FRAX,   are 9.3 % for major osteoporotic fracture and 1.3 % for hip fracture.   Based on these guidelines, treatment (in addition to calcium and vitamin   D) is not recommended for this patient, after ruling out other causes of   osteoporosis.  This is meant as an aid to clinical decision making; one must still use   clinical judgement.    Follow up can be considered in 5 years.   ___________________  Trudi Tom M.D.  Electronically signed          If you have any questions or concerns, please call the clinic at the number listed above.       Sincerely,      Carolyne Layne MD            "

## 2023-05-05 ENCOUNTER — ANCILLARY PROCEDURE (OUTPATIENT)
Dept: GENERAL RADIOLOGY | Facility: CLINIC | Age: 68
End: 2023-05-05
Attending: INTERNAL MEDICINE
Payer: COMMERCIAL

## 2023-05-05 ENCOUNTER — OFFICE VISIT (OUTPATIENT)
Dept: INTERNAL MEDICINE | Facility: CLINIC | Age: 68
End: 2023-05-05
Payer: COMMERCIAL

## 2023-05-05 VITALS
WEIGHT: 210 LBS | OXYGEN SATURATION: 97 % | TEMPERATURE: 97.4 F | HEART RATE: 75 BPM | BODY MASS INDEX: 38.64 KG/M2 | DIASTOLIC BLOOD PRESSURE: 70 MMHG | HEIGHT: 62 IN | SYSTOLIC BLOOD PRESSURE: 132 MMHG

## 2023-05-05 DIAGNOSIS — R05.9 COUGH, UNSPECIFIED TYPE: ICD-10-CM

## 2023-05-05 DIAGNOSIS — E66.813 CLASS 3 SEVERE OBESITY DUE TO EXCESS CALORIES WITH SERIOUS COMORBIDITY AND BODY MASS INDEX (BMI) OF 40.0 TO 44.9 IN ADULT (H): ICD-10-CM

## 2023-05-05 DIAGNOSIS — E11.29 TYPE 2 DIABETES MELLITUS WITH MICROALBUMINURIA, WITHOUT LONG-TERM CURRENT USE OF INSULIN (H): ICD-10-CM

## 2023-05-05 DIAGNOSIS — R80.9 TYPE 2 DIABETES MELLITUS WITH MICROALBUMINURIA, WITHOUT LONG-TERM CURRENT USE OF INSULIN (H): ICD-10-CM

## 2023-05-05 DIAGNOSIS — E03.8 OTHER SPECIFIED HYPOTHYROIDISM: ICD-10-CM

## 2023-05-05 DIAGNOSIS — Z92.21 HX OF AROMATASE INHIBITOR THERAPY: ICD-10-CM

## 2023-05-05 DIAGNOSIS — Z23 NEED FOR PNEUMOCOCCAL VACCINATION: ICD-10-CM

## 2023-05-05 DIAGNOSIS — I10 ESSENTIAL HYPERTENSION WITH GOAL BLOOD PRESSURE LESS THAN 140/90: ICD-10-CM

## 2023-05-05 DIAGNOSIS — E78.5 HYPERLIPIDEMIA LDL GOAL <100: ICD-10-CM

## 2023-05-05 DIAGNOSIS — R09.82 POSTNASAL DRIP: ICD-10-CM

## 2023-05-05 DIAGNOSIS — Z00.01 ENCOUNTER FOR GENERAL ADULT MEDICAL EXAMINATION WITH ABNORMAL FINDINGS: Primary | ICD-10-CM

## 2023-05-05 DIAGNOSIS — E66.01 CLASS 3 SEVERE OBESITY DUE TO EXCESS CALORIES WITH SERIOUS COMORBIDITY AND BODY MASS INDEX (BMI) OF 40.0 TO 44.9 IN ADULT (H): ICD-10-CM

## 2023-05-05 DIAGNOSIS — Z85.3 HX: BREAST CANCER: ICD-10-CM

## 2023-05-05 PROCEDURE — 71046 X-RAY EXAM CHEST 2 VIEWS: CPT | Mod: TC | Performed by: RADIOLOGY

## 2023-05-05 PROCEDURE — 90677 PCV20 VACCINE IM: CPT | Performed by: INTERNAL MEDICINE

## 2023-05-05 PROCEDURE — G0009 ADMIN PNEUMOCOCCAL VACCINE: HCPCS | Performed by: INTERNAL MEDICINE

## 2023-05-05 PROCEDURE — G0439 PPPS, SUBSEQ VISIT: HCPCS | Performed by: INTERNAL MEDICINE

## 2023-05-05 PROCEDURE — 99214 OFFICE O/P EST MOD 30 MIN: CPT | Mod: 25 | Performed by: INTERNAL MEDICINE

## 2023-05-05 RX ORDER — FLUTICASONE PROPIONATE 50 MCG
1 SPRAY, SUSPENSION (ML) NASAL DAILY
Qty: 16 G | Refills: 11 | Status: SHIPPED | OUTPATIENT
Start: 2023-05-05 | End: 2024-05-06

## 2023-05-05 RX ORDER — LORATADINE 10 MG/1
10 TABLET ORAL DAILY
COMMUNITY

## 2023-05-05 ASSESSMENT — ENCOUNTER SYMPTOMS
EYE PAIN: 0
NAUSEA: 0
HEADACHES: 0
DIZZINESS: 0
WEAKNESS: 0
SHORTNESS OF BREATH: 0
DYSURIA: 0
HEARTBURN: 0
HEMATURIA: 0
FEVER: 0
BREAST MASS: 0
NERVOUS/ANXIOUS: 0
PALPITATIONS: 0
HEMATOCHEZIA: 0
SORE THROAT: 0
MYALGIAS: 0
CONSTIPATION: 0
JOINT SWELLING: 0
CHILLS: 0
ARTHRALGIAS: 0
DIARRHEA: 0
PARESTHESIAS: 0
ABDOMINAL PAIN: 0
FREQUENCY: 0
COUGH: 1

## 2023-05-05 ASSESSMENT — ACTIVITIES OF DAILY LIVING (ADL): CURRENT_FUNCTION: NO ASSISTANCE NEEDED

## 2023-05-05 NOTE — PROGRESS NOTES
"SUBJECTIVE:   Kenna is a 67 year old who presents for Preventive Visit.    68 y/o F here for AFE.      H/o  DMII (can't tolerate metformin--due to GI, ... on Trulicity [semaglutide]),  HTN, Hyperlipidemia, Stage I Er/DC+ (HER2 [-]) L Breast cancer, (Completed Aromatase inhibitor therapy), osteopenia ,  Obesity    >>Yesterday, Dexa showed osteopenia.  BMD probably stable.   No report generated yet.      >> Recent labs look great, HgbA1C 7.1    >> Chronic cough since covid in Falltime.    No wheezing.  No h/o asthma. Some mild GERD.  No new exposures.   Dry.  Sometimes brought on by talking on phone.  Can wake her up at night.   She might have post nasal drips symptoms.     >>Dry mouth in morning.  Mouth breather.        Stressor:  Daughter is suffering with terrible migraines.                  View : No data to display.            Patient has been advised of split billing requirements and indicates understanding: Yes  Are you in the first 12 months of your Medicare coverage?  No    Healthy Habits:     In general, how would you rate your overall health?  Good    Frequency of exercise:  1 day/week    Duration of exercise:  Less than 15 minutes    Do you usually eat at least 4 servings of fruit and vegetables a day, include whole grains    & fiber and avoid regularly eating high fat or \"junk\" foods?  No    Taking medications regularly:  Yes    Medication side effects:  None    Ability to successfully perform activities of daily living:  No assistance needed    Home Safety:  No safety concerns identified    Hearing Impairment:  Feel that people are mumbling or not speaking clearly    In the past 6 months, have you been bothered by leaking of urine?  No    In general, how would you rate your overall mental or emotional health?  Excellent      PHQ-2 Total Score: 0    Additional concerns today:  No      Have you ever done Advance Care Planning? (For example, a Health Directive, POLST, or a discussion with a medical provider " or your loved ones about your wishes): Yes, patient states has an Advance Care Planning document and will bring a copy to the clinic.       Fall risk  Fallen 2 or more times in the past year?: No  Any fall with injury in the past year?: No    Cognitive Screening   1) Repeat 3 items (Leader, Season, Table)    2) Clock draw: NORMAL  3) 3 item recall: Recalls 3 objects  Results: 3 items recalled: COGNITIVE IMPAIRMENT LESS LIKELY    Mini-CogTM Copyright DEWAYNE Plummer. Licensed by the author for use in [facility]; reprinted with permission (kobi@[facility]). All rights reserved.      Do you have sleep apnea, excessive snoring or daytime drowsiness?: daytime drowsiness    Reviewed and updated as needed this visit by clinical staff                  Reviewed and updated as needed this visit by Provider                 Social History     Tobacco Use     Smoking status: Never     Smokeless tobacco: Never     Tobacco comments:     no second hand exposure.  when young Dad smoked.   Vaping Use     Vaping status: Not on file   Substance Use Topics     Alcohol use: Not Currently     Comment: very, very little             5/1/2023    10:24 AM   Alcohol Use   Prescreen: >3 drinks/day or >7 drinks/week? No     Do you have a current opioid prescription? No  Do you use any other controlled substances or medications that are not prescribed by a provider? None           Discuss a cough x 2 months     Current providers sharing in care for this patient include:   Patient Care Team:  Carolyne Layne MD as PCP - General (Internal Medicine)  Ruth Boston MD as MD (Urology)  Karen Salvador, RN as Registered Nurse  Ani Simon RN as Diabetes Educator (Diabetes Education)  Fracisco Lopes OD as Assigned Surgical Provider  Carolyne Layne MD as Assigned PCP    The following health maintenance items are reviewed in Epic and correct as of today:  Health Maintenance   Topic Date Due     Pneumococcal Vaccine: 65+  Years (3 - PPSV23 if available, else PCV20) 11/12/2020     MEDICARE ANNUAL WELLNESS VISIT  12/10/2022     EYE EXAM  03/30/2023     DIABETIC FOOT EXAM  05/26/2023     A1C  10/24/2023     COLORECTAL CANCER SCREENING  12/14/2023     ANNUAL REVIEW OF HM ORDERS  01/11/2024     BMP  04/24/2024     LIPID  04/24/2024     MICROALBUMIN  04/24/2024     TSH W/FREE T4 REFLEX  04/24/2024     HEMOGLOBIN  04/24/2024     FALL RISK ASSESSMENT  05/05/2024     MAMMO SCREENING  05/13/2024     ADVANCE CARE PLANNING  12/13/2026     DTAP/TDAP/TD IMMUNIZATION (3 - Td or Tdap) 12/10/2031     DEXA  10/02/2032     HEPATITIS C SCREENING  Completed     PHQ-2 (once per calendar year)  Completed     INFLUENZA VACCINE  Completed     URINALYSIS  Completed     ZOSTER IMMUNIZATION  Completed     COVID-19 Vaccine  Completed     IPV IMMUNIZATION  Aged Out     MENINGITIS IMMUNIZATION  Aged Out     Labs reviewed in EPIC  BP Readings from Last 3 Encounters:   05/05/23 132/70   05/26/22 138/82   12/10/21 134/82    Wt Readings from Last 3 Encounters:   05/05/23 95.3 kg (210 lb)   05/26/22 98.9 kg (218 lb)   12/10/21 98.4 kg (217 lb)                  Patient Active Problem List   Diagnosis     Borderline osteopenia     HYPERLIPIDEMIA LDL GOAL <100     Family history of colon cancer     S/P colonoscopic polypectomy     Cataract of both eyes     Elevated LFTs     Dermatochalasis of eyelid     No diabetic retinopathy in both eyes     Tibialis posterior tendinitis, left     Essential hypertension with goal blood pressure less than 140/90     Hypothyroidism, unspecified type     Type 2 diabetes mellitus with microalbuminuria, without long-term current use of insulin (H)     Class 3 severe obesity due to excess calories with serious comorbidity and body mass index (BMI) of 40.0 to 44.9 in adult (H)     CKD (chronic kidney disease) stage 3, GFR 30-59 ml/min (H)     Hx of aromatase inhibitor therapy     Benign neoplasm of transverse colon     Constipation      Diverticular disease of colon     History of colonic polyps     Polyp of colon     HX: breast cancer     Past Surgical History:   Procedure Laterality Date     BIOPSY  2015     BIOPSY BREAST NEEDLE LOCALIZATION, BIOPSY NODE SENTINEL, COMBINED  2012    Procedure: COMBINED BIOPSY BREAST NEEDLE LOCALIZATION, BIOPSY NODE SENTINEL;  Left Breast Wire Locilized Lumpectomy and Sentinal Lymph Node Biopsy;  Surgeon: Jesus Vicente MD;  Location: [facility] OR     COLONOSCOPY  10-26-11    Return in 1 yr for Polyp Surveillance     COLONOSCOPY  12    Return for Colonoscopy in 3 yrs.Polyps     COMBINED CYSTOSCOPY, INSERT STENT URETER(S) Right 2017    Procedure: COMBINED CYSTOSCOPY, INSERT STENT URETER(S);  Cystoscopy Right retrograde pyelogram, Right ureteral Stent Placement;  Surgeon: Loida Sánchez MD;  Location: [facility] OR     COMBINED CYSTOSCOPY, RETROGRADES, URETEROSCOPY, LASER HOLMIUM LITHOTRIPSY URETER(S), INSERT STENT Right 2017    Procedure: COMBINED CYSTOSCOPY, RETROGRADES, URETEROSCOPY, LASER HOLMIUM LITHOTRIPSY URETER(S), INSERT STENT;  Cystoscopy, Right Ureteroscopy, Laser Lithotripsy, Right Stent Exchange ;  Surgeon: Ruth Boston MD;  Location: [facility] OR     LITHOTRIPSY       SURGICAL HISTORY OF -       exploratory laparotomy     SURGICAL HISTORY OF -       Right ovary removed     SURGICAL HISTORY OF -   2008    breast biopsy     ZZC  DELIVERY ONLY      x 2       Social History     Tobacco Use     Smoking status: Never     Smokeless tobacco: Never     Tobacco comments:     no second hand exposure.  when young Dad smoked.   Vaping Use     Vaping status: Not on file   Substance Use Topics     Alcohol use: Not Currently     Comment: very, very little     Family History   Problem Relation Age of Onset     Cancer - colorectal Mother      Thyroid Disease Mother      Colon Cancer Mother      Cancer Father         Lung     Lipids Father      Macular Degeneration Father      Depression  Brother      Respiratory Brother         losing hearing in 50's         Current Outpatient Medications   Medication Sig Dispense Refill     ASPIRIN 81 MG OR TABS 1 TABLET DAILY       atorvastatin (LIPITOR) 20 MG tablet TAKE ONE TABLET BY MOUTH IN THE MORNING TWICE WEEKLY 24 tablet 3     fluticasone (FLONASE) 50 MCG/ACT nasal spray Spray 1 spray into both nostrils daily 16 g 11     levothyroxine (SYNTHROID/LEVOTHROID) 112 MCG tablet TAKE 1 TABLET (112 MCG) BY MOUTH DAILY 90 tablet 3     loratadine (CLARITIN) 10 MG tablet Take 10 mg by mouth daily       losartan (COZAAR) 50 MG tablet TAKE 1 TABLET (50 MG) BY MOUTH DAILY 90 tablet 3     metoprolol tartrate (LOPRESSOR) 25 MG tablet TAKE 1 TABLET (25 MG) BY MOUTH 2 TIMES DAILY 180 tablet 1     OZEMPIC, 0.25 OR 0.5 MG/DOSE, 2 MG/1.5ML SOPN pen INJECT 0.5 MG UNDER THE SKIN ONCE A WEEK 3 mL 11     Allergies   Allergen Reactions     Metformin GI Disturbance     Nkda [No Known Drug Allergy]      Recent Labs   Lab Test 04/24/23  0920 05/26/22  1714 12/06/21  0712 11/10/20  1138 09/17/19  0756 07/05/17  0929 07/02/17  0513 07/01/17  1655 07/01/17  0658 06/30/17  1410 06/30/17  0544   A1C 7.1* 7.3* 7.7* 6.7* 7.1*   < >  --   --   --   --   --    LDL 69  --  66 86 65   < >  --   --   --   --   --    HDL 50  --  46* 48* 38*   < >  --   --   --   --   --    TRIG 169*  --  184* 193* 177*   < >  --   --   --   --   --    ALT  --   --   --   --   --   --  27  --  54*  --  74*   CR 1.11*  --  1.24* 1.10* 1.12*   < > 2.28*  --  2.53*   < > 2.86*   GFRESTIMATED 54*  --  45* 52* 51*   < > 22*  --  19*   < > 17*   GFRESTBLACK  --   --   --  60* 59*   < > 26*  --  23*   < > 20*   POTASSIUM 4.1  --  4.6 4.2 4.0   < > 3.0*   < > 3.1*   < > 3.0*   TSH 0.52  --  4.65* 0.14* 1.66   < >  --   --   --   --   --     < > = values in this interval not displayed.           FHS-7:       5/2/2022    10:09 AM   Breast CA Risk Assessment (FHS-7)   Did any of your first-degree relatives have breast or  "ovarian cancer? No   Did any of your relatives have bilateral breast cancer? No   Did any man in your family have breast cancer? No   Did any woman in your family have breast and ovarian cancer? No   Did any woman in your family have breast cancer before age 50 y? No   Do you have 2 or more relatives with breast and/or ovarian cancer? No   Do you have 2 or more relatives with breast and/or bowel cancer? No     click delete button to remove this line now  Mammogram Screening: Recommended mammography every 1-2 years with patient discussion and risk factor consideration  Pertinent mammograms are reviewed under the imaging tab.    Review of Systems   Constitutional: Negative for chills and fever.   HENT: Positive for congestion. Negative for ear pain, hearing loss and sore throat.    Eyes: Negative for pain and visual disturbance.   Respiratory: Positive for cough. Negative for shortness of breath.    Cardiovascular: Negative for chest pain, palpitations and peripheral edema.   Gastrointestinal: Negative for abdominal pain, constipation, diarrhea, heartburn, hematochezia and nausea.   Breasts:  Negative for tenderness, breast mass and discharge.   Genitourinary: Negative for dysuria, frequency, genital sores, hematuria, pelvic pain, urgency, vaginal bleeding and vaginal discharge.   Musculoskeletal: Negative for arthralgias, joint swelling and myalgias.   Skin: Negative for rash.   Neurological: Negative for dizziness, weakness, headaches and paresthesias.   Psychiatric/Behavioral: Negative for mood changes. The patient is not nervous/anxious.           OBJECTIVE:   BP (!) 144/75 (BP Location: Right arm, Patient Position: Sitting, Cuff Size: Adult Large)   Pulse 75   Temp 97.4  F (36.3  C) (Temporal)   Ht 1.562 m (5' 1.5\")   Wt 95.3 kg (210 lb)   SpO2 97%   BMI 39.04 kg/m   Estimated body mass index is 40.52 kg/m  as calculated from the following:    Height as of 5/26/22: 1.562 m (5' 1.5\").    Weight as of " 5/26/22: 98.9 kg (218 lb).  Physical Exam  GENERAL: healthy, alert and no distress  EYES: Eyes grossly normal to inspection, PERRL and conjunctivae and sclerae normal  HENT: ear canals and TM's normal, nose and mouth without ulcers or lesions  NECK: no adenopathy, no asymmetry, masses, or scars and thyroid normal to palpation  RESP: lungs clear to auscultation - no rales, rhonchi or wheezes  BREAST: normal without masses, tenderness or nipple discharge and no palpable axillary masses or adenopathy  BREAST: normal without masses, tenderness or nipple discharge and no palpable axillary masses or adenopathy  Lumpectomy site at L breast, Upper outer quadrant/axillary tail area.    CV: regular rate and rhythm, normal S1 S2, no S3 or S4, no murmur, click or rub, no peripheral edema and peripheral pulses strong  ABDOMEN: soft, nontender, no hepatosplenomegaly, no masses and bowel sounds normal   (female)defer  MS: no gross musculoskeletal defects noted, no edema  SKIN: no suspicious lesions or rashes  NEURO: Normal strength and tone, mentation intact and speech normal  PSYCH: mentation appears normal, affect normal/bright  Diabetic foot exam: normal DP and PT pulses, no trophic changes or ulcerative lesions and normal sensory exam    Diagnostic Test Results:  Labs reviewed in Epic  Results for orders placed or performed in visit on 05/05/23   XR Chest 2 Views     Status: None    Narrative    XR CHEST 2 VIEWS 5/5/2023 12:42 PM    HISTORY: Cough, unspecified type    COMPARISON: 6/29/2017      Impression    IMPRESSION: No infiltrate, pleural effusion or pneumothorax. The  cardiac and mediastinal silhouettes are normal.    NICK KENNEY MD         SYSTEM ID:  [id number]     Labs reviewed, done last week     ASSESSMENT / PLAN:   (Z00.01) Encounter for general adult medical examination with abnormal findings  (primary encounter diagnosis)  Comment:  Chronic conditions are managed.    Plan:      (E11.29,  R80.9) Type 2 diabetes  "mellitus with microalbuminuria, without long-term current use of insulin (H)  Comment:  She is unable to get weight below 200.  Might be slowly going down with semaglutide, which she is tolerating well   Plan: OFFICE/OUTPT VISIT,EST,LEVL III  (E66.01,  Z68.41) Class 3 severe obesity due to excess calories with serious comorbidity and body mass index (BMI) of 40.0 to 44.9 in adult (H)  Comment:  See above   Plan: OFFICE/OUTPT VISIT,EST,LEVL III             (E78.5) Hyperlipidemia LDL goal <100  Comment:  Continue current management   Plan:      (I10) Essential hypertension with goal blood pressure less than 140/90  Comment:  Continue current management    Plan:      (E03.8) Other specified hypothyroidism  Comment:  Euthyroid   Plan:      (Z92.21) Hx of aromatase inhibitor therapy  Comment:  DEXA stable   Plan:      (Z85.3) HX: breast cancer  Comment:  No recurrence signs.    Plan:      (R05.9) Cough, unspecified type  Comment:  cxr negative.   Trial PND rx (flonase)  Consider holding losartan, see if improves (seems unlikely, but possible)   Plan: fluticasone (FLONASE) 50 MCG/ACT nasal spray,         XR Chest 2 Views, OFFICE/OUTPT VISIT,EST,LEVL         III  (R09.82) Postnasal drip  Comment:  See above.   Plan: fluticasone (FLONASE) 50 MCG/ACT nasal spray,         OFFICE/OUTPT VISIT,EST,LEVL III            (Z23) Need for pneumococcal vaccination  Comment: agreeable   Plan: Pneumococcal 20 Valent Conjugate (PCV20)                     COUNSELING:  Reviewed preventive health counseling, as reflected in patient instructions       Immunizations    Vaccinated for: Pneumococcal            BMI:   Estimated body mass index is 40.52 kg/m  as calculated from the following:    Height as of 5/26/22: 1.562 m (5' 1.5\").    Weight as of 5/26/22: 98.9 kg (218 lb).   Weight management plan: DM diet.  semaglutide.       She reports that she has never smoked. She has never used smokeless tobacco.      Appropriate preventive services were " discussed with this patient, including applicable screening as appropriate for cardiovascular disease, diabetes, osteopenia/osteoporosis, and glaucoma.  As appropriate for age/gender, discussed screening for colorectal cancer, prostate cancer, breast cancer, and cervical cancer. Checklist reviewing preventive services available has been given to the patient.    Reviewed patients plan of care and provided an AVS. The Basic Care Plan (routine screening as documented in Health Maintenance) for Kenna meets the Care Plan requirement. This Care Plan has been established and reviewed with the Patient.          Carolyne Layne MD  [facility]    Identified Health Risks:

## 2023-05-05 NOTE — NURSING NOTE
Prior to immunization administration, verified patients identity using patient s name and date of birth. Please see Immunization Activity for additional information.     Screening Questionnaire for Adult Immunization    Are you sick today?   No   Do you have allergies to medications, food, a vaccine component or latex?   No   Have you ever had a serious reaction after receiving a vaccination?   No   Do you have a long-term health problem with heart, lung, kidney, or metabolic disease (e.g., diabetes), asthma, a blood disorder, no spleen, complement component deficiency, a cochlear implant, or a spinal fluid leak?  Are you on long-term aspirin therapy?   Yes   Do you have cancer, leukemia, HIV/AIDS, or any other immune system problem?   No   Do you have a parent, brother, or sister with an immune system problem?   No   In the past 3 months, have you taken medications that affect  your immune system, such as prednisone, other steroids, or anticancer drugs; drugs for the treatment of rheumatoid arthritis, Crohn s disease, or psoriasis; or have you had radiation treatments?   No   Have you had a seizure, or a brain or other nervous system problem?   No   During the past year, have you received a transfusion of blood or blood    products, or been given immune (gamma) globulin or antiviral drug?   No   For women: Are you pregnant or is there a chance you could become       pregnant during the next month?   No   Have you received any vaccinations in the past 4 weeks?   No     Immunization questionnaire was positive for at least one answer.  Notified Dr. Layne.      Injection of prevnar 20 given by Maryann Palencia CMA. Patient instructed to remain in clinic for 15 minutes afterwards, and to report any adverse reactions.     Screening performed by Maryann Palencia CMA on 5/5/2023 at 12:28 PM.

## 2023-05-05 NOTE — PATIENT INSTRUCTIONS
Mammogram scheduled 5/15/22 at 10 am      Trial fluticasone nasal spray (one spray each side) for postnasal drip/cough    Consider trial OFF losartan for a week or two:  have an opinion whether this helps your cough.     Return to clinic Fall 2023 for diabetes.

## 2023-05-08 ENCOUNTER — OFFICE VISIT (OUTPATIENT)
Dept: OPTOMETRY | Facility: CLINIC | Age: 68
End: 2023-05-08
Payer: COMMERCIAL

## 2023-05-08 DIAGNOSIS — H52.02 HYPERMETROPIA, LEFT: ICD-10-CM

## 2023-05-08 DIAGNOSIS — H52.221 REGULAR ASTIGMATISM OF RIGHT EYE: ICD-10-CM

## 2023-05-08 DIAGNOSIS — E11.9 TYPE 2 DIABETES MELLITUS WITHOUT RETINOPATHY (H): ICD-10-CM

## 2023-05-08 DIAGNOSIS — H52.31 ANISOMETROPIA: ICD-10-CM

## 2023-05-08 DIAGNOSIS — Z01.01 ENCOUNTER FOR EXAMINATION OF EYES AND VISION WITH ABNORMAL FINDINGS: Primary | ICD-10-CM

## 2023-05-08 DIAGNOSIS — H52.11 MYOPIA, RIGHT: ICD-10-CM

## 2023-05-08 DIAGNOSIS — H52.4 PRESBYOPIA: ICD-10-CM

## 2023-05-08 DIAGNOSIS — H25.13 NUCLEAR AGE-RELATED CATARACT, BOTH EYES: ICD-10-CM

## 2023-05-08 PROCEDURE — 92015 DETERMINE REFRACTIVE STATE: CPT | Performed by: OPTOMETRIST

## 2023-05-08 PROCEDURE — 92014 COMPRE OPH EXAM EST PT 1/>: CPT | Performed by: OPTOMETRIST

## 2023-05-08 ASSESSMENT — CUP TO DISC RATIO
OD_RATIO: 0.45
OS_RATIO: 0.45

## 2023-05-08 ASSESSMENT — REFRACTION_WEARINGRX
OD_ADD: +2.50
OD_AXIS: 017
SPECS_TYPE: BIFOCAL
OD_SPHERE: -1.25
OS_SPHERE: +1.00
OD_CYLINDER: +1.75
OS_CYLINDER: SPHERE
OS_ADD: +2.50

## 2023-05-08 ASSESSMENT — REFRACTION_MANIFEST
OS_SPHERE: +1.50
OS_CYLINDER: SPHERE
OD_ADD: +2.50
OD_AXIS: 005
OS_ADD: +2.50
OD_SPHERE: -1.00
OD_CYLINDER: +1.75

## 2023-05-08 ASSESSMENT — VISUAL ACUITY
CORRECTION_TYPE: GLASSES
OS_SC+: +1
OS_CC: 20/30
OS_CC+: -1
OS_SC: 20/60
OD_SC+: -1
OS_SC: 20/200+1
METHOD: SNELLEN - LINEAR
OD_CC+: +2
OD_SC: 20/30
OD_CC: 20/20
OS_CC: 20/25
OD_CC: 20/30
OD_SC: 20/60

## 2023-05-08 ASSESSMENT — CONF VISUAL FIELD
OS_SUPERIOR_NASAL_RESTRICTION: 0
OD_INFERIOR_TEMPORAL_RESTRICTION: 0
OS_NORMAL: 1
OS_SUPERIOR_TEMPORAL_RESTRICTION: 0
OD_SUPERIOR_NASAL_RESTRICTION: 0
METHOD: COUNTING FINGERS
OS_INFERIOR_NASAL_RESTRICTION: 0
OD_NORMAL: 1
OD_SUPERIOR_TEMPORAL_RESTRICTION: 0
OD_INFERIOR_NASAL_RESTRICTION: 0
OS_INFERIOR_TEMPORAL_RESTRICTION: 0

## 2023-05-08 ASSESSMENT — EXTERNAL EXAM - RIGHT EYE: OD_EXAM: NORMAL

## 2023-05-08 ASSESSMENT — TONOMETRY
OD_IOP_MMHG: 16
IOP_METHOD: APPLANATION
OS_IOP_MMHG: 16

## 2023-05-08 ASSESSMENT — SLIT LAMP EXAM - LIDS
COMMENTS: 2-3+ DERMATOCHALASIS
COMMENTS: 2+ DERMATOCHALASIS

## 2023-05-08 ASSESSMENT — EXTERNAL EXAM - LEFT EYE: OS_EXAM: NORMAL

## 2023-05-08 NOTE — PATIENT INSTRUCTIONS
Patient educated on importance of good blood sugar control.  Letter sent to primary care provider with diabetic eye exam report.     You have the start of mild cataracts.  You may notice some blurred vision or glare with night driving.  It is important that you wear good sunglasses to protect your eyes from the ultraviolet light from the sun.     Kenna was advised of today's exam findings.  Optional to fill new glasses prescription, mild change  Copy of glasses Rx provided today.    Return in 1 year for eye exam, or sooner if needed.    The effects of the dilating drops last for 4- 6 hours.  You will be more sensitive to light and vision will be blurry up close.  Mydriatic sunglasses were given if needed.       Fracisco Lopes O.D.  [facility]  [address] [place] [address]. [place]  [address]    [phone]

## 2023-05-08 NOTE — LETTER
5/8/2023         RE: Kenna MANE Ruben  [address] [place] [address]        Dear Colleague,    Thank you for referring your patient, Kenna Miranda, to the [facility]. Please see a copy of my visit note below.    Chief Complaint   Patient presents with     Diabetic Eye Exam        Chief Complaint(s) and History of Present Illness(es)     Diabetic Eye Exam            Vision: is stable    Diabetes Type: Type 2 and controlled with diet (Takes Ozempic once/week)    Duration: years    Blood Sugars: Doesn't check               Lab Results   Component Value Date    A1C 7.1 04/24/2023    A1C 7.3 05/26/2022    A1C 7.7 12/06/2021    A1C 6.7 11/10/2020    A1C 7.1 09/17/2019    A1C 11.4 06/20/2019    A1C 8.6 09/18/2018    A1C 6.9 09/05/2017       Last Eye Exam: 3/30/2022  Dilated Previously: Yes, side effects of dilation explained today    What are you currently using to see?  Glasses - FT BF - wears full time     Distance Vision Acuity: Satisfied with vision    Near Vision Acuity: Satisfied with vision while reading and using computer with glasses    Eye Comfort: good  Do you use eye drops? : No  Occupation or Hobbies: Retired    Jennifer Yourebel     Medical, surgical and family histories reviewed and updated 5/8/2023.       OBJECTIVE: See Ophthalmology exam    ASSESSMENT:    ICD-10-CM    1. Encounter for examination of eyes and vision with abnormal findings  Z01.01       2. Type 2 diabetes mellitus without retinopathy (H)  E11.9       3. Nuclear age-related cataract, both eyes  H25.13       4. Anisometropia  H52.31       5. Myopia, right  H52.11       6. Regular astigmatism of right eye  H52.221       7. Hypermetropia, left  H52.02       8. Presbyopia  H52.4           PLAN:    Kenna Miranda aware  eye exam results will be sent to Carolyne Layne  Patient Instructions   Patient educated on importance of good blood sugar control.  Letter sent to primary care provider with  diabetic eye exam report.     You have the start of mild cataracts.  You may notice some blurred vision or glare with night driving.  It is important that you wear good sunglasses to protect your eyes from the ultraviolet light from the sun.     Kenna was advised of today's exam findings.  Optional to fill new glasses prescription, mild change  Copy of glasses Rx provided today.    Return in 1 year for eye exam, or sooner if needed.    The effects of the dilating drops last for 4- 6 hours.  You will be more sensitive to light and vision will be blurry up close.  Mydriatic sunglasses were given if needed.       Fracisco Lopes O.D.  [address]. [place]  [address]    [phone]               Again, thank you for allowing me to participate in the care of your patient.        Sincerely,        Fracisco Lopes, OD

## 2023-05-08 NOTE — RESULT ENCOUNTER NOTE
Kenna Miranda    As discussed, bone density shows that there has been no significant change in bone density of the lumbar spine nor hips.     Continue current management   Recheck bone density in 5 years (2028)    Sincerely,       GEORGE DEL ANGEL M.D.

## 2023-05-08 NOTE — PROGRESS NOTES
Chief Complaint   Patient presents with     Diabetic Eye Exam        Chief Complaint(s) and History of Present Illness(es)     Diabetic Eye Exam            Vision: is stable    Diabetes Type: Type 2 and controlled with diet (Takes Ozempic once/week)    Duration: years    Blood Sugars: Doesn't check               Lab Results   Component Value Date    A1C 7.1 04/24/2023    A1C 7.3 05/26/2022    A1C 7.7 12/06/2021    A1C 6.7 11/10/2020    A1C 7.1 09/17/2019    A1C 11.4 06/20/2019    A1C 8.6 09/18/2018    A1C 6.9 09/05/2017       Last Eye Exam: 3/30/2022  Dilated Previously: Yes, side effects of dilation explained today    What are you currently using to see?  Glasses - FT BF - wears full time     Distance Vision Acuity: Satisfied with vision    Near Vision Acuity: Satisfied with vision while reading and using computer with glasses    Eye Comfort: good  Do you use eye drops? : No  Occupation or Hobbies: Retired    [employer]     Medical, surgical and family histories reviewed and updated 5/8/2023.       OBJECTIVE: See Ophthalmology exam    ASSESSMENT:    ICD-10-CM    1. Encounter for examination of eyes and vision with abnormal findings  Z01.01       2. Type 2 diabetes mellitus without retinopathy (H)  E11.9       3. Nuclear age-related cataract, both eyes  H25.13       4. Anisometropia  H52.31       5. Myopia, right  H52.11       6. Regular astigmatism of right eye  H52.221       7. Hypermetropia, left  H52.02       8. Presbyopia  H52.4           PLAN:    Kenna Miranda aware  eye exam results will be sent to Carolyne Layne  Patient Instructions   Patient educated on importance of good blood sugar control.  Letter sent to primary care provider with diabetic eye exam report.     You have the start of mild cataracts.  You may notice some blurred vision or glare with night driving.  It is important that you wear good sunglasses to protect your eyes from the ultraviolet light from the sun.     Kenna vides  advised of today's exam findings.  Optional to fill new glasses prescription, mild change  Copy of glasses Rx provided today.    Return in 1 year for eye exam, or sooner if needed.    The effects of the dilating drops last for 4- 6 hours.  You will be more sensitive to light and vision will be blurry up close.  Mydriatic sunglasses were given if needed.       Fracisco Lopes O.D.  [facility]  [place]  [address]    [phone]

## 2023-05-15 ENCOUNTER — ANCILLARY PROCEDURE (OUTPATIENT)
Dept: MAMMOGRAPHY | Facility: CLINIC | Age: 68
End: 2023-05-15
Attending: INTERNAL MEDICINE
Payer: COMMERCIAL

## 2023-05-15 DIAGNOSIS — Z12.31 VISIT FOR SCREENING MAMMOGRAM: ICD-10-CM

## 2023-05-15 PROCEDURE — 77063 BREAST TOMOSYNTHESIS BI: CPT | Mod: TC | Performed by: RADIOLOGY

## 2023-05-15 PROCEDURE — 77067 SCR MAMMO BI INCL CAD: CPT | Mod: TC | Performed by: RADIOLOGY

## 2023-06-07 NOTE — PROGRESS NOTES
Called Dr. Nevarez about pt's 's/90's. Dr. Nevarez unavailable, per Dr. Bambi templeton for pt to go to Phase II and take home metoprolol when she gets home.    Continue ferrous sulfate

## 2023-07-25 DIAGNOSIS — I10 ESSENTIAL HYPERTENSION WITH GOAL BLOOD PRESSURE LESS THAN 140/90: ICD-10-CM

## 2023-07-26 RX ORDER — METOPROLOL TARTRATE 25 MG/1
25 TABLET, FILM COATED ORAL 2 TIMES DAILY
Qty: 180 TABLET | Refills: 1 | Status: SHIPPED | OUTPATIENT
Start: 2023-07-26 | End: 2023-10-24

## 2023-10-24 ENCOUNTER — OFFICE VISIT (OUTPATIENT)
Dept: FAMILY MEDICINE | Facility: CLINIC | Age: 68
End: 2023-10-24
Payer: COMMERCIAL

## 2023-10-24 ENCOUNTER — TELEPHONE (OUTPATIENT)
Dept: FAMILY MEDICINE | Facility: CLINIC | Age: 68
End: 2023-10-24
Payer: COMMERCIAL

## 2023-10-24 VITALS
SYSTOLIC BLOOD PRESSURE: 142 MMHG | RESPIRATION RATE: 16 BRPM | WEIGHT: 222.8 LBS | DIASTOLIC BLOOD PRESSURE: 85 MMHG | BODY MASS INDEX: 42.06 KG/M2 | HEART RATE: 78 BPM | OXYGEN SATURATION: 94 % | HEIGHT: 61 IN | TEMPERATURE: 98.2 F

## 2023-10-24 DIAGNOSIS — E11.22 TYPE 2 DIABETES MELLITUS WITH STAGE 3A CHRONIC KIDNEY DISEASE, WITHOUT LONG-TERM CURRENT USE OF INSULIN (H): Primary | ICD-10-CM

## 2023-10-24 DIAGNOSIS — E03.9 HYPOTHYROIDISM, UNSPECIFIED TYPE: ICD-10-CM

## 2023-10-24 DIAGNOSIS — Z29.11 NEED FOR VACCINATION AGAINST RESPIRATORY SYNCYTIAL VIRUS: ICD-10-CM

## 2023-10-24 DIAGNOSIS — I10 ESSENTIAL HYPERTENSION WITH GOAL BLOOD PRESSURE LESS THAN 140/90: ICD-10-CM

## 2023-10-24 DIAGNOSIS — N18.31 TYPE 2 DIABETES MELLITUS WITH STAGE 3A CHRONIC KIDNEY DISEASE, WITHOUT LONG-TERM CURRENT USE OF INSULIN (H): Primary | ICD-10-CM

## 2023-10-24 DIAGNOSIS — E78.5 HYPERLIPIDEMIA LDL GOAL <100: ICD-10-CM

## 2023-10-24 DIAGNOSIS — E66.01 CLASS 3 SEVERE OBESITY DUE TO EXCESS CALORIES WITH SERIOUS COMORBIDITY AND BODY MASS INDEX (BMI) OF 40.0 TO 44.9 IN ADULT (H): ICD-10-CM

## 2023-10-24 DIAGNOSIS — E66.813 CLASS 3 SEVERE OBESITY DUE TO EXCESS CALORIES WITH SERIOUS COMORBIDITY AND BODY MASS INDEX (BMI) OF 40.0 TO 44.9 IN ADULT (H): ICD-10-CM

## 2023-10-24 PROBLEM — Z92.21: Status: RESOLVED | Noted: 2019-11-10 | Resolved: 2023-10-24

## 2023-10-24 PROBLEM — K63.5 POLYP OF COLON: Status: RESOLVED | Noted: 2020-12-14 | Resolved: 2023-10-24

## 2023-10-24 PROBLEM — D12.3 BENIGN NEOPLASM OF TRANSVERSE COLON: Status: RESOLVED | Noted: 2020-12-16 | Resolved: 2023-10-24

## 2023-10-24 LAB — HBA1C MFR BLD: 7.9 % (ref 0–5.6)

## 2023-10-24 PROCEDURE — 80048 BASIC METABOLIC PNL TOTAL CA: CPT | Performed by: FAMILY MEDICINE

## 2023-10-24 PROCEDURE — 36415 COLL VENOUS BLD VENIPUNCTURE: CPT | Performed by: FAMILY MEDICINE

## 2023-10-24 PROCEDURE — 83036 HEMOGLOBIN GLYCOSYLATED A1C: CPT | Performed by: FAMILY MEDICINE

## 2023-10-24 PROCEDURE — 99214 OFFICE O/P EST MOD 30 MIN: CPT | Performed by: FAMILY MEDICINE

## 2023-10-24 RX ORDER — LEVOTHYROXINE SODIUM 112 UG/1
112 TABLET ORAL DAILY
Qty: 90 TABLET | Refills: 1 | Status: SHIPPED | OUTPATIENT
Start: 2023-10-24 | End: 2024-01-29

## 2023-10-24 RX ORDER — LOSARTAN POTASSIUM 50 MG/1
50 TABLET ORAL DAILY
Qty: 90 TABLET | Refills: 0 | Status: SHIPPED | OUTPATIENT
Start: 2023-10-24 | End: 2024-01-29

## 2023-10-24 RX ORDER — METOPROLOL TARTRATE 25 MG/1
25 TABLET, FILM COATED ORAL 2 TIMES DAILY
Qty: 180 TABLET | Refills: 3 | Status: SHIPPED | OUTPATIENT
Start: 2023-10-24

## 2023-10-24 RX ORDER — SEMAGLUTIDE 1.34 MG/ML
0.5 INJECTION, SOLUTION SUBCUTANEOUS
Qty: 3 ML | Refills: 11 | Status: SHIPPED | OUTPATIENT
Start: 2023-10-24 | End: 2023-10-25 | Stop reason: DRUGHIGH

## 2023-10-24 RX ORDER — RESPIRATORY SYNCYTIAL VIRUS VACCINE 120MCG/0.5
0.5 KIT INTRAMUSCULAR ONCE
Qty: 1 EACH | Refills: 0 | Status: SHIPPED | OUTPATIENT
Start: 2023-10-24 | End: 2023-10-24

## 2023-10-24 RX ORDER — ATORVASTATIN CALCIUM 20 MG/1
20 TABLET, FILM COATED ORAL
Qty: 24 TABLET | Refills: 3 | Status: SHIPPED | OUTPATIENT
Start: 2023-10-26

## 2023-10-24 NOTE — TELEPHONE ENCOUNTER
Pt calling. States she is a former pt of Dr. Layne. Pt takes Ozempic for her diabetes. Ozempic is not available through the pharmacy. Pt is planning to establish care with Dr. Rubio in the near future. Pt has one more injection left for this week and won't have any medication for next week. Pt is wondering if there are alternate options. Pt wanted it noted that she had taken Metformin previously and was very sick from this. Routing to Provider to advise.    Marlen Ledbetter RN  [facility]

## 2023-10-24 NOTE — TELEPHONE ENCOUNTER
Spoke with patient and informed of med change    Patient verbalized understanding. Patient is needing to establish care with a provider- transferred to scheduling line to schedule this appointment.     Katherine Bassett RN  [facility]

## 2023-10-24 NOTE — PROGRESS NOTES
"  Assessment & Plan     (E11.22,  N18.31) Type 2 diabetes mellitus with stage 3a chronic kidney disease, without long-term current use of insulin (H)  (primary encounter diagnosis)  (E66.01,  Z68.41) Class 3 severe obesity due to excess calories with serious comorbidity and body mass index (BMI) of 40.0 to 44.9 in adult (H)  Plan: Basic metabolic panel  (Ca, Cl, CO2, Creat,         Gluc, K, Na, BUN)        Will try filling Ozempic at another pharmacy with availability; otherwise I am willing to pursue prior authorization for Trulicity; she'll let me know and follow-up in 3 months     (E78.5) HYPERLIPIDEMIA LDL GOAL <70  Comment: Well controlled with medications without side effects.   Plan: atorvastatin (LIPITOR) 20 MG tablet          (I10) Essential hypertension with goal blood pressure less than 140/90  Plan: Basic metabolic panel  (Ca, Cl, CO2, Creat,         Gluc, K, Na, BUN), metoprolol tartrate         (LOPRESSOR) 25 MG tablet, losartan (COZAAR) 50         MG tablet        Her home blood pressure readings are fine, so reasonable to continue plan and follow-up in 3 months     (E03.9) Hypothyroidism, unspecified type  Comment: euthyroid on replacement   Plan: levothyroxine (SYNTHROID/LEVOTHROID) 112 MCG         tablet                 BMI:   Estimated body mass index is 41.69 kg/m  as calculated from the following:    Height as of this encounter: 1.557 m (5' 1.3\").    Weight as of this encounter: 101.1 kg (222 lb 12.8 oz).   Weight management plan: Discussed healthy diet and exercise guidelines    See Patient Instructions    Анна Rubio MD  [facility] ABIGAIL Pierson is a 67 year old, presenting for the following health issues:  Diabetes        10/24/2023     4:50 PM   Additional Questions   Roomed by Vicki MAE CMA   Accompanied by Brett -        History of Present Illness       Diabetes:   She presents for follow up of diabetes.    She is not checking blood glucose.        " "She is concerned about other.    She is not experiencing numbness or burning in feet, excessive thirst, blurry vision, weight changes or redness, sores or blisters on feet.           She eats 0-1 servings of fruits and vegetables daily.She consumes 1 sweetened beverage(s) daily.She exercises with enough effort to increase her heart rate 9 or less minutes per day.  She exercises with enough effort to increase her heart rate 3 or less days per week.   She is taking medications regularly.     Hypercholesterolemia well controlled with current treatment plan without side effects. Hypertension well controlled on current medications without side effects, chest pain, or dyspnea. Patient also presents for follow-up of hypothyroidism, controlled on current medication.  Denies symptoms of hypo- or hyperthyroidism.             Review of Systems   Constitutional, HEENT, cardiovascular, pulmonary, gi and gu systems are negative, except as otherwise noted.      Objective    BP (!) 142/85 (BP Location: Left arm, Patient Position: Sitting, Cuff Size: Adult Large)   Pulse 78   Temp 98.2  F (36.8  C) (Oral)   Resp 16   Ht 1.557 m (5' 1.3\")   Wt 101.1 kg (222 lb 12.8 oz)   SpO2 94%   BMI 41.69 kg/m    Body mass index is 41.69 kg/m .  Physical Exam   GENERAL: alert, no distress, and obese  NECK: no adenopathy, no asymmetry, masses, or scars and thyroid normal to palpation  RESP: lungs clear to auscultation - no rales, rhonchi or wheezes  CV: regular rates and rhythm, normal S1 S2, no S3 or S4, no murmur, click or rub, woody bipedal edema   MS: extremities normal- no gross deformities noted  PSYCH: mentation appears normal, affect normal/bright    Lab on 04/24/2023   Component Date Value Ref Range Status    Hemoglobin 04/24/2023 13.6  11.7 - 15.7 g/dL Final    Creatinine Urine mg/dL 04/24/2023 123.0  mg/dL Final    The reference ranges have not been established in urine creatinine. The results should be integrated into the " clinical context for interpretation.    Albumin Urine mg/L 04/24/2023 <12.0  mg/L Final    The reference ranges have not been established in urine albumin. The results should be integrated into the clinical context for interpretation.    Albumin Urine mg/g Cr 04/24/2023    Final    Unable to calculate, urine albumin and/or urine creatinine is outside detectable limits.  Microalbuminuria is defined as an albumin:creatinine ratio of 17 to 299 for males and 25 to 299 for females. A ratio of albumin:creatinine of 300 or higher is indicative of overt proteinuria.  Due to biologic variability, positive results should be confirmed by a second, first-morning random or 24-hour timed urine specimen. If there is discrepancy, a third specimen is recommended. When 2 out of 3 results are in the microalbuminuria range, this is evidence for incipient nephropathy and warrants increased efforts at glucose control, blood pressure control, and institution of therapy with an angiotensin-converting-enzyme (ACE) inhibitor (if the patient can tolerate it).      TSH 04/24/2023 0.52  0.30 - 4.20 uIU/mL Final    Cholesterol 04/24/2023 153  <200 mg/dL Final    Triglycerides 04/24/2023 169 (H)  <150 mg/dL Final    Direct Measure HDL 04/24/2023 50  >=50 mg/dL Final    LDL Cholesterol Calculated 04/24/2023 69  <=100 mg/dL Final    Non HDL Cholesterol 04/24/2023 103  <130 mg/dL Final    Sodium 04/24/2023 140  136 - 145 mmol/L Final    Potassium 04/24/2023 4.1  3.4 - 5.3 mmol/L Final    Chloride 04/24/2023 103  98 - 107 mmol/L Final    Carbon Dioxide (CO2) 04/24/2023 24  22 - 29 mmol/L Final    Anion Gap 04/24/2023 13  7 - 15 mmol/L Final    Urea Nitrogen 04/24/2023 12.1  8.0 - 23.0 mg/dL Final    Creatinine 04/24/2023 1.11 (H)  0.51 - 0.95 mg/dL Final    Calcium 04/24/2023 10.3 (H)  8.8 - 10.2 mg/dL Final    Glucose 04/24/2023 134 (H)  70 - 99 mg/dL Final    GFR Estimate 04/24/2023 54 (L)  >60 mL/min/1.73m2 Final    eGFR calculated using 2021  CKD-EPI equation.    Hemoglobin A1C 04/24/2023 7.1 (H)  0.0 - 5.6 % Final    Normal <5.7%   Prediabetes 5.7-6.4%    Diabetes 6.5% or higher     Note: Adopted from ADA consensus guidelines.     No results found for this or any previous visit (from the past 24 hour(s)).

## 2023-10-25 LAB
ANION GAP SERPL CALCULATED.3IONS-SCNC: 12 MMOL/L (ref 7–15)
BUN SERPL-MCNC: 15.1 MG/DL (ref 8–23)
CALCIUM SERPL-MCNC: 9.8 MG/DL (ref 8.8–10.2)
CHLORIDE SERPL-SCNC: 102 MMOL/L (ref 98–107)
CREAT SERPL-MCNC: 1.11 MG/DL (ref 0.51–0.95)
DEPRECATED HCO3 PLAS-SCNC: 26 MMOL/L (ref 22–29)
EGFRCR SERPLBLD CKD-EPI 2021: 54 ML/MIN/1.73M2
GLUCOSE SERPL-MCNC: 169 MG/DL (ref 70–99)
POTASSIUM SERPL-SCNC: 4.3 MMOL/L (ref 3.4–5.3)
SODIUM SERPL-SCNC: 140 MMOL/L (ref 135–145)

## 2023-10-25 NOTE — RESULT ENCOUNTER NOTE
Kenna,    Your kidney tests are stable. Your blood glucose is higher. If you are able to fill Ozempic, I recommend increasing the dose to 1 mg weekly. Follow-up in 3 months.     Анна Rubio MD

## 2023-11-28 ENCOUNTER — PATIENT OUTREACH (OUTPATIENT)
Dept: GASTROENTEROLOGY | Facility: CLINIC | Age: 68
End: 2023-11-28
Payer: COMMERCIAL

## 2023-12-15 ENCOUNTER — TRANSFERRED RECORDS (OUTPATIENT)
Dept: HEALTH INFORMATION MANAGEMENT | Facility: CLINIC | Age: 68
End: 2023-12-15
Payer: COMMERCIAL

## 2024-01-29 ENCOUNTER — OFFICE VISIT (OUTPATIENT)
Dept: FAMILY MEDICINE | Facility: CLINIC | Age: 69
End: 2024-01-29
Payer: COMMERCIAL

## 2024-01-29 VITALS
SYSTOLIC BLOOD PRESSURE: 134 MMHG | TEMPERATURE: 97.6 F | HEIGHT: 61 IN | BODY MASS INDEX: 40.4 KG/M2 | RESPIRATION RATE: 16 BRPM | HEART RATE: 76 BPM | DIASTOLIC BLOOD PRESSURE: 79 MMHG | WEIGHT: 214 LBS | OXYGEN SATURATION: 94 %

## 2024-01-29 DIAGNOSIS — E11.22 TYPE 2 DIABETES MELLITUS WITH STAGE 3A CHRONIC KIDNEY DISEASE, WITHOUT LONG-TERM CURRENT USE OF INSULIN (H): Primary | ICD-10-CM

## 2024-01-29 DIAGNOSIS — N18.31 TYPE 2 DIABETES MELLITUS WITH STAGE 3A CHRONIC KIDNEY DISEASE, WITHOUT LONG-TERM CURRENT USE OF INSULIN (H): Primary | ICD-10-CM

## 2024-01-29 DIAGNOSIS — E03.9 HYPOTHYROIDISM, UNSPECIFIED TYPE: ICD-10-CM

## 2024-01-29 DIAGNOSIS — E66.01 CLASS 3 SEVERE OBESITY DUE TO EXCESS CALORIES WITH SERIOUS COMORBIDITY AND BODY MASS INDEX (BMI) OF 40.0 TO 44.9 IN ADULT (H): ICD-10-CM

## 2024-01-29 DIAGNOSIS — E66.813 CLASS 3 SEVERE OBESITY DUE TO EXCESS CALORIES WITH SERIOUS COMORBIDITY AND BODY MASS INDEX (BMI) OF 40.0 TO 44.9 IN ADULT (H): ICD-10-CM

## 2024-01-29 DIAGNOSIS — I10 ESSENTIAL HYPERTENSION WITH GOAL BLOOD PRESSURE LESS THAN 140/90: ICD-10-CM

## 2024-01-29 LAB
HBA1C MFR BLD: 6.8 % (ref 0–5.6)
HGB BLD-MCNC: 13.7 G/DL (ref 11.7–15.7)

## 2024-01-29 PROCEDURE — 36415 COLL VENOUS BLD VENIPUNCTURE: CPT | Performed by: FAMILY MEDICINE

## 2024-01-29 PROCEDURE — 85018 HEMOGLOBIN: CPT | Performed by: FAMILY MEDICINE

## 2024-01-29 PROCEDURE — 83036 HEMOGLOBIN GLYCOSYLATED A1C: CPT | Performed by: FAMILY MEDICINE

## 2024-01-29 PROCEDURE — 99214 OFFICE O/P EST MOD 30 MIN: CPT | Performed by: FAMILY MEDICINE

## 2024-01-29 RX ORDER — LEVOTHYROXINE SODIUM 112 UG/1
112 TABLET ORAL DAILY
Qty: 90 TABLET | Refills: 1 | Status: SHIPPED | OUTPATIENT
Start: 2024-01-29

## 2024-01-29 RX ORDER — LOSARTAN POTASSIUM 50 MG/1
50 TABLET ORAL DAILY
Qty: 90 TABLET | Refills: 3 | Status: SHIPPED | OUTPATIENT
Start: 2024-01-29

## 2024-01-29 RX ORDER — RESPIRATORY SYNCYTIAL VIRUS VACCINE 120MCG/0.5
0.5 KIT INTRAMUSCULAR ONCE
Qty: 1 EACH | Refills: 0 | Status: CANCELLED | OUTPATIENT
Start: 2024-01-29 | End: 2024-01-29

## 2024-01-29 NOTE — RESULT ENCOUNTER NOTE
Kenna,    Your blood glucose is in great control.     Your anemia test is normal.     Анна Rubio MD

## 2024-01-29 NOTE — PROGRESS NOTES
Assessment & Plan     Type 2 diabetes mellitus with stage 3a chronic kidney disease, without long-term current use of insulin (H)  Class 3 severe obesity due to excess calories with serious comorbidity and body mass index (BMI) of 40.0 to 44.9 in adult (H)  -some difficulty finding GLP1 agonist medication at her pharmacy so she'd like to research the cost/availability of switching Ozempic to Rybelsus; Discussed risks and benefits of this medication.   - Semaglutide (RYBELSUS) 7 MG tablet; Take 1 tablet (7 mg) by mouth daily  -follow-up 3 - 4 months     Essential hypertension with goal blood pressure less than 140/90  -Well controlled with medications without side effects.   - losartan (COZAAR) 50 MG tablet; Take 1 tablet (50 mg) by mouth daily    Hypothyroidism, unspecified type  -euthyroid on replacement   - levothyroxine (SYNTHROID/LEVOTHROID) 112 MCG tablet; Take 1 tablet (112 mcg) by mouth daily            See Patient Instructions    Charles Pierson is a 68 year old, presenting for the following health issues:  Diabetes        1/29/2024     2:02 PM   Additional Questions   Roomed by Vicki MAE CMA   Accompanied by      History of Present Illness       Diabetes:   She presents for follow up of diabetes.    She is not checking blood glucose.         She has no concerns regarding her diabetes at this time.   She is not experiencing numbness or burning in feet, excessive thirst, blurry vision, weight changes or redness, sores or blisters on feet.           She eats 0-1 servings of fruits and vegetables daily.She consumes 1 sweetened beverage(s) daily.She exercises with enough effort to increase her heart rate 9 or less minutes per day.  She exercises with enough effort to increase her heart rate 3 or less days per week.   She is taking medications regularly.                     Objective    /79 (BP Location: Left arm, Patient Position: Sitting, Cuff Size: Adult Large)   Pulse 76   Temp 97.6  F (36.4  " C) (Oral)   Resp 16   Ht 1.56 m (5' 1.42\")   Wt 97.1 kg (214 lb)   SpO2 94%   BMI 39.89 kg/m    Body mass index is 39.89 kg/m .  Physical Exam   GENERAL: alert and no distress  MS: extremities normal- no gross deformities noted  PSYCH: mentation appears normal, affect normal/bright    Office Visit on 10/24/2023   Component Date Value Ref Range Status    Hemoglobin A1C 10/24/2023 7.9 (H)  0.0 - 5.6 % Final    Sodium 10/24/2023 140  135 - 145 mmol/L Final    Reference intervals for this test were updated on 09/26/2023 to more accurately reflect our healthy population. There may be differences in the flagging of prior results with similar values performed with this method. Interpretation of those prior results can be made in the context of the updated reference intervals.     Potassium 10/24/2023 4.3  3.4 - 5.3 mmol/L Final    Chloride 10/24/2023 102  98 - 107 mmol/L Final    Carbon Dioxide (CO2) 10/24/2023 26  22 - 29 mmol/L Final    Anion Gap 10/24/2023 12  7 - 15 mmol/L Final    Urea Nitrogen 10/24/2023 15.1  8.0 - 23.0 mg/dL Final    Creatinine 10/24/2023 1.11 (H)  0.51 - 0.95 mg/dL Final    GFR Estimate 10/24/2023 54 (L)  >60 mL/min/1.73m2 Final    Calcium 10/24/2023 9.8  8.8 - 10.2 mg/dL Final    Glucose 10/24/2023 169 (H)  70 - 99 mg/dL Final     Results for orders placed or performed in visit on 01/29/24 (from the past 24 hour(s))   HEMOGLOBIN A1C   Result Value Ref Range    Hemoglobin A1C 6.8 (H) 0.0 - 5.6 %   Hemoglobin   Result Value Ref Range    Hemoglobin 13.7 11.7 - 15.7 g/dL           Signed Electronically by: Анна Rubio MD     "

## 2024-02-11 ENCOUNTER — MYC MEDICAL ADVICE (OUTPATIENT)
Dept: FAMILY MEDICINE | Facility: CLINIC | Age: 69
End: 2024-02-11
Payer: COMMERCIAL

## 2024-02-11 DIAGNOSIS — N18.31 TYPE 2 DIABETES MELLITUS WITH STAGE 3A CHRONIC KIDNEY DISEASE, WITHOUT LONG-TERM CURRENT USE OF INSULIN (H): Primary | ICD-10-CM

## 2024-02-11 DIAGNOSIS — E11.22 TYPE 2 DIABETES MELLITUS WITH STAGE 3A CHRONIC KIDNEY DISEASE, WITHOUT LONG-TERM CURRENT USE OF INSULIN (H): Primary | ICD-10-CM

## 2024-03-06 ENCOUNTER — ANCILLARY ORDERS (OUTPATIENT)
Dept: FAMILY MEDICINE | Facility: CLINIC | Age: 69
End: 2024-03-06

## 2024-03-06 DIAGNOSIS — Z12.31 VISIT FOR SCREENING MAMMOGRAM: Primary | ICD-10-CM

## 2024-03-21 ENCOUNTER — TRANSFERRED RECORDS (OUTPATIENT)
Dept: HEALTH INFORMATION MANAGEMENT | Facility: CLINIC | Age: 69
End: 2024-03-21
Payer: COMMERCIAL

## 2024-03-29 ENCOUNTER — TRANSFERRED RECORDS (OUTPATIENT)
Dept: HEALTH INFORMATION MANAGEMENT | Facility: CLINIC | Age: 69
End: 2024-03-29
Payer: COMMERCIAL

## 2024-04-29 SDOH — HEALTH STABILITY: PHYSICAL HEALTH: ON AVERAGE, HOW MANY DAYS PER WEEK DO YOU ENGAGE IN MODERATE TO STRENUOUS EXERCISE (LIKE A BRISK WALK)?: 0 DAYS

## 2024-04-29 SDOH — HEALTH STABILITY: PHYSICAL HEALTH: ON AVERAGE, HOW MANY MINUTES DO YOU ENGAGE IN EXERCISE AT THIS LEVEL?: 0 MIN

## 2024-04-29 ASSESSMENT — SOCIAL DETERMINANTS OF HEALTH (SDOH): HOW OFTEN DO YOU GET TOGETHER WITH FRIENDS OR RELATIVES?: THREE TIMES A WEEK

## 2024-04-29 NOTE — COMMUNITY RESOURCES LIST (ENGLISH)
April 29, 2024           YOUR PERSONALIZED LIST OF SERVICES & PROGRAMS           & RECREATION    [employer]      [employer] [place] - Sports clubs and recreational activities - [facility]  [address] [place] [address] (Distance: 4.4 miles)  Language: English  Fee: Self pay, Sliding scale      [facility] - Adult Enrichment  Phone: [phone]  Website: [email or web address]  Language: English  Hours: Mon 7:30 AM - 4:00 PM Tue 7:30 AM - 4:00 PM Wed 7:30 AM - 4:00 PM Thu 7:30 AM - 4:00 PM Fri 7:30 AM - 4:00 PM      [employer]ASE[employer] AND SOFTBALL  Website: [email or web address].[email or web address].org    Classes/Groups      [facility] - Personal training  [address] [place] [address] (Distance: 10.1 miles)  Phone: [phone]  Website: [email or web address] [employer]/  Language: English, Tamazight  Fee: Sliding scale, Self pay, Insurance      - Group fitness classes  [address] [place] [address] (Distance: 11.2 miles)  Phone: [phone]  Website: [email or web address]/[email or web address]-Center  Language: English  Fee: Self pay  Accessibility: Ada accessible      [facility] - Adult [facility]  Phone: [phone]  Website: [email or web address]  Language: English  Hours: Mon 7:30 AM - 4:00 PM Tue 7:30 AM - 4:00 PM Wed 7:30 AM - 4:00 PM Thu 7:30 AM - 4:00 PM Fri 7:30 AM - 4:00 PM               IMPORTANT NUMBERS & WEBSITES        Emergency Services  911  .   [employer] Way  211 http://211unitedway.org  .   Poison Control  (104) 121-5169 http://[place]poison.org http://[place]poison.org  .     Suicide and Crisis Lifeline  988 http://988[facility] Hospitalline.org  .   Childhelp [place] Child Abuse Hotline  866.359.8359 http://Childhelphotline.org   .   National Sexual Assault Hotline  (376) 269-8409 (HTGF) http://Rainn.org   .     National Runaway Safeline  (913)  888-6102 (RUNAWAY) http://WowOwowruMotion Engine.org  .   Pregnancy & Postpartum Support  Call/text 409-515-7921  [place]: http://ppsupport[place].org  [place]: http://psichapters.com/wi  .   Substance Abuse National Helpline ([facility])  719-161-HELP (2679) http://Findtreatment.gov   .                DISCLAIMER: These resources have been generated via the [employer] Platform. [employer] does not endorse any service providers mentioned in this resource list. [employer] does not guarantee that the services mentioned in this resource list will be available to you or will improve your health or wellness.    [facility]

## 2024-05-06 ENCOUNTER — OFFICE VISIT (OUTPATIENT)
Dept: FAMILY MEDICINE | Facility: CLINIC | Age: 69
End: 2024-05-06
Payer: COMMERCIAL

## 2024-05-06 ENCOUNTER — TRANSFERRED RECORDS (OUTPATIENT)
Dept: HEALTH INFORMATION MANAGEMENT | Facility: CLINIC | Age: 69
End: 2024-05-06

## 2024-05-06 VITALS
BODY MASS INDEX: 39.46 KG/M2 | SYSTOLIC BLOOD PRESSURE: 141 MMHG | OXYGEN SATURATION: 96 % | HEART RATE: 77 BPM | TEMPERATURE: 97.9 F | WEIGHT: 209 LBS | HEIGHT: 61 IN | DIASTOLIC BLOOD PRESSURE: 85 MMHG | RESPIRATION RATE: 16 BRPM

## 2024-05-06 DIAGNOSIS — Z00.00 ENCOUNTER FOR MEDICARE ANNUAL WELLNESS EXAM: Primary | ICD-10-CM

## 2024-05-06 DIAGNOSIS — E78.5 HYPERLIPIDEMIA LDL GOAL <100: ICD-10-CM

## 2024-05-06 DIAGNOSIS — E66.813 CLASS 3 SEVERE OBESITY DUE TO EXCESS CALORIES WITH SERIOUS COMORBIDITY AND BODY MASS INDEX (BMI) OF 40.0 TO 44.9 IN ADULT (H): ICD-10-CM

## 2024-05-06 DIAGNOSIS — E03.9 HYPOTHYROIDISM, UNSPECIFIED TYPE: ICD-10-CM

## 2024-05-06 DIAGNOSIS — E11.22 TYPE 2 DIABETES MELLITUS WITH STAGE 3A CHRONIC KIDNEY DISEASE, WITHOUT LONG-TERM CURRENT USE OF INSULIN (H): ICD-10-CM

## 2024-05-06 DIAGNOSIS — E66.01 CLASS 3 SEVERE OBESITY DUE TO EXCESS CALORIES WITH SERIOUS COMORBIDITY AND BODY MASS INDEX (BMI) OF 40.0 TO 44.9 IN ADULT (H): ICD-10-CM

## 2024-05-06 DIAGNOSIS — N18.31 TYPE 2 DIABETES MELLITUS WITH STAGE 3A CHRONIC KIDNEY DISEASE, WITHOUT LONG-TERM CURRENT USE OF INSULIN (H): ICD-10-CM

## 2024-05-06 DIAGNOSIS — I10 ESSENTIAL HYPERTENSION WITH GOAL BLOOD PRESSURE LESS THAN 140/90: ICD-10-CM

## 2024-05-06 LAB — HBA1C MFR BLD: 6.8 % (ref 0–5.6)

## 2024-05-06 PROCEDURE — 36415 COLL VENOUS BLD VENIPUNCTURE: CPT | Performed by: FAMILY MEDICINE

## 2024-05-06 PROCEDURE — 80061 LIPID PANEL: CPT | Performed by: FAMILY MEDICINE

## 2024-05-06 PROCEDURE — 83036 HEMOGLOBIN GLYCOSYLATED A1C: CPT | Performed by: FAMILY MEDICINE

## 2024-05-06 PROCEDURE — 84443 ASSAY THYROID STIM HORMONE: CPT | Performed by: FAMILY MEDICINE

## 2024-05-06 PROCEDURE — G0439 PPPS, SUBSEQ VISIT: HCPCS | Performed by: FAMILY MEDICINE

## 2024-05-06 PROCEDURE — 99214 OFFICE O/P EST MOD 30 MIN: CPT | Mod: 25 | Performed by: FAMILY MEDICINE

## 2024-05-06 PROCEDURE — 99207 PR FOOT EXAM NO CHARGE: CPT | Performed by: FAMILY MEDICINE

## 2024-05-06 RX ORDER — RESPIRATORY SYNCYTIAL VIRUS VACCINE 120MCG/0.5
0.5 KIT INTRAMUSCULAR ONCE
Qty: 1 EACH | Refills: 0 | Status: CANCELLED | OUTPATIENT
Start: 2024-05-06 | End: 2024-05-06

## 2024-05-06 NOTE — PROGRESS NOTES
"Preventive Care Visit  [facility] AB[facility] Rubio MD, Family Medicine  May 6, 2024      Assessment & Plan     Encounter for Medicare annual wellness exam    Type 2 diabetes mellitus with stage 3a chronic kidney disease, without long-term current use of insulin (H)  Class 3 severe obesity due to excess calories with serious comorbidity and body mass index (BMI) of 40.0 to 44.9 in adult (H)  Continue medications and see me in 3 months   - OFFICE/OUTPT VISIT,YARITZA GARCIAL IV    Essential hypertension with goal blood pressure less than 140/90  Elevated blood pressure; offered increase in losartan dose; Counseled to make better food choices, exercise as tolerated, and lose weight. Follow-up blood pressure recommended   - OFFICE/OUTPT VISIT,EST,LEVL IV    HYPERLIPIDEMIA LDL GOAL <70  Well controlled with medications without side effects.   - OFFICE/OUTPT VISIT,EST,LEVL IV    Hypothyroidism, unspecified type  Euthyroid on replacement   - TSH WITH FREE T4 REFLEX; Future  - OFFICE/OUTPT VISIT,YARITZA GARCIAL IV              BMI  Estimated body mass index is 38.95 kg/m  as calculated from the following:    Height as of this encounter: 1.56 m (5' 1.42\").    Weight as of this encounter: 94.8 kg (209 lb).   Weight management plan: Discussed healthy diet and exercise guidelines    Counseling  Appropriate preventive services were discussed with this patient, including applicable screening as appropriate for fall prevention, nutrition, physical activity, Tobacco-use cessation, weight loss and cognition.  Checklist reviewing preventive services available has been given to the patient.  Reviewed patient's diet, addressing concerns and/or questions.   Discussed possible causes of fatigue.     See Patient Instructions    Charles Pierson is a 68 year old, presenting for the following:  Physical        5/6/2024     4:05 PM   Additional Questions   Roomed by Vicki MAE CMA   Accompanied by Self         Health Care " Directive  Patient does not have a Health Care Directive or Living Will: Patient states has Advance Directive and will bring in a copy to clinic.    HPI  Patient also presents to follow-up diabetes. Diabetic Review of Systems - Medication compliance:  compliant all of the time, Diabetic diet compliance:  compliant all of the time, Home glucose monitoring:  blood glucose record WAS NOT brought in today, Diabetic ROS: no polyuria or polydipsia, no chest pain, dyspnea or TIA's, no numbness, tingling or pain in extremities, no unusual visual symptoms, no hypoglycemia, no medication side effects noted. Hypertension well controlled on current medications without side effects, chest pain, or dyspnea. Hypercholesterolemia well controlled with current treatment plan without side effects. Patient also presents for follow-up of hypothyroidism, controlled on current medication.  Denies symptoms of hypo- or hyperthyroidism.             4/29/2024   General Health   How would you rate your overall physical health? Good   Feel stress (tense, anxious, or unable to sleep) Not at all         4/29/2024   Nutrition   Diet: Regular (no restrictions)         4/29/2024   Exercise   Days per week of moderate/strenous exercise 0 days   Average minutes spent exercising at this level 0 min   (!) EXERCISE CONCERN      4/29/2024   Social Factors   Frequency of gathering with friends or relatives Three times a week   Worry food won't last until get money to buy more No   Food not last or not have enough money for food? No   Do you have housing?  Yes   Are you worried about losing your housing? No   Lack of transportation? No   Unable to get utilities (heat,electricity)? No         5/5/2024   Fall Risk   Fallen 2 or more times in the past year? No   Trouble with walking or balance? No          4/29/2024   Activities of Daily Living- Home Safety   Needs help with the following daily activites None of the above   Safety concerns in the home None of  the above         4/29/2024   Dental   Dentist two times every year? Yes         4/29/2024   Hearing Screening   Hearing concerns? None of the above         4/29/2024   Driving Risk Screening   Patient/family members have concerns about driving No         4/29/2024   General Alertness/Fatigue Screening   Have you been more tired than usual lately? (!) YES         4/29/2024   Urinary Incontinence Screening   Bothered by leaking urine in past 6 months No         4/29/2024   TB Screening   Were you born outside of the [place]? Yes         Today's PHQ-2 Score:       5/5/2024     4:42 PM   PHQ-2 ( 1999 [employer])   Q1: Little interest or pleasure in doing things 0   Q2: Feeling down, depressed or hopeless 0   PHQ-2 Score 0   Q1: Little interest or pleasure in doing things Not at all   Q2: Feeling down, depressed or hopeless Not at all   PHQ-2 Score 0           4/29/2024   Substance Use   Alcohol more than 3/day or more than 7/wk No   Do you have a current opioid prescription? No   How severe/bad is pain from 1 to 10? 4/10   Do you use any other substances recreationally? No     Social History     Tobacco Use    Smoking status: Never    Smokeless tobacco: Never    Tobacco comments:     no second hand exposure.  when young Dad smoked.   Vaping Use    Vaping status: Never Used   Substance Use Topics    Alcohol use: Not Currently     Comment: very, very little    Drug use: No           5/15/2023   LAST FHS-7 RESULTS   1st degree relative breast or ovarian cancer No   Any relative bilateral breast cancer No   Any male have breast cancer No   Any ONE woman have BOTH breast AND ovarian cancer No   Any woman with breast cancer before 50yrs No   2 or more relatives with breast AND/OR ovarian cancer No   2 or more relatives with breast AND/OR bowel cancer No            ASCVD Risk   The 10-year ASCVD risk score (Nirali ERICKSON, et al., 2019) is: 25.7%    Values used to calculate the score:      Age: 68 years      Sex: Female      Is  Non- : No      Diabetic: Yes      Tobacco smoker: No      Systolic Blood Pressure: 141 mmHg      Is BP treated: Yes      HDL Cholesterol: 50 mg/dL      Total Cholesterol: 153 mg/dL    Fracture Risk Assessment Tool  Link to Frax Calculator  Use the information below to complete the Frax calculator  : 1955  Sex: female  Weight (kg): 94.8 kg (actual weight)  Height (cm): 156 cm  Previous Fragility Fracture:  No  History of parent with fractured hip:  No  Current Smoking:  No  Patient has been on glucocorticoids for more than 3 months (5mg/day or more): No  Rheumatoid Arthritis on Problem List:  No  Secondary Osteoporosis on Problem List:  No  Consumes 3 or more units of alcohol per day: No  Femoral Neck BMD (g/cm2)            Reviewed and updated as needed this visit by Provider   Tobacco  Allergies  Meds  Problems  Med Hx  Surg Hx  Fam Hx     Sexual Activity          Patient Active Problem List   Diagnosis    HYPERLIPIDEMIA LDL GOAL <70    Cataract of both eyes    Essential hypertension with goal blood pressure less than 140/90    Hypothyroidism, unspecified type    Type 2 diabetes mellitus with stage 3a chronic kidney disease, without long-term current use of insulin (H)    Class 3 severe obesity due to excess calories with serious comorbidity and body mass index (BMI) of 40.0 to 44.9 in adult (H)    CKD (chronic kidney disease) stage 3, GFR 30-59 ml/min (H)    Constipation    History of colonic polyps    HX: breast cancer     Past Surgical History:   Procedure Laterality Date    BIOPSY BREAST NEEDLE LOCALIZATION, BIOPSY NODE SENTINEL, COMBINED  2012    Procedure: COMBINED BIOPSY BREAST NEEDLE LOCALIZATION, BIOPSY NODE SENTINEL;  Left Breast Wire Locilized Lumpectomy and Sentinal Lymph Node Biopsy;  Surgeon: Jesus Vicente MD;  Location: [facility] OR    COLONOSCOPY  10/26/2011    Return in 1 yr for Polyp Surveillance    COLONOSCOPY  2012    Return for Colonoscopy in 3  yrs.Polyps    COMBINED CYSTOSCOPY, INSERT STENT URETER(S) Right 2017    Procedure: COMBINED CYSTOSCOPY, INSERT STENT URETER(S);  Cystoscopy Right retrograde pyelogram, Right ureteral Stent Placement;  Surgeon: Loida Sánchez MD;  Location: [facility] OR    COMBINED CYSTOSCOPY, RETROGRADES, URETEROSCOPY, LASER HOLMIUM LITHOTRIPSY URETER(S), INSERT STENT Right 2017    Procedure: COMBINED CYSTOSCOPY, RETROGRADES, URETEROSCOPY, LASER HOLMIUM LITHOTRIPSY URETER(S), INSERT STENT;  Cystoscopy, Right Ureteroscopy, Laser Lithotripsy, Right Stent Exchange ;  Surgeon: Ruth Boston MD;  Location: [facility] OR    LITHOTRIPSY      LUMPECTOMY BREAST      SURGICAL HISTORY OF -       exploratory laparotomy    SURGICAL HISTORY OF -       Right ovary removed    SURGICAL HISTORY OF -   2008    breast biopsy    ZZC  DELIVERY ONLY      x 2       Social History     Tobacco Use    Smoking status: Never    Smokeless tobacco: Never    Tobacco comments:     no second hand exposure.  when young Dad smoked.   Substance Use Topics    Alcohol use: Not Currently     Comment: very, very little     Family History   Problem Relation Age of Onset    Cancer - colorectal Mother     Thyroid Disease Mother     Colon Cancer Mother     Cancer Father         Lung    Lipids Father     Macular Degeneration Father     Depression Brother     Respiratory Brother         losing hearing in 50's         Current providers sharing in care for this patient include:  Patient Care Team:  Анна Rubio MD as PCP - General (Family Medicine)  Fracisco Lopes OD as Assigned Surgical Provider  Анна Rubio MD as Assigned PCP    The following health maintenance items are reviewed in Epic and correct as of today:  Health Maintenance   Topic Date Due    RSV VACCINE (Pregnancy & 60+) (1 - 1-dose 60+ series) Never done    COVID-19 Vaccine (2023- season) 2024    LIPID  2024    MICROALBUMIN  2024    A1C  " 04/29/2024    DIABETIC FOOT EXAM  05/05/2024    TSH W/FREE T4 REFLEX  04/24/2024    EYE EXAM  05/08/2024    MAMMO SCREENING  05/15/2024    BMP  10/24/2024    HEMOGLOBIN  01/29/2025    MEDICARE ANNUAL WELLNESS VISIT  05/06/2025    ANNUAL REVIEW OF HM ORDERS  05/06/2025    FALL RISK ASSESSMENT  05/06/2025    DEXA  05/03/2028    COLORECTAL CANCER SCREENING  12/15/2028    ADVANCE CARE PLANNING  05/06/2029    DTAP/TDAP/TD IMMUNIZATION (3 - Td or Tdap) 12/10/2031    HEPATITIS C SCREENING  Completed    PHQ-2 (once per calendar year)  Completed    INFLUENZA VACCINE  Completed    Pneumococcal Vaccine: 65+ Years  Completed    URINALYSIS  Completed    ZOSTER IMMUNIZATION  Completed    IPV IMMUNIZATION  Aged Out    HPV IMMUNIZATION  Aged Out    MENINGITIS IMMUNIZATION  Aged Out    RSV MONOCLONAL ANTIBODY  Aged Out            Objective    Exam  BP (!) 141/85 (BP Location: Left arm, Patient Position: Sitting, Cuff Size: Adult Large)   Pulse 77   Temp 97.9  F (36.6  C) (Oral)   Resp 16   Ht 1.56 m (5' 1.42\")   Wt 94.8 kg (209 lb)   SpO2 96%   BMI 38.95 kg/m     Estimated body mass index is 38.95 kg/m  as calculated from the following:    Height as of this encounter: 1.56 m (5' 1.42\").    Weight as of this encounter: 94.8 kg (209 lb).    Physical Exam  GENERAL: alert and no distress  EYES: Eyes grossly normal to inspection, PERRL and conjunctivae and sclerae normal  HENT: ear canals and TM's normal, nose and mouth without ulcers or lesions  NECK: no adenopathy, no asymmetry, masses, or scars  RESP: lungs clear to auscultation - no rales, rhonchi or wheezes  CV: regular rate and rhythm, normal S1 S2, no S3 or S4, no murmur, click or rub, no peripheral edema  MS: normal gait   NEURO: Normal strength and tone, mentation intact and speech normal  PSYCH: mentation appears normal, affect normal/bright  Diabetic foot exam: normal monofilament exam and dry cracking heels        5/6/2024   Mini Cog   Clock Draw Score 2 Normal   3 " Item Recall 2 objects recalled   Mini Cog Total Score 4              Signed Electronically by: Анна Rubio MD

## 2024-05-06 NOTE — PATIENT INSTRUCTIONS
"Did you know you can lower your blood pressure with your daily habits?    *Losing 20 pounds of weight lowers blood pressure 5 to 20 points.  *Eating a low-fat diet rich in fruits, vegetables and low-fat dairy lowers blood pressure 8 to 14 points.  *Eating a low-salt diet lowers blood pressure 2 to 8 points.  *Exercising regularly lowers blood pressure 4 to 9 points.  *Reducing alcohol use lowers blood pressure 2 to 4 points.     Preventive Care Advice   This is general advice we often give to help people stay healthy. Your care team may have specific advice just for you. Please talk to your care team about your own preventive care needs.  Lifestyle  Exercise at least 150 minutes each week (30 minutes a day, 5 days a week).  Do muscle strengthening activities 2 days a week. These help control your weight and prevent disease.  No smoking.  Wear sunscreen to prevent skin cancer.  Have your home tested for radon every 2 to 5 years. Radon is a colorless, odorless gas that can harm your lungs. To learn more, go to www.health.[facility].mn. and search for \"Radon in Homes.\"  Keep guns unloaded and locked up in a safe place like a safe or gun vault, or, use a gun lock and hide the keys. Always lock away bullets separately. To learn more, visit [employer].mn.gov and search for \"safe gun storage.\"  Nutrition  Eat 5 or more servings of fruits and vegetables each day.  Try wheat bread, brown rice and whole grain pasta (instead of white bread, rice, and pasta).  Get enough calcium and vitamin D. Check the label on foods and aim for 100% of the RDA (recommended daily allowance).  Regular exams  Have a dental exam and cleaning every 6 months.  See your health care team every year to talk about:  Any changes in your health.  Any medicines your care team has prescribed.  Preventive care, family planning, and ways to prevent chronic diseases.  Shots (vaccines)   HPV shots (up to age 26), if you've never had them before.  Hepatitis B shots (up " to age 59), if you've never had them before.  COVID-19 shot: Get this shot when it's due.  Flu shot: Get a flu shot every year.  Tetanus shot: Get a tetanus shot every 10 years.  Pneumococcal, hepatitis A, and RSV shots: Ask your care team if you need these based on your risk.  Shingles shot (for age 50 and up).  General health tests  Diabetes screening:  Starting at age 35, Get screened for diabetes at least every 3 years.  If you are younger than age 35, ask your care team if you should be screened for diabetes.  Cholesterol test: At age 39, start having a cholesterol test every 5 years, or more often if advised.  Bone density scan (DEXA): At age 50, ask your care team if you should have this scan for osteoporosis (brittle bones).  Hepatitis C: Get tested at least once in your life.  Abdominal aortic aneurysm screening: Talk to your doctor about having this screening if you:  Have ever smoked; and  Are biologically male; and  Are between the ages of 65 and 75.  STIs (sexually transmitted infections)  Before age 24: Ask your care team if you should be screened for STIs.  After age 24: Get screened for STIs if you're at risk. You are at risk for STIs (including HIV) if:  You are sexually active with more than one person.  You don't use condoms every time.  You or a partner was diagnosed with a sexually transmitted infection.  If you are at risk for HIV, ask about PrEP medicine to prevent HIV.  Get tested for HIV at least once in your life, whether you are at risk for HIV or not.  Cancer screening tests  Cervical cancer screening: If you have a cervix, begin getting regular cervical cancer screening tests at age 21. Most people who have regular screenings with normal results can stop after age 65. Talk about this with your provider.  Breast cancer scan (mammogram): If you've ever had breasts, begin having regular mammograms starting at age 40. This is a scan to check for breast cancer.  Colon cancer screening: It is  important to start screening for colon cancer at age 45.  Have a colonoscopy test every 10 years (or more often if you're at risk) Or, ask your provider about stool tests like a FIT test every year or Cologuard test every 3 years.  To learn more about your testing options, visit: www.[employer]/030343.pdf.  For help making a decision, visit: jaiden/mo01817.  Prostate cancer screening test: If you have a prostate and are age 55 to 69, ask your provider if you would benefit from a yearly prostate cancer screening test.  Lung cancer screening: If you are a current or former smoker age 50 to 80, ask your care team if ongoing lung cancer screenings are right for you.  For informational purposes only. Not to replace the advice of your health care provider. Copyright   2023 [place] [employer]. All rights reserved. Clinically reviewed by the [facility] Trans[facility] Program. Sqeeqee [id number] - REV 04/24.    Learning About Sleeping Well  What does sleeping well mean?     Sleeping well means getting enough sleep to feel good and stay healthy. How much sleep is enough varies among people.  The number of hours you sleep and how you feel when you wake up are both important. If you do not feel refreshed, you probably need more sleep. Another sign of not getting enough sleep is feeling tired during the day.  Experts recommend that adults get at least 7 or more hours of sleep per day. Children and older adults need more sleep.  Why is getting enough sleep important?  Getting enough quality sleep is a basic part of good health. When your sleep suffers, your physical health, mood, and your thoughts can suffer too. You may find yourself feeling more grumpy or stressed. Not getting enough sleep also can lead to serious problems, including injury, accidents, anxiety, and depression.  What might cause poor sleeping?  Many things can cause sleep problems, including:  Changes to your sleep schedule.  Stress. Stress can be  "caused by fear about a single event, such as giving a speech. Or you may have ongoing stress, such as worry about work or school.  Depression, anxiety, and other mental or emotional conditions.  Changes in your sleep habits or surroundings. This includes changes that happen where you sleep, such as noise, light, or sleeping in a different bed. It also includes changes in your sleep pattern, such as having jet lag or working a late shift.  Health problems, such as pain, breathing problems, and restless legs syndrome.  Lack of regular exercise.  Using alcohol, nicotine, or caffeine before bed.  How can you help yourself?  Here are some tips that may help you sleep more soundly and wake up feeling more refreshed.  Your sleeping area   Use your bedroom only for sleeping and sex. A bit of light reading may help you fall asleep. But if it doesn't, do your reading elsewhere in the house. Try not to use your TV, computer, smartphone, or tablet while you are in bed.  Be sure your bed is big enough to stretch out comfortably, especially if you have a sleep partner.  Keep your bedroom quiet, dark, and cool. Use curtains, blinds, or a sleep mask to block out light. To block out noise, use earplugs, soothing music, or a \"white noise\" machine.  Your evening and bedtime routine   Create a relaxing bedtime routine. You might want to take a warm shower or bath, or listen to soothing music.  Go to bed at the same time every night. And get up at the same time every morning, even if you feel tired.  What to avoid   Limit caffeine (coffee, tea, caffeinated sodas) during the day, and don't have any for at least 6 hours before bedtime.  Avoid drinking alcohol before bedtime. Alcohol can cause you to wake up more often during the night.  Try not to smoke or use tobacco, especially in the evening. Nicotine can keep you awake.  Limit naps during the day, especially close to bedtime.  Avoid lying in bed awake for too long. If you can't fall " "asleep or if you wake up in the middle of the night and can't get back to sleep within about 20 minutes, get out of bed and go to another room until you feel sleepy.  Avoid taking medicine right before bed that may keep you awake or make you feel hyper or energized. Your doctor can tell you if your medicine may do this and if you can take it earlier in the day.  If you can't sleep   Imagine yourself in a peaceful, pleasant scene. Focus on the details and feelings of being in a place that is relaxing.  Get up and do a quiet or boring activity until you feel sleepy.  Avoid drinking any liquids before going to bed to help prevent waking up often to use the bathroom.  Where can you learn more?  Go to https://www.Roadmap.net/patiented  Enter J942 in the search box to learn more about \"Learning About Sleeping Well.\"  Current as of: July 10, 2023               Content Version: 14.0    5378-5502 [employer].   Care instructions adapted under license by your healthcare professional. If you have questions about a medical condition or this instruction, always ask your healthcare professional. Healthwise, [employer] disclaims any warranty or liability for your use of this information.      "

## 2024-05-07 LAB
CHOLEST SERPL-MCNC: 164 MG/DL
FASTING STATUS PATIENT QL REPORTED: NO
HDLC SERPL-MCNC: 48 MG/DL
LDLC SERPL CALC-MCNC: 78 MG/DL
NONHDLC SERPL-MCNC: 116 MG/DL
TRIGL SERPL-MCNC: 188 MG/DL
TSH SERPL DL<=0.005 MIU/L-ACNC: 1.16 UIU/ML (ref 0.3–4.2)

## 2024-05-08 NOTE — RESULT ENCOUNTER NOTE
Kenna,    These are good results. Your thyroid test is normal. Your cholesterol and blood glucose are controlled.     Анна Rubio MD

## 2024-05-16 ENCOUNTER — ANCILLARY PROCEDURE (OUTPATIENT)
Dept: MAMMOGRAPHY | Facility: CLINIC | Age: 69
End: 2024-05-16
Attending: FAMILY MEDICINE
Payer: COMMERCIAL

## 2024-05-16 ENCOUNTER — OFFICE VISIT (OUTPATIENT)
Dept: OPTOMETRY | Facility: CLINIC | Age: 69
End: 2024-05-16
Payer: COMMERCIAL

## 2024-05-16 DIAGNOSIS — H52.31 ANISOMETROPIA: ICD-10-CM

## 2024-05-16 DIAGNOSIS — H02.834 DERMATOCHALASIS OF BOTH UPPER EYELIDS: ICD-10-CM

## 2024-05-16 DIAGNOSIS — H02.831 DERMATOCHALASIS OF BOTH UPPER EYELIDS: ICD-10-CM

## 2024-05-16 DIAGNOSIS — E11.9 TYPE 2 DIABETES MELLITUS WITHOUT RETINOPATHY (H): ICD-10-CM

## 2024-05-16 DIAGNOSIS — H52.221 REGULAR ASTIGMATISM OF RIGHT EYE: ICD-10-CM

## 2024-05-16 DIAGNOSIS — H52.4 PRESBYOPIA: ICD-10-CM

## 2024-05-16 DIAGNOSIS — Z01.01 ENCOUNTER FOR EXAMINATION OF EYES AND VISION WITH ABNORMAL FINDINGS: Primary | ICD-10-CM

## 2024-05-16 DIAGNOSIS — H25.13 NUCLEAR AGE-RELATED CATARACT, BOTH EYES: ICD-10-CM

## 2024-05-16 DIAGNOSIS — Z12.31 VISIT FOR SCREENING MAMMOGRAM: ICD-10-CM

## 2024-05-16 DIAGNOSIS — H52.02 HYPERMETROPIA, LEFT: ICD-10-CM

## 2024-05-16 DIAGNOSIS — H52.11 MYOPIA, RIGHT: ICD-10-CM

## 2024-05-16 PROCEDURE — 77063 BREAST TOMOSYNTHESIS BI: CPT | Mod: TC | Performed by: RADIOLOGY

## 2024-05-16 PROCEDURE — 77067 SCR MAMMO BI INCL CAD: CPT | Mod: TC | Performed by: RADIOLOGY

## 2024-05-16 PROCEDURE — 92015 DETERMINE REFRACTIVE STATE: CPT | Performed by: OPTOMETRIST

## 2024-05-16 PROCEDURE — 92014 COMPRE OPH EXAM EST PT 1/>: CPT | Performed by: OPTOMETRIST

## 2024-05-16 ASSESSMENT — REFRACTION_WEARINGRX
OD_CYLINDER: +1.75
OD_SPHERE: -1.00
OD_ADD: +2.50
OS_CYLINDER: SPHERE
OS_SPHERE: +1.50
OD_AXIS: 005
SPECS_TYPE: BIFOCAL
OS_ADD: +2.50

## 2024-05-16 ASSESSMENT — REFRACTION_MANIFEST
OS_CYLINDER: SPHERE
OD_CYLINDER: +1.75
OS_ADD: +2.50
OD_AXIS: 007
OD_SPHERE: -1.50
OD_ADD: +2.50
OS_SPHERE: +1.25

## 2024-05-16 ASSESSMENT — CONF VISUAL FIELD
OS_NORMAL: 1
OD_SUPERIOR_TEMPORAL_RESTRICTION: 0
OD_SUPERIOR_NASAL_RESTRICTION: 0
OS_INFERIOR_NASAL_RESTRICTION: 0
OD_INFERIOR_TEMPORAL_RESTRICTION: 0
OS_SUPERIOR_NASAL_RESTRICTION: 0
OS_SUPERIOR_TEMPORAL_RESTRICTION: 0
OD_INFERIOR_NASAL_RESTRICTION: 0
OS_INFERIOR_TEMPORAL_RESTRICTION: 0
METHOD: COUNTING FINGERS
OD_NORMAL: 1

## 2024-05-16 ASSESSMENT — CUP TO DISC RATIO
OS_RATIO: 0.4
OD_RATIO: 0.4

## 2024-05-16 ASSESSMENT — EXTERNAL EXAM - RIGHT EYE: OD_EXAM: NORMAL

## 2024-05-16 ASSESSMENT — TONOMETRY
OS_IOP_MMHG: 16
OD_IOP_MMHG: 16
IOP_METHOD: APPLANATION

## 2024-05-16 ASSESSMENT — VISUAL ACUITY
OS_SC: 20/60
OS_SC: 20/200
CORRECTION_TYPE: GLASSES
METHOD: SNELLEN - LINEAR
OD_CC: 20/25
OD_SC: 20/80-1
OD_CC: 20/20
OS_CC: 20/20
OS_CC: 20/25
OD_SC: 20/40
OD_CC+: -1

## 2024-05-16 ASSESSMENT — SLIT LAMP EXAM - LIDS
COMMENTS: 2-3+ DERMATOCHALASIS
COMMENTS: 2-3+ DERMATOCHALASIS

## 2024-05-16 ASSESSMENT — EXTERNAL EXAM - LEFT EYE: OS_EXAM: NORMAL

## 2024-05-16 NOTE — LETTER
5/16/2024         RE: Kenna MANE Ruben  [address] [place] [address]        Dear Colleague,    Thank you for referring your patient, Kenna Miranda, to the [facility]. Please see a copy of my visit note below.    Chief Complaint   Patient presents with     Diabetic Eye Exam     Cataract        Chief Complaint(s) and History of Present Illness(es)       Diabetic Eye Exam              Diabetes Type: Type 2 and taking oral medications    Duration: years    Blood Sugars: is controlled (Only checks at PCP appts)              Cataract                    Lab Results   Component Value Date    A1C 6.8 05/06/2024    A1C 6.8 01/29/2024    A1C 7.9 10/24/2023    A1C 7.1 04/24/2023    A1C 7.3 05/26/2022    A1C 6.7 11/10/2020    A1C 7.1 09/17/2019    A1C 11.4 06/20/2019    A1C 8.6 09/18/2018    A1C 6.9 09/05/2017        -Cataracts each eye     Last Eye Exam: 5/8/2023   Dilated Previously: Yes, side effects of dilation explained today    What are you currently using to see?  Glasses - FT BF - wears full time     Distance Vision Acuity: Satisfied with vision    Near Vision Acuity: Satisfied with vision while reading and using computer with glasses    Eye Comfort: good  Do you use eye drops? : No  Occupation or Hobbies: Retired     Jennifer Riggs  Optometry Assistant     Medical, surgical and family histories reviewed and updated 5/16/2024.       OBJECTIVE: See Ophthalmology exam    ASSESSMENT:    ICD-10-CM    1. Encounter for examination of eyes and vision with abnormal findings  Z01.01       2. Type 2 diabetes mellitus without retinopathy (H)  E11.9       3. Nuclear age-related cataract, both eyes  H25.13       4. Dermatochalasis of both upper eyelids  H02.831     H02.834       5. Anisometropia  H52.31       6. Myopia, right  H52.11       7. Regular astigmatism of right eye  H52.221       8. Hypermetropia, left  H52.02       9. Presbyopia  H52.4           PLAN:    Kenna Miranda  aware  eye exam results will be sent to Анна Rubio.  Patient Instructions   Patient educated on importance of good blood sugar control.  Letter sent to primary care provider with diabetic eye exam report.     You have the start of mild cataracts.  You may notice some blurred vision or glare with night driving.  It is important that you wear good sunglasses to protect your eyes from the ultraviolet light from the sun.     Updated glasses prescription provided today.   Allow 2 weeks to adapt to the new glasses.     Return in 1 year for a comprehensive eye exam, or sooner if needed.      The effects of the dilating drops last for 4- 6 hours.  You will be more sensitive to light and vision will be blurry up close.  Mydriatic sunglasses were given if needed.     Fracisco Lopes, LB  [address] [place]  [address]    [phone]           Again, thank you for allowing me to participate in the care of your patient.        Sincerely,        Fracisco Lopes OD

## 2024-05-16 NOTE — PROGRESS NOTES
Chief Complaint   Patient presents with    Diabetic Eye Exam    Cataract        Chief Complaint(s) and History of Present Illness(es)       Diabetic Eye Exam              Diabetes Type: Type 2 and taking oral medications    Duration: years    Blood Sugars: is controlled (Only checks at PCP appts)              Cataract                    Lab Results   Component Value Date    A1C 6.8 05/06/2024    A1C 6.8 01/29/2024    A1C 7.9 10/24/2023    A1C 7.1 04/24/2023    A1C 7.3 05/26/2022    A1C 6.7 11/10/2020    A1C 7.1 09/17/2019    A1C 11.4 06/20/2019    A1C 8.6 09/18/2018    A1C 6.9 09/05/2017        -Cataracts each eye     Last Eye Exam: 5/8/2023   Dilated Previously: Yes, side effects of dilation explained today    What are you currently using to see?  Glasses - FT BF - wears full time     Distance Vision Acuity: Satisfied with vision    Near Vision Acuity: Satisfied with vision while reading and using computer with glasses    Eye Comfort: good  Do you use eye drops? : No  Occupation or Hobbies: Retired     Jennifer Riggs  Optometry Assistant     Medical, surgical and family histories reviewed and updated 5/16/2024.       OBJECTIVE: See Ophthalmology exam    ASSESSMENT:    ICD-10-CM    1. Encounter for examination of eyes and vision with abnormal findings  Z01.01       2. Type 2 diabetes mellitus without retinopathy (H)  E11.9       3. Nuclear age-related cataract, both eyes  H25.13       4. Dermatochalasis of both upper eyelids  H02.831     H02.834       5. Anisometropia  H52.31       6. Myopia, right  H52.11       7. Regular astigmatism of right eye  H52.221       8. Hypermetropia, left  H52.02       9. Presbyopia  H52.4           PLAN:    Kenna Miranda aware  eye exam results will be sent to Анна Rubio.  Patient Instructions   Patient educated on importance of good blood sugar control.  Letter sent to primary care provider with diabetic eye exam report.     You have the start of mild cataracts.   You may notice some blurred vision or glare with night driving.  It is important that you wear good sunglasses to protect your eyes from the ultraviolet light from the sun.     Updated glasses prescription provided today.   Allow 2 weeks to adapt to the new glasses.     Return in 1 year for a comprehensive eye exam, or sooner if needed.      The effects of the dilating drops last for 4- 6 hours.  You will be more sensitive to light and vision will be blurry up close.  Mydriatic sunglasses were given if needed.     Fracisco Lopes, OD  [address]  [place]  [address]    [phone]

## 2024-05-16 NOTE — PATIENT INSTRUCTIONS
Patient educated on importance of good blood sugar control.  Letter sent to primary care provider with diabetic eye exam report.     You have the start of mild cataracts.  You may notice some blurred vision or glare with night driving.  It is important that you wear good sunglasses to protect your eyes from the ultraviolet light from the sun.     Updated glasses prescription provided today.   Allow 2 weeks to adapt to the new glasses.     Return in 1 year for a comprehensive eye exam, or sooner if needed.      The effects of the dilating drops last for 4- 6 hours.  You will be more sensitive to light and vision will be blurry up close.  Mydriatic sunglasses were given if needed.     Fracisco Lopes, LB  [address] [place]  [address]    [phone]

## 2024-06-18 ENCOUNTER — TELEPHONE (OUTPATIENT)
Dept: FAMILY MEDICINE | Facility: CLINIC | Age: 69
End: 2024-06-18
Payer: COMMERCIAL

## 2024-06-18 NOTE — TELEPHONE ENCOUNTER
Patient Quality Outreach    Patient is due for the following:   Hypertension -  BP check    Next Steps:   Schedule a nurse only visit for blood pressure check.    Type of outreach:    Sent [employer] message.    Next Steps:  Reach out within 90 days via PANOSOL[employer].    Max number of attempts reached: Yes. Will try again in 90 days if patient still on fail list.    Questions for provider review:    None           Jennifer Godinez, [facility]  Chart routed to Care Team.

## 2024-07-24 DIAGNOSIS — E78.5 HYPERLIPIDEMIA LDL GOAL <100: ICD-10-CM

## 2024-07-24 RX ORDER — ATORVASTATIN CALCIUM 20 MG/1
20 TABLET, FILM COATED ORAL
Qty: 24 TABLET | Refills: 3 | OUTPATIENT
Start: 2024-07-25

## 2024-08-09 ENCOUNTER — TRANSFERRED RECORDS (OUTPATIENT)
Dept: HEALTH INFORMATION MANAGEMENT | Facility: CLINIC | Age: 69
End: 2024-08-09
Payer: COMMERCIAL

## 2024-09-08 ENCOUNTER — HEALTH MAINTENANCE LETTER (OUTPATIENT)
Age: 69
End: 2024-09-08

## 2024-10-08 DIAGNOSIS — E78.5 HYPERLIPIDEMIA LDL GOAL <100: ICD-10-CM

## 2024-10-08 DIAGNOSIS — I10 ESSENTIAL HYPERTENSION WITH GOAL BLOOD PRESSURE LESS THAN 140/90: ICD-10-CM

## 2024-10-08 DIAGNOSIS — E03.9 HYPOTHYROIDISM, UNSPECIFIED TYPE: ICD-10-CM

## 2024-10-08 RX ORDER — METOPROLOL TARTRATE 25 MG/1
25 TABLET, FILM COATED ORAL 2 TIMES DAILY
Qty: 180 TABLET | Refills: 0 | Status: SHIPPED | OUTPATIENT
Start: 2024-10-08

## 2024-10-08 RX ORDER — ATORVASTATIN CALCIUM 20 MG/1
20 TABLET, FILM COATED ORAL
Qty: 24 TABLET | Refills: 1 | Status: SHIPPED | OUTPATIENT
Start: 2024-10-10

## 2024-10-08 RX ORDER — LEVOTHYROXINE SODIUM 112 UG/1
112 TABLET ORAL DAILY
Qty: 90 TABLET | Refills: 1 | Status: SHIPPED | OUTPATIENT
Start: 2024-10-08

## 2024-10-16 ENCOUNTER — TELEPHONE (OUTPATIENT)
Dept: FAMILY MEDICINE | Facility: CLINIC | Age: 69
End: 2024-10-16
Payer: COMMERCIAL

## 2024-10-16 NOTE — TELEPHONE ENCOUNTER
Patient Quality Outreach    Patient is due for the following:   Diabetes -  A1C and Microalbumin  Hypertension -  BP check      Topic Date Due    Flu Vaccine (1) 09/01/2024    COVID-19 Vaccine (7 - 2024-25 season) 09/01/2024       Next Steps:   Schedule a nurse only visit for blood pressure lab only visit for A1C    Type of outreach:    Sent [employer] message.    Next Steps:  Reach out within 90 days via [employer].    Max number of attempts reached: Yes. Will try again in 90 days if patient still on fail list.    Questions for provider review:    None           Jennifer Godinez [facility]  Chart routed to Care Team.

## 2024-10-21 ENCOUNTER — LAB (OUTPATIENT)
Dept: LAB | Facility: CLINIC | Age: 69
End: 2024-10-21
Payer: COMMERCIAL

## 2024-10-21 DIAGNOSIS — N18.30 CKD (CHRONIC KIDNEY DISEASE) STAGE 3, GFR 30-59 ML/MIN (H): Primary | ICD-10-CM

## 2024-10-21 DIAGNOSIS — N18.31 TYPE 2 DIABETES MELLITUS WITH STAGE 3A CHRONIC KIDNEY DISEASE, WITHOUT LONG-TERM CURRENT USE OF INSULIN (H): ICD-10-CM

## 2024-10-21 DIAGNOSIS — E11.22 TYPE 2 DIABETES MELLITUS WITH STAGE 3A CHRONIC KIDNEY DISEASE, WITHOUT LONG-TERM CURRENT USE OF INSULIN (H): ICD-10-CM

## 2024-10-21 LAB
ANION GAP SERPL CALCULATED.3IONS-SCNC: 9 MMOL/L (ref 7–15)
BUN SERPL-MCNC: 15.5 MG/DL (ref 8–23)
CALCIUM SERPL-MCNC: 9.7 MG/DL (ref 8.8–10.4)
CHLORIDE SERPL-SCNC: 103 MMOL/L (ref 98–107)
CREAT SERPL-MCNC: 1.18 MG/DL (ref 0.51–0.95)
CREAT UR-MCNC: 197 MG/DL
EGFRCR SERPLBLD CKD-EPI 2021: 49 ML/MIN/1.73M2
EST. AVERAGE GLUCOSE BLD GHB EST-MCNC: 140 MG/DL
GLUCOSE SERPL-MCNC: 124 MG/DL (ref 70–99)
HBA1C MFR BLD: 6.5 % (ref 0–5.6)
HCO3 SERPL-SCNC: 29 MMOL/L (ref 22–29)
MICROALBUMIN UR-MCNC: 13.9 MG/L
MICROALBUMIN/CREAT UR: 7.06 MG/G CR (ref 0–25)
POTASSIUM SERPL-SCNC: 4 MMOL/L (ref 3.4–5.3)
SODIUM SERPL-SCNC: 141 MMOL/L (ref 135–145)

## 2024-10-21 PROCEDURE — 36415 COLL VENOUS BLD VENIPUNCTURE: CPT

## 2024-10-21 PROCEDURE — 82043 UR ALBUMIN QUANTITATIVE: CPT

## 2024-10-21 PROCEDURE — 83036 HEMOGLOBIN GLYCOSYLATED A1C: CPT

## 2024-10-21 PROCEDURE — 80048 BASIC METABOLIC PNL TOTAL CA: CPT

## 2024-10-21 PROCEDURE — 82570 ASSAY OF URINE CREATININE: CPT

## 2024-10-22 NOTE — RESULT ENCOUNTER NOTE
Drew Pierson,    Your kidney tests are stable.     Your blood glucose is controlled.     Анна Rubio MD

## 2024-12-31 DIAGNOSIS — I10 ESSENTIAL HYPERTENSION WITH GOAL BLOOD PRESSURE LESS THAN 140/90: ICD-10-CM

## 2024-12-31 RX ORDER — METOPROLOL TARTRATE 25 MG/1
25 TABLET, FILM COATED ORAL 2 TIMES DAILY
Qty: 180 TABLET | Refills: 0 | Status: SHIPPED | OUTPATIENT
Start: 2024-12-31

## 2025-02-08 ENCOUNTER — HEALTH MAINTENANCE LETTER (OUTPATIENT)
Age: 70
End: 2025-02-08

## 2025-03-04 DIAGNOSIS — E66.813 CLASS 3 SEVERE OBESITY DUE TO EXCESS CALORIES WITH SERIOUS COMORBIDITY AND BODY MASS INDEX (BMI) OF 40.0 TO 44.9 IN ADULT (H): ICD-10-CM

## 2025-03-04 DIAGNOSIS — N18.31 TYPE 2 DIABETES MELLITUS WITH STAGE 3A CHRONIC KIDNEY DISEASE, WITHOUT LONG-TERM CURRENT USE OF INSULIN (H): ICD-10-CM

## 2025-03-04 DIAGNOSIS — E11.22 TYPE 2 DIABETES MELLITUS WITH STAGE 3A CHRONIC KIDNEY DISEASE, WITHOUT LONG-TERM CURRENT USE OF INSULIN (H): ICD-10-CM

## 2025-03-04 DIAGNOSIS — E66.01 CLASS 3 SEVERE OBESITY DUE TO EXCESS CALORIES WITH SERIOUS COMORBIDITY AND BODY MASS INDEX (BMI) OF 40.0 TO 44.9 IN ADULT (H): ICD-10-CM

## 2025-03-04 DIAGNOSIS — Z29.11 NEED FOR VACCINATION AGAINST RESPIRATORY SYNCYTIAL VIRUS: ICD-10-CM

## 2025-03-04 PROBLEM — K59.00 CONSTIPATION: Status: RESOLVED | Noted: 2020-12-14 | Resolved: 2025-03-04

## 2025-03-04 RX ORDER — ORAL SEMAGLUTIDE 7 MG/1
TABLET ORAL
Qty: 90 TABLET | Refills: 0 | Status: SHIPPED | OUTPATIENT
Start: 2025-03-04

## 2025-03-13 ENCOUNTER — OFFICE VISIT (OUTPATIENT)
Dept: FAMILY MEDICINE | Facility: CLINIC | Age: 70
End: 2025-03-13
Payer: COMMERCIAL

## 2025-03-13 VITALS
OXYGEN SATURATION: 96 % | DIASTOLIC BLOOD PRESSURE: 76 MMHG | SYSTOLIC BLOOD PRESSURE: 138 MMHG | HEIGHT: 61 IN | WEIGHT: 218 LBS | BODY MASS INDEX: 41.16 KG/M2 | TEMPERATURE: 98.1 F | HEART RATE: 76 BPM

## 2025-03-13 DIAGNOSIS — N18.31 STAGE 3A CHRONIC KIDNEY DISEASE (H): ICD-10-CM

## 2025-03-13 DIAGNOSIS — E11.22 TYPE 2 DIABETES MELLITUS WITH STAGE 3A CHRONIC KIDNEY DISEASE, WITHOUT LONG-TERM CURRENT USE OF INSULIN (H): ICD-10-CM

## 2025-03-13 DIAGNOSIS — R39.89 URINARY PROBLEM: Primary | ICD-10-CM

## 2025-03-13 DIAGNOSIS — N18.31 TYPE 2 DIABETES MELLITUS WITH STAGE 3A CHRONIC KIDNEY DISEASE, WITHOUT LONG-TERM CURRENT USE OF INSULIN (H): ICD-10-CM

## 2025-03-13 DIAGNOSIS — I10 ESSENTIAL HYPERTENSION WITH GOAL BLOOD PRESSURE LESS THAN 140/90: ICD-10-CM

## 2025-03-13 LAB
ALBUMIN UR-MCNC: NEGATIVE MG/DL
APPEARANCE UR: CLEAR
BACTERIA #/AREA URNS HPF: ABNORMAL /HPF
BILIRUB UR QL STRIP: NEGATIVE
COLOR UR AUTO: YELLOW
EST. AVERAGE GLUCOSE BLD GHB EST-MCNC: 146 MG/DL
GLUCOSE UR STRIP-MCNC: NEGATIVE MG/DL
HBA1C MFR BLD: 6.7 % (ref 0–5.6)
HGB UR QL STRIP: ABNORMAL
KETONES UR STRIP-MCNC: NEGATIVE MG/DL
LEUKOCYTE ESTERASE UR QL STRIP: ABNORMAL
NITRATE UR QL: NEGATIVE
PH UR STRIP: 6 [PH] (ref 5–7)
RBC #/AREA URNS AUTO: ABNORMAL /HPF
SP GR UR STRIP: >=1.03 (ref 1–1.03)
SQUAMOUS #/AREA URNS AUTO: ABNORMAL /LPF
UROBILINOGEN UR STRIP-ACNC: 0.2 E.U./DL
WBC #/AREA URNS AUTO: ABNORMAL /HPF
WBC CLUMPS #/AREA URNS HPF: PRESENT /HPF

## 2025-03-13 NOTE — PROGRESS NOTES
Assessment & Plan     (R3.23) Urinary problem  (primary encounter diagnosis)  Comment: Increased urinary frequency and pain post-urination since Monday. No urgency, dysuria, fever, or flank pain. History of kidney stones and pyelonephritis. Differential includes UTI and possible kidney stone. Initiated Bactrim (sulfamethoxazole/trimethoprim) independently, but symptoms persist, suggesting resistance or incorrect treatment.    Plan: Microscopic and Culture - Lab Collect, UA         Microscopic with Reflex to Culture, Urine         Culture  - Ordered urinalysis and urine culture  - Continue Bactrim for a total of 5 days until culture results are available  - Encouraged increased fluid intake  - Consider kidney ultrasound if no resolution or if urine culture is negative    (E11.22,  N18.31) Type 2 diabetes mellitus with stage 3a chronic kidney disease, without long-term current use of insulin (H)  Comment: Well controlled, currently on Rybelsus 7 mg. No hyperglycemia symptoms such as polydipsia or polyphagia. Blood glucose not monitored at home. Due for A1c check. Increased urinary frequency could be related to elevated blood glucose levels, necessitating A1c evaluation.  Plan: HEMOGLOBIN A1C  - Order A1c test  - Monitor blood glucose levels at home if possible  -The current medical regimen is effective;  continue present plan and medications.     (I10) Essential hypertension with goal blood pressure less than 140/90  Comment: Blood pressure elevated at home and in office, around 150/80 mmHg. No chest pain, dyspnea, or headaches. Reports peripheral edema. Advised to reduce sodium intake and monitor blood pressure closely due to diabetes and kidney concerns. Target blood pressure is less than 130/80 mmHg to prevent further kidney damage.  Plan: Basic metabolic panel  (Ca, Cl, CO2, Creat,         Gluc, K, Na, BUN)  - Encourage reduction of sodium intake  - Monitor blood pressure at home, aiming for less than 130/80  "mmHg  - Report if blood pressure consistently exceeds 130/80 mmHg    (N18.31) Stage 3a chronic kidney disease (H)  Comment: History of kidney stones and pyelonephritis. No current flank pain. Last kidney function test in October. Monitoring due to diabetes and hypertension. Current symptoms and history necessitate checking kidney function to ensure no deterioration.  Plan: Basic metabolic panel  (Ca, Cl, CO2, Creat,         Gluc, K, Na, BUN)  - Order kidney function tests  - Reviewed previous kidney function test results from October    (Z68.41) Body mass index (BMI) 40.0-44.9, adult (H)  PLAN:   -Reinforced need for reduced calorie, low fat diet and increased physical   activity.        BMI  Estimated body mass index is 40.63 kg/m  as calculated from the following:    Height as of this encounter: 1.56 m (5' 1.42\").    Weight as of this encounter: 98.9 kg (218 lb).   {Weight Management Plan needed for ACO:630569}      {FOLLOW UP PLANS (Optional) Includes COVID19 Treatment Plan:744678}    Charles Pierson is a 69 year old, presenting for the following health issues:  Urinary Problem        3/13/2025     2:22 PM   Additional Questions   Roomed by Katharina BERRY CMA         3/13/2025     2:22 PM   Patient Reported Additional Medications   Patient reports taking the following new medications None     History of Present Illness       Reason for visit:  Urine pain  Symptom onset:  3-7 days ago  Symptoms include:  Pain  Symptom intensity:  Moderate  Symptom progression:  Worsening  Had these symptoms before:  Yes  Has tried/received treatment for these symptoms:  Yes  Previous treatment was successful:  Yes  Prior treatment description:  Medication  What makes it worse:  No  What makes it better:  No   She is taking medications regularly.      The patient, with a history of kidney stones, presents with urinary frequency and pain after urination. She is accompanied by her , who is a retired family practice doctor.    She " "has been experiencing urinary frequency and pain after urination since Monday. She urinates approximately five to six times during the day and experiences pain after urination, particularly when standing up, but not during urination itself. She also notes groin pain when standing. No urgency, painful urination, fever, flank pain, or discharge. She began taking leftover Septra (Bactrim) two days ago, missing one dose yesterday morning, and has taken a dose today. Initially, the medication seemed to help, but the pain returned today.   Pre-Provider Visit Orders- Urinalysis UA/UC  Patient reports the following symptoms:  discomfort, pain or burning with urination   Does the patient report any of the following symptoms: vaginal discharge, vaginal itching, possible yeast infection, has a urinary catheter in place, or unable to void in a specimen cup?  No    {OK to proceed with order Link to Pre-Provider Visit Standing Orders SmartSet :940578}  {SUPERLIST (Optional):876083}  {additonal problems for provider to add (Optional):257919}      Review of Systems  All systems negative except as detailed in HPI.       Objective    /76   Pulse 76   Temp 98.1  F (36.7  C) (Temporal)   Ht 1.56 m (5' 1.42\")   Wt 98.9 kg (218 lb)   SpO2 96%   BMI 40.63 kg/m    Body mass index is 40.63 kg/m .  Physical Exam  Constitutional:       Appearance: Normal appearance.   HENT:      Head: Normocephalic and atraumatic.   Cardiovascular:      Rate and Rhythm: Normal rate and regular rhythm.      Pulses: Normal pulses.      Heart sounds: Normal heart sounds.   Abdominal:      General: Bowel sounds are normal. There is no distension.      Tenderness: There is no abdominal tenderness. There is no right CVA tenderness or left CVA tenderness.   Musculoskeletal:      Cervical back: Normal range of motion.   Neurological:      Mental Status: She is alert and oriented to person, place, and time.   Psychiatric:         Mood and Affect: Mood " normal.         Behavior: Behavior normal.         Thought Content: Thought content normal.         Judgment: Judgment normal.      {Exam List (Optional):048773}    {Diagnostic Test Results (Optional):566572}        Signed Electronically by: PHUONG Goodwin CNP  {Email feedback regarding this note to [email or web address]   :534378}

## 2025-03-15 LAB — BACTERIA UR CULT: NO GROWTH

## 2025-03-18 DIAGNOSIS — E78.5 HYPERLIPIDEMIA LDL GOAL <100: ICD-10-CM

## 2025-03-18 RX ORDER — ATORVASTATIN CALCIUM 20 MG/1
20 TABLET, FILM COATED ORAL
Qty: 24 TABLET | Refills: 1 | Status: SHIPPED | OUTPATIENT
Start: 2025-03-20

## 2025-03-19 ENCOUNTER — TRANSFERRED RECORDS (OUTPATIENT)
Dept: HEALTH INFORMATION MANAGEMENT | Facility: CLINIC | Age: 70
End: 2025-03-19
Payer: COMMERCIAL

## 2025-03-22 ENCOUNTER — TRANSFERRED RECORDS (OUTPATIENT)
Dept: HEALTH INFORMATION MANAGEMENT | Facility: CLINIC | Age: 70
End: 2025-03-22
Payer: COMMERCIAL

## 2025-04-01 ENCOUNTER — TRANSFERRED RECORDS (OUTPATIENT)
Dept: HEALTH INFORMATION MANAGEMENT | Facility: CLINIC | Age: 70
End: 2025-04-01
Payer: COMMERCIAL

## 2025-04-10 DIAGNOSIS — E03.9 HYPOTHYROIDISM, UNSPECIFIED TYPE: ICD-10-CM

## 2025-04-10 DIAGNOSIS — I10 ESSENTIAL HYPERTENSION WITH GOAL BLOOD PRESSURE LESS THAN 140/90: ICD-10-CM

## 2025-04-10 RX ORDER — LOSARTAN POTASSIUM 50 MG/1
50 TABLET ORAL DAILY
Qty: 90 TABLET | Refills: 3 | Status: SHIPPED | OUTPATIENT
Start: 2025-04-10

## 2025-04-10 RX ORDER — LEVOTHYROXINE SODIUM 112 UG/1
112 TABLET ORAL DAILY
Qty: 90 TABLET | Refills: 0 | Status: SHIPPED | OUTPATIENT
Start: 2025-04-10

## 2025-04-10 RX ORDER — METOPROLOL TARTRATE 25 MG/1
25 TABLET, FILM COATED ORAL 2 TIMES DAILY
Qty: 180 TABLET | Refills: 0 | Status: SHIPPED | OUTPATIENT
Start: 2025-04-10

## 2025-04-22 ENCOUNTER — TRANSFERRED RECORDS (OUTPATIENT)
Dept: HEALTH INFORMATION MANAGEMENT | Facility: CLINIC | Age: 70
End: 2025-04-22
Payer: COMMERCIAL

## 2025-05-14 SDOH — HEALTH STABILITY: PHYSICAL HEALTH: ON AVERAGE, HOW MANY DAYS PER WEEK DO YOU ENGAGE IN MODERATE TO STRENUOUS EXERCISE (LIKE A BRISK WALK)?: 0 DAYS

## 2025-05-14 SDOH — HEALTH STABILITY: PHYSICAL HEALTH: ON AVERAGE, HOW MANY MINUTES DO YOU ENGAGE IN EXERCISE AT THIS LEVEL?: 0 MIN

## 2025-05-19 ENCOUNTER — OFFICE VISIT (OUTPATIENT)
Dept: OPTOMETRY | Facility: CLINIC | Age: 70
End: 2025-05-19
Payer: COMMERCIAL

## 2025-05-19 ENCOUNTER — OFFICE VISIT (OUTPATIENT)
Dept: FAMILY MEDICINE | Facility: CLINIC | Age: 70
End: 2025-05-19
Attending: FAMILY MEDICINE
Payer: COMMERCIAL

## 2025-05-19 VITALS
SYSTOLIC BLOOD PRESSURE: 137 MMHG | WEIGHT: 217 LBS | RESPIRATION RATE: 18 BRPM | DIASTOLIC BLOOD PRESSURE: 88 MMHG | HEART RATE: 73 BPM | OXYGEN SATURATION: 94 % | BODY MASS INDEX: 40.97 KG/M2 | HEIGHT: 61 IN | TEMPERATURE: 97.5 F

## 2025-05-19 DIAGNOSIS — H52.4 PRESBYOPIA: ICD-10-CM

## 2025-05-19 DIAGNOSIS — Z00.00 ENCOUNTER FOR MEDICARE ANNUAL WELLNESS EXAM: Primary | ICD-10-CM

## 2025-05-19 DIAGNOSIS — H52.31 ANISOMETROPIA: ICD-10-CM

## 2025-05-19 DIAGNOSIS — N18.31 TYPE 2 DIABETES MELLITUS WITH STAGE 3A CHRONIC KIDNEY DISEASE, WITHOUT LONG-TERM CURRENT USE OF INSULIN (H): ICD-10-CM

## 2025-05-19 DIAGNOSIS — E66.813 CLASS 3 SEVERE OBESITY DUE TO EXCESS CALORIES WITH SERIOUS COMORBIDITY AND BODY MASS INDEX (BMI) OF 40.0 TO 44.9 IN ADULT (H): ICD-10-CM

## 2025-05-19 DIAGNOSIS — E11.22 TYPE 2 DIABETES MELLITUS WITH STAGE 3A CHRONIC KIDNEY DISEASE, WITHOUT LONG-TERM CURRENT USE OF INSULIN (H): ICD-10-CM

## 2025-05-19 DIAGNOSIS — Z01.01 ENCOUNTER FOR EXAMINATION OF EYES AND VISION WITH ABNORMAL FINDINGS: Primary | ICD-10-CM

## 2025-05-19 DIAGNOSIS — E11.9 TYPE 2 DIABETES MELLITUS WITHOUT RETINOPATHY (H): ICD-10-CM

## 2025-05-19 DIAGNOSIS — H02.831 DERMATOCHALASIS OF BOTH UPPER EYELIDS: ICD-10-CM

## 2025-05-19 DIAGNOSIS — H52.11 MYOPIA, RIGHT: ICD-10-CM

## 2025-05-19 DIAGNOSIS — Z12.31 VISIT FOR SCREENING MAMMOGRAM: ICD-10-CM

## 2025-05-19 DIAGNOSIS — Z23 NEED FOR VACCINATION: ICD-10-CM

## 2025-05-19 DIAGNOSIS — H25.13 NUCLEAR AGE-RELATED CATARACT, BOTH EYES: ICD-10-CM

## 2025-05-19 DIAGNOSIS — E03.9 HYPOTHYROIDISM, UNSPECIFIED TYPE: ICD-10-CM

## 2025-05-19 DIAGNOSIS — H52.223 REGULAR ASTIGMATISM OF BOTH EYES: ICD-10-CM

## 2025-05-19 DIAGNOSIS — I10 ESSENTIAL HYPERTENSION WITH GOAL BLOOD PRESSURE LESS THAN 140/90: ICD-10-CM

## 2025-05-19 DIAGNOSIS — H02.834 DERMATOCHALASIS OF BOTH UPPER EYELIDS: ICD-10-CM

## 2025-05-19 DIAGNOSIS — H52.02 HYPERMETROPIA, LEFT: ICD-10-CM

## 2025-05-19 PROCEDURE — 99214 OFFICE O/P EST MOD 30 MIN: CPT | Mod: 25 | Performed by: FAMILY MEDICINE

## 2025-05-19 PROCEDURE — 3079F DIAST BP 80-89 MM HG: CPT | Performed by: FAMILY MEDICINE

## 2025-05-19 PROCEDURE — G2211 COMPLEX E/M VISIT ADD ON: HCPCS | Performed by: FAMILY MEDICINE

## 2025-05-19 PROCEDURE — 92014 COMPRE OPH EXAM EST PT 1/>: CPT | Performed by: OPTOMETRIST

## 2025-05-19 PROCEDURE — 3075F SYST BP GE 130 - 139MM HG: CPT | Performed by: FAMILY MEDICINE

## 2025-05-19 PROCEDURE — G0439 PPPS, SUBSEQ VISIT: HCPCS | Performed by: FAMILY MEDICINE

## 2025-05-19 PROCEDURE — 2023F DILAT RTA XM W/O RTNOPTHY: CPT | Performed by: OPTOMETRIST

## 2025-05-19 RX ORDER — METOPROLOL TARTRATE 25 MG/1
25 TABLET, FILM COATED ORAL 2 TIMES DAILY
Qty: 180 TABLET | Refills: 4 | Status: SHIPPED | OUTPATIENT
Start: 2025-05-19

## 2025-05-19 RX ORDER — LEVOTHYROXINE SODIUM 112 UG/1
112 TABLET ORAL DAILY
Qty: 90 TABLET | Refills: 4 | Status: SHIPPED | OUTPATIENT
Start: 2025-05-19

## 2025-05-19 RX ORDER — ORAL SEMAGLUTIDE 7 MG/1
7 TABLET ORAL DAILY
Qty: 90 TABLET | Refills: 4 | Status: SHIPPED | OUTPATIENT
Start: 2025-05-19

## 2025-05-19 ASSESSMENT — EXTERNAL EXAM - LEFT EYE: OS_EXAM: NORMAL

## 2025-05-19 ASSESSMENT — REFRACTION_WEARINGRX
SPECS_TYPE: BIFOCAL
OD_SPHERE: -1.00
OD_CYLINDER: +1.75
OD_AXIS: 005
OS_ADD: +2.50
OS_SPHERE: +1.50
OD_ADD: +2.50
OS_CYLINDER: SPHERE

## 2025-05-19 ASSESSMENT — REFRACTION_MANIFEST
OD_SPHERE: -1.50
OS_AXIS: 165
OS_SPHERE: +1.00
OS_CYLINDER: +1.25
OD_ADD: +2.50
OD_CYLINDER: +2.00
OD_AXIS: 022
OS_ADD: +2.50

## 2025-05-19 ASSESSMENT — VISUAL ACUITY
OD_CC: 20/20
OS_CC: 20/25
OD_CC+: -1
CORRECTION_TYPE: GLASSES
METHOD: SNELLEN - LINEAR
OS_CC+: -1
OD_CC: 20/20
OS_CC: 20/20

## 2025-05-19 ASSESSMENT — SLIT LAMP EXAM - LIDS
COMMENTS: 2-3+ DERMATOCHALASIS
COMMENTS: 2-3+ DERMATOCHALASIS

## 2025-05-19 ASSESSMENT — CONF VISUAL FIELD
OD_SUPERIOR_NASAL_RESTRICTION: 0
OD_SUPERIOR_TEMPORAL_RESTRICTION: 0
OS_NORMAL: 1
OD_INFERIOR_NASAL_RESTRICTION: 0
OS_SUPERIOR_TEMPORAL_RESTRICTION: 0
METHOD: COUNTING FINGERS
OS_INFERIOR_NASAL_RESTRICTION: 0
OD_INFERIOR_TEMPORAL_RESTRICTION: 0
OD_NORMAL: 1
OS_INFERIOR_TEMPORAL_RESTRICTION: 0
OS_SUPERIOR_NASAL_RESTRICTION: 0

## 2025-05-19 ASSESSMENT — EXTERNAL EXAM - RIGHT EYE: OD_EXAM: NORMAL

## 2025-05-19 ASSESSMENT — CUP TO DISC RATIO
OD_RATIO: 0.4
OS_RATIO: 0.4

## 2025-05-19 ASSESSMENT — TONOMETRY
OD_IOP_MMHG: 16
IOP_METHOD: APPLANATION
OS_IOP_MMHG: 16

## 2025-05-19 NOTE — PROGRESS NOTES
Chief Complaint   Patient presents with    Diabetic Eye Exam        Chief Complaint(s) and History of Present Illness(es)       Diabetic Eye Exam              Diabetes Type: Type 2 and taking oral medications    Duration: years    Blood Sugars: is controlled                   Lab Results   Component Value Date    A1C 6.7 03/13/2025    A1C 6.5 10/21/2024    A1C 6.8 05/06/2024    A1C 6.8 01/29/2024    A1C 7.9 10/24/2023    A1C 6.7 11/10/2020    A1C 7.1 09/17/2019    A1C 11.4 06/20/2019    A1C 8.6 09/18/2018    A1C 6.9 09/05/2017       Last Eye Exam: 05/16/2024  Dilated Previously: Yes    What are you currently using to see?  Glasses FT bifocal - wears full time    Distance Vision Acuity: Satisfied with vision    Near Vision Acuity: Satisfied with vision while reading and using computer with glasses    Eye Comfort: good  Do you use eye drops? : No  Occupation or Hobbies: Retired    Katherine Pena    Optometric Assistant       Medical, surgical and family histories reviewed and updated 5/19/2025.       OBJECTIVE: See Ophthalmology exam    ASSESSMENT:    ICD-10-CM    1. Encounter for examination of eyes and vision with abnormal findings  Z01.01       2. Type 2 diabetes mellitus without retinopathy (H)  E11.9       3. Nuclear age-related cataract, both eyes  H25.13       4. Dermatochalasis of both upper eyelids  H02.831     H02.834       5. Anisometropia  H52.31       6. Myopia, right  H52.11       7. Hypermetropia, left  H52.02       8. Regular astigmatism of both eyes  H52.223       9. Presbyopia  H52.4           PLAN:    Kenna Miranda aware  eye exam results will be sent to Анна Rubio.  Patient Instructions   Patient educated on importance of good blood sugar control.  Letter sent to primary care provider with diabetic eye exam report.     You have the start of mild cataracts.  You may notice some blurred vision or glare with night driving.  It is important that you wear good sunglasses to protect  your eyes from the ultraviolet light from the sun.     Updated glasses prescription provided today. Optional to fill.     Return in 1 year for a comprehensive eye exam, or sooner if needed.      The effects of the dilating drops last for 4- 6 hours.  You will be more sensitive to light and vision will be blurry up close.  Mydriatic sunglasses were given if needed.     Fracisco Lopes, OD  [facility]  [address] [place] [address]. [place]  [place]  [address]    [phone]

## 2025-05-19 NOTE — PATIENT INSTRUCTIONS
Patient Education   Preventive Care Advice   This is general advice given by our system to help you stay healthy. However, your care team may have specific advice just for you. Please talk to your care team about your preventive care needs.  Nutrition  Eat 5 or more servings of fruits and vegetables each day.  Try wheat bread, brown rice and whole grain pasta (instead of white bread, rice, and pasta).  Get enough calcium and vitamin D. Check the label on foods and aim for 100% of the RDA (recommended daily allowance).  Lifestyle  Exercise at least 150 minutes each week  (30 minutes a day, 5 days a week).  Do muscle strengthening activities 2 days a week. These help control your weight and prevent disease.  No smoking.  Wear sunscreen to prevent skin cancer.  Have a dental exam and cleaning every 6 months.  Yearly exams  See your health care team every year to talk about:  Any changes in your health.  Any medicines your care team has prescribed.  Preventive care, family planning, and ways to prevent chronic diseases.  Shots (vaccines)   HPV shots (up to age 26), if you've never had them before.  Hepatitis B shots (up to age 59), if you've never had them before.  COVID-19 shot: Get this shot when it's due.  Flu shot: Get a flu shot every year.  Tetanus shot: Get a tetanus shot every 10 years.  Pneumococcal, hepatitis A, and RSV shots: Ask your care team if you need these based on your risk.  Shingles shot (for age 50 and up)  General health tests  Diabetes screening:  Starting at age 35, Get screened for diabetes at least every 3 years.  If you are younger than age 35, ask your care team if you should be screened for diabetes.  Cholesterol test: At age 39, start having a cholesterol test every 5 years, or more often if advised.  Bone density scan (DEXA): At age 50, ask your care team if you should have this scan for osteoporosis (brittle bones).  Hepatitis C: Get tested at least once in your life.  STIs (sexually  transmitted infections)  Before age 24: Ask your care team if you should be screened for STIs.  After age 24: Get screened for STIs if you're at risk. You are at risk for STIs (including HIV) if:  You are sexually active with more than one person.  You don't use condoms every time.  You or a partner was diagnosed with a sexually transmitted infection.  If you are at risk for HIV, ask about PrEP medicine to prevent HIV.  Get tested for HIV at least once in your life, whether you are at risk for HIV or not.  Cancer screening tests  Cervical cancer screening: If you have a cervix, begin getting regular cervical cancer screening tests starting at age 21.  Breast cancer scan (mammogram): If you've ever had breasts, begin having regular mammograms starting at age 40. This is a scan to check for breast cancer.  Colon cancer screening: It is important to start screening for colon cancer at age 45.  Have a colonoscopy test every 10 years (or more often if you're at risk) Or, ask your provider about stool tests like a FIT test every year or Cologuard test every 3 years.  To learn more about your testing options, visit:   .  For help making a decision, visit:   https://bit.ly/cc09757.  Prostate cancer screening test: If you have a prostate, ask your care team if a prostate cancer screening test (PSA) at age 55 is right for you.  Lung cancer screening: If you are a current or former smoker ages 50 to 80, ask your care team if ongoing lung cancer screenings are right for you.  For informational purposes only. Not to replace the advice of your health care provider. Copyright   2023 [facility]. All rights reserved. Clinically reviewed by the [facility] Transitions Program. [employer] 205105 - REV 01/24.  Learning About Activities of Daily Living  What are activities of daily living?     Activities of daily living (ADLs) are the basic self-care tasks you do every day. These include eating, bathing, dressing,  and moving around.  As you age, and if you have health problems, you may find that it's harder to do some of these tasks. If so, your doctor can suggest ideas that may help.  To measure what kind of help you may need, your doctor will ask how well you are able to do ADLs. Let your doctor know if there are any tasks that you are having trouble doing. This is an important first step to getting help. And when you have the help you need, you can stay as independent as possible.  How will a doctor assess your ADLs?  Asking about ADLs is part of a routine health checkup your doctor will likely do as you age. Your health check might be done in a doctor's office, in your home, or at a hospital. The goal is to find out if you are having any problems that could make it hard to care for yourself or that make it unsafe for you to be on your own.  To measure your ADLs, your doctor will ask how hard it is for you to do routine tasks. Your doctor may also want to know if you have changed the way you do a task because of a health problem. Your doctor may watch how you:  Walk back and forth.  Keep your balance while you stand or walk.  Move from sitting to standing or from a bed to a chair.  Button or unbutton a shirt or sweater.  Remove and put on your shoes.  It's common to feel a little worried or anxious if you find you can't do all the things you used to be able to do. Talking with your doctor about ADLs is a way to make sure you're as safe as possible and able to care for yourself as well as you can. You may want to bring a caregiver, friend, or family member to your checkup. They can help you talk to your doctor.  Follow-up care is a key part of your treatment and safety. Be sure to make and go to all appointments, and call your doctor if you are having problems. It's also a good idea to know your test results and keep a list of the medicines you take.  Current as of: October 24, 2024  Content Version: 14.4    1674-1454  [employer].   Care instructions adapted under license by your healthcare professional. If you have questions about a medical condition or this instruction, always ask your healthcare professional. [employer] disclaims any warranty or liability for your use of this information.    Hearing Loss: Care Instructions  Overview     Hearing loss is a sudden or slow decrease in how well you hear. It can range from slight to profound. Permanent hearing loss can occur with aging. It also can happen when you are exposed long-term to loud noise. Examples include listening to loud music, riding motorcycles, or being around other loud machines.  Hearing loss can affect your work and home life. It can make you feel lonely or depressed. You may feel that you have lost your independence. But hearing aids and other devices can help you hear better and feel connected to others.  Follow-up care is a key part of your treatment and safety. Be sure to make and go to all appointments, and call your doctor if you are having problems. It's also a good idea to know your test results and keep a list of the medicines you take.  How can you care for yourself at home?  Avoid loud noises whenever possible. This helps keep your hearing from getting worse.  Always wear hearing protection around loud noises.  Wear a hearing aid as directed.  A professional can help you pick a hearing aid that will work best for you.  You can also get hearing aids over the counter for mild to moderate hearing loss.  Have hearing tests as your doctor suggests. They can show whether your hearing has changed. Your hearing aid may need to be adjusted.  Use other devices as needed. These may include:  Telephone amplifiers and hearing aids that can connect to a television, stereo, radio, or microphone.  Devices that use lights or vibrations. These alert you to the doorbell, a ringing telephone, or a baby monitor.  Television closed-captioning. This  "shows the words at the bottom of the screen. Most new TVs can do this.  TTY (text telephone). This lets you type messages back and forth on the telephone instead of talking or listening. These devices are also called TDD. When messages are typed on the keyboard, they are sent over the phone line to a receiving TTY. The message is shown on a monitor.  Use text messaging, social media, and email if it is hard for you to communicate by telephone.  Try to learn a listening technique called speechreading. It is not lipreading. You pay attention to people's gestures, expressions, posture, and tone of voice. These clues can help you understand what a person is saying. Face the person you are talking to, and have them face you. Make sure the lighting is good. You need to see the other person's face clearly.  Think about counseling if you need help to adjust to your hearing loss.  When should you call for help?  Watch closely for changes in your health, and be sure to contact your doctor if:    You think your hearing is getting worse.     You have new symptoms, such as dizziness or nausea.   Where can you learn more?  Go to https://www.[employer].net/patiented  Enter R798 in the search box to learn more about \"Hearing Loss: Care Instructions.\"  Current as of: October 27, 2024  Content Version: 14.4    8304-3362 [employer].   Care instructions adapted under license by your healthcare professional. If you have questions about a medical condition or this instruction, always ask your healthcare professional. [employer] disclaims any warranty or liability for your use of this information.       "

## 2025-05-19 NOTE — PROGRESS NOTES
Preventive Care Visit  [facility] ABIGAIL Rubio MD, Family Medicine  May 19, 2025      Assessment & Plan     Encounter for Medicare annual wellness exam    Type 2 diabetes mellitus with stage 3a chronic kidney disease, without long-term current use of insulin (H)  Well controlled with medications without side effects.   - Semaglutide (RYBELSUS) 7 MG tablet; Take 1 tablet (7 mg) by mouth daily.  - OFFICE/OUTPT VISIT,EST,LEVL IV    Class 3 severe obesity due to excess calories with serious comorbidity and body mass index (BMI) of 40.0 to 44.9 in adult (H)  Discussed risks and benefits of this medication. Offered weight management referral and/or alterative injectable medication. Counseled to make better food choices, exercise as tolerated, and lose weight.   - Semaglutide (RYBELSUS) 7 MG tablet; Take 1 tablet (7 mg) by mouth daily.  - OFFICE/OUTPT VISIT,EST,LEVL IV  - naltrexone-bupropion (CONTRAVE) 8-90 MG per 12 hr tablet; Take 1 tablet by mouth 2 times daily.    Essential hypertension with goal blood pressure less than 140/90  Well controlled with medications without side effects.   - metoprolol tartrate (LOPRESSOR) 25 MG tablet; Take 1 tablet (25 mg) by mouth 2 times daily.  - OFFICE/OUTPT VISIT,EST,LEVL IV    Hypothyroidism, unspecified type  Euthyroid on replacement   - levothyroxine (SYNTHROID/LEVOTHROID) 112 MCG tablet; Take 1 tablet (112 mcg) by mouth daily.  - OFFICE/OUTPT VISIT,EST,LEVL IV    Visit for screening mammogram  - MA Screening Bilateral w/ Ramón; Future    Need for vaccination  - RSV vaccine, bivalent, ABRYSVO, injection; Inject 0.5 mLs into the muscle once for 1 dose. Pharmacist administered        The longitudinal plan of care for the diagnosis(es)/condition(s) as documented were addressed during this visit. Due to the added complexity in care, I will continue to support Kenna in the subsequent management and with ongoing continuity of care.    BMI  Estimated body mass  "index is 40.71 kg/m  as calculated from the following:    Height as of this encounter: 1.555 m (5' 1.22\").    Weight as of this encounter: 98.4 kg (217 lb).   Weight management plan: Discussed healthy diet and exercise guidelines    Counseling  Appropriate preventive services were addressed with this patient via screening, questionnaire, or discussion as appropriate for fall prevention, nutrition, physical activity, Tobacco-use cessation, social engagement, weight loss and cognition.  Checklist reviewing preventive services available has been given to the patient.  Reviewed patient's diet, addressing concerns and/or questions.   Updated plan of care.  Patient reported difficulty with activities of daily living were addressed today.The patient was provided with written information regarding signs of hearing loss.       Follow-up  Return in about 1 month (around 6/19/2025) for fasting lab only recheck.    Charles Pierson is a 69 year old, presenting for the following:  Physical        5/19/2025    10:52 AM   Additional Questions   Roomed by Vicki MAE CMA   Accompanied by Self         5/19/2025    10:52 AM   Patient Reported Additional Medications   Patient reports taking the following new medications None           HPI  Patient also presents to follow-up diabetes. Diabetic Review of Systems - Medication compliance:  compliant all of the time, Diabetic diet compliance:  compliant most of the time, Home glucose monitoring:  blood glucose record WAS NOT brought in today, Diabetic ROS: no polyuria or polydipsia, no chest pain, dyspnea or TIA's, no numbness, tingling or pain in extremities, no unusual visual symptoms, no hypoglycemia, no medication side effects noted.      Hypertension well controlled on current medications without side effects, chest pain, or dyspnea.     Patient also presents for follow-up of hypothyroidism, controlled on current medication.  Denies symptoms of hypo- or hyperthyroidism.      Discuss " weight loss.       Advance Care Planning    Patient states has Health Care Directive and will send to [employer].        5/14/2025   General Health   How would you rate your overall physical health? (!) FAIR   Feel stress (tense, anxious, or unable to sleep) Not at all         5/14/2025   Nutrition   Diet: Regular (no restrictions)         5/14/2025   Exercise   Days per week of moderate/strenous exercise 0 days   Average minutes spent exercising at this level 0 min   (!) EXERCISE CONCERN      5/14/2025   Social Factors   Worry food won't last until get money to buy more No   Food not last or not have enough money for food? No   Do you have housing? (Housing is defined as stable permanent housing and does not include staying outside in a car, in a tent, in an abandoned building, in an overnight shelter, or couch-surfing.) Yes   Are you worried about losing your housing? No   Lack of transportation? No   Unable to get utilities (heat,electricity)? No         5/14/2025   Fall Risk   Fallen 2 or more times in the past year? No   Trouble with walking or balance? No          5/14/2025   Activities of Daily Living- Home Safety   Needs help with the following daily activites Dressing   Safety concerns in the home None of the above         5/14/2025   Dental   Dentist two times every year? Yes         5/14/2025   Hearing Screening   Hearing concerns? (!) I FEEL THAT PEOPLE ARE MUMBLING OR NOT SPEAKING CLEARLY.    (!) I NEED TO ASK PEOPLE TO SPEAK UP OR REPEAT THEMSELVES.       Multiple values from one day are sorted in reverse-chronological order         5/14/2025   Driving Risk Screening   Patient/family members have concerns about driving No         5/14/2025   General Alertness/Fatigue Screening   Have you been more tired than usual lately? No         5/14/2025   Urinary Incontinence Screening   Bothered by leaking urine in past 6 months No         Today's PHQ-2 Score:       5/19/2025    10:49 AM   PHQ-2 ( 1999  [employer])   Q1: Little interest or pleasure in doing things 0   Q2: Feeling down, depressed or hopeless 0   PHQ-2 Score 0    Q1: Little interest or pleasure in doing things Not at all   Q2: Feeling down, depressed or hopeless Not at all   PHQ-2 Score 0       Patient-reported           5/14/2025   Substance Use   Alcohol more than 3/day or more than 7/wk Not Applicable   Do you have a current opioid prescription? No   How severe/bad is pain from 1 to 10? 2/10   Do you use any other substances recreationally? No     Social History     Tobacco Use    Smoking status: Never     Passive exposure: Past    Smokeless tobacco: Never    Tobacco comments:     no second hand exposure.  when young Dad smoked.   Vaping Use    Vaping status: Never Used   Substance Use Topics    Alcohol use: Not Currently     Comment: very, very little    Drug use: No           5/16/2024   LAST FHS-7 RESULTS   1st degree relative breast or ovarian cancer No   Any relative bilateral breast cancer No   Any male have breast cancer No   Any ONE woman have BOTH breast AND ovarian cancer No   Any woman with breast cancer before 50yrs No   2 or more relatives with breast AND/OR ovarian cancer No   2 or more relatives with breast AND/OR bowel cancer No        Mammogram Screening - Mammogram every 1-2 years updated in Health Maintenance based on mutual decision making    ASCVD Risk   The 10-year ASCVD risk score (Nirali ERICKSON, et al., 2019) is: 27.7%    Values used to calculate the score:      Age: 69 years      Sex: Female      Is Non- : No      Diabetic: Yes      Tobacco smoker: No      Systolic Blood Pressure: 137 mmHg      Is BP treated: Yes      HDL Cholesterol: 48 mg/dL      Total Cholesterol: 164 mg/dL            Reviewed and updated as needed this visit by Provider   Tobacco  Allergies  Meds  Problems  Med Hx  Surg Hx  Fam Hx     Sexual Activity          Patient Active Problem List   Diagnosis    HYPERLIPIDEMIA LDL  GOAL <70    Cataract of both eyes    Essential hypertension with goal blood pressure less than 140/90    Hypothyroidism, unspecified type    Type 2 diabetes mellitus with stage 3a chronic kidney disease, without long-term current use of insulin (H)    Class 3 severe obesity due to excess calories with serious comorbidity and body mass index (BMI) of 40.0 to 44.9 in adult (H)    CKD (chronic kidney disease) stage 3, GFR 30-59 ml/min (H)    History of colonic polyps    HX: breast cancer    Clavicle fracture     Past Surgical History:   Procedure Laterality Date    BIOPSY BREAST NEEDLE LOCALIZATION, BIOPSY NODE SENTINEL, COMBINED  2012    Procedure: COMBINED BIOPSY BREAST NEEDLE LOCALIZATION, BIOPSY NODE SENTINEL;  Left Breast Wire Locilized Lumpectomy and Sentinal Lymph Node Biopsy;  Surgeon: Jesus Vicente MD;  Location: [facility] OR    COLONOSCOPY  10/26/2011    Return in 1 yr for Polyp Surveillance    COLONOSCOPY  2012    Return for Colonoscopy in 3 yrs.Polyps    COMBINED CYSTOSCOPY, INSERT STENT URETER(S) Right 2017    Procedure: COMBINED CYSTOSCOPY, INSERT STENT URETER(S);  Cystoscopy Right retrograde pyelogram, Right ureteral Stent Placement;  Surgeon: Loida Sánchez MD;  Location: [facility] OR    COMBINED CYSTOSCOPY, RETROGRADES, URETEROSCOPY, LASER HOLMIUM LITHOTRIPSY URETER(S), INSERT STENT Right 2017    Procedure: COMBINED CYSTOSCOPY, RETROGRADES, URETEROSCOPY, LASER HOLMIUM LITHOTRIPSY URETER(S), INSERT STENT;  Cystoscopy, Right Ureteroscopy, Laser Lithotripsy, Right Stent Exchange ;  Surgeon: Ruth Boston MD;  Location: [facility] OR    LITHOTRIPSY      LUMPECTOMY BREAST      SURGICAL HISTORY OF -       exploratory laparotomy    SURGICAL HISTORY OF -       Right ovary removed    SURGICAL HISTORY OF -       breast biopsy    ZZC  DELIVERY ONLY      x 2       Social History     Tobacco Use    Smoking status: Never     Passive exposure: Past    Smokeless tobacco: Never     "Tobacco comments:     no second hand exposure.  when young Dad smoked.   Substance Use Topics    Alcohol use: Not Currently     Comment: very, very little     Family History   Problem Relation Age of Onset    Cancer - colorectal Mother     Thyroid Disease Mother     Colon Cancer Mother     Cancer Father         Lung    Lipids Father     Macular Degeneration Father     Respiratory Brother         losing hearing in 50's    Depression Brother          Current providers sharing in care for this patient include:  Patient Care Team:  Анна Rubio MD as PCP - General (Family Medicine)  Fracisco Lopes OD as Assigned Surgical Provider  Анна Rubio MD as Assigned PCP    The following health maintenance items are reviewed in Epic and correct as of today:  Health Maintenance   Topic Date Due    RSV VACCINE (1 - Risk 60-74 years 1-dose series) Never done    LIPID  05/06/2025    DIABETIC FOOT EXAM  05/06/2025    HEMOGLOBIN  01/29/2025    TSH W/FREE T4 REFLEX  05/06/2025    MAMMO SCREENING  05/16/2025    COVID-19 Vaccine (8 - 2024-25 season) 06/10/2025    A1C  06/13/2025    MICROALBUMIN  10/21/2025    ANNUAL REVIEW OF HM ORDERS  03/04/2026    BMP  03/13/2026    MEDICARE ANNUAL WELLNESS VISIT  05/19/2026    EYE EXAM  05/19/2026    FALL RISK ASSESSMENT  05/19/2026    DEXA  05/03/2028    COLORECTAL CANCER SCREENING  12/15/2028    ADVANCE CARE PLANNING  05/19/2030    DTAP/TDAP/TD IMMUNIZATION (3 - Td or Tdap) 12/10/2031    HEPATITIS C SCREENING  Completed    PHQ-2 (once per calendar year)  Completed    INFLUENZA VACCINE  Completed    Pneumococcal Vaccine: 50+ Years  Completed    URINALYSIS  Completed    ZOSTER IMMUNIZATION  Completed    HPV IMMUNIZATION  Aged Out    MENINGITIS IMMUNIZATION  Aged Out            Objective    Exam  /88 (BP Location: Right arm, Patient Position: Sitting, Cuff Size: Adult Small)   Pulse 73   Temp 97.5  F (36.4  C) (Oral)   Resp 18   Ht 1.555 m (5' 1.22\")   Wt 98.4 " "kg (217 lb)   SpO2 94%   BMI 40.71 kg/m     Estimated body mass index is 40.71 kg/m  as calculated from the following:    Height as of this encounter: 1.555 m (5' 1.22\").    Weight as of this encounter: 98.4 kg (217 lb).    Physical Exam  GENERAL: alert and no distress  EYES: Eyes grossly normal to inspection, PERRL and conjunctivae and sclerae normal  HENT: ear canals and TM's normal, nose and mouth without ulcers or lesions  NECK: no adenopathy, no asymmetry, masses, or scars  RESP: lungs clear to auscultation - no rales, rhonchi or wheezes  CV: regular rate and rhythm, normal S1 S2, no S3 or S4, no murmur, click or rub, no peripheral edema  MS: no gross musculoskeletal defects noted, no edema  SKIN: no suspicious lesions or rashes  NEURO: Normal strength and tone, mentation intact and speech normal  PSYCH: mentation appears normal, affect normal/bright  Diabetic foot examination declined by patient         5/19/2025   Mini Cog   Clock Draw Score 2 Normal   3 Item Recall 3 objects recalled   Mini Cog Total Score 5              Signed Electronically by: Анна Rubio MD    "

## 2025-05-19 NOTE — PATIENT INSTRUCTIONS
Patient educated on importance of good blood sugar control.  Letter sent to primary care provider with diabetic eye exam report.     You have the start of mild cataracts.  You may notice some blurred vision or glare with night driving.  It is important that you wear good sunglasses to protect your eyes from the ultraviolet light from the sun.     Updated glasses prescription provided today. Optional to fill.     Return in 1 year for a comprehensive eye exam, or sooner if needed.      The effects of the dilating drops last for 4- 6 hours.  You will be more sensitive to light and vision will be blurry up close.  Mydriatic sunglasses were given if needed.     Fracisco Lopes, OD  [facility]  [address] [place] [address]. [place]  [id number]    [phone]

## 2025-05-19 NOTE — LETTER
5/19/2025      Kenna Miranda  [address] [place] [address]      Dear Colleague,    Thank you [facility], [facility]na Miranda[facility] Please see a copy of my visit note below.    Chief Complaint   Patient presents with     Diabetic Eye Exam        Chief Complaint(s) and History of Present Illness(es)       Diabetic Eye Exam              Diabetes Type: Type 2 and taking oral medications    Duration: years    Blood Sugars: is controlled                   Lab Results   Component Value Date    A1C 6.7 03/13/2025    A1C 6.5 10/21/2024    A1C 6.8 05/06/2024    A1C 6.8 01/29/2024    A1C 7.9 10/24/2023    A1C 6.7 11/10/2020    A1C 7.1 09/17/2019    A1C 11.4 06/20/2019    A1C 8.6 09/18/2018    A1C 6.9 09/05/2017       Last Eye Exam: 05/16/2024  Dilated Previously: Yes    What are you currently using to see?  Glasses FT bifocal - wears full time    Distance Vision Acuity: Satisfied with vision    Near Vision Acuity: Satisfied with vision while reading and using computer with glasses    Eye Comfort: good  Do you use eye drops? : No  Occupation or Hobbies: Retired    Katherine Pena    Optometric Assistant       Medical, surgical and family histories reviewed and updated 5/19/2025.       OBJECTIVE: See Ophthalmology exam    ASSESSMENT:    ICD-10-CM    1. Encounter for examination of eyes and vision with abnormal findings  Z01.01       2. Type 2 diabetes mellitus without retinopathy (H)  E11.9       3. Nuclear age-related cataract, both eyes  H25.13       4. Dermatochalasis of both upper eyelids  H02.831     H02.834       5. Anisometropia  H52.31       6. Myopia, right  H52.11       7. Hypermetropia, left  H52.02       8. Regular astigmatism of both eyes  H52.223       9. Presbyopia  H52.4           PLAN:    Kenna ALHAJI Ruben aware  eye exam results will be sent to Анна Rubio.  Patient Instructions   Patient educated on importance of good blood  sugar control.  Letter sent to primary care provider with diabetic eye exam report.     You have the start of mild cataracts.  You may notice some blurred vision or glare with night driving.  It is important that you wear good sunglasses to protect your eyes from the ultraviolet light from the sun.     Updated glasses prescription provided today. Optional to fill.     Return in 1 year for a comprehensive eye exam, or sooner if needed.      The effects of the dilating drops last for 4- 6 hours.  You will be more sensitive to light and vision will be blurry up close.  Mydriatic sunglasses were given if needed.     Fracisco Lopes OD  [address] [place]  [address] [phone]           Again, thank you for allowing me to participate in the care of your patient.        Sincerely,        Fracisco Lopes OD    Electronically signed

## 2025-06-22 ENCOUNTER — HEALTH MAINTENANCE LETTER (OUTPATIENT)
Age: 70
End: 2025-06-22

## 2025-06-24 ENCOUNTER — TRANSFERRED RECORDS (OUTPATIENT)
Dept: HEALTH INFORMATION MANAGEMENT | Facility: CLINIC | Age: 70
End: 2025-06-24
Payer: COMMERCIAL

## 2025-07-29 ENCOUNTER — TRANSFERRED RECORDS (OUTPATIENT)
Dept: HEALTH INFORMATION MANAGEMENT | Facility: CLINIC | Age: 70
End: 2025-07-29
Payer: COMMERCIAL

## 2025-09-03 DIAGNOSIS — E78.5 HYPERLIPIDEMIA LDL GOAL <100: ICD-10-CM

## 2025-09-03 RX ORDER — ATORVASTATIN CALCIUM 20 MG/1
20 TABLET, FILM COATED ORAL
Qty: 24 TABLET | Refills: 0 | Status: SHIPPED | OUTPATIENT
Start: 2025-09-04

## (undated) DEVICE — SUCTION MANIFOLD DORNOCH ULTRA CART UL-CL500

## (undated) DEVICE — LINEN TOWEL PACK X5 5464

## (undated) DEVICE — CATH URETERAL OPEN END 05FR 70CM 020015

## (undated) DEVICE — ADAPTER CATH CHECK-FLO 9FR FLL 050885 G15476

## (undated) DEVICE — GUIDEWIRE SENSOR DUAL FLEX STR 0.035"X150CM M0066703080

## (undated) DEVICE — SPECIMEN CONTAINER 5OZ STERILE 2600SA

## (undated) DEVICE — SYR 10ML LL W/O NDL

## (undated) DEVICE — PACK CYSTO UMMC CUSTOM

## (undated) DEVICE — GLOVE PROTEXIS MICRO 7.0  2D73PM70

## (undated) DEVICE — SOL NACL 0.9% IRRIG 1000ML BOTTLE 2F7124

## (undated) DEVICE — LASER FIBER HOLMIUM FLEXIVA 200UM M0068403910 840-391

## (undated) DEVICE — ESU GROUND PAD ADULT W/CORD E7507

## (undated) DEVICE — PAD CHUX UNDERPAD 23X24" 7136

## (undated) DEVICE — DRAPE C-ARM W/STRAPS 42X72" 07-CA104

## (undated) DEVICE — GLOVE PROTEXIS POWDER FREE SMT 6.5  2D72PT65X

## (undated) DEVICE — BASKET NITINOL TIPLESS HALO  1.5FRX120CM 554120

## (undated) DEVICE — SOL WATER IRRIG 1000ML BOTTLE 2F7114

## (undated) DEVICE — SHEATH URETERAL ACCESS NAVIGATOR HD 12/14FRX36CM M0062502250

## (undated) DEVICE — SOL NACL 0.9% IRRIG 3000ML BAG 2B7477

## (undated) DEVICE — CATH URETERAL DL 6FR FLEX-TIP 10FRX50CM G17323 AQ-022610

## (undated) DEVICE — GOWN LG DISP 9515

## (undated) DEVICE — GLOVE ESTEEM BLUE W/NEU-THERA 7.5  2D73PB75

## (undated) DEVICE — GOWN XLG DISP 9545

## (undated) RX ORDER — ONDANSETRON 2 MG/ML
INJECTION INTRAMUSCULAR; INTRAVENOUS
Status: DISPENSED
Start: 2017-07-31

## (undated) RX ORDER — ALBUTEROL SULFATE 90 UG/1
AEROSOL, METERED RESPIRATORY (INHALATION)
Status: DISPENSED
Start: 2017-07-01

## (undated) RX ORDER — CEFAZOLIN SODIUM 2 G/100ML
INJECTION, SOLUTION INTRAVENOUS
Status: DISPENSED
Start: 2017-07-31

## (undated) RX ORDER — SCOLOPAMINE TRANSDERMAL SYSTEM 1 MG/1
PATCH, EXTENDED RELEASE TRANSDERMAL
Status: DISPENSED
Start: 2017-07-01

## (undated) RX ORDER — ESMOLOL HYDROCHLORIDE 10 MG/ML
INJECTION INTRAVENOUS
Status: DISPENSED
Start: 2017-07-31

## (undated) RX ORDER — EPHEDRINE SULFATE 50 MG/ML
INJECTION, SOLUTION INTRAMUSCULAR; INTRAVENOUS; SUBCUTANEOUS
Status: DISPENSED
Start: 2017-07-01

## (undated) RX ORDER — CALCIUM CHLORIDE 100 MG/ML
INJECTION INTRAVENOUS; INTRAVENTRICULAR
Status: DISPENSED
Start: 2017-07-01

## (undated) RX ORDER — FENTANYL CITRATE 50 UG/ML
INJECTION, SOLUTION INTRAMUSCULAR; INTRAVENOUS
Status: DISPENSED
Start: 2017-07-01

## (undated) RX ORDER — ALBUMIN, HUMAN INJ 5% 5 %
SOLUTION INTRAVENOUS
Status: DISPENSED
Start: 2017-07-01

## (undated) RX ORDER — METOPROLOL TARTRATE 1 MG/ML
INJECTION, SOLUTION INTRAVENOUS
Status: DISPENSED
Start: 2017-07-31

## (undated) RX ORDER — PHENYLEPHRINE HCL IN 0.9% NACL 1 MG/10 ML
SYRINGE (ML) INTRAVENOUS
Status: DISPENSED
Start: 2017-07-01

## (undated) RX ORDER — CEFAZOLIN SODIUM 1 G/3ML
INJECTION, POWDER, FOR SOLUTION INTRAMUSCULAR; INTRAVENOUS
Status: DISPENSED
Start: 2017-07-01

## (undated) RX ORDER — FENTANYL CITRATE 50 UG/ML
INJECTION, SOLUTION INTRAMUSCULAR; INTRAVENOUS
Status: DISPENSED
Start: 2017-07-31

## (undated) RX ORDER — CIPROFLOXACIN 500 MG/1
TABLET, FILM COATED ORAL
Status: DISPENSED
Start: 2017-08-11

## (undated) RX ORDER — ONDANSETRON 2 MG/ML
INJECTION INTRAMUSCULAR; INTRAVENOUS
Status: DISPENSED
Start: 2017-07-01

## (undated) RX ORDER — PROPOFOL 10 MG/ML
INJECTION, EMULSION INTRAVENOUS
Status: DISPENSED
Start: 2017-07-01

## (undated) RX ORDER — DEXAMETHASONE SODIUM PHOSPHATE 4 MG/ML
INJECTION, SOLUTION INTRA-ARTICULAR; INTRALESIONAL; INTRAMUSCULAR; INTRAVENOUS; SOFT TISSUE
Status: DISPENSED
Start: 2017-07-31

## (undated) RX ORDER — SODIUM CHLORIDE 9 MG/ML
INJECTION, SOLUTION INTRAVENOUS
Status: DISPENSED
Start: 2017-07-01

## (undated) RX ORDER — LIDOCAINE HYDROCHLORIDE 20 MG/ML
INJECTION, SOLUTION EPIDURAL; INFILTRATION; INTRACAUDAL; PERINEURAL
Status: DISPENSED
Start: 2017-07-01

## (undated) RX ORDER — SCOLOPAMINE TRANSDERMAL SYSTEM 1 MG/1
PATCH, EXTENDED RELEASE TRANSDERMAL
Status: DISPENSED
Start: 2017-07-31